# Patient Record
Sex: MALE | Race: WHITE | NOT HISPANIC OR LATINO | Employment: FULL TIME | URBAN - METROPOLITAN AREA
[De-identification: names, ages, dates, MRNs, and addresses within clinical notes are randomized per-mention and may not be internally consistent; named-entity substitution may affect disease eponyms.]

---
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
## 2018-01-12 ENCOUNTER — HOSPITAL ENCOUNTER (OUTPATIENT)
Dept: LAB | Facility: MEDICAL CENTER | Age: 18
End: 2018-01-12
Attending: NURSE PRACTITIONER
Payer: COMMERCIAL

## 2018-01-12 LAB
ALBUMIN SERPL BCP-MCNC: 5.1 G/DL (ref 3.2–4.9)
ALBUMIN/GLOB SERPL: 2 G/DL
ALP SERPL-CCNC: 75 U/L (ref 30–99)
ALT SERPL-CCNC: 77 U/L (ref 2–50)
ANION GAP SERPL CALC-SCNC: 7 MMOL/L (ref 0–11.9)
AST SERPL-CCNC: 35 U/L (ref 12–45)
BILIRUB SERPL-MCNC: 0.6 MG/DL (ref 0.1–1.5)
BUN SERPL-MCNC: 20 MG/DL (ref 8–22)
CALCIUM SERPL-MCNC: 9.6 MG/DL (ref 8.5–10.5)
CHLORIDE SERPL-SCNC: 100 MMOL/L (ref 96–112)
CHOLEST SERPL-MCNC: 230 MG/DL (ref 100–199)
CO2 SERPL-SCNC: 30 MMOL/L (ref 20–33)
CREAT SERPL-MCNC: 0.79 MG/DL (ref 0.5–1.4)
CREAT UR-MCNC: 404.8 MG/DL
GLOBULIN SER CALC-MCNC: 2.6 G/DL (ref 1.9–3.5)
GLUCOSE SERPL-MCNC: 216 MG/DL (ref 65–99)
HDLC SERPL-MCNC: 40 MG/DL
LDLC SERPL CALC-MCNC: 172 MG/DL
MICROALBUMIN UR-MCNC: 1 MG/DL
MICROALBUMIN/CREAT UR: 2 MG/G (ref 0–30)
POTASSIUM SERPL-SCNC: 4 MMOL/L (ref 3.6–5.5)
PROT SERPL-MCNC: 7.7 G/DL (ref 6–8.2)
SODIUM SERPL-SCNC: 137 MMOL/L (ref 135–145)
T4 FREE SERPL-MCNC: 1.11 NG/DL (ref 0.53–1.43)
TRIGL SERPL-MCNC: 90 MG/DL (ref 0–149)
TSH SERPL DL<=0.005 MIU/L-ACNC: 1.71 UIU/ML (ref 0.38–5.33)

## 2018-01-12 PROCEDURE — 84439 ASSAY OF FREE THYROXINE: CPT

## 2018-01-12 PROCEDURE — 80061 LIPID PANEL: CPT

## 2018-01-12 PROCEDURE — 80053 COMPREHEN METABOLIC PANEL: CPT

## 2018-01-12 PROCEDURE — 82043 UR ALBUMIN QUANTITATIVE: CPT

## 2018-01-12 PROCEDURE — 84443 ASSAY THYROID STIM HORMONE: CPT

## 2018-01-12 PROCEDURE — 36415 COLL VENOUS BLD VENIPUNCTURE: CPT

## 2018-01-12 PROCEDURE — 82570 ASSAY OF URINE CREATININE: CPT

## 2018-05-14 ENCOUNTER — HOSPITAL ENCOUNTER (EMERGENCY)
Facility: MEDICAL CENTER | Age: 18
End: 2018-05-15
Attending: EMERGENCY MEDICINE
Payer: COMMERCIAL

## 2018-05-14 DIAGNOSIS — R11.2 NON-INTRACTABLE VOMITING WITH NAUSEA, UNSPECIFIED VOMITING TYPE: ICD-10-CM

## 2018-05-14 DIAGNOSIS — E86.0 DEHYDRATION: ICD-10-CM

## 2018-05-14 LAB
BASOPHILS # BLD AUTO: 0.6 % (ref 0–1.8)
BASOPHILS # BLD: 0.07 K/UL (ref 0–0.12)
EOSINOPHIL # BLD AUTO: 0.1 K/UL (ref 0–0.51)
EOSINOPHIL NFR BLD: 0.8 % (ref 0–6.9)
ERYTHROCYTE [DISTWIDTH] IN BLOOD BY AUTOMATED COUNT: 37.6 FL (ref 35.9–50)
HCT VFR BLD AUTO: 47.3 % (ref 42–52)
HGB BLD-MCNC: 15.9 G/DL (ref 14–18)
IMM GRANULOCYTES # BLD AUTO: 0.22 K/UL (ref 0–0.11)
IMM GRANULOCYTES NFR BLD AUTO: 1.8 % (ref 0–0.9)
LYMPHOCYTES # BLD AUTO: 0.51 K/UL (ref 1–4.8)
LYMPHOCYTES NFR BLD: 4.3 % (ref 22–41)
MCH RBC QN AUTO: 28.6 PG (ref 27–33)
MCHC RBC AUTO-ENTMCNC: 33.6 G/DL (ref 33.7–35.3)
MCV RBC AUTO: 85.2 FL (ref 81.4–97.8)
MONOCYTES # BLD AUTO: 0.52 K/UL (ref 0–0.85)
MONOCYTES NFR BLD AUTO: 4.4 % (ref 0–13.4)
NEUTROPHILS # BLD AUTO: 10.5 K/UL (ref 1.82–7.42)
NEUTROPHILS NFR BLD: 88.1 % (ref 44–72)
NRBC # BLD AUTO: 0 K/UL
NRBC BLD-RTO: 0 /100 WBC
PLATELET # BLD AUTO: 249 K/UL (ref 164–446)
PMV BLD AUTO: 10.9 FL (ref 9–12.9)
RBC # BLD AUTO: 5.55 M/UL (ref 4.7–6.1)
WBC # BLD AUTO: 11.9 K/UL (ref 4.8–10.8)

## 2018-05-14 PROCEDURE — 99284 EMERGENCY DEPT VISIT MOD MDM: CPT

## 2018-05-14 PROCEDURE — 36415 COLL VENOUS BLD VENIPUNCTURE: CPT

## 2018-05-14 PROCEDURE — 80053 COMPREHEN METABOLIC PANEL: CPT

## 2018-05-14 PROCEDURE — 83690 ASSAY OF LIPASE: CPT

## 2018-05-14 PROCEDURE — 700105 HCHG RX REV CODE 258: Performed by: EMERGENCY MEDICINE

## 2018-05-14 PROCEDURE — 85025 COMPLETE CBC W/AUTO DIFF WBC: CPT

## 2018-05-14 PROCEDURE — 96374 THER/PROPH/DIAG INJ IV PUSH: CPT

## 2018-05-14 PROCEDURE — 700111 HCHG RX REV CODE 636 W/ 250 OVERRIDE (IP): Performed by: EMERGENCY MEDICINE

## 2018-05-14 RX ORDER — SODIUM CHLORIDE 9 MG/ML
1000 INJECTION, SOLUTION INTRAVENOUS ONCE
Status: COMPLETED | OUTPATIENT
Start: 2018-05-14 | End: 2018-05-15

## 2018-05-14 RX ORDER — ONDANSETRON 2 MG/ML
4 INJECTION INTRAMUSCULAR; INTRAVENOUS ONCE
Status: COMPLETED | OUTPATIENT
Start: 2018-05-14 | End: 2018-05-14

## 2018-05-14 RX ADMIN — ONDANSETRON HYDROCHLORIDE 4 MG: 2 INJECTION, SOLUTION INTRAMUSCULAR; INTRAVENOUS at 23:33

## 2018-05-14 RX ADMIN — SODIUM CHLORIDE 1000 ML: 9 INJECTION, SOLUTION INTRAVENOUS at 23:33

## 2018-05-15 VITALS
HEART RATE: 89 BPM | OXYGEN SATURATION: 99 % | HEIGHT: 67 IN | BODY MASS INDEX: 24.36 KG/M2 | RESPIRATION RATE: 16 BRPM | WEIGHT: 155.2 LBS | DIASTOLIC BLOOD PRESSURE: 96 MMHG | SYSTOLIC BLOOD PRESSURE: 150 MMHG | TEMPERATURE: 100.2 F

## 2018-05-15 LAB
ALBUMIN SERPL BCP-MCNC: 4.6 G/DL (ref 3.2–4.9)
ALBUMIN/GLOB SERPL: 1.4 G/DL
ALP SERPL-CCNC: 75 U/L (ref 30–99)
ALT SERPL-CCNC: 33 U/L (ref 2–50)
ANION GAP SERPL CALC-SCNC: 11 MMOL/L (ref 0–11.9)
AST SERPL-CCNC: 23 U/L (ref 12–45)
BILIRUB SERPL-MCNC: 1 MG/DL (ref 0.1–1.5)
BUN SERPL-MCNC: 16 MG/DL (ref 8–22)
CALCIUM SERPL-MCNC: 9.3 MG/DL (ref 8.5–10.5)
CHLORIDE SERPL-SCNC: 98 MMOL/L (ref 96–112)
CO2 SERPL-SCNC: 25 MMOL/L (ref 20–33)
CREAT SERPL-MCNC: 0.8 MG/DL (ref 0.5–1.4)
GLOBULIN SER CALC-MCNC: 3.2 G/DL (ref 1.9–3.5)
GLUCOSE SERPL-MCNC: 271 MG/DL (ref 65–99)
LIPASE SERPL-CCNC: 11 U/L (ref 11–82)
POTASSIUM SERPL-SCNC: 4.2 MMOL/L (ref 3.6–5.5)
PROT SERPL-MCNC: 7.8 G/DL (ref 6–8.2)
SODIUM SERPL-SCNC: 134 MMOL/L (ref 135–145)

## 2018-05-15 PROCEDURE — 700105 HCHG RX REV CODE 258: Performed by: EMERGENCY MEDICINE

## 2018-05-15 RX ORDER — ONDANSETRON 8 MG/1
8 TABLET, ORALLY DISINTEGRATING ORAL EVERY 8 HOURS PRN
Qty: 15 TAB | Refills: 0 | Status: SHIPPED | OUTPATIENT
Start: 2018-05-15 | End: 2019-12-14

## 2018-05-15 RX ORDER — SODIUM CHLORIDE 9 MG/ML
1000 INJECTION, SOLUTION INTRAVENOUS ONCE
Status: COMPLETED | OUTPATIENT
Start: 2018-05-15 | End: 2018-05-15

## 2018-05-15 RX ADMIN — SODIUM CHLORIDE 1000 ML: 9 INJECTION, SOLUTION INTRAVENOUS at 01:15

## 2018-05-15 NOTE — ED NOTES
Pt ambulated back to G [facility] with steady gait with mom at side. Pt thinks last time he threw up was around 2100 tonight.

## 2018-05-15 NOTE — ED TRIAGE NOTES
"Martinez Smith  17 y.o. male  Chief Complaint   Patient presents with   • Vomiting     \"I started throwing up this morning, I was fine for a few hours, I ate something about 6pm and haven't stopped since.\" HX type 1DM, reports BS slowly rising (203 at this time was 180 before leaving home). Endocrinologist Dr. Annabella Smith.         Pt amb to triage with steady gait for above complaint. HX celiacs as well.     Pt is alert and oriented, speaking in full sentences, follows commands and responds appropriately to questions. Pt is pale at this time. Resp are even and unlabored.    Pt placed in lobby. Pt educated on triage process. Pt encouraged to alert staff for any changes.    "

## 2018-05-15 NOTE — ED PROVIDER NOTES
"ED Provider Note    Scribed for Britney Barragan M.D. by Wandy Bailey. 5/14/2018, 11:00 PM.    Primary care provider: Gregory Grande M.D.  Means of arrival: walk-in  History obtained from: patient  History limited by: none    CHIEF COMPLAINT  Chief Complaint   Patient presents with   • Vomiting     \"I started throwing up this morning, I was fine for a few hours, I ate something about 6pm and haven't stopped since.\" HX type 1DM, reports BS slowly rising (203 at this time was 180 before leaving home). Endocrinologist Dr. Annabella Smith.         HPI  Martinez Smith is a 17 y.o. male with a history of celiac disease and diabetes who presents to the Emergency Department for evaluation after several episodes of vomiting beginning around 11AM this morning, resolving, then returning again at 6PM. Patient woke up with significant nausea and has been unable to tolerate most fluids or solids throughout the day. He does have a continuous blood sugar monitor and Insulin dispenser to manage his diabetes, with his blood sugars dropping throughout today secondary to the decreased PO. Patient has experienced nausea and vomiting similar to this in the past with gastroparesis. He reports feeling hungry but generally un well at this time, believing that he would not tolerate PO if he were to try.  No complaints of abdominal pain.    REVIEW OF SYSTEMS  Pertinent positives include nausea, vomiting. Pertinent negatives include no abdominal pain.  All other systems reviewed and negative.    PAST MEDICAL HISTORY   has a past medical history of Celiac disease and Diabetes type I (HCC).    SURGICAL HISTORY   has a past surgical history that includes hypospadias repair.    SOCIAL HISTORY  Social History   Substance Use Topics   • Smoking status: Never Smoker   • Smokeless tobacco: Never Used   • Alcohol use No      History   Drug Use No       FAMILY HISTORY  Family History   Problem Relation Age of Onset   • Arthritis Maternal " "Grandmother        CURRENT MEDICATIONS  No current facility-administered medications on file prior to encounter.      Current Outpatient Prescriptions on File Prior to Encounter   Medication Sig Dispense Refill   • insulin glargine (LANTUS SOLOSTAR) 100 UNIT/ML Solution Pen-injector injection Inject  as instructed as needed (backup for pump).     • Insulin Lispro (HUMALOG) 100 UNIT/ML Solution Cartridge Inject  as instructed. Refer to pump settings in download.     • Glucagon, rDNA, (GLUCAGON EMERGENCY) 1 MG Kit by Injection route.     • glucose blood (AFSHAN CONTOUR NEXT TEST) strip 1 Each by Other route as needed.     • Insulin Pen Needle (BD PEN NEEDLE ANDREW U/F) 32G X 4 MM Misc by Does not apply route.     • acetone, urine, test (KETOSTIX) strip by Does not apply route.     • Ketone Blood Test (PRECISION XTRA) Strip by In Vitro route.         ALLERGIES  Allergies   Allergen Reactions   • Acetaminophen Unspecified     \"Makes my CGM for DM unusable.\"        PHYSICAL EXAM  VITAL SIGNS: /96   Pulse (!) 123   Temp 37.9 °C (100.2 °F)   Resp 14   Ht 1.702 m (5' 7\")   Wt 70.4 kg (155 lb 3.3 oz)   SpO2 97%   BMI 24.31 kg/m²     Constitutional: Alert in no apparent distress.  HENT: No signs of trauma, Bilateral external ears normal, Nose normal.   Eyes: Pupils are equal and reactive, Conjunctiva normal, Non-icteric.   Neck: Normal range of motion, No tenderness, Supple, No stridor.   Cardiovascular: tachycardic, Regular hythm, no murmurs.   Thorax & Lungs: Normal breath sounds, No respiratory distress, No wheezing, No chest tenderness.   Abdomen: Bowel sounds normal, Soft, mild epigastric tenderness, No masses, No peritoneal signs.  Skin: Warm, Dry, No erythema, No rash.   Musculoskeletal:  No major deformities noted.   Neurologic: Alert, moving all extremities without difficulty, no focal deficits.    LABS  Labs Reviewed   CBC WITH DIFFERENTIAL - Abnormal; Notable for the following:        Result Value    WBC " 11.9 (*)     MCHC 33.6 (*)     Neutrophils-Polys 88.10 (*)     Lymphocytes 4.30 (*)     Immature Granulocytes 1.80 (*)     Neutrophils (Absolute) 10.50 (*)     Lymphs (Absolute) 0.51 (*)     Immature Granulocytes (abs) 0.22 (*)     All other components within normal limits   COMP METABOLIC PANEL - Abnormal; Notable for the following:     Sodium 134 (*)     Glucose 271 (*)     All other components within normal limits   LIPASE   ESTIMATED GFR     All labs reviewed by me.    IV Placement Procedure Note: (with ultrasound guidance)   The patient was consented all risks benefits and alternatives were discussed.   LOCATION: right brachial  POSITION: supine   PROCEDURE NOTE: Indication, poor access.   Using ultrasound guidance an 20-gauge IV was placed and had good flash and was able to cannulate fully and normal saline flowed easily. There is no localized infiltration. The line was secured by nursing staff.    COURSE & MEDICAL DECISION MAKING  Pertinent Labs & Imaging studies reviewed. (See chart for details)    Differential diagnoses include but are not limited to: DKA, gastroparesis, dehydration    11:00 PM - Patient seen and examined at bedside. He presents tachycardic with otherwise normal vital signs complaining of severe nausea and vomiting that has been intermittently present throughout the day, ultimately resulting in decreased PO and dropping blood sugars. Patient will be treated with 4mg iV Zofran. Ordered CBC, CMP, lipase to evaluate his symptoms. The patient will be resuscitated with NS IV for tachycardia. I informed the patient that we would rehydrate him and evaluate for anything acute with labs.    12:42 AM I re-evaluated patient at bedside. He reports feeling improved after some IV fluids. I explained that his labs appear normal at this time, and explained that he would most likely be discharge following 1 more bag of fluids and a p.o. challenge. Patient understands and agrees.     Response to IV fluids:  improved heart rate.      FINAL IMPRESSION  1. Non-intractable vomiting with nausea, unspecified vomiting type    2. Dehydration         This dictation has been created using voice recognition software and/or scribes. The accuracy of the dictation is limited by the abilities of the software and the expertise of the scribes. I expect there may be some errors of grammar and possibly content. I made every attempt to manually correct the errors within my dictation. However, errors related to voice recognition software and/or scribes may still exist and should be interpreted within the appropriate context.     Wandy GUTIERREZ (Scribe), am scribing for, and in the presence of, Britney Barragan M.D..    Electronically signed by: Wandy Bailey (Scribe), 5/14/2018    Britney GUTIERREZ M.D. personally performed the services described in this documentation, as scribed by Wandy Bailey in my presence, and it is both accurate and complete.    The note accurately reflects work and decisions made by me.  Britney Barragan  5/15/2018  12:57 AM

## 2018-05-15 NOTE — ED NOTES
Discharge instructions given to pt. Prescriptions handed to pt at bedside. Pt educated, verbalizes understanding. All belongings accounted for. Pt ambulated out of ED with mom at side and steady gait.

## 2018-11-13 ENCOUNTER — OFFICE VISIT (OUTPATIENT)
Dept: NEUROLOGY | Facility: MEDICAL CENTER | Age: 18
End: 2018-11-13
Payer: COMMERCIAL

## 2018-11-13 VITALS
HEART RATE: 98 BPM | RESPIRATION RATE: 16 BRPM | OXYGEN SATURATION: 98 % | WEIGHT: 158 LBS | TEMPERATURE: 96.6 F | SYSTOLIC BLOOD PRESSURE: 120 MMHG | BODY MASS INDEX: 25.39 KG/M2 | HEIGHT: 66 IN | DIASTOLIC BLOOD PRESSURE: 72 MMHG

## 2018-11-13 DIAGNOSIS — E10.9 TYPE 1 DIABETES MELLITUS WITHOUT COMPLICATION (HCC): ICD-10-CM

## 2018-11-13 DIAGNOSIS — M47.14 THORACIC MYELOPATHY: ICD-10-CM

## 2018-11-13 DIAGNOSIS — M54.30 BACK PAIN WITH SCIATICA: ICD-10-CM

## 2018-11-13 DIAGNOSIS — R29.898 WEAKNESS OF RIGHT LEG: ICD-10-CM

## 2018-11-13 DIAGNOSIS — M54.9 BACK PAIN WITH SCIATICA: ICD-10-CM

## 2018-11-13 DIAGNOSIS — R20.2 PARESTHESIAS: ICD-10-CM

## 2018-11-13 PROCEDURE — 99215 OFFICE O/P EST HI 40 MIN: CPT | Performed by: PSYCHIATRY & NEUROLOGY

## 2018-11-13 ASSESSMENT — PATIENT HEALTH QUESTIONNAIRE - PHQ9: CLINICAL INTERPRETATION OF PHQ2 SCORE: 0

## 2018-11-13 NOTE — PROGRESS NOTES
Chief Complaint   Patient presents with   • Follow-Up     leg weakness       Problem List Items Addressed This Visit     Type 1 diabetes mellitus without complication (HCC)      Other Visit Diagnoses     Paresthesias        Thoracic myelopathy        Relevant Orders    REFERRAL TO NEUROSURGERY    MR-THORACIC SPINE-W/O    Back pain with sciatica        Weakness of right leg              Interim history:  Martinez mSith returns in follow up with his mother. He still c/o middle back pain/ spasm that radiates to his right thigh up to his knee, that started when the school semester started in August. He says that it's probably aggravated by him walking around the [facility]. He is taking ibuprofen 200mg OTC, 1-2 tabs as needed, and reports that that is helping with the pain. He does take it judiciously and does not overdose himself with ibuprofen. He does not want any more pain meds.     He continues to complain of paresthesias and weakness on his right leg with the back pain. He wants to do a repeat MRI of his back and will consult another neurosurgery for 2nd opinion.     He has no problems with bladder or bowel control.      He denies any significant other symptoms.     Past medical history:   Past Medical History:   Diagnosis Date   • Celiac disease    • Diabetes type I (HCC)     insulin pump       Past surgical history:   Past Surgical History:   Procedure Laterality Date   • HYPOSPADIAS REPAIR         Family history:   Family History   Problem Relation Age of Onset   • Arthritis Maternal Grandmother        Social history:   Social History     Social History   • Marital status: Single     Spouse name: N/A   • Number of children: N/A   • Years of education: N/A     Occupational History   • Not on file.     Social History Main Topics   • Smoking status: Never Smoker   • Smokeless tobacco: Never Used   • Alcohol use No   • Drug use: No   • Sexual activity: Not on file     Other Topics Concern   • Not on file  "    Social History Narrative   • No narrative on file       Current medications:   Current Outpatient Prescriptions   Medication   • ondansetron (ZOFRAN ODT) 8 MG TABLET DISPERSIBLE   • insulin glargine (LANTUS SOLOSTAR) 100 UNIT/ML Solution Pen-injector injection   • Insulin Lispro (HUMALOG) 100 UNIT/ML Solution Cartridge   • glucose blood (AFSHAN CONTOUR NEXT TEST) strip   • Ketone Blood Test (PRECISION XTRA) Strip   • Glucagon, rDNA, (GLUCAGON EMERGENCY) 1 MG Kit   • Insulin Pen Needle (BD PEN NEEDLE ANDREW U/F) 32G X 4 MM Misc   • acetone, urine, test (KETOSTIX) strip     No current facility-administered medications for this visit.        Medication Allergy:  Allergies   Allergen Reactions   • Acetaminophen Unspecified     \"Makes my CGM for DM unusable.\"        Review of systems:   Constitutional: denies fever, night sweats, weight loss.   Eyes: denies acute vision change, eye pain or secretion.   Ears, Nose, Mouth, Throat: denies nasal secretion, nasal bleeding, difficulty swallowing, hearing loss, tinnitus, vertigo, ear pain, acute dental problems, oral ulcers or lesions.   Endocrine: denies recent weight changes, heat or cold intolerance, polyuria, polydypsia, polyphagia,abnormal hair growth.  Cardiovascular: denies new onset of chest pain, palpitations, syncope, or dyspnea of exertion.  Pulmonary: denies shortness of breath, new onset of cough, hemoptysis, wheezing, chest pain or flu-like symptoms.   GI: denies nausea, vomiting, diarrhea, GI bleeding, change in appetite, abdominal pain, and change in bowel habits.  : denies dysuria, urinary incontinence, hematuria.  Heme/oncology: denies history of easy bruising or bleeding. No history of cancer, DVTor PE.  Allergy/immunology: denies hives/urticaria, or itching.   Dermatologic: denies new rash, or new skin lesions.  Musculoskeletal:denies joint swelling or pain, muscle pain. + back pain.  Neurologic: denies headaches, acute visual changes, facial droopiness,  " "anesthesia, ataxia, change in speech or language, memory loss, abnormal movements, seizures, loss of consciousness, or episodes of confusion. +paresthesias and weakness on right leg.   Psychiatric: denies symptoms of depression, anxiety, hallucinations, mood swings or changes, suicidal or homicidal thoughts.     Physical examination:   Vitals:    11/13/18 1026   BP: 120/72   BP Location: Left arm   Patient Position: Sitting   BP Cuff Size: Adult   Pulse: 98   Resp: 16   Temp: 35.9 °C (96.6 °F)   TempSrc: Temporal   SpO2: 98%   Weight: 71.7 kg (158 lb)   Height: 1.676 m (5' 6\")     General: Patient in no acute distress, pleasant and cooperative.  HEENT: Normocephalic, no signs of acute trauma.    Neck: supple, no meningeal signs or carotid bruits. There is normal range of motion. No tenderness on exam.    Chest: clear to auscultation. No cough.    CV: RRR, no murmurs.    Skin: no signs of acute rashes or trauma.    Musculoskeletal: joints exhibit full range of motion. + Pain with middle back palpation.   Psychiatric: No hallucinatory behavior. Denies symptoms of depression or suicidal ideation. Mood and affect appear normal on exam.      NEUROLOGICAL EXAM:    Mental status, orientation: Awake, alert and fully oriented.    Speech and language: speech is clear and fluent. The patient is able to name, repeat and comprehend.    Memory: There is intact recollection of recent and remote events.    Cranial nerve exam: Pupils are 3-4 mm bilaterally and equally reactive to light and accommodation. Visual fields are intact by confrontation. There is no nystagmus on primary or secondary gaze. Intact full EOM in all directions of gaze. Face appears symmetric. Sensation in the face is intact to light touch. Uvula is midline. Palate elevates symmetrically. Tongue is midline and without any signs of tongue biting or fasciculations.Sternocleidomastoid muscles exhibit is normal strength bilaterally. Shoulder shrug is intact " bilaterally.    Motor exam: Strength is 5/5 in all extremities. Tone is normal. No abnormal movements were seen on exam.    Sensory exam reveals normal sense of light touch in all extremities. There is decreased sense of vibration on his feet.    Deep tendon reflexes:  2+ in the UE's, mildly brisk on his knees and ankles jerks. Plantar responses are flexor. There is no clonus.    Coordination: shows a normal finger-nose-finger. Normal rapidly alternating movements.    Gait: The patient was able to get up from seated position on first attempt without requiring assistance. Found to be steady when walking. Movements were fluid with normal arm swing. The patient was able to turn without difficulties or tendency to fall. Romberg examination unremarkable.   ANCILLARY DATA REVIEWED:     Lab Data Review:  Chart reviewed. CBC and CMP    Records reviewed:       Imaging:     MRI LS spine w/wo 10/3/16:  Sacralization of the L5 vertebral body, otherwise unremarkable MRI of the lumbar spine without and with contrast. This can be a pain generator.  (I personally reviewed images, no abnormalities other than described, no herniated discs).      MRI pelvis w/wo, right LS plexus protocol: 10/3/16  Transitional vertebral body at the lumbosacral junction.     MRI brain w/wo, 11/15/16  MRI OF THE BRAIN WITHOUT AND WITH CONTRAST WITHIN NORMAL LIMITS.     MRI c spine w/wo, 11/15/16  1. MRI OF THE CERVICAL SPINE WITHOUT AND WITH CONTRAST WITHIN NORMAL LIMITS. NO EVIDENCE OF DEMYELINATING DISEASE IN THE CERVICAL SPINAL CORD.     MRI t spine w/wo, 11/15/16  1.  T7-T8 small central disc protrusion with perhaps slight flattening of the ventral cord. Dorsal subarachnoid space remains intact without bernadette central stenosis.  2.  No evidence of myelopathic cord signal or abnormal enhancement in the thoracic spinal cord. No evidence of demyelinating disease.  (I personally reviewed images, either disc protrusion or extrusion at T7-8 with indentation  enough to deform spinal cord at that level although no clear cord changes abnormalities. I showed images to pt/mother)        NCS/EMG 8/31/16:  There is no electrodiagnostic suggestion of a widespread peripheral neuropathy (this test does not assess for small fiber neuropathy).    There is no electrodiagnostic suggestion of a right lumbosacral radiculopathy (L2-S2), however it cannot be fully ruled out on the basis of this test.    There is no electrodiagnostic suggestion of a lumbosacral plexopathy.    (Resuts d/w pt and mother).       ASSESSMENT AND PLAN:    1. Paresthesias    2. Thoracic myelopathy  - REFERRAL TO NEUROSURGERY  - MR-THORACIC SPINE-W/O; Future    3. Type 1 diabetes mellitus without complication (HCC)    4. Back pain with sciatica    5. Weakness of right leg        CLINICAL DISCUSSION:  There is no evidence for large or medium fiber peripheral polyneuropathy, mononeuropathy, plexopathy, or lumbar radiculopathy based on his last EMG result.   There is a family h/o MS but no evidence of MS on this patient.    Thoracic back pain and right leg paresthesia and weakness are thought due to T7-T8 thoracic myelopathy as seen on his previous MRI. Probably aggravated more with physical activities in school. He was previously referred to a neurosurgeon but was deemed insignificant by them and nothing was done. His sx improved until now. WIll repeat MRI t spine and pt was given referral to neurosurgery with Dr. Pete for 2nd opinion. He is okay with taking ibuprofen OTC. Does not want other pain meds. Discussed alternative options such as rest, cold/heat pack as needed. His neurological exam is intact.       FOLLOW-UP:   Return in about 3 months (around 2/13/2019).        EDUCATION AND COUNSELING:  -Discussed regular exercise program and prevention of cardiovascular disease, including stroke.   -Discussed healthy lifestyle, including: healthy diet (rich in fruits, vegetables, nuts and healthy oils); proper  hydration, and adequate sleep hygiene (allowing 7-8 hrs of overnight sleep).    The patient understands and agrees that due to the complexity of his/her diagnosis, results of any testing and further recommendations will typically be discussed/made during a face to face encounter in my office. The patient and/or family further understands it is their responsibility to keep proper follow up.       Severo Espinoza MD   Epilepsy and Neurodiagnostics.   Clinical  of Neurology [facility].   Diplomate in Neurology, Epilepsy, and Electrodiagnostic Medicine.   Office: [phone]  Fax: [phone]      o BILLING DOCUMENTATION:   Counseling:  I spent greater than 50% time face-to-face time of a total of 50 mins visit. Over 50% of the time of the visit today was spent on counseling and or coordination of care wtih the patient/family, with greater than 50% of the total time discussing:   o Diagnostic results, impressions, and/or recommended diagnostic studies, and coordination of care.   o Prognosis.  o Treatment recommendations, including risks, benefits, & alternatives.  o Instructions for treatment/management and/or follow-up.  o Importance of compliance with chosen treatment/management options.  o Risk factor modification.   o Patient & family education.  o Provided business card and/or instructions for follow-up & emergencies.

## 2018-11-28 ENCOUNTER — HOSPITAL ENCOUNTER (OUTPATIENT)
Dept: RADIOLOGY | Facility: MEDICAL CENTER | Age: 18
End: 2018-11-28
Attending: NURSE PRACTITIONER
Payer: COMMERCIAL

## 2018-11-28 DIAGNOSIS — M47.14 THORACIC MYELOPATHY: ICD-10-CM

## 2018-11-28 PROCEDURE — 72146 MRI CHEST SPINE W/O DYE: CPT

## 2019-02-02 ENCOUNTER — HOSPITAL ENCOUNTER (OUTPATIENT)
Dept: LAB | Facility: MEDICAL CENTER | Age: 19
End: 2019-02-02
Attending: NURSE PRACTITIONER
Payer: COMMERCIAL

## 2019-02-02 LAB
ALBUMIN SERPL BCP-MCNC: 4.8 G/DL (ref 3.2–4.9)
ALBUMIN/GLOB SERPL: 1.4 G/DL
ALP SERPL-CCNC: 79 U/L (ref 30–99)
ALT SERPL-CCNC: 19 U/L (ref 2–50)
ANION GAP SERPL CALC-SCNC: 9 MMOL/L (ref 0–11.9)
AST SERPL-CCNC: 19 U/L (ref 12–45)
BILIRUB SERPL-MCNC: 0.4 MG/DL (ref 0.1–1.5)
BUN SERPL-MCNC: 18 MG/DL (ref 8–22)
CALCIUM SERPL-MCNC: 9.5 MG/DL (ref 8.5–10.5)
CHLORIDE SERPL-SCNC: 101 MMOL/L (ref 96–112)
CHOLEST SERPL-MCNC: 223 MG/DL (ref 100–199)
CO2 SERPL-SCNC: 26 MMOL/L (ref 20–33)
CREAT SERPL-MCNC: 0.88 MG/DL (ref 0.5–1.4)
CREAT UR-MCNC: 361.3 MG/DL
GLOBULIN SER CALC-MCNC: 3.4 G/DL (ref 1.9–3.5)
GLUCOSE SERPL-MCNC: 255 MG/DL (ref 65–99)
HDLC SERPL-MCNC: 32 MG/DL
LDLC SERPL CALC-MCNC: 155 MG/DL
MICROALBUMIN UR-MCNC: 1.5 MG/DL
MICROALBUMIN/CREAT UR: 4 MG/G (ref 0–30)
POTASSIUM SERPL-SCNC: 4.5 MMOL/L (ref 3.6–5.5)
PROT SERPL-MCNC: 8.2 G/DL (ref 6–8.2)
SODIUM SERPL-SCNC: 136 MMOL/L (ref 135–145)
TRIGL SERPL-MCNC: 179 MG/DL (ref 0–149)
TSH SERPL DL<=0.005 MIU/L-ACNC: 1.57 UIU/ML (ref 0.38–5.33)

## 2019-02-02 PROCEDURE — 80061 LIPID PANEL: CPT

## 2019-02-02 PROCEDURE — 82570 ASSAY OF URINE CREATININE: CPT

## 2019-02-02 PROCEDURE — 36415 COLL VENOUS BLD VENIPUNCTURE: CPT

## 2019-02-02 PROCEDURE — 84443 ASSAY THYROID STIM HORMONE: CPT

## 2019-02-02 PROCEDURE — 82043 UR ALBUMIN QUANTITATIVE: CPT

## 2019-02-02 PROCEDURE — 80053 COMPREHEN METABOLIC PANEL: CPT

## 2019-02-08 ENCOUNTER — APPOINTMENT (OUTPATIENT)
Dept: RADIOLOGY | Facility: MEDICAL CENTER | Age: 19
End: 2019-02-08
Attending: NEUROLOGICAL SURGERY
Payer: COMMERCIAL

## 2019-02-19 ENCOUNTER — OFFICE VISIT (OUTPATIENT)
Dept: URGENT CARE | Facility: CLINIC | Age: 19
End: 2019-02-19
Payer: COMMERCIAL

## 2019-02-19 VITALS
TEMPERATURE: 98.1 F | BODY MASS INDEX: 25.49 KG/M2 | DIASTOLIC BLOOD PRESSURE: 80 MMHG | HEIGHT: 67 IN | OXYGEN SATURATION: 96 % | HEART RATE: 113 BPM | WEIGHT: 162.4 LBS | RESPIRATION RATE: 14 BRPM | SYSTOLIC BLOOD PRESSURE: 100 MMHG

## 2019-02-19 DIAGNOSIS — H66.002 ACUTE SUPPURATIVE OTITIS MEDIA OF LEFT EAR WITHOUT SPONTANEOUS RUPTURE OF TYMPANIC MEMBRANE, RECURRENCE NOT SPECIFIED: ICD-10-CM

## 2019-02-19 PROCEDURE — 99214 OFFICE O/P EST MOD 30 MIN: CPT | Performed by: FAMILY MEDICINE

## 2019-02-19 RX ORDER — AMOXICILLIN 500 MG/1
500 CAPSULE ORAL 3 TIMES DAILY
Qty: 30 CAP | Refills: 0 | Status: SHIPPED | OUTPATIENT
Start: 2019-02-19 | End: 2019-12-17 | Stop reason: CLARIF

## 2019-02-20 NOTE — PROGRESS NOTES
Subjective:     Martinez Smith is a 18 y.o. male who presents for Otalgia (started today); Pharyngitis (couple of days); and Cough (couple of days)    HPI  Pt presents for evaluation of a new problem   Pt with a cough for the past 3 days   Woke up this morning and lost voice   Both ears painful   Pain is constant and worse in left   Pain stays localized and does not radiate  +Nasal congestion   Cough is occasionally productive   Cough is constant and not worse at any time of day   Took ibuprofen, Zyrtec, and Mucinex     Review of Systems   Constitutional: Negative for fever.   HENT: Positive for congestion and ear pain. Negative for sore throat.    Respiratory: Positive for cough. Negative for shortness of breath.    Cardiovascular: Negative for chest pain.   Gastrointestinal: Negative for abdominal pain, diarrhea, nausea and vomiting.   Skin: Negative for rash.   Neurological: Negative for headaches.       PMH:  has a past medical history of Celiac disease and Diabetes type I (HCC).  MEDS:   Current Outpatient Prescriptions:   •  Insulin Lispro (HUMALOG) 100 UNIT/ML Solution Cartridge, Inject  as instructed. Refer to pump settings in download., Disp: , Rfl:   •  ondansetron (ZOFRAN ODT) 8 MG TABLET DISPERSIBLE, Take 1 Tab by mouth every 8 hours as needed., Disp: 15 Tab, Rfl: 0  •  insulin glargine (LANTUS SOLOSTAR) 100 UNIT/ML Solution Pen-injector injection, Inject  as instructed as needed (backup for pump)., Disp: , Rfl:   •  Glucagon, rDNA, (GLUCAGON EMERGENCY) 1 MG Kit, by Injection route., Disp: , Rfl:   •  glucose blood (FASHAN CONTOUR NEXT TEST) strip, 1 Each by Other route as needed., Disp: , Rfl:   •  Insulin Pen Needle (BD PEN NEEDLE ANDREW U/F) 32G X 4 MM Misc, by Does not apply route., Disp: , Rfl:   •  acetone, urine, test (KETOSTIX) strip, by Does not apply route., Disp: , Rfl:   •  Ketone Blood Test (PRECISION XTRA) Strip, by In Vitro route., Disp: , Rfl:   ALLERGIES:   Allergies   Allergen  "Reactions   • Acetaminophen Unspecified     \"Makes my CGM for DM unusable.\"      SURGHX:   Past Surgical History:   Procedure Laterality Date   • HYPOSPADIAS REPAIR       SOCHX:  reports that he has never smoked. He has never used smokeless tobacco. He reports that he does not drink alcohol or use drugs.  FH: Family history was reviewed, not contributing to acute illness     Objective:   /80 (BP Location: Left arm, Patient Position: Sitting, BP Cuff Size: Adult)   Pulse (!) 113   Temp 36.7 °C (98.1 °F)   Resp 14   Ht 1.702 m (5' 7\")   Wt 73.7 kg (162 lb 6.4 oz)   SpO2 96%   BMI 25.44 kg/m²     Physical Exam   Constitutional: He appears well-developed and well-nourished. No distress.   HENT:   Head: Normocephalic and atraumatic.   Right Ear: External ear normal.   Left Ear: External ear normal.   Nose: Nose normal.   Mouth/Throat: Oropharynx is clear and moist. No oropharyngeal exudate.   Left tympanic membrane erythematous with small purulent effusion   Eyes: Pupils are equal, round, and reactive to light. Conjunctivae and EOM are normal. Right eye exhibits no discharge. Left eye exhibits no discharge. No scleral icterus.   Neck: Normal range of motion. No tracheal deviation present.   Cardiovascular: Normal rate, regular rhythm and normal heart sounds.    Pulmonary/Chest: Effort normal and breath sounds normal. No respiratory distress. He has no wheezes. He has no rales.   Musculoskeletal: Normal range of motion.   Neurological: He is alert.   Skin: Skin is warm and dry. No rash noted. He is not diaphoretic.   Psychiatric: He has a normal mood and affect. His behavior is normal. Judgment and thought content normal.       Assessment/Plan:   Assessment    1. Acute suppurative otitis media of left ear without spontaneous rupture of tympanic membrane, recurrence not specified  Patient is a 18-year-old male with left-sided ear infection.  Will treat with amoxicillin.  Follow-up as needed.  - amoxicillin " (AMOXIL) 500 MG Cap; Take 1 Cap by mouth 3 times a day.  Dispense: 30 Cap; Refill: 0

## 2019-02-25 ASSESSMENT — ENCOUNTER SYMPTOMS
SHORTNESS OF BREATH: 0
HEADACHES: 0
COUGH: 1
FEVER: 0
DIARRHEA: 0
SORE THROAT: 0
NAUSEA: 0
VOMITING: 0
ABDOMINAL PAIN: 0

## 2019-05-23 ENCOUNTER — OFFICE VISIT (OUTPATIENT)
Dept: NEUROLOGY | Facility: MEDICAL CENTER | Age: 19
End: 2019-05-23
Payer: COMMERCIAL

## 2019-05-23 VITALS
WEIGHT: 163 LBS | TEMPERATURE: 96.7 F | SYSTOLIC BLOOD PRESSURE: 132 MMHG | HEIGHT: 66 IN | RESPIRATION RATE: 16 BRPM | DIASTOLIC BLOOD PRESSURE: 88 MMHG | OXYGEN SATURATION: 98 % | BODY MASS INDEX: 26.2 KG/M2 | HEART RATE: 100 BPM

## 2019-05-23 DIAGNOSIS — M47.14 THORACIC MYELOPATHY: ICD-10-CM

## 2019-05-23 DIAGNOSIS — R20.2 PARESTHESIAS: ICD-10-CM

## 2019-05-23 PROCEDURE — 99214 OFFICE O/P EST MOD 30 MIN: CPT | Performed by: PSYCHIATRY & NEUROLOGY

## 2019-05-23 ASSESSMENT — PATIENT HEALTH QUESTIONNAIRE - PHQ9: CLINICAL INTERPRETATION OF PHQ2 SCORE: 0

## 2019-05-24 NOTE — PROGRESS NOTES
Chief Complaint   Patient presents with   • Follow-Up     Thoracic myelopathy       Problem List Items Addressed This Visit     None      Visit Diagnoses     Thoracic myelopathy        Paresthesias              Interim history:  Martinez Smith returns in fu with his mother. He saw [facility] and was supposed to have Xray and MRI L spine but has not have done yet due to financial hardship. He is interested in scheduling it now.     His symptoms to the right lower extremity are actually better over the last few months. He states he has been walking more to school and feels much improve, only mild discomfort at times, no weakness.     Mood is great.     He is attending college.     Past medical history:   Past Medical History:   Diagnosis Date   • Celiac disease    • Diabetes type I (HCC)     insulin pump       Past surgical history:   Past Surgical History:   Procedure Laterality Date   • HYPOSPADIAS REPAIR         Family history:   Family History   Problem Relation Age of Onset   • Arthritis Maternal Grandmother        Social history:   Social History     Social History   • Marital status: Single     Spouse name: N/A   • Number of children: N/A   • Years of education: N/A     Occupational History   • Not on file.     Social History Main Topics   • Smoking status: Never Smoker   • Smokeless tobacco: Never Used   • Alcohol use No   • Drug use: No   • Sexual activity: Not on file     Other Topics Concern   • Not on file     Social History Narrative   • No narrative on file       Current medications:   Current Outpatient Prescriptions   Medication   • ondansetron (ZOFRAN ODT) 8 MG TABLET DISPERSIBLE   • insulin glargine (LANTUS SOLOSTAR) 100 UNIT/ML Solution Pen-injector injection   • Insulin Lispro (HUMALOG) 100 UNIT/ML Solution Cartridge   • Glucagon, rDNA, (GLUCAGON EMERGENCY) 1 MG Kit   • glucose blood (AFSHAN CONTOUR NEXT TEST) strip   • Insulin Pen Needle (BD PEN NEEDLE ANDREW U/F) 32G X 4 MM Misc   • acetone,  "urine, test (KETOSTIX) strip   • Ketone Blood Test (PRECISION XTRA) Strip   • amoxicillin (AMOXIL) 500 MG Cap     No current facility-administered medications for this visit.        Medication Allergy:  Allergies   Allergen Reactions   • Acetaminophen Unspecified     \"Makes my CGM for DM unusable.\"        Review of systems:   Constitutional: denies fever, night sweats, weight loss.   Eyes: denies acute vision change, eye pain or secretion.   Ears, Nose, Mouth, Throat: denies nasal secretion, nasal bleeding, difficulty swallowing, hearing loss, tinnitus, vertigo, ear pain, acute dental problems, oral ulcers or lesions.   Endocrine: denies recent weight changes, heat or cold intolerance, polyuria, polydypsia, polyphagia,abnormal hair growth.  Cardiovascular: denies new onset of chest pain, palpitations, syncope, or dyspnea of exertion.  Pulmonary: denies shortness of breath, new onset of cough, hemoptysis, wheezing, chest pain or flu-like symptoms.   GI: denies nausea, vomiting, diarrhea, GI bleeding, change in appetite, abdominal pain, and change in bowel habits.  : denies dysuria, urinary incontinence, hematuria.  Heme/oncology: denies history of easy bruising or bleeding. No history of cancer, DVTor PE.  Allergy/immunology: denies hives/urticaria, or itching.   Dermatologic: denies new rash, or new skin lesions.  Musculoskeletal:denies joint swelling or pain, muscle pain, neck and back pain. Neurologic: denies headaches, acute visual changes, facial droopiness, muscle weakness (focal or generalized), anesthesia, ataxia, change in speech or language, memory loss, abnormal movements, seizures, loss of consciousness, or episodes of confusion.   Psychiatric: denies symptoms of depression, anxiety, hallucinations, mood swings or changes, suicidal or homicidal thoughts.     Physical examination:   Vitals:    05/23/19 1027   BP: 132/88   BP Location: Left arm   Patient Position: Sitting   BP Cuff Size: Adult   Pulse: " "100   Resp: 16   Temp: 35.9 °C (96.7 °F)   TempSrc: Temporal   SpO2: 98%   Weight: 73.9 kg (163 lb)   Height: 1.676 m (5' 6\")     General: Patient in no acute distress, pleasant and cooperative.  HEENT: Normocephalic, no signs of acute trauma.   Neck: supple, no meningeal signs or carotid bruits. There is normal range of motion. No tenderness on exam.   Chest: clear to auscultation. No cough.   CV: RRR, no murmurs.   Skin: no signs of acute rashes or trauma.   Musculoskeletal: joints exhibit full range of motion, without any pain to palpation. There are no signs of joint or muscle swelling. There is no tenderness to deep palpation of muscles.   Psychiatric: No hallucinatory behavior. Denies symptoms of depression or suicidal ideation. Mood and affect appear normal on exam.     NEUROLOGICAL EXAM:   Mental status, orientation: Awake, alert and fully oriented.   Speech and language: speech is clear and fluent. The patient is able to name, repeat and comprehend.   Memory: There is intact recollection of recent and remote events.   Cranial nerve exam: Pupils are 3-4 mm bilaterally and equally reactive to light and accommodation. Visual fields are intact by confrontation. here is no nystagmus on primary or secondary gaze. Intact full EOM in all directions of gaze. Face appears symmetric. Sensation in the face is intact to light touch. Uvula is midline. Palate elevates symmetrically. Tongue is midline and without any signs of tongue biting or fasciculations. Sternocleidomastoid muscles exhibit is normal strength bilaterally. Shoulder shrug is intact bilaterally.   Motor exam: Strength is 5/5 in all extremities. Tone is normal. No abnormal movements were seen on exam.   Sensory exam reveals normal sense of light touch in all extremities.   Deep tendon reflexes:  2+ throughout. Plantar responses are flexor. There is no clonus.   Coordination: shows a normal finger-nose-finger. Normal rapidly alternating movements.   Gait: " The patient was able to get up from seated position on first attempt without requiring assistance. Found to be steady when walking. Movements were fluid with normal arm swing. The patient was able to turn without difficulties or tendency to fall. Romberg examination unremarkable.       ANCILLARY DATA REVIEWED:     Lab Data Review:  Reviewed in chart.     Records reviewed:   Chart reviewed.       Imaging:   MRI LS spine w/wo 10/3/16:  Sacralization of the L5 vertebral body, otherwise unremarkable MRI of the lumbar spine without and with contrast. This can be a pain generator.  (I personally reviewed images, no abnormalities other than described, no herniated discs).      MRI pelvis w/wo, right LS plexus protocol: 10/3/16  Transitional vertebral body at the lumbosacral junction.     MRI brain w/wo, 11/15/16  MRI OF THE BRAIN WITHOUT AND WITH CONTRAST WITHIN NORMAL LIMITS.     MRI c spine w/wo, 11/15/16  1. MRI OF THE CERVICAL SPINE WITHOUT AND WITH CONTRAST WITHIN NORMAL LIMITS. NO EVIDENCE OF DEMYELINATING DISEASE IN THE CERVICAL SPINAL CORD.     MRI t spine w/wo, 11/15/16  1.  T7-T8 small central disc protrusion with perhaps slight flattening of the ventral cord. Dorsal subarachnoid space remains intact without bernadette central stenosis.  2.  No evidence of myelopathic cord signal or abnormal enhancement in the thoracic spinal cord. No evidence of demyelinating disease.    MRI t spine w/wo, 11/29/18:  1. Dorsal epidural lipomatosis which does not cause significant compression of the thecal sac or cord.  2. Minimal central disc protrusion at the T7-8 level which has decreased in size since previous exam and does not cause significant cord compression.  3. Minimal right paracentral thoracic spondylosis at the T2-3 level.          NCS/EMG 8/31/16:  There is no electrodiagnostic suggestion of a widespread peripheral neuropathy (this test does not assess for small fiber neuropathy).    There is no electrodiagnostic  suggestion of a right lumbosacral radiculopathy (L2-S2), however it cannot be fully ruled out on the basis of this test.    There is no electrodiagnostic suggestion of a lumbosacral plexopathy.    (Resuts d/w pt and mother).         ASSESSMENT AND PLAN:    1. Thoracic myelopathy      2. Paresthesias        CLINICAL DISCUSSION:  There was no evidence for large or medium fiber peripheral polyneuropathy, mononeuropathy, plexopathy, or lumbar radiculopathy based on his last EMG result.   There is a family h/o MS but there was no evidence of MS on this patient.  Had thoracic back pain and right leg paresthesia and weakness. One possibility was due to finding of T7-T8 thoracic cord compression. Most recent MRI is improved. Pt's symptoms are much improved. Following with [facility], will proceed with imaging ordered by [facility].         FOLLOW-UP:   Return if symptoms worsen or fail to improve.          EDUCATION AND COUNSELING:  -Discussed regular exercise program and prevention of cardiovascular disease, including stroke.   -Discussed healthy lifestyle, including: healthy diet (rich in fruits, vegetables, nuts and healthy oils); proper hydration, and adequate sleep hygiene (allowing 7-8 hrs of overnight sleep).    The patient understands and agrees that due to the complexity of his/her diagnosis, results of any testing and further recommendations will typically be discussed/made during a face to face encounter in my office. The patient and/or family further understands it is their responsibility to keep proper follow up.     Pt and mother agree with plan.       Severo Espinoza MD   Epilepsy and Neurodiagnostics.   Clinical  of Neurology [facility].   Diplomate in Neurology, Epilepsy, and Electrodiagnostic Medicine.   Office: [phone]  Fax: [phone]      BILLING DOCUMENTATION:     I have performed physical exam and reviewed and updated ROS and plan today  5/23/2019. In review of that note, there are no new changes except as documented above.     Counseling:  I spent greater than 50% time face-to-face time of a total of 40 minutes visit. Over 50% of the time of the visit today was spent on counseling and or coordination of care wtih the patient and/or family, with greater than 50% of the total discussing my assessment and plan as stated above.

## 2019-07-11 ENCOUNTER — APPOINTMENT (OUTPATIENT)
Dept: RADIOLOGY | Facility: MEDICAL CENTER | Age: 19
End: 2019-07-11
Attending: NEUROLOGICAL SURGERY
Payer: COMMERCIAL

## 2019-07-13 ENCOUNTER — HOSPITAL ENCOUNTER (OUTPATIENT)
Dept: LAB | Facility: MEDICAL CENTER | Age: 19
End: 2019-07-13
Attending: FAMILY MEDICINE
Payer: COMMERCIAL

## 2019-07-13 LAB
25(OH)D3 SERPL-MCNC: 17 NG/ML (ref 30–100)
ALBUMIN SERPL BCP-MCNC: 4.9 G/DL (ref 3.2–4.9)
ALBUMIN/GLOB SERPL: 1.3 G/DL
ALP SERPL-CCNC: 92 U/L (ref 30–99)
ALT SERPL-CCNC: 27 U/L (ref 2–50)
ANION GAP SERPL CALC-SCNC: 5 MMOL/L (ref 0–11.9)
AST SERPL-CCNC: 20 U/L (ref 12–45)
BASOPHILS # BLD AUTO: 1.3 % (ref 0–1.8)
BASOPHILS # BLD: 0.1 K/UL (ref 0–0.12)
BILIRUB SERPL-MCNC: 0.5 MG/DL (ref 0.1–1.5)
BUN SERPL-MCNC: 13 MG/DL (ref 8–22)
CALCIUM SERPL-MCNC: 10 MG/DL (ref 8.5–10.5)
CHLORIDE SERPL-SCNC: 100 MMOL/L (ref 96–112)
CHOLEST SERPL-MCNC: 235 MG/DL (ref 100–199)
CO2 SERPL-SCNC: 28 MMOL/L (ref 20–33)
CREAT SERPL-MCNC: 0.83 MG/DL (ref 0.5–1.4)
CREAT UR-MCNC: 131.4 MG/DL
EOSINOPHIL # BLD AUTO: 0.69 K/UL (ref 0–0.51)
EOSINOPHIL NFR BLD: 8.8 % (ref 0–6.9)
ERYTHROCYTE [DISTWIDTH] IN BLOOD BY AUTOMATED COUNT: 40.5 FL (ref 35.9–50)
FASTING STATUS PATIENT QL REPORTED: NORMAL
FOLATE SERPL-MCNC: 5.9 NG/ML
GLOBULIN SER CALC-MCNC: 3.9 G/DL (ref 1.9–3.5)
GLUCOSE SERPL-MCNC: 331 MG/DL (ref 65–99)
HCT VFR BLD AUTO: 50.4 % (ref 42–52)
HDLC SERPL-MCNC: 38 MG/DL
HGB BLD-MCNC: 16.1 G/DL (ref 14–18)
IMM GRANULOCYTES # BLD AUTO: 0.06 K/UL (ref 0–0.11)
IMM GRANULOCYTES NFR BLD AUTO: 0.8 % (ref 0–0.9)
LDLC SERPL CALC-MCNC: 166 MG/DL
LYMPHOCYTES # BLD AUTO: 1.68 K/UL (ref 1–4.8)
LYMPHOCYTES NFR BLD: 21.5 % (ref 22–41)
MCH RBC QN AUTO: 28.3 PG (ref 27–33)
MCHC RBC AUTO-ENTMCNC: 31.9 G/DL (ref 33.7–35.3)
MCV RBC AUTO: 88.6 FL (ref 81.4–97.8)
MICROALBUMIN UR-MCNC: <0.7 MG/DL
MICROALBUMIN/CREAT UR: NORMAL MG/G (ref 0–30)
MONOCYTES # BLD AUTO: 0.57 K/UL (ref 0–0.85)
MONOCYTES NFR BLD AUTO: 7.3 % (ref 0–13.4)
NEUTROPHILS # BLD AUTO: 4.71 K/UL (ref 1.82–7.42)
NEUTROPHILS NFR BLD: 60.3 % (ref 44–72)
NRBC # BLD AUTO: 0 K/UL
NRBC BLD-RTO: 0 /100 WBC
PLATELET # BLD AUTO: 294 K/UL (ref 164–446)
PMV BLD AUTO: 11.7 FL (ref 9–12.9)
POTASSIUM SERPL-SCNC: 4.8 MMOL/L (ref 3.6–5.5)
PROT SERPL-MCNC: 8.8 G/DL (ref 6–8.2)
RBC # BLD AUTO: 5.69 M/UL (ref 4.7–6.1)
SODIUM SERPL-SCNC: 133 MMOL/L (ref 135–145)
T3FREE SERPL-MCNC: 4.61 PG/ML (ref 2.4–4.2)
T4 FREE SERPL-MCNC: 0.97 NG/DL (ref 0.53–1.43)
TRIGL SERPL-MCNC: 156 MG/DL (ref 0–149)
TSH SERPL DL<=0.005 MIU/L-ACNC: 1.61 UIU/ML (ref 0.38–5.33)
VIT B12 SERPL-MCNC: 878 PG/ML (ref 211–911)
WBC # BLD AUTO: 7.8 K/UL (ref 4.8–10.8)

## 2019-07-13 PROCEDURE — 80053 COMPREHEN METABOLIC PANEL: CPT

## 2019-07-13 PROCEDURE — 84481 FREE ASSAY (FT-3): CPT

## 2019-07-13 PROCEDURE — 82746 ASSAY OF FOLIC ACID SERUM: CPT

## 2019-07-13 PROCEDURE — 36415 COLL VENOUS BLD VENIPUNCTURE: CPT

## 2019-07-13 PROCEDURE — 84443 ASSAY THYROID STIM HORMONE: CPT

## 2019-07-13 PROCEDURE — 84439 ASSAY OF FREE THYROXINE: CPT

## 2019-07-13 PROCEDURE — 85025 COMPLETE CBC W/AUTO DIFF WBC: CPT

## 2019-07-13 PROCEDURE — 86800 THYROGLOBULIN ANTIBODY: CPT

## 2019-07-13 PROCEDURE — 82043 UR ALBUMIN QUANTITATIVE: CPT

## 2019-07-13 PROCEDURE — 80061 LIPID PANEL: CPT

## 2019-07-13 PROCEDURE — 82306 VITAMIN D 25 HYDROXY: CPT

## 2019-07-13 PROCEDURE — 82570 ASSAY OF URINE CREATININE: CPT

## 2019-07-13 PROCEDURE — 82607 VITAMIN B-12: CPT

## 2019-07-15 LAB — THYROGLOB AB SERPL-ACNC: <0.9 IU/ML (ref 0–4)

## 2019-07-18 ENCOUNTER — HOSPITAL ENCOUNTER (OUTPATIENT)
Dept: RADIOLOGY | Facility: MEDICAL CENTER | Age: 19
End: 2019-07-18
Attending: NEUROLOGICAL SURGERY
Payer: COMMERCIAL

## 2019-07-18 DIAGNOSIS — M54.5 LOW BACK PAIN, UNSPECIFIED BACK PAIN LATERALITY, UNSPECIFIED CHRONICITY, WITH SCIATICA PRESENCE UNSPECIFIED: ICD-10-CM

## 2019-07-18 PROCEDURE — 72072 X-RAY EXAM THORAC SPINE 3VWS: CPT

## 2019-07-18 PROCEDURE — 72148 MRI LUMBAR SPINE W/O DYE: CPT

## 2019-09-14 ENCOUNTER — HOSPITAL ENCOUNTER (OUTPATIENT)
Dept: LAB | Facility: MEDICAL CENTER | Age: 19
End: 2019-09-14
Attending: FAMILY MEDICINE
Payer: COMMERCIAL

## 2019-09-14 LAB
25(OH)D3 SERPL-MCNC: 46 NG/ML (ref 30–100)
ALBUMIN SERPL BCP-MCNC: 4.8 G/DL (ref 3.2–4.9)
ALBUMIN/GLOB SERPL: 1.5 G/DL
ALP SERPL-CCNC: 87 U/L (ref 30–99)
ALT SERPL-CCNC: 16 U/L (ref 2–50)
ANION GAP SERPL CALC-SCNC: 7 MMOL/L (ref 0–11.9)
AST SERPL-CCNC: 15 U/L (ref 12–45)
BASOPHILS # BLD AUTO: 1.3 % (ref 0–1.8)
BASOPHILS # BLD: 0.1 K/UL (ref 0–0.12)
BILIRUB SERPL-MCNC: 0.4 MG/DL (ref 0.1–1.5)
BUN SERPL-MCNC: 19 MG/DL (ref 8–22)
CALCIUM SERPL-MCNC: 9.5 MG/DL (ref 8.5–10.5)
CHLORIDE SERPL-SCNC: 103 MMOL/L (ref 96–112)
CHOLEST SERPL-MCNC: 253 MG/DL (ref 100–199)
CO2 SERPL-SCNC: 27 MMOL/L (ref 20–33)
CORTIS SERPL-MCNC: 13.6 UG/DL (ref 0–23)
CREAT SERPL-MCNC: 0.91 MG/DL (ref 0.5–1.4)
CRP SERPL HS-MCNC: 0.8 MG/L (ref 0–7.5)
EOSINOPHIL # BLD AUTO: 0.56 K/UL (ref 0–0.51)
EOSINOPHIL NFR BLD: 7.4 % (ref 0–6.9)
ERYTHROCYTE [DISTWIDTH] IN BLOOD BY AUTOMATED COUNT: 40.5 FL (ref 35.9–50)
GLOBULIN SER CALC-MCNC: 3.1 G/DL (ref 1.9–3.5)
GLUCOSE SERPL-MCNC: 283 MG/DL (ref 65–99)
HCT VFR BLD AUTO: 47.5 % (ref 42–52)
HDLC SERPL-MCNC: 37 MG/DL
HGB BLD-MCNC: 15.2 G/DL (ref 14–18)
IMM GRANULOCYTES # BLD AUTO: 0.03 K/UL (ref 0–0.11)
IMM GRANULOCYTES NFR BLD AUTO: 0.4 % (ref 0–0.9)
LDLC SERPL CALC-MCNC: 184 MG/DL
LYMPHOCYTES # BLD AUTO: 2.21 K/UL (ref 1–4.8)
LYMPHOCYTES NFR BLD: 29.3 % (ref 22–41)
MCH RBC QN AUTO: 28.8 PG (ref 27–33)
MCHC RBC AUTO-ENTMCNC: 32 G/DL (ref 33.7–35.3)
MCV RBC AUTO: 90.1 FL (ref 81.4–97.8)
MONOCYTES # BLD AUTO: 0.56 K/UL (ref 0–0.85)
MONOCYTES NFR BLD AUTO: 7.4 % (ref 0–13.4)
NEUTROPHILS # BLD AUTO: 4.09 K/UL (ref 1.82–7.42)
NEUTROPHILS NFR BLD: 54.2 % (ref 44–72)
NRBC # BLD AUTO: 0 K/UL
NRBC BLD-RTO: 0 /100 WBC
PLATELET # BLD AUTO: 311 K/UL (ref 164–446)
PMV BLD AUTO: 11.5 FL (ref 9–12.9)
POTASSIUM SERPL-SCNC: 4.6 MMOL/L (ref 3.6–5.5)
PROT SERPL-MCNC: 7.9 G/DL (ref 6–8.2)
RBC # BLD AUTO: 5.27 M/UL (ref 4.7–6.1)
SODIUM SERPL-SCNC: 137 MMOL/L (ref 135–145)
T3FREE SERPL-MCNC: 3.53 PG/ML (ref 2.4–4.2)
T4 FREE SERPL-MCNC: 0.84 NG/DL (ref 0.53–1.43)
TRIGL SERPL-MCNC: 160 MG/DL (ref 0–149)
TSH SERPL DL<=0.005 MIU/L-ACNC: 2.14 UIU/ML (ref 0.38–5.33)
WBC # BLD AUTO: 7.6 K/UL (ref 4.8–10.8)

## 2019-09-14 PROCEDURE — 86376 MICROSOMAL ANTIBODY EACH: CPT

## 2019-09-14 PROCEDURE — 85025 COMPLETE CBC W/AUTO DIFF WBC: CPT

## 2019-09-14 PROCEDURE — 82306 VITAMIN D 25 HYDROXY: CPT

## 2019-09-14 PROCEDURE — 82533 TOTAL CORTISOL: CPT

## 2019-09-14 PROCEDURE — 36415 COLL VENOUS BLD VENIPUNCTURE: CPT

## 2019-09-14 PROCEDURE — 84439 ASSAY OF FREE THYROXINE: CPT

## 2019-09-14 PROCEDURE — 80053 COMPREHEN METABOLIC PANEL: CPT

## 2019-09-14 PROCEDURE — 84481 FREE ASSAY (FT-3): CPT

## 2019-09-14 PROCEDURE — 80061 LIPID PANEL: CPT

## 2019-09-14 PROCEDURE — 86141 C-REACTIVE PROTEIN HS: CPT

## 2019-09-14 PROCEDURE — 86800 THYROGLOBULIN ANTIBODY: CPT

## 2019-09-14 PROCEDURE — 84443 ASSAY THYROID STIM HORMONE: CPT

## 2019-09-17 LAB
THYROGLOB AB SERPL-ACNC: <0.9 IU/ML (ref 0–4)
THYROPEROXIDASE AB SERPL-ACNC: 2.7 IU/ML (ref 0–9)

## 2019-11-26 ENCOUNTER — OFFICE VISIT (OUTPATIENT)
Dept: URGENT CARE | Facility: CLINIC | Age: 19
End: 2019-11-26
Payer: COMMERCIAL

## 2019-11-26 VITALS
BODY MASS INDEX: 25.88 KG/M2 | TEMPERATURE: 98.7 F | DIASTOLIC BLOOD PRESSURE: 80 MMHG | WEIGHT: 161 LBS | SYSTOLIC BLOOD PRESSURE: 106 MMHG | OXYGEN SATURATION: 95 % | HEIGHT: 66 IN | RESPIRATION RATE: 16 BRPM | HEART RATE: 120 BPM

## 2019-11-26 DIAGNOSIS — J01.90 ACUTE BACTERIAL SINUSITIS: ICD-10-CM

## 2019-11-26 DIAGNOSIS — B96.89 ACUTE BACTERIAL SINUSITIS: ICD-10-CM

## 2019-11-26 PROCEDURE — 99214 OFFICE O/P EST MOD 30 MIN: CPT | Performed by: NURSE PRACTITIONER

## 2019-11-26 RX ORDER — DOXYCYCLINE HYCLATE 100 MG
100 TABLET ORAL 2 TIMES DAILY
Qty: 20 TAB | Refills: 0 | Status: SHIPPED | OUTPATIENT
Start: 2019-11-26 | End: 2019-12-06

## 2019-11-26 ASSESSMENT — ENCOUNTER SYMPTOMS
HEADACHES: 0
FEVER: 0
DIZZINESS: 0
CHILLS: 0
SINUS PAIN: 1

## 2019-11-27 NOTE — PROGRESS NOTES
Subjective:      Martinez Smith is a 18 y.o. male who presents with Sinus Pain (x4 days) and Nasal Congestion (x4 days)            Sinus Pain   This is a new problem. Episode onset: 4 days. The problem occurs constantly. The problem has been gradually worsening. Associated symptoms include congestion. Pertinent negatives include no chills, fever or headaches. Associated symptoms comments: Patient reports worsening symptoms of sinus pain and congestion as well as bilateral ear pain and pressure.  Was treated 6 weeks ago for sinus infection with antibiotics feels as though he got better but now is getting worse again.. Exacerbated by: Position changes. Treatments tried: Mucinex. The treatment provided mild relief.       Review of Systems   Constitutional: Negative for chills and fever.   HENT: Positive for congestion, ear pain and sinus pain.    Neurological: Negative for dizziness and headaches.     Past Medical History:   Diagnosis Date   • Celiac disease    • Diabetes type I (HCC)     insulin pump      Past Surgical History:   Procedure Laterality Date   • HYPOSPADIAS REPAIR        Social History     Socioeconomic History   • Marital status: Single     Spouse name: Not on file   • Number of children: Not on file   • Years of education: Not on file   • Highest education level: Not on file   Occupational History   • Not on file   Social Needs   • Financial resource strain: Not on file   • Food insecurity:     Worry: Not on file     Inability: Not on file   • Transportation needs:     Medical: Not on file     Non-medical: Not on file   Tobacco Use   • Smoking status: Never Smoker   • Smokeless tobacco: Never Used   Substance and Sexual Activity   • Alcohol use: No   • Drug use: No   • Sexual activity: Not on file   Lifestyle   • Physical activity:     Days per week: Not on file     Minutes per session: Not on file   • Stress: Not on file   Relationships   • Social connections:     Talks on phone:  "Not on file     Gets together: Not on file     Attends Spiritism service: Not on file     Active member of club or organization: Not on file     Attends meetings of clubs or organizations: Not on file     Relationship status: Not on file   • Intimate partner violence:     Fear of current or ex partner: Not on file     Emotionally abused: Not on file     Physically abused: Not on file     Forced sexual activity: Not on file   Other Topics Concern   • Behavioral problems Not Asked   • Interpersonal relationships Not Asked   • Sad or not enjoying activities Not Asked   • Suicidal thoughts Not Asked   • Poor school performance Not Asked   • Reading difficulties Not Asked   • Speech difficulties Not Asked   • Writing difficulties Not Asked   • Inadequate sleep Not Asked   • Excessive TV viewing Not Asked   • Excessive video game use Not Asked   • Inadequate exercise Not Asked   • Sports related Not Asked   • Poor diet Not Asked   • Family concerns for drug/alcohol abuse Not Asked   • Poor oral hygiene Not Asked   • Bike safety Not Asked   • Family concerns vehicle safety Not Asked   Social History Narrative   • Not on file    Acetaminophen         Objective:     /80 (BP Location: Left arm, Patient Position: Sitting, BP Cuff Size: Adult)   Pulse (!) 120   Temp 37.1 °C (98.7 °F)   Resp 16   Ht 1.676 m (5' 6\")   Wt 73 kg (161 lb)   SpO2 95%   BMI 25.99 kg/m²      Physical Exam  Vitals signs reviewed.   Constitutional:       Appearance: Normal appearance.   HENT:      Head: Normocephalic and atraumatic.      Right Ear: Tympanic membrane, ear canal and external ear normal.      Left Ear: Tympanic membrane, ear canal and external ear normal.      Nose: Nose normal.      Right Turbinates: Enlarged.      Left Turbinates: Enlarged.      Mouth/Throat:      Lips: Pink.      Mouth: Mucous membranes are moist.      Pharynx: Uvula midline. Posterior oropharyngeal erythema present.      Comments: Postnasal drip " noted  Cardiovascular:      Rate and Rhythm: Normal rate and regular rhythm.      Heart sounds: Normal heart sounds.   Pulmonary:      Effort: Pulmonary effort is normal.      Breath sounds: Normal breath sounds.   Neurological:      Mental Status: He is alert and oriented to person, place, and time.   Psychiatric:         Mood and Affect: Mood normal.         Behavior: Behavior normal.         Thought Content: Thought content normal.         Judgment: Judgment normal.                 Assessment/Plan:       1. Acute bacterial sinusitis  - doxycycline (VIBRAMYCIN) 100 MG Tab; Take 1 Tab by mouth 2 times a day for 10 days.  Dispense: 20 Tab; Refill: 0    Instructed patient to increase fluids, may take over-the-counter NSAIDs and Tylenol per 's instructions.  May continue to take Mucinex also suggested he take Sudafed    Discussed antibiotic choice with patient due to recent antibiotic use.  Patient was on the sure he was placed on amoxicillin, however is not completely confident that it was not Augmentin.    Instructed patient to return to clinic for worsening symptoms or symptoms that persist for 7 to 10 days  Supportive care, differential diagnoses, and indications for immediate follow-up discussed with patient.    Pathogenesis of diagnosis discussed including typical length and natural progression. Patient expresses understanding and agrees to plan.       Please note that this dictation was created using voice recognition software. I have made every reasonable attempt to correct obvious errors, but I expect that there are errors of grammar and possibly content that I did not discover before finalizing the note.

## 2019-12-13 ENCOUNTER — OFFICE VISIT (OUTPATIENT)
Dept: ENDOCRINOLOGY | Facility: MEDICAL CENTER | Age: 19
End: 2019-12-13
Payer: COMMERCIAL

## 2019-12-13 DIAGNOSIS — Z79.4 ENCOUNTER FOR LONG-TERM (CURRENT) USE OF INSULIN (HCC): ICD-10-CM

## 2019-12-13 DIAGNOSIS — E10.9 TYPE 1 DIABETES MELLITUS WITHOUT COMPLICATION (HCC): ICD-10-CM

## 2019-12-13 DIAGNOSIS — Z46.81 FITTING AND ADJUSTMENT OF INSULIN PUMP: ICD-10-CM

## 2019-12-13 LAB
HBA1C MFR BLD: 8.8 % (ref 0–5.6)
INT CON NEG: NEGATIVE
INT CON POS: POSITIVE

## 2019-12-13 PROCEDURE — 83036 HEMOGLOBIN GLYCOSYLATED A1C: CPT | Performed by: PHYSICIAN ASSISTANT

## 2019-12-13 PROCEDURE — 99204 OFFICE O/P NEW MOD 45 MIN: CPT | Performed by: PHYSICIAN ASSISTANT

## 2019-12-14 ENCOUNTER — APPOINTMENT (OUTPATIENT)
Dept: RADIOLOGY | Facility: MEDICAL CENTER | Age: 19
End: 2019-12-14
Attending: EMERGENCY MEDICINE
Payer: COMMERCIAL

## 2019-12-14 ENCOUNTER — HOSPITAL ENCOUNTER (EMERGENCY)
Facility: MEDICAL CENTER | Age: 19
End: 2019-12-14
Attending: EMERGENCY MEDICINE
Payer: COMMERCIAL

## 2019-12-14 VITALS
TEMPERATURE: 96.5 F | WEIGHT: 159.39 LBS | HEART RATE: 115 BPM | RESPIRATION RATE: 18 BRPM | BODY MASS INDEX: 25.73 KG/M2 | DIASTOLIC BLOOD PRESSURE: 61 MMHG | SYSTOLIC BLOOD PRESSURE: 111 MMHG | OXYGEN SATURATION: 96 %

## 2019-12-14 DIAGNOSIS — R11.2 NON-INTRACTABLE VOMITING WITH NAUSEA, UNSPECIFIED VOMITING TYPE: ICD-10-CM

## 2019-12-14 LAB
ALBUMIN SERPL BCP-MCNC: 4.6 G/DL (ref 3.2–4.9)
ALBUMIN/GLOB SERPL: 1.4 G/DL
ALP SERPL-CCNC: 79 U/L (ref 30–99)
ALT SERPL-CCNC: 15 U/L (ref 2–50)
ANION GAP SERPL CALC-SCNC: 7 MMOL/L (ref 0–11.9)
AST SERPL-CCNC: 16 U/L (ref 12–45)
BASE EXCESS BLDV CALC-SCNC: 2 MMOL/L
BASOPHILS # BLD AUTO: 0.1 % (ref 0–1.8)
BASOPHILS # BLD: 0.02 K/UL (ref 0–0.12)
BILIRUB SERPL-MCNC: 0.5 MG/DL (ref 0.1–1.5)
BODY TEMPERATURE: NORMAL CENTIGRADE
BUN SERPL-MCNC: 15 MG/DL (ref 8–22)
CALCIUM SERPL-MCNC: 9.6 MG/DL (ref 8.5–10.5)
CHLORIDE SERPL-SCNC: 103 MMOL/L (ref 96–112)
CO2 SERPL-SCNC: 29 MMOL/L (ref 20–33)
CREAT SERPL-MCNC: 0.8 MG/DL (ref 0.5–1.4)
EKG IMPRESSION: NORMAL
EOSINOPHIL # BLD AUTO: 0.02 K/UL (ref 0–0.51)
EOSINOPHIL NFR BLD: 0.1 % (ref 0–6.9)
ERYTHROCYTE [DISTWIDTH] IN BLOOD BY AUTOMATED COUNT: 37.9 FL (ref 35.9–50)
FLUAV RNA SPEC QL NAA+PROBE: NEGATIVE
FLUBV RNA SPEC QL NAA+PROBE: NEGATIVE
GLOBULIN SER CALC-MCNC: 3.4 G/DL (ref 1.9–3.5)
GLUCOSE BLD-MCNC: 66 MG/DL (ref 65–99)
GLUCOSE BLD-MCNC: 83 MG/DL (ref 65–99)
GLUCOSE SERPL-MCNC: 86 MG/DL (ref 65–99)
HCO3 BLDV-SCNC: 27 MMOL/L (ref 24–28)
HCT VFR BLD AUTO: 43.8 % (ref 42–52)
HGB BLD-MCNC: 14.8 G/DL (ref 14–18)
IMM GRANULOCYTES # BLD AUTO: 0.08 K/UL (ref 0–0.11)
IMM GRANULOCYTES NFR BLD AUTO: 0.6 % (ref 0–0.9)
LIPASE SERPL-CCNC: 29 U/L (ref 11–82)
LYMPHOCYTES # BLD AUTO: 1.32 K/UL (ref 1–4.8)
LYMPHOCYTES NFR BLD: 9.8 % (ref 22–41)
MCH RBC QN AUTO: 29.2 PG (ref 27–33)
MCHC RBC AUTO-ENTMCNC: 33.8 G/DL (ref 33.7–35.3)
MCV RBC AUTO: 86.4 FL (ref 81.4–97.8)
MONOCYTES # BLD AUTO: 0.7 K/UL (ref 0–0.85)
MONOCYTES NFR BLD AUTO: 5.2 % (ref 0–13.4)
NEUTROPHILS # BLD AUTO: 11.31 K/UL (ref 1.82–7.42)
NEUTROPHILS NFR BLD: 84.2 % (ref 44–72)
NRBC # BLD AUTO: 0 K/UL
NRBC BLD-RTO: 0 /100 WBC
PCO2 BLDV: 46.4 MMHG (ref 41–51)
PH BLDV: 7.39 [PH] (ref 7.31–7.45)
PLATELET # BLD AUTO: 320 K/UL (ref 164–446)
PMV BLD AUTO: 10.6 FL (ref 9–12.9)
PO2 BLDV: 25.8 MMHG (ref 25–40)
POTASSIUM SERPL-SCNC: 3.6 MMOL/L (ref 3.6–5.5)
PROT SERPL-MCNC: 8 G/DL (ref 6–8.2)
RBC # BLD AUTO: 5.07 M/UL (ref 4.7–6.1)
SAO2 % BLDV: 44.9 %
SODIUM SERPL-SCNC: 139 MMOL/L (ref 135–145)
TROPONIN T SERPL-MCNC: 10 NG/L (ref 6–19)
WBC # BLD AUTO: 13.5 K/UL (ref 4.8–10.8)

## 2019-12-14 PROCEDURE — 71045 X-RAY EXAM CHEST 1 VIEW: CPT

## 2019-12-14 PROCEDURE — 700105 HCHG RX REV CODE 258: Performed by: EMERGENCY MEDICINE

## 2019-12-14 PROCEDURE — 96375 TX/PRO/DX INJ NEW DRUG ADDON: CPT

## 2019-12-14 PROCEDURE — 82803 BLOOD GASES ANY COMBINATION: CPT

## 2019-12-14 PROCEDURE — 93005 ELECTROCARDIOGRAM TRACING: CPT | Performed by: EMERGENCY MEDICINE

## 2019-12-14 PROCEDURE — 82962 GLUCOSE BLOOD TEST: CPT | Mod: 91

## 2019-12-14 PROCEDURE — 85025 COMPLETE CBC W/AUTO DIFF WBC: CPT

## 2019-12-14 PROCEDURE — 83690 ASSAY OF LIPASE: CPT

## 2019-12-14 PROCEDURE — 80053 COMPREHEN METABOLIC PANEL: CPT

## 2019-12-14 PROCEDURE — 87502 INFLUENZA DNA AMP PROBE: CPT

## 2019-12-14 PROCEDURE — 84484 ASSAY OF TROPONIN QUANT: CPT

## 2019-12-14 PROCEDURE — 99285 EMERGENCY DEPT VISIT HI MDM: CPT

## 2019-12-14 PROCEDURE — 700111 HCHG RX REV CODE 636 W/ 250 OVERRIDE (IP): Performed by: EMERGENCY MEDICINE

## 2019-12-14 PROCEDURE — 96374 THER/PROPH/DIAG INJ IV PUSH: CPT

## 2019-12-14 RX ORDER — SODIUM CHLORIDE 9 MG/ML
1000 INJECTION, SOLUTION INTRAVENOUS ONCE
Status: COMPLETED | OUTPATIENT
Start: 2019-12-14 | End: 2019-12-14

## 2019-12-14 RX ORDER — DIPHENHYDRAMINE HYDROCHLORIDE 50 MG/ML
25 INJECTION INTRAMUSCULAR; INTRAVENOUS ONCE
Status: COMPLETED | OUTPATIENT
Start: 2019-12-14 | End: 2019-12-14

## 2019-12-14 RX ORDER — METOCLOPRAMIDE HYDROCHLORIDE 5 MG/ML
10 INJECTION INTRAMUSCULAR; INTRAVENOUS ONCE
Status: COMPLETED | OUTPATIENT
Start: 2019-12-14 | End: 2019-12-14

## 2019-12-14 RX ORDER — ONDANSETRON 4 MG/1
4 TABLET, ORALLY DISINTEGRATING ORAL EVERY 8 HOURS PRN
Qty: 8 TAB | Refills: 0 | Status: SHIPPED | OUTPATIENT
Start: 2019-12-14 | End: 2019-12-17

## 2019-12-14 RX ADMIN — DIPHENHYDRAMINE HYDROCHLORIDE 25 MG: 50 INJECTION INTRAMUSCULAR; INTRAVENOUS at 16:07

## 2019-12-14 RX ADMIN — SODIUM CHLORIDE 1000 ML: 9 INJECTION, SOLUTION INTRAVENOUS at 16:07

## 2019-12-14 RX ADMIN — METOCLOPRAMIDE 10 MG: 5 INJECTION, SOLUTION INTRAMUSCULAR; INTRAVENOUS at 16:07

## 2019-12-14 ASSESSMENT — LIFESTYLE VARIABLES: DO YOU DRINK ALCOHOL: NO

## 2019-12-14 NOTE — ED PROVIDER NOTES
ED Provider Note    Scribed for Abbe Shipley M.D. by Michael Jett. 12/14/2019  3:34 PM    Primary care provider: Gregory Grande M.D.  Means of arrival: Walk-in  History obtained from: Patient  History limited by: None    CHIEF COMPLAINT  Chief Complaint   Patient presents with   • Abdominal Pain   • Vomiting       HPI  Martinez Smith is a 18 y.o. male who presents to the Emergency Department for evaluation of abdominal pain and vomiting onset today. Additionally, he reports of numbness of bilateral arms, tactile fever, and cough, but denies any dysuria or hematuria. Patient states that he developed a headache after his episode of emesis. As well, he reports of chest pain that comes and goes for last 3 days. He has no past abdominal surgeries. Patient reports having similar symptoms 6 years ago and was diagnosed with the flu. He denies any alcohol or drug use. Patient takes allegra and vitamin 3 every day. Patient has a past medical history of diabetes type 1 and takes insulin every time he eats, but does not take any oral insulin as he is on the drip. Patient's blood glucose levels were 140 prior to arrival.     REVIEW OF SYSTEMS  Pertinent positives include: abdominal pain, vomiting, diarrhea, chest pain, headache numbness of bilateral arms, tactile fever, and cough. Pertinent negatives include: dysuria or hematuria.. See history of present illness. All other systems are negative.     PAST MEDICAL HISTORY   has a past medical history of Celiac disease and Diabetes type I (HCC).    SURGICAL HISTORY   has a past surgical history that includes hypospadias repair.    SOCIAL HISTORY  Social History     Tobacco Use   • Smoking status: Never Smoker   • Smokeless tobacco: Never Used   Substance Use Topics   • Alcohol use: No   • Drug use: No      Social History     Substance and Sexual Activity   Drug Use No       FAMILY HISTORY  Family History   Problem Relation Age of Onset   • Arthritis Maternal  Grandmother        CURRENT MEDICATIONS  Current Outpatient Medications:   •  Fexofenadine HCl (ALLEGRA ALLERGY PO), Take  by mouth., Disp: , Rfl:   •  Cholecalciferol (VITAMIN D-3) 5000 units Tab, Take  by mouth., Disp: , Rfl:   •  amoxicillin (AMOXIL) 500 MG Cap, Take 1 Cap by mouth 3 times a day. (Patient not taking: Reported on 5/23/2019), Disp: 30 Cap, Rfl: 0  •  ondansetron (ZOFRAN ODT) 8 MG TABLET DISPERSIBLE, Take 1 Tab by mouth every 8 hours as needed., Disp: 15 Tab, Rfl: 0  •  insulin glargine (LANTUS SOLOSTAR) 100 UNIT/ML Solution Pen-injector injection, Inject  as instructed as needed (backup for pump)., Disp: , Rfl:   •  Insulin Lispro (HUMALOG) 100 UNIT/ML Solution Cartridge, Inject  as instructed. Refer to pump settings in download., Disp: , Rfl:   •  Glucagon, rDNA, (GLUCAGON EMERGENCY) 1 MG Kit, by Injection route., Disp: , Rfl:   •  glucose blood (AFSHAN CONTOUR NEXT TEST) strip, 1 Each by Other route as needed., Disp: , Rfl:   •  Insulin Pen Needle (BD PEN NEEDLE ANDREW U/F) 32G X 4 MM Misc, by Does not apply route., Disp: , Rfl:   •  acetone, urine, test (KETOSTIX) strip, by Does not apply route., Disp: , Rfl:   •  Ketone Blood Test (PRECISION XTRA) Strip, by In Vitro route., Disp: , Rfl:     ALLERGIES  No Known Allergies    PHYSICAL EXAM  VITAL SIGNS: /84   Pulse (!) 111   Temp 35.8 °C (96.5 °F) (Temporal)   Resp 16   Wt 72.3 kg (159 lb 6.3 oz)   SpO2 98%   BMI 25.73 kg/m²     Constitutional: Alert in no apparent distress.  HENT: No signs of trauma, Bilateral external ears normal, Nose normal. Uvula midline.   Eyes: Pupils are equal and reactive, Conjunctiva normal, Non-icteric.   Neck: Normal range of motion, No tenderness, Supple, No stridor.   Lymphatic: No lymphadenopathy noted.   Cardiovascular: Tachycardic. Regular rhythm, no murmurs.   Thorax & Lungs: Normal breath sounds, No respiratory distress, No wheezing, No chest tenderness.   Abdomen: Mild diffuse abdominal pain more  significant at epigastric region. Soft,  No peritoneal signs, No masses, No pulsatile masses.   Skin: Warm, Dry, No erythema, No rash.   Back: No bony tenderness, No CVA tenderness.   Extremities: Intact distal pulses, No edema, No tenderness, No cyanosis.  Musculoskeletal: Good range of motion in all major joints. No tenderness to palpation or major deformities noted.   Neurologic: Alert, Cranial nerves II through XII intact.  5 out of 5 strength x4.  Sensation intact light touch.  Normal finger-nose-finger.  Normal reflexes bilaterally.  No clonus. EOMI. PERRL.   Psychiatric: Affect normal, Judgment normal, Mood normal.     DIAGNOSTIC STUDIES / PROCEDURES    LABS  Labs Reviewed   CBC WITH DIFFERENTIAL - Abnormal; Notable for the following components:       Result Value    WBC 13.5 (*)     Neutrophils-Polys 84.20 (*)     Lymphocytes 9.80 (*)     Neutrophils (Absolute) 11.31 (*)     All other components within normal limits   COMP METABOLIC PANEL   VENOUS BLOOD GAS   INFLUENZA A/B BY PCR   TROPONIN   LIPASE   ESTIMATED GFR   ACCU-CHEK GLUCOSE   ACCU-CHEK GLUCOSE      All labs reviewed by me.    EKG  12 Lead EKG interpreted by me to show:  Indication: Arrhythmia   Tachycardic sinus rhythm  Rate 107  Axis: Normal  Intervals: Normal  Normal T waves  Normal ST segments  My impression of this EKG: No STEMI.     RADIOLOGY  DX-CHEST-PORTABLE (1 VIEW)   Final Result         No acute cardiac or pulmonary abnormality is identified.        The radiologist's interpretation of all radiological studies have been reviewed by me.    COURSE & MEDICAL DECISION MAKING  Nursing notes, VS, PMSFHx reviewed in chart.    18 y.o. male p/w chief complaint of abdominal pain and vomiting onset today.    3:34 PM Patient seen and examined at bedside. Patient will be treated with NS infusion 1000 ml, benadryl 25 mg, and reglan 10 mg. Ordered DX-chest, CMP, venous blood, CBC with diff, lipase, troponin, influenza A/B, and EKG.    5:04 PM - Patient  was reevaluated at bedside. Discussed lab and radiology results with the patient and informed them that the results came back normal. The patient will return for new or worsening symptoms and is stable at the time of discharge.    The differential diagnoses include but are not limited to: No: muffled voice, drooling, stridor, tripoding, trismus, crepitus or trauma.   #abdominal pain  No RLQ pain, TTP or fever to suggest appendicitis  No LLQ pain or TTP or fever to suggest diverticulitis  No e/o rash or zoster  No hx to suggest UTI  No elevation in lipase to suggest pancreatitis  Mild elevation in white blood cell count however patient agrees to return within 24 hours for repeat abdominal exam if symptoms persist or return sooner if symptoms worsen.      HYDRATION: Based on the patient's presentation of Acute Vomiting the patient was given IV fluids. IV Hydration was used because oral hydration was not adequate alone. Upon recheck following hydration, the patient was improved.    I discussed alternative treatments at this time of giving patient an additional liter of normal saline to help with his tachycardia/dehydration  He does not wish to stay for an additional liter of normal saline to ensure vital signs normalized.  Upon review of last 2 office visits patient with tachycardia and heart rate greater than 100 on both prior visits.      I discussed the importance of reducing his basal insulin rate given his hypoglycemic episode (glucose in 60s) in emergency department    The patient is referred to a primary physician for blood pressure management, diabetic screening, and for all other preventative health concerns.    DISPOSITION:  Patient will be discharged home in stable condition.    FOLLOW UP:  Gregory Grande M.D.  [address] [id number] [phone]    In 3 days  If symptoms worsen    [facility], Emergency Dept  [address] [facility] [id number] [phone]    If  symptoms worsen    FINAL IMPRESSION  1. Non-intractable vomiting with nausea, unspecified vomiting type        I, Michael Jett (Scribe), am scribing for, and in the presence of, Abbe Shipley M.D..    Electronically signed by: Michael Jett (Scribe), 12/14/2019    IAbbe M.D. personally performed the services described in this documentation, as scribed by Michael Jett in my presence, and it is both accurate and complete.C.    The note accurately reflects work and decisions made by me.  Abbe Shipley  12/14/2019  8:31 PM

## 2019-12-15 NOTE — ED NOTES
Glucose 66, pt has an insulin pump, suspended pump - MD aware. Apple juice given. Pt told to leave insulin pump suspended - pt verbalized understanding. Mom at bedside.

## 2019-12-15 NOTE — ED NOTES
Discharge instructions given and explained to pt and mom including f/u care and Rx, reasons to rtn to ER. All questions answered and pt verbalized understanding. Left ER ambulatory with mom.

## 2019-12-16 ENCOUNTER — APPOINTMENT (OUTPATIENT)
Dept: RADIOLOGY | Facility: MEDICAL CENTER | Age: 19
End: 2019-12-16
Attending: EMERGENCY MEDICINE
Payer: COMMERCIAL

## 2019-12-16 ENCOUNTER — HOSPITAL ENCOUNTER (EMERGENCY)
Facility: MEDICAL CENTER | Age: 19
End: 2019-12-16
Attending: EMERGENCY MEDICINE
Payer: COMMERCIAL

## 2019-12-16 VITALS
HEART RATE: 70 BPM | HEIGHT: 66 IN | TEMPERATURE: 97.8 F | RESPIRATION RATE: 17 BRPM | OXYGEN SATURATION: 95 % | WEIGHT: 158.07 LBS | BODY MASS INDEX: 25.4 KG/M2 | DIASTOLIC BLOOD PRESSURE: 72 MMHG | SYSTOLIC BLOOD PRESSURE: 109 MMHG

## 2019-12-16 DIAGNOSIS — R11.2 NON-INTRACTABLE VOMITING WITH NAUSEA, UNSPECIFIED VOMITING TYPE: ICD-10-CM

## 2019-12-16 DIAGNOSIS — I88.0 MESENTERIC ADENITIS: ICD-10-CM

## 2019-12-16 DIAGNOSIS — R10.31 RIGHT LOWER QUADRANT ABDOMINAL PAIN: ICD-10-CM

## 2019-12-16 LAB
ALBUMIN SERPL BCP-MCNC: 4.9 G/DL (ref 3.2–4.9)
ALBUMIN/GLOB SERPL: 1.8 G/DL
ALP SERPL-CCNC: 77 U/L (ref 30–99)
ALT SERPL-CCNC: 13 U/L (ref 2–50)
ANION GAP SERPL CALC-SCNC: 10 MMOL/L (ref 0–11.9)
APPEARANCE UR: CLEAR
AST SERPL-CCNC: 14 U/L (ref 12–45)
BASOPHILS # BLD AUTO: 0.2 % (ref 0–1.8)
BASOPHILS # BLD: 0.02 K/UL (ref 0–0.12)
BILIRUB SERPL-MCNC: 0.4 MG/DL (ref 0.1–1.5)
BILIRUB UR QL STRIP.AUTO: NEGATIVE
BUN SERPL-MCNC: 9 MG/DL (ref 8–22)
CALCIUM SERPL-MCNC: 9.7 MG/DL (ref 8.5–10.5)
CHLORIDE SERPL-SCNC: 105 MMOL/L (ref 96–112)
CO2 SERPL-SCNC: 24 MMOL/L (ref 20–33)
COLOR UR: YELLOW
CREAT SERPL-MCNC: 0.78 MG/DL (ref 0.5–1.4)
EOSINOPHIL # BLD AUTO: 0.4 K/UL (ref 0–0.51)
EOSINOPHIL NFR BLD: 4.3 % (ref 0–6.9)
ERYTHROCYTE [DISTWIDTH] IN BLOOD BY AUTOMATED COUNT: 38.6 FL (ref 35.9–50)
GLOBULIN SER CALC-MCNC: 2.7 G/DL (ref 1.9–3.5)
GLUCOSE BLD-MCNC: 150 MG/DL (ref 65–99)
GLUCOSE SERPL-MCNC: 168 MG/DL (ref 65–99)
GLUCOSE UR STRIP.AUTO-MCNC: NEGATIVE MG/DL
HCT VFR BLD AUTO: 41.5 % (ref 42–52)
HGB BLD-MCNC: 14.2 G/DL (ref 14–18)
IMM GRANULOCYTES # BLD AUTO: 0.04 K/UL (ref 0–0.11)
IMM GRANULOCYTES NFR BLD AUTO: 0.4 % (ref 0–0.9)
KETONES UR STRIP.AUTO-MCNC: 15 MG/DL
LACTATE BLD-SCNC: 1 MMOL/L (ref 0.5–2)
LEUKOCYTE ESTERASE UR QL STRIP.AUTO: NEGATIVE
LIPASE SERPL-CCNC: 7 U/L (ref 11–82)
LYMPHOCYTES # BLD AUTO: 1.71 K/UL (ref 1–4.8)
LYMPHOCYTES NFR BLD: 18.3 % (ref 22–41)
MCH RBC QN AUTO: 29.6 PG (ref 27–33)
MCHC RBC AUTO-ENTMCNC: 34.2 G/DL (ref 33.7–35.3)
MCV RBC AUTO: 86.6 FL (ref 81.4–97.8)
MICRO URNS: ABNORMAL
MONOCYTES # BLD AUTO: 0.54 K/UL (ref 0–0.85)
MONOCYTES NFR BLD AUTO: 5.8 % (ref 0–13.4)
NEUTROPHILS # BLD AUTO: 6.64 K/UL (ref 1.82–7.42)
NEUTROPHILS NFR BLD: 71 % (ref 44–72)
NITRITE UR QL STRIP.AUTO: NEGATIVE
NRBC # BLD AUTO: 0 K/UL
NRBC BLD-RTO: 0 /100 WBC
PH UR STRIP.AUTO: 5 [PH] (ref 5–8)
PLATELET # BLD AUTO: 304 K/UL (ref 164–446)
PMV BLD AUTO: 10.8 FL (ref 9–12.9)
POTASSIUM SERPL-SCNC: 3.6 MMOL/L (ref 3.6–5.5)
PROT SERPL-MCNC: 7.6 G/DL (ref 6–8.2)
PROT UR QL STRIP: NEGATIVE MG/DL
RBC # BLD AUTO: 4.79 M/UL (ref 4.7–6.1)
RBC UR QL AUTO: NEGATIVE
SODIUM SERPL-SCNC: 139 MMOL/L (ref 135–145)
SP GR UR STRIP.AUTO: >=1.045
UROBILINOGEN UR STRIP.AUTO-MCNC: 0.2 MG/DL
WBC # BLD AUTO: 9.4 K/UL (ref 4.8–10.8)

## 2019-12-16 PROCEDURE — 96374 THER/PROPH/DIAG INJ IV PUSH: CPT

## 2019-12-16 PROCEDURE — 74177 CT ABD & PELVIS W/CONTRAST: CPT

## 2019-12-16 PROCEDURE — 83605 ASSAY OF LACTIC ACID: CPT

## 2019-12-16 PROCEDURE — 80053 COMPREHEN METABOLIC PANEL: CPT

## 2019-12-16 PROCEDURE — 83690 ASSAY OF LIPASE: CPT

## 2019-12-16 PROCEDURE — 700105 HCHG RX REV CODE 258: Performed by: EMERGENCY MEDICINE

## 2019-12-16 PROCEDURE — 96375 TX/PRO/DX INJ NEW DRUG ADDON: CPT

## 2019-12-16 PROCEDURE — 81003 URINALYSIS AUTO W/O SCOPE: CPT

## 2019-12-16 PROCEDURE — 700111 HCHG RX REV CODE 636 W/ 250 OVERRIDE (IP): Performed by: EMERGENCY MEDICINE

## 2019-12-16 PROCEDURE — 85025 COMPLETE CBC W/AUTO DIFF WBC: CPT

## 2019-12-16 PROCEDURE — 82962 GLUCOSE BLOOD TEST: CPT

## 2019-12-16 PROCEDURE — 99285 EMERGENCY DEPT VISIT HI MDM: CPT

## 2019-12-16 PROCEDURE — 700117 HCHG RX CONTRAST REV CODE 255: Performed by: EMERGENCY MEDICINE

## 2019-12-16 RX ORDER — ONDANSETRON 2 MG/ML
4 INJECTION INTRAMUSCULAR; INTRAVENOUS ONCE
Status: COMPLETED | OUTPATIENT
Start: 2019-12-16 | End: 2019-12-16

## 2019-12-16 RX ORDER — SODIUM CHLORIDE, SODIUM LACTATE, POTASSIUM CHLORIDE, CALCIUM CHLORIDE 600; 310; 30; 20 MG/100ML; MG/100ML; MG/100ML; MG/100ML
1000 INJECTION, SOLUTION INTRAVENOUS ONCE
Status: COMPLETED | OUTPATIENT
Start: 2019-12-16 | End: 2019-12-16

## 2019-12-16 RX ORDER — MORPHINE SULFATE 4 MG/ML
4 INJECTION, SOLUTION INTRAMUSCULAR; INTRAVENOUS
Status: DISPENSED | OUTPATIENT
Start: 2019-12-16 | End: 2019-12-16

## 2019-12-16 RX ADMIN — MORPHINE SULFATE 4 MG: 4 INJECTION INTRAVENOUS at 01:53

## 2019-12-16 RX ADMIN — SODIUM CHLORIDE, POTASSIUM CHLORIDE, SODIUM LACTATE AND CALCIUM CHLORIDE 1000 ML: 600; 310; 30; 20 INJECTION, SOLUTION INTRAVENOUS at 03:33

## 2019-12-16 RX ADMIN — IOHEXOL 80 ML: 350 INJECTION, SOLUTION INTRAVENOUS at 02:48

## 2019-12-16 RX ADMIN — SODIUM CHLORIDE, POTASSIUM CHLORIDE, SODIUM LACTATE AND CALCIUM CHLORIDE 1000 ML: 600; 310; 30; 20 INJECTION, SOLUTION INTRAVENOUS at 01:53

## 2019-12-16 RX ADMIN — ONDANSETRON 4 MG: 2 INJECTION INTRAMUSCULAR; INTRAVENOUS at 01:53

## 2019-12-16 ASSESSMENT — PAIN SCALES - WONG BAKER: WONGBAKER_NUMERICALRESPONSE: DOESN'T HURT AT ALL

## 2019-12-16 ASSESSMENT — PAIN DESCRIPTION - DESCRIPTORS: DESCRIPTORS: CRAMPING;SHARP

## 2019-12-16 NOTE — ED PROVIDER NOTES
ED Provider Note    CHIEF COMPLAINT  Chief Complaint   Patient presents with   • Abdominal Pain     c/o lower abdominal pain, described as sharp / cramping and constant. patient as seen here last saturday c/o epigastric pain, now pain went down to more on the lower part of the abdomen. + N/V. states that he is Diabetic  mg/ dl.        HPI  Martinez Smith is a 18 y.o. male who presents for evaluation of continued right-sided abdominal pain which is been present for several days and now is migrated to the right lower quadrant.  Patient was seen yesterday complaining of pain in the right upper quadrant and epigastrium however he states that the pain is not improving and he had multiple episodes of diarrhea additionally.  He notes he did not have nausea today until just prior to arrival but has had no significant output.  He notes that his blood sugar has generally been very well controlled despite all of this    REVIEW OF SYSTEMS  Constitutional: No fevers or chills.  Generalized fatigue and malaise noted  Skin: No rashes  HEENT: No sore throat, runny nose, sores, trouble swallowing, trouble speaking.  Neck: No neck pain, stiffness, or masses.  Chest: No pain or rashes  Pulm: No shortness of breath, cough, wheezing, stridor, or pain with inspiration/expiration  Gastrointestinal: Nausea, no significant vomiting.  Multiple episodes of diarrhea.  No blood in the stool or melena.  Genitourinary: No dysuria or hematuria  Musculoskeletal: No recent trauma, pain, swelling, weakness  Neurologic: No sensory or motor changes. No confusion or disorientation.  Heme: No bleeding or bruising problems.   Immuno: No hx of recurrent infections      PAST MEDICAL HISTORY   has a past medical history of Celiac disease and Diabetes type I (HCC).    SOCIAL HISTORY  Social History     Tobacco Use   • Smoking status: Never Smoker   • Smokeless tobacco: Never Used   Substance and Sexual Activity   • Alcohol use: No   • Drug  "use: No   • Sexual activity: Not on file       SURGICAL HISTORY   has a past surgical history that includes hypospadias repair.    CURRENT MEDICATIONS  Home Medications    **Home medications have not yet been reviewed for this encounter**         ALLERGIES  No Known Allergies    PHYSICAL EXAM  VITAL SIGNS: /72   Pulse 70   Temp 36.6 °C (97.8 °F)   Resp 17   Ht 1.676 m (5' 6\")   Wt 71.7 kg (158 lb 1.1 oz)   SpO2 95%   BMI 25.51 kg/m²    Gen: Appears tired, slightly pale, otherwise nondistressed and appropriate  HEENT: No signs of trauma, Bilateral external ears normal, Nose normal. Conjunctiva normal, Non-icteric.  Tacky mucous membrane  Neck:  No tenderness, Supple, No masses  Lymphatic: No cervical lymphadenopathy noted.   Cardiovascular: Tachycardia, regular rhythm, no murmurs.  Capillary refill around 4 seconds to all extremities, 2+ distal pulses.  Thorax & Lungs: Normal breath sounds, No respiratory distress, No wheezing bilateral chest rise  Abdomen: Bowel sounds normal, Soft, moderate diffuse tenderness, worse in the right lower quadrant. No masses, No pulsatile masses. No Guarding or rebound  Skin: Warm, Dry, No erythema, No rash.    Extremities: Intact distal pulses, No edema  Neurologic: Alert , no facial droop, grossly normal coordination    LABS  Results for orders placed or performed during the hospital encounter of 12/16/19   CBC WITH DIFFERENTIAL   Result Value Ref Range    WBC 9.4 4.8 - 10.8 K/uL    RBC 4.79 4.70 - 6.10 M/uL    Hemoglobin 14.2 14.0 - 18.0 g/dL    Hematocrit 41.5 (L) 42.0 - 52.0 %    MCV 86.6 81.4 - 97.8 fL    MCH 29.6 27.0 - 33.0 pg    MCHC 34.2 33.7 - 35.3 g/dL    RDW 38.6 35.9 - 50.0 fL    Platelet Count 304 164 - 446 K/uL    MPV 10.8 9.0 - 12.9 fL    Neutrophils-Polys 71.00 44.00 - 72.00 %    Lymphocytes 18.30 (L) 22.00 - 41.00 %    Monocytes 5.80 0.00 - 13.40 %    Eosinophils 4.30 0.00 - 6.90 %    Basophils 0.20 0.00 - 1.80 %    Immature Granulocytes 0.40 0.00 - 0.90 " %    Nucleated RBC 0.00 /100 WBC    Neutrophils (Absolute) 6.64 1.82 - 7.42 K/uL    Lymphs (Absolute) 1.71 1.00 - 4.80 K/uL    Monos (Absolute) 0.54 0.00 - 0.85 K/uL    Eos (Absolute) 0.40 0.00 - 0.51 K/uL    Baso (Absolute) 0.02 0.00 - 0.12 K/uL    Immature Granulocytes (abs) 0.04 0.00 - 0.11 K/uL    NRBC (Absolute) 0.00 K/uL   COMP METABOLIC PANEL   Result Value Ref Range    Sodium 139 135 - 145 mmol/L    Potassium 3.6 3.6 - 5.5 mmol/L    Chloride 105 96 - 112 mmol/L    Co2 24 20 - 33 mmol/L    Anion Gap 10.0 0.0 - 11.9    Glucose 168 (H) 65 - 99 mg/dL    Bun 9 8 - 22 mg/dL    Creatinine 0.78 0.50 - 1.40 mg/dL    Calcium 9.7 8.5 - 10.5 mg/dL    AST(SGOT) 14 12 - 45 U/L    ALT(SGPT) 13 2 - 50 U/L    Alkaline Phosphatase 77 30 - 99 U/L    Total Bilirubin 0.4 0.1 - 1.5 mg/dL    Albumin 4.9 3.2 - 4.9 g/dL    Total Protein 7.6 6.0 - 8.2 g/dL    Globulin 2.7 1.9 - 3.5 g/dL    A-G Ratio 1.8 g/dL   LIPASE   Result Value Ref Range    Lipase 7 (L) 11 - 82 U/L   LACTIC ACID   Result Value Ref Range    Lactic Acid 1.0 0.5 - 2.0 mmol/L   URINALYSIS CULTURE, IF INDICATED   Result Value Ref Range    Color Yellow     Character Clear     Specific Gravity >=1.045 (A) <1.035    Ph 5.0 5.0 - 8.0    Glucose Negative Negative mg/dL    Ketones 15 (A) Negative mg/dL    Protein Negative Negative mg/dL    Bilirubin Negative Negative    Urobilinogen, Urine 0.2 Negative    Nitrite Negative Negative    Leukocyte Esterase Negative Negative    Occult Blood Negative Negative    Micro Urine Req see below    ESTIMATED GFR   Result Value Ref Range    GFR If African American >60 >60 mL/min/1.73 m 2    GFR If Non African American >60 >60 mL/min/1.73 m 2   ACCU-CHEK GLUCOSE   Result Value Ref Range    Glucose - Accu-Ck 150 (H) 65 - 99 mg/dL       RADIOLOGY  CT-ABDOMEN-PELVIS WITH   Final Result            1.  Mildly prominent mesenteric lymph nodes, consider mesenteric adenitis, otherwise no acute abdominal abnormality.          HYDRATION: Based on  the patient's presentation of Tachycardia the patient was given IV fluids. IV Hydration was used because oral hydration was not adequate alone. Upon recheck following hydration, the patient was stable.    COURSE & MEDICAL DECISION MAKING  Pertinent Labs & Imaging studies reviewed. (See chart for details)  Patient's initial evaluation is concerning for possible appendicitis in the setting of what appears to be dehydration and possibly early sepsis.  Of particular concern is the fact patient is diabetic and has been vomiting and having diarrhea.  He does note that he has generally been able to take fluids today and has been drinking Gatorade as well as electrolyte water.  He will be given IV fluids as he is nauseous currently and will be made n.p.o due to the possibility of appendicitis.  We will plan on CT in his abdomen and pelvis and performing screening labs to compare to yesterday.    2 AM  Still awaiting urinalysis.  I evaluated patient at bedside, he is calm, conversant, no longer tachycardic.  He states he is feeling better but does not feel like he can urinate yet.  Will give him another liter of lactated Ringer's.    4 AM  Patient able to tolerate p.o. fluids and has a normal urinalysis.  Given his work-up today, I feel he is safe for discharge with supportive treatment for what is likely a viral gastroenteritis.  He is noted to have mesenteric adenitis and also stated clear understanding that appendicitis was not completely ruled out.  I feel it is very unlikely, however, as it is biliary colic.  I feel he is safe for discharge with continued symptomatic treatment provided symptoms do not worsen or change in any way in which case he should return for immediate reevaluation..    FINAL IMPRESSION  1. Right lower quadrant abdominal pain    2. Mesenteric adenitis    3. Non-intractable vomiting with nausea, unspecified vomiting type        Electronically signed by: Ryan Lemus, 12/16/2019 1:31 AM

## 2019-12-16 NOTE — ED TRIAGE NOTES
Martinez Smith  18 y.o.  male  Chief Complaint   Patient presents with   • Abdominal Pain     c/o lower abdominal pain, described as sharp / cramping and constant. patient as seen here last saturday c/o epigastric pain, now pain went down to more on the lower part of the abdomen. + N/V. states that he is Diabetic  mg/ dl.

## 2019-12-17 ENCOUNTER — HOSPITAL ENCOUNTER (OUTPATIENT)
Facility: MEDICAL CENTER | Age: 19
End: 2019-12-21
Attending: EMERGENCY MEDICINE | Admitting: INTERNAL MEDICINE
Payer: COMMERCIAL

## 2019-12-17 DIAGNOSIS — R11.2 INTRACTABLE VOMITING WITH NAUSEA, UNSPECIFIED VOMITING TYPE: ICD-10-CM

## 2019-12-17 DIAGNOSIS — R73.9 HYPERGLYCEMIA: ICD-10-CM

## 2019-12-17 DIAGNOSIS — R10.33 PERIUMBILICAL ABDOMINAL PAIN: ICD-10-CM

## 2019-12-17 PROBLEM — Z46.81 FITTING AND ADJUSTMENT OF INSULIN PUMP: Status: ACTIVE | Noted: 2019-12-17

## 2019-12-17 PROBLEM — Z79.4 ENCOUNTER FOR LONG-TERM (CURRENT) USE OF INSULIN (HCC): Status: ACTIVE | Noted: 2019-12-17

## 2019-12-17 LAB
ALBUMIN SERPL BCP-MCNC: 5.2 G/DL (ref 3.2–4.9)
ALBUMIN/GLOB SERPL: 1.5 G/DL
ALP SERPL-CCNC: 81 U/L (ref 30–99)
ALT SERPL-CCNC: 13 U/L (ref 2–50)
ANION GAP SERPL CALC-SCNC: 11 MMOL/L (ref 0–11.9)
AST SERPL-CCNC: 14 U/L (ref 12–45)
BASOPHILS # BLD AUTO: 0.5 % (ref 0–1.8)
BASOPHILS # BLD: 0.05 K/UL (ref 0–0.12)
BILIRUB SERPL-MCNC: 0.9 MG/DL (ref 0.1–1.5)
BUN SERPL-MCNC: 7 MG/DL (ref 8–22)
CALCIUM SERPL-MCNC: 9.8 MG/DL (ref 8.5–10.5)
CHLORIDE SERPL-SCNC: 102 MMOL/L (ref 96–112)
CO2 SERPL-SCNC: 26 MMOL/L (ref 20–33)
CREAT SERPL-MCNC: 0.75 MG/DL (ref 0.5–1.4)
EOSINOPHIL # BLD AUTO: 0.13 K/UL (ref 0–0.51)
EOSINOPHIL NFR BLD: 1.3 % (ref 0–6.9)
ERYTHROCYTE [DISTWIDTH] IN BLOOD BY AUTOMATED COUNT: 38.9 FL (ref 35.9–50)
GLOBULIN SER CALC-MCNC: 3.4 G/DL (ref 1.9–3.5)
GLUCOSE BLD-MCNC: 137 MG/DL (ref 65–99)
GLUCOSE BLD-MCNC: 142 MG/DL (ref 65–99)
GLUCOSE BLD-MCNC: 90 MG/DL (ref 65–99)
GLUCOSE BLD-MCNC: 92 MG/DL (ref 65–99)
GLUCOSE BLD-MCNC: 95 MG/DL (ref 65–99)
GLUCOSE SERPL-MCNC: 147 MG/DL (ref 65–99)
HCT VFR BLD AUTO: 48.1 % (ref 42–52)
HGB BLD-MCNC: 15.9 G/DL (ref 14–18)
IMM GRANULOCYTES # BLD AUTO: 0.03 K/UL (ref 0–0.11)
IMM GRANULOCYTES NFR BLD AUTO: 0.3 % (ref 0–0.9)
LIPASE SERPL-CCNC: 4 U/L (ref 11–82)
LYMPHOCYTES # BLD AUTO: 1.24 K/UL (ref 1–4.8)
LYMPHOCYTES NFR BLD: 12.5 % (ref 22–41)
MCH RBC QN AUTO: 29.1 PG (ref 27–33)
MCHC RBC AUTO-ENTMCNC: 33.1 G/DL (ref 33.7–35.3)
MCV RBC AUTO: 88.1 FL (ref 81.4–97.8)
MONOCYTES # BLD AUTO: 0.38 K/UL (ref 0–0.85)
MONOCYTES NFR BLD AUTO: 3.8 % (ref 0–13.4)
NEUTROPHILS # BLD AUTO: 8.06 K/UL (ref 1.82–7.42)
NEUTROPHILS NFR BLD: 81.6 % (ref 44–72)
NRBC # BLD AUTO: 0 K/UL
NRBC BLD-RTO: 0 /100 WBC
PLATELET # BLD AUTO: 356 K/UL (ref 164–446)
PMV BLD AUTO: 10.5 FL (ref 9–12.9)
POTASSIUM SERPL-SCNC: 4 MMOL/L (ref 3.6–5.5)
PROT SERPL-MCNC: 8.6 G/DL (ref 6–8.2)
RBC # BLD AUTO: 5.46 M/UL (ref 4.7–6.1)
SODIUM SERPL-SCNC: 139 MMOL/L (ref 135–145)
WBC # BLD AUTO: 9.9 K/UL (ref 4.8–10.8)

## 2019-12-17 PROCEDURE — G0378 HOSPITAL OBSERVATION PER HR: HCPCS

## 2019-12-17 PROCEDURE — 96375 TX/PRO/DX INJ NEW DRUG ADDON: CPT

## 2019-12-17 PROCEDURE — 700111 HCHG RX REV CODE 636 W/ 250 OVERRIDE (IP): Performed by: INTERNAL MEDICINE

## 2019-12-17 PROCEDURE — 99220 PR INITIAL OBSERVATION CARE,LEVL III: CPT | Performed by: INTERNAL MEDICINE

## 2019-12-17 PROCEDURE — 700111 HCHG RX REV CODE 636 W/ 250 OVERRIDE (IP): Performed by: EMERGENCY MEDICINE

## 2019-12-17 PROCEDURE — A9270 NON-COVERED ITEM OR SERVICE: HCPCS | Performed by: INTERNAL MEDICINE

## 2019-12-17 PROCEDURE — 700102 HCHG RX REV CODE 250 W/ 637 OVERRIDE(OP): Performed by: INTERNAL MEDICINE

## 2019-12-17 PROCEDURE — 85025 COMPLETE CBC W/AUTO DIFF WBC: CPT

## 2019-12-17 PROCEDURE — 700105 HCHG RX REV CODE 258: Performed by: EMERGENCY MEDICINE

## 2019-12-17 PROCEDURE — 36415 COLL VENOUS BLD VENIPUNCTURE: CPT

## 2019-12-17 PROCEDURE — 94760 N-INVAS EAR/PLS OXIMETRY 1: CPT

## 2019-12-17 PROCEDURE — 99285 EMERGENCY DEPT VISIT HI MDM: CPT

## 2019-12-17 PROCEDURE — 83690 ASSAY OF LIPASE: CPT

## 2019-12-17 PROCEDURE — 82962 GLUCOSE BLOOD TEST: CPT

## 2019-12-17 PROCEDURE — 96374 THER/PROPH/DIAG INJ IV PUSH: CPT

## 2019-12-17 PROCEDURE — 80053 COMPREHEN METABOLIC PANEL: CPT

## 2019-12-17 PROCEDURE — 700105 HCHG RX REV CODE 258: Performed by: INTERNAL MEDICINE

## 2019-12-17 RX ORDER — ONDANSETRON 4 MG/1
4 TABLET, ORALLY DISINTEGRATING ORAL EVERY 8 HOURS PRN
Status: ON HOLD | COMMUNITY
End: 2019-12-21 | Stop reason: SDUPTHER

## 2019-12-17 RX ORDER — SODIUM CHLORIDE 9 MG/ML
INJECTION, SOLUTION INTRAVENOUS CONTINUOUS
Status: DISCONTINUED | OUTPATIENT
Start: 2019-12-17 | End: 2019-12-21 | Stop reason: HOSPADM

## 2019-12-17 RX ORDER — FEXOFENADINE HCL 60 MG/1
60 TABLET, FILM COATED ORAL EVERY 12 HOURS
Status: DISCONTINUED | OUTPATIENT
Start: 2019-12-18 | End: 2019-12-21 | Stop reason: HOSPADM

## 2019-12-17 RX ORDER — PROCHLORPERAZINE EDISYLATE 5 MG/ML
5-10 INJECTION INTRAMUSCULAR; INTRAVENOUS EVERY 4 HOURS PRN
Status: DISCONTINUED | OUTPATIENT
Start: 2019-12-17 | End: 2019-12-21 | Stop reason: HOSPADM

## 2019-12-17 RX ORDER — BISACODYL 10 MG
10 SUPPOSITORY, RECTAL RECTAL
Status: DISCONTINUED | OUTPATIENT
Start: 2019-12-17 | End: 2019-12-21 | Stop reason: HOSPADM

## 2019-12-17 RX ORDER — AMOXICILLIN 250 MG
2 CAPSULE ORAL 2 TIMES DAILY
Status: DISCONTINUED | OUTPATIENT
Start: 2019-12-17 | End: 2019-12-21 | Stop reason: HOSPADM

## 2019-12-17 RX ORDER — SODIUM CHLORIDE 9 MG/ML
1000 INJECTION, SOLUTION INTRAVENOUS ONCE
Status: COMPLETED | OUTPATIENT
Start: 2019-12-17 | End: 2019-12-17

## 2019-12-17 RX ORDER — POLYETHYLENE GLYCOL 3350 17 G/17G
1 POWDER, FOR SOLUTION ORAL
Status: DISCONTINUED | OUTPATIENT
Start: 2019-12-17 | End: 2019-12-21 | Stop reason: HOSPADM

## 2019-12-17 RX ORDER — ACETAMINOPHEN 325 MG/1
650 TABLET ORAL EVERY 6 HOURS PRN
Status: DISCONTINUED | OUTPATIENT
Start: 2019-12-17 | End: 2019-12-21 | Stop reason: HOSPADM

## 2019-12-17 RX ORDER — DIPHENHYDRAMINE HYDROCHLORIDE 50 MG/ML
25 INJECTION INTRAMUSCULAR; INTRAVENOUS ONCE
Status: COMPLETED | OUTPATIENT
Start: 2019-12-17 | End: 2019-12-17

## 2019-12-17 RX ORDER — PROMETHAZINE HYDROCHLORIDE 25 MG/1
12.5-25 TABLET ORAL EVERY 4 HOURS PRN
Status: DISCONTINUED | OUTPATIENT
Start: 2019-12-17 | End: 2019-12-21 | Stop reason: HOSPADM

## 2019-12-17 RX ORDER — ONDANSETRON 2 MG/ML
4 INJECTION INTRAMUSCULAR; INTRAVENOUS EVERY 4 HOURS PRN
Status: DISCONTINUED | OUTPATIENT
Start: 2019-12-17 | End: 2019-12-21 | Stop reason: HOSPADM

## 2019-12-17 RX ORDER — METOCLOPRAMIDE HYDROCHLORIDE 5 MG/ML
10 INJECTION INTRAMUSCULAR; INTRAVENOUS ONCE
Status: COMPLETED | OUTPATIENT
Start: 2019-12-17 | End: 2019-12-17

## 2019-12-17 RX ORDER — FEXOFENADINE HCL 60 MG/1
60 TABLET, FILM COATED ORAL DAILY
COMMUNITY
End: 2021-04-05

## 2019-12-17 RX ORDER — TRAMADOL HYDROCHLORIDE 50 MG/1
50 TABLET ORAL EVERY 6 HOURS PRN
Status: DISCONTINUED | OUTPATIENT
Start: 2019-12-17 | End: 2019-12-21 | Stop reason: HOSPADM

## 2019-12-17 RX ORDER — ONDANSETRON 4 MG/1
4 TABLET, ORALLY DISINTEGRATING ORAL EVERY 4 HOURS PRN
Status: DISCONTINUED | OUTPATIENT
Start: 2019-12-17 | End: 2019-12-21 | Stop reason: HOSPADM

## 2019-12-17 RX ORDER — PROMETHAZINE HYDROCHLORIDE 12.5 MG/1
12.5-25 SUPPOSITORY RECTAL EVERY 4 HOURS PRN
Status: DISCONTINUED | OUTPATIENT
Start: 2019-12-17 | End: 2019-12-21 | Stop reason: HOSPADM

## 2019-12-17 RX ADMIN — TRAMADOL HYDROCHLORIDE 50 MG: 50 TABLET, FILM COATED ORAL at 20:17

## 2019-12-17 RX ADMIN — ONDANSETRON 4 MG: 2 INJECTION INTRAMUSCULAR; INTRAVENOUS at 20:18

## 2019-12-17 RX ADMIN — METOCLOPRAMIDE 10 MG: 5 INJECTION, SOLUTION INTRAMUSCULAR; INTRAVENOUS at 17:59

## 2019-12-17 RX ADMIN — DIPHENHYDRAMINE HYDROCHLORIDE 25 MG: 50 INJECTION INTRAMUSCULAR; INTRAVENOUS at 17:59

## 2019-12-17 RX ADMIN — SODIUM CHLORIDE: 9 INJECTION, SOLUTION INTRAVENOUS at 20:24

## 2019-12-17 RX ADMIN — SODIUM CHLORIDE 1000 ML: 9 INJECTION, SOLUTION INTRAVENOUS at 17:37

## 2019-12-17 ASSESSMENT — ENCOUNTER SYMPTOMS
FOCAL WEAKNESS: 0
DEPRESSION: 0
EYE PAIN: 0
HEARTBURN: 0
NAUSEA: 1
BLURRED VISION: 0
STRIDOR: 0
VOMITING: 1
WEIGHT LOSS: 0
NECK PAIN: 0
SPUTUM PRODUCTION: 0
ORTHOPNEA: 0
COUGH: 0
NERVOUS/ANXIOUS: 0
FEVER: 0
ABDOMINAL PAIN: 1
HEADACHES: 0
DIZZINESS: 0
MYALGIAS: 0
SHORTNESS OF BREATH: 0
CHILLS: 0
DIARRHEA: 1
PALPITATIONS: 0
BACK PAIN: 0
EYE REDNESS: 0
INSOMNIA: 0
EYE DISCHARGE: 0
SEIZURES: 0

## 2019-12-17 ASSESSMENT — LIFESTYLE VARIABLES
EVER HAD A DRINK FIRST THING IN THE MORNING TO STEADY YOUR NERVES TO GET RID OF A HANGOVER: NO
HAVE PEOPLE ANNOYED YOU BY CRITICIZING YOUR DRINKING: NO
HOW MANY TIMES IN THE PAST YEAR HAVE YOU HAD 5 OR MORE DRINKS IN A DAY: 0
TOTAL SCORE: 0
EVER_SMOKED: NEVER
HAVE YOU EVER FELT YOU SHOULD CUT DOWN ON YOUR DRINKING: NO
ON A TYPICAL DAY WHEN YOU DRINK ALCOHOL HOW MANY DRINKS DO YOU HAVE: 0
TOTAL SCORE: 0
DOES PATIENT WANT TO STOP DRINKING: NO
ALCOHOL_USE: NO
CONSUMPTION TOTAL: NEGATIVE
DO YOU DRINK ALCOHOL: NO
TOTAL SCORE: 0
AVERAGE NUMBER OF DAYS PER WEEK YOU HAVE A DRINK CONTAINING ALCOHOL: 0
EVER FELT BAD OR GUILTY ABOUT YOUR DRINKING: NO

## 2019-12-17 ASSESSMENT — PATIENT HEALTH QUESTIONNAIRE - PHQ9
6. FEELING BAD ABOUT YOURSELF - OR THAT YOU ARE A FAILURE OR HAVE LET YOURSELF OR YOUR FAMILY DOWN: NOT AL ALL
5. POOR APPETITE OR OVEREATING: NOT AT ALL
8. MOVING OR SPEAKING SO SLOWLY THAT OTHER PEOPLE COULD HAVE NOTICED. OR THE OPPOSITE, BEING SO FIGETY OR RESTLESS THAT YOU HAVE BEEN MOVING AROUND A LOT MORE THAN USUAL: NOT AT ALL
3. TROUBLE FALLING OR STAYING ASLEEP OR SLEEPING TOO MUCH: SEVERAL DAYS
SUM OF ALL RESPONSES TO PHQ9 QUESTIONS 1 AND 2: 2
2. FEELING DOWN, DEPRESSED, IRRITABLE, OR HOPELESS: SEVERAL DAYS
7. TROUBLE CONCENTRATING ON THINGS, SUCH AS READING THE NEWSPAPER OR WATCHING TELEVISION: SEVERAL DAYS
SUM OF ALL RESPONSES TO PHQ QUESTIONS 1-9: 5
9. THOUGHTS THAT YOU WOULD BE BETTER OFF DEAD, OR OF HURTING YOURSELF: NOT AT ALL
1. LITTLE INTEREST OR PLEASURE IN DOING THINGS: SEVERAL DAYS
4. FEELING TIRED OR HAVING LITTLE ENERGY: SEVERAL DAYS

## 2019-12-17 NOTE — PROGRESS NOTES
New Patient Consult Note  Referred by: Gregory Grande M.D.    Reason for consult: Diabetes Management Type 1    HPI:  Martinez Smith is a 19 y.o. old patient who is seeing us today for diabetes care.  This is a pleasant patient with diabetes and I appreciate the opportunity to participate in the care of this patient.  This is a new patient with me today.    Medtronic Insulin 630G insulin pump    Labs of 12/17/2019 HbA1c is 8.8      BG Diary:12/17/2019  In the AM no log    Has been Diabetic since T1 age 6  Has a Glucagon pen at home: no        1. Type 1 diabetes mellitus without complication (HCC)  This is a new patient with me on 12/17/2019  They are on:  1.  Humalog    Basal rate  00:00  1.25  03:00  1.35  07:00  1.40  09:00  1.30  11:00  1.30  17:00  1.45    Carb ratio  00:00  8  11:00  7.5  15:30  7.0  17:00  6.0  20:00  8.0    ICF  1:35 above 130    Active insulin time      2. Encounter for long-term (current) use of insulin (HCC)  Is on a high risk medication Insulin and we will continue to follow       ROS:   Constitutional: No change in weight , No fatigue, No night sweats.  HEENT: No Headache.  Eyes:  No blurred vision, No visual changes.  Cardiac: No chest pain, No palpitations.  Resp: No shortness of breath, No cough,   Gastro: No nausea or vomiting, No diarrhea.  Neuro: Denies numbness or tinging in bilateral feet or hands, and no loss of sensation.  Endo: No heat or cold intolerance.  : No polyuria, No polydipsia, No chronic UTI's.  Lower extremities: No lower leg edema bilateral.  All other systems were reviewed and were negative.    Past Medical History:  Patient Active Problem List    Diagnosis Date Noted   • Encounter for long-term (current) use of insulin (HCC) 12/17/2019   • Fitting and adjustment of insulin pump 12/17/2019   • Type 1 diabetes mellitus without complication (HCC) 06/15/2017   • Celiac disease 06/15/2017   • Weakness of right lower extremity 06/15/2017   •  Lipohyperplasia 06/15/2017   • Dyslipidemia 06/15/2017       Past Surgical History:  Past Surgical History:   Procedure Laterality Date   • HYPOSPADIAS REPAIR         Allergies:  Patient has no known allergies.    Social History:  Social History     Socioeconomic History   • Marital status: Single     Spouse name: Not on file   • Number of children: Not on file   • Years of education: Not on file   • Highest education level: Not on file   Occupational History   • Not on file   Social Needs   • Financial resource strain: Not on file   • Food insecurity:     Worry: Not on file     Inability: Not on file   • Transportation needs:     Medical: Not on file     Non-medical: Not on file   Tobacco Use   • Smoking status: Never Smoker   • Smokeless tobacco: Never Used   Substance and Sexual Activity   • Alcohol use: No   • Drug use: No   • Sexual activity: Not on file   Lifestyle   • Physical activity:     Days per week: Not on file     Minutes per session: Not on file   • Stress: Not on file   Relationships   • Social connections:     Talks on phone: Not on file     Gets together: Not on file     Attends Yarsanism service: Not on file     Active member of club or organization: Not on file     Attends meetings of clubs or organizations: Not on file     Relationship status: Not on file   • Intimate partner violence:     Fear of current or ex partner: Not on file     Emotionally abused: Not on file     Physically abused: Not on file     Forced sexual activity: Not on file   Other Topics Concern   • Behavioral problems Not Asked   • Interpersonal relationships Not Asked   • Sad or not enjoying activities Not Asked   • Suicidal thoughts Not Asked   • Poor school performance Not Asked   • Reading difficulties Not Asked   • Speech difficulties Not Asked   • Writing difficulties Not Asked   • Inadequate sleep Not Asked   • Excessive TV viewing Not Asked   • Excessive video game use Not Asked   • Inadequate exercise Not Asked   •  Sports related Not Asked   • Poor diet Not Asked   • Family concerns for drug/alcohol abuse Not Asked   • Poor oral hygiene Not Asked   • Bike safety Not Asked   • Family concerns vehicle safety Not Asked   Social History Narrative   • Not on file       Family History:  Family History   Problem Relation Age of Onset   • Arthritis Maternal Grandmother        Medications:    Current Outpatient Medications:   •  glucose blood (AFSHAN CONTOUR NEXT TEST) strip, 1 Strip by Other route 6 Times a Day., Disp: 150 Strip, Rfl: 11  •  HUMALOG 100 UNIT/ML, Inject 100 Units as instructed Continuous., Disp: 3 Vial, Rfl: 11  •  Glucagon (BAQSIMI TWO PACK) 3 MG/DOSE Powder, Spray 1 Applicator in nose as needed. For severe low blood glucose, Disp: 1 Each, Rfl: 6  •  Urine Glucose-Ketones Test Strip, 1 Strip by In Vitro route every day., Disp: 100 Strip, Rfl: 2  •  ondansetron (ZOFRAN ODT) 4 MG TABLET DISPERSIBLE, Take 1 Tab by mouth every 8 hours as needed for Nausea for up to 7 days., Disp: 8 Tab, Rfl: 0  •  Fexofenadine HCl (ALLEGRA ALLERGY PO), Take  by mouth., Disp: , Rfl:   •  Cholecalciferol (VITAMIN D-3) 5000 units Tab, Take  by mouth., Disp: , Rfl:   •  amoxicillin (AMOXIL) 500 MG Cap, Take 1 Cap by mouth 3 times a day. (Patient not taking: Reported on 5/23/2019), Disp: 30 Cap, Rfl: 0  •  insulin glargine (LANTUS SOLOSTAR) 100 UNIT/ML Solution Pen-injector injection, Inject  as instructed as needed (backup for pump)., Disp: , Rfl:   •  Glucagon, rDNA, (GLUCAGON EMERGENCY) 1 MG Kit, by Injection route., Disp: , Rfl:   •  Insulin Pen Needle (BD PEN NEEDLE ANDREW U/F) 32G X 4 MM Misc, by Does not apply route., Disp: , Rfl:   •  acetone, urine, test (KETOSTIX) strip, by Does not apply route., Disp: , Rfl:   •  Ketone Blood Test (PRECISION XTRA) Strip, by In Vitro route., Disp: , Rfl:       Physical Examination:   Vital signs: There were no vitals taken for this visit.  General: No distress, cooperative, well dressed and well  nourished.   Eyes: No scleral icterus or discharge, No hyposphagma  ENMT: Normal on external inspection of nose, lips, No nasal drainage   Neck: No abnormal masses on inspection  Resp: Normal effort, Bilateral clear to auscultation, No wheezing, No rales  CVS: Regular rate and rhythm, S1 S2 normal, No murmur. No gallop  Extremities: No edema bilateral extremities  Neuro: Alert and oriented  Skin: No rash, No Ulcers  Psych: Normal mood and affect  Foot exam: normal sensation to monofilament testing, normal pulses, no ulcers.  Normal Vibration quantitative sensation test.    Assessment and Plan:    1. Type 1 diabetes mellitus without complication (HCC)  Basal rate  00:00  1.25 (change to 1.15)  03:00  1.35 (change to 1.25)  07:00  1.40  09:00  1.30  11:00  1.30  17:00  1.45    Carb ratio  00:00  8  11:00  7.5  15:30  7.0  17:00  6.0  20:00  8.0    ICF  1:35 above 130    Active insulin time 3 hours    2. Encounter for long-term (current) use of insulin (HCC)  Is on a high risk medication Insulin and we will continue to follow     During today's visit we went over the patients insulin pump Medtronic and settings and the cloud based pump reports.  See report that has been scanned into the chart. This should be considered part of the chart record for today, hand written notes were placed on this record. Recommendations were made and pump settings may have been changed.  See the written notes on the scanned in report.   If this patient has a CGM then a blood glucose was taken in the office today.    We had a long discussion of better control    Return in about 3 weeks (around 1/3/2020).       This patient during there office visit was started on new medication.  Side effects of new medications were discussed with the patient today in the office. The patient was supplied paperwork on this new medication.    Thank you kindly for allowing me to participate in the diabetes care plan for this patient.    Kingsley Ríos PA-C,  [facility]  Board Certified - Advanced Diabetes Management  12/17/19    CC:   Gregory Grande M.D.

## 2019-12-17 NOTE — ED TRIAGE NOTES
Martinez Smith  Chief Complaint   Patient presents with   • Nausea/Vomiting/Diarrhea     started Saturday    • Abdominal Pain     Pt ambulatory to triage with above complaint.  No acute distress.  Pt seen here Saturday and last night for same,  States today unable to keep any fluids down, no relief with zofron prescription..  Type I DM,  FSBS 137 in triage.   Pt returned to [facility], educated on triage process, and to inform staff of any changes or concerns.

## 2019-12-17 NOTE — ED NOTES
Pt feeling shaky,  FSBS rechecked, noted at 142.  Ice chips given for comfort.  Apologized for wait.  No other assessment changes noted.  Refusing blood draw at this time.

## 2019-12-18 LAB
ALBUMIN SERPL BCP-MCNC: 3.8 G/DL (ref 3.2–4.9)
ALBUMIN/GLOB SERPL: 1.9 G/DL
ALP SERPL-CCNC: 64 U/L (ref 30–99)
ALT SERPL-CCNC: 8 U/L (ref 2–50)
ANION GAP SERPL CALC-SCNC: 6 MMOL/L (ref 0–11.9)
AST SERPL-CCNC: 11 U/L (ref 12–45)
BILIRUB SERPL-MCNC: 1.1 MG/DL (ref 0.1–1.5)
BUN SERPL-MCNC: 8 MG/DL (ref 8–22)
CALCIUM SERPL-MCNC: 8.5 MG/DL (ref 8.5–10.5)
CHLORIDE SERPL-SCNC: 109 MMOL/L (ref 96–112)
CO2 SERPL-SCNC: 27 MMOL/L (ref 20–33)
CREAT SERPL-MCNC: 0.72 MG/DL (ref 0.5–1.4)
ERYTHROCYTE [DISTWIDTH] IN BLOOD BY AUTOMATED COUNT: 38.8 FL (ref 35.9–50)
GLOBULIN SER CALC-MCNC: 2 G/DL (ref 1.9–3.5)
GLUCOSE BLD-MCNC: 108 MG/DL (ref 65–99)
GLUCOSE BLD-MCNC: 168 MG/DL (ref 65–99)
GLUCOSE BLD-MCNC: 180 MG/DL (ref 65–99)
GLUCOSE BLD-MCNC: 225 MG/DL (ref 65–99)
GLUCOSE BLD-MCNC: 53 MG/DL (ref 65–99)
GLUCOSE BLD-MCNC: 63 MG/DL (ref 65–99)
GLUCOSE BLD-MCNC: 72 MG/DL (ref 65–99)
GLUCOSE BLD-MCNC: 72 MG/DL (ref 65–99)
GLUCOSE BLD-MCNC: 78 MG/DL (ref 65–99)
GLUCOSE SERPL-MCNC: 195 MG/DL (ref 65–99)
HCT VFR BLD AUTO: 38.3 % (ref 42–52)
HGB BLD-MCNC: 12.7 G/DL (ref 14–18)
MCH RBC QN AUTO: 29.1 PG (ref 27–33)
MCHC RBC AUTO-ENTMCNC: 33.2 G/DL (ref 33.7–35.3)
MCV RBC AUTO: 87.8 FL (ref 81.4–97.8)
PLATELET # BLD AUTO: 294 K/UL (ref 164–446)
PMV BLD AUTO: 10.7 FL (ref 9–12.9)
POTASSIUM SERPL-SCNC: 4 MMOL/L (ref 3.6–5.5)
PROT SERPL-MCNC: 5.8 G/DL (ref 6–8.2)
RBC # BLD AUTO: 4.36 M/UL (ref 4.7–6.1)
SODIUM SERPL-SCNC: 142 MMOL/L (ref 135–145)
WBC # BLD AUTO: 10.1 K/UL (ref 4.8–10.8)

## 2019-12-18 PROCEDURE — 700102 HCHG RX REV CODE 250 W/ 637 OVERRIDE(OP): Performed by: INTERNAL MEDICINE

## 2019-12-18 PROCEDURE — 700105 HCHG RX REV CODE 258: Performed by: INTERNAL MEDICINE

## 2019-12-18 PROCEDURE — A9270 NON-COVERED ITEM OR SERVICE: HCPCS | Performed by: INTERNAL MEDICINE

## 2019-12-18 PROCEDURE — G0378 HOSPITAL OBSERVATION PER HR: HCPCS

## 2019-12-18 PROCEDURE — 36415 COLL VENOUS BLD VENIPUNCTURE: CPT

## 2019-12-18 PROCEDURE — 82962 GLUCOSE BLOOD TEST: CPT | Mod: 91

## 2019-12-18 PROCEDURE — 80053 COMPREHEN METABOLIC PANEL: CPT

## 2019-12-18 PROCEDURE — 99226 PR SUBSEQUENT OBSERVATION CARE,LEVEL III: CPT | Performed by: HOSPITALIST

## 2019-12-18 PROCEDURE — 85027 COMPLETE CBC AUTOMATED: CPT

## 2019-12-18 RX ADMIN — Medication 16 G: at 00:06

## 2019-12-18 RX ADMIN — FEXOFENADINE HCL 60 MG: 60 TABLET, FILM COATED ORAL at 17:56

## 2019-12-18 RX ADMIN — SODIUM CHLORIDE: 9 INJECTION, SOLUTION INTRAVENOUS at 06:00

## 2019-12-18 RX ADMIN — FEXOFENADINE HCL 60 MG: 60 TABLET, FILM COATED ORAL at 06:00

## 2019-12-18 RX ADMIN — SODIUM CHLORIDE: 9 INJECTION, SOLUTION INTRAVENOUS at 17:09

## 2019-12-18 ASSESSMENT — ENCOUNTER SYMPTOMS
ABDOMINAL PAIN: 1
FALLS: 0
CHILLS: 0
MYALGIAS: 0
EYE PAIN: 0
SORE THROAT: 0
VOMITING: 1
SHORTNESS OF BREATH: 0
FEVER: 0
DEPRESSION: 0
DIZZINESS: 0
EYE DISCHARGE: 0
MEMORY LOSS: 0
COUGH: 0
NAUSEA: 1
HEADACHES: 0
CONSTIPATION: 0

## 2019-12-18 NOTE — ED NOTES
Med rec updated and complete. Allergies reviewed.   No antibiotic use in last 14 days  Home pharmacy [facility]

## 2019-12-18 NOTE — PROGRESS NOTES
Pt is Type 1 diabetic. Per pt request, this Rn has checked his blood glucose nearly every 1 hour due to pt having lower than average readings for him. Pt had sustained in the 90's for most of the night, however he did drop down to 72 x2. Pt was given a total of 4 orange juices and 4 glucose tablets. This resulted in a rise of his blood glucose from 72 to 108. Will recheck around 0200.

## 2019-12-18 NOTE — ED PROVIDER NOTES
ED Provider Note    Scribed for Lin Bhat M.D. by Soco Sepulveda. 12/17/2019, 5:15 PM.    Primary care provider: Gregory Grande M.D.  Means of arrival: Walk in  History obtained from: patient   History limited by: note    CHIEF COMPLAINT  Chief Complaint   Patient presents with   • Nausea/Vomiting/Diarrhea     started Saturday    • Abdominal Pain       HPI  Martinez Smith is a 19 y.o. male who presents to the Emergency Department for vomiting onset last night. He was seen in the ED on Saturday afternoon and yesterday for abdominal pain where he has a CT done which showed mesenteric adenitis.  he presents to the ED again today because he has been repeatedly vomiting even after treating with Zofran. His blood sugars have been normal at home with his most recent reading being 142 in lobby. He has associated diarrhea starting yesterday, and continues to have worsening abdominal pain. Denies back pain, cough, or dysuria. Denies ever having smoked marijuana. Additionally he has never been diagnosed with gastroparesis.     REVIEW OF SYSTEMS  Pertinent positives include emesis, abdominal pain, and diarrhea. Pertinent negatives include no back pain, cough, or dysuria, blood in his vomit, black stools, bleeding problems, fevers, difficulty breathing, chest pain. All other systems reviewed and negative.     PAST MEDICAL HISTORY   has a past medical history of Celiac disease and Diabetes type I (HCC).    SURGICAL HISTORY   has a past surgical history that includes hypospadias repair.    SOCIAL HISTORY  Social History     Tobacco Use   • Smoking status: Never Smoker   • Smokeless tobacco: Never Used   Substance Use Topics   • Alcohol use: No   • Drug use: No      Social History     Substance and Sexual Activity   Drug Use No       FAMILY HISTORY  Family History   Problem Relation Age of Onset   • Arthritis Maternal Grandmother        CURRENT MEDICATIONS  Home Medications     Reviewed by Mayra Cardona R.N.  "(Registered Nurse) on 12/17/19 at 1426  Med List Status: Partial   Medication Last Dose Status   acetone, urine, test (KETOSTIX) strip  Active   amoxicillin (AMOXIL) 500 MG Cap  Active   Cholecalciferol (VITAMIN D-3) 5000 units Tab  Active   Fexofenadine HCl (ALLEGRA ALLERGY PO)  Active   Glucagon (BAQSIMI TWO PACK) 3 MG/DOSE Powder  Active   Glucagon, rDNA, (GLUCAGON EMERGENCY) 1 MG Kit  Active   glucose blood (AFSHAN CONTOUR NEXT TEST) strip  Active   HUMALOG 100 UNIT/ML  Active   insulin glargine (LANTUS SOLOSTAR) 100 UNIT/ML Solution Pen-injector injection  Active   Insulin Pen Needle (BD PEN NEEDLE ANDREW U/F) 32G X 4 MM Misc  Active   Ketone Blood Test (PRECISION XTRA) Strip  Active   ondansetron (ZOFRAN ODT) 4 MG TABLET DISPERSIBLE  Active   Urine Glucose-Ketones Test Strip  Active                ALLERGIES  No Known Allergies    PHYSICAL EXAM  VITAL SIGNS: /94   Pulse 100   Temp 37.3 °C (99.1 °F) (Oral)   Resp 18   Ht 1.676 m (5' 6\")   Wt 70.9 kg (156 lb 4.9 oz)   SpO2 96%   BMI 25.23 kg/m²     Constitutional: Well developed, No acute distress, Non-toxic appearance.   HENT: Normocephalic, Atraumatic, Bilateral external ears normal, oral mucosa dry, No oral exudates, Nose normal.   Eyes: PERRL, EOMI, Conjunctiva normal  Neck: Normal range of motion, No tenderness, Supple  Cardiovascular: Tachycardic, Normal rhythm  Thorax & Lungs: Normal breath sounds, No respiratory distress,  No chest tenderness.   Abdomen: Periumbilical tenderness,no guarding no rebound,no distention  Skin: Warm, Dry, No erythema, No rash.   Back: No tenderness, No CVA tenderness.   Extremities: Intact distal pulses, No edema, No tenderness   Neurologic: Alert & oriented x 3, Normal motor function, Normal sensory function, No focal deficits noted.  Psychiatric: Appropriate                                                 DIAGNOSTIC STUDIES / PROCEDURES\    LABS  Results for orders placed or performed during the hospital encounter " of 12/17/19   CBC WITH DIFFERENTIAL   Result Value Ref Range    WBC 9.9 4.8 - 10.8 K/uL    RBC 5.46 4.70 - 6.10 M/uL    Hemoglobin 15.9 14.0 - 18.0 g/dL    Hematocrit 48.1 42.0 - 52.0 %    MCV 88.1 81.4 - 97.8 fL    MCH 29.1 27.0 - 33.0 pg    MCHC 33.1 (L) 33.7 - 35.3 g/dL    RDW 38.9 35.9 - 50.0 fL    Platelet Count 356 164 - 446 K/uL    MPV 10.5 9.0 - 12.9 fL    Neutrophils-Polys 81.60 (H) 44.00 - 72.00 %    Lymphocytes 12.50 (L) 22.00 - 41.00 %    Monocytes 3.80 0.00 - 13.40 %    Eosinophils 1.30 0.00 - 6.90 %    Basophils 0.50 0.00 - 1.80 %    Immature Granulocytes 0.30 0.00 - 0.90 %    Nucleated RBC 0.00 /100 WBC    Neutrophils (Absolute) 8.06 (H) 1.82 - 7.42 K/uL    Lymphs (Absolute) 1.24 1.00 - 4.80 K/uL    Monos (Absolute) 0.38 0.00 - 0.85 K/uL    Eos (Absolute) 0.13 0.00 - 0.51 K/uL    Baso (Absolute) 0.05 0.00 - 0.12 K/uL    Immature Granulocytes (abs) 0.03 0.00 - 0.11 K/uL    NRBC (Absolute) 0.00 K/uL   COMP METABOLIC PANEL   Result Value Ref Range    Sodium 139 135 - 145 mmol/L    Potassium 4.0 3.6 - 5.5 mmol/L    Chloride 102 96 - 112 mmol/L    Co2 26 20 - 33 mmol/L    Anion Gap 11.0 0.0 - 11.9    Glucose 147 (H) 65 - 99 mg/dL    Bun 7 (L) 8 - 22 mg/dL    Creatinine 0.75 0.50 - 1.40 mg/dL    Calcium 9.8 8.5 - 10.5 mg/dL    AST(SGOT) 14 12 - 45 U/L    ALT(SGPT) 13 2 - 50 U/L    Alkaline Phosphatase 81 30 - 99 U/L    Total Bilirubin 0.9 0.1 - 1.5 mg/dL    Albumin 5.2 (H) 3.2 - 4.9 g/dL    Total Protein 8.6 (H) 6.0 - 8.2 g/dL    Globulin 3.4 1.9 - 3.5 g/dL    A-G Ratio 1.5 g/dL   LIPASE   Result Value Ref Range    Lipase 4 (L) 11 - 82 U/L   ESTIMATED GFR   Result Value Ref Range    GFR If African American >60 >60 mL/min/1.73 m 2    GFR If Non African American >60 >60 mL/min/1.73 m 2   ACCU-CHEK GLUCOSE   Result Value Ref Range    Glucose - Accu-Ck 137 (H) 65 - 99 mg/dL   ACCU-CHEK GLUCOSE   Result Value Ref Range    Glucose - Accu-Ck 142 (H) 65 - 99 mg/dL     All labs reviewed by me.    COURSE & MEDICAL  DECISION MAKING  Nursing notes, VS, PMSFHx reviewed in chart.     Patient presents to the emergency department complaining of persistent abdominal pain as well as vomiting.  Patient is unable to tolerate p.o. despite Zofran.  Patient has diffuse abdominal pain on exam.  There is no focal right lower quadrant tenderness to suggest appendicitis.  I have reviewed the old records from his previous visits.  CT yesterday showed evidence of mesenteric adenitis.  He does not have any peritoneal signs on exam.  Therefore at this point will hold re-CT and.  Plan is to give Reglan and Benadryl for his vomiting for possible gastroparesis and check laboratory studies    5:15 PM Patient seen and examined at bedside. I discussed that we will hydrate the patient and give him nausea medication before reevaluating. I am hesitant to rescan the patients abdomen due to radiation exposure so we will start with the treatment stated previously and go from there. Because of the patients persistent symptoms and medical history of Type 1 Diabetes we will likely need to admit him for further observation and treatment. The patient presents with abdominal pain and vomiting. Ordered for accu-chek glucose, CBC w/ diff, CMP, lipase, and UA to evaluate. Patient was treated with Reglan 10 mg, Benadryl 25 mg, and IV hydration for dehydration for his symptoms.    Patient's laboratory studies have returned and show no evidence of a gap acidosis or significant dehydration.  Is a normal white count without evidence of bandemia.  Overall his labs look quite good.  However patient continues to have vomiting and therefore I do not feel it is stable for outpatient management.  This is his third visit in 4 days for continued vomiting.  Patient does not have a history of marijuana use and I doubt this is cyclic vomiting.    6:05 PM I discussed the patient's case and the above findings with Dr. Jorge (Hospitalist) who agreed to evaluate patient for  hospitalization.    6:14 PM Patient was reevaluated at bedside. Further discussed admission with the patient who is agreeable.    HYDRATION: Based on the patient's presentation of Acute Vomiting and Dehydration the patient was given IV fluids. IV Hydration was used because oral hydration was not adequate alone. Upon recheck following hydration, the patient was well improved.    DISPOSITION:  Patient will be hospitalized by Dr. Jorge in guarded condition.     FINAL IMPRESSION  1. Intractable vomiting with nausea, unspecified vomiting type    2. Hyperglycemia    3. Periumbilical abdominal pain          Soco GUTIERREZ ([employer]), am scribing for, and in the presence of, Lin Bhat M.D..    Electronically signed by: Soco Sepulveda ([employer]), 12/17/2019    ILin M.D. personally performed the services described in this documentation, as scribed by Soco Sepulveda in my presence, and it is both accurate and complete. C    The note accurately reflects work and decisions made by me.  Lin Bhat  12/17/2019  9:13 PM

## 2019-12-18 NOTE — CARE PLAN
Pt instructed on use of call light and pt using call light appropriately. Call light and personal belongings within reach of pt. Pt denies any needs at this time. Will continue to monitor.

## 2019-12-18 NOTE — ASSESSMENT & PLAN NOTE
Viral gastroenteritis vs. Gastroparesis   Supportive care with IV fluid and antiemetic medications  Gastric emptying study pending

## 2019-12-18 NOTE — H&P
Hospital Medicine History & Physical Note    Date of Service  12/17/2019    Primary Care Physician  Gregory Grande M.D.    Consultants  none    Code Status  full    Chief Complaint  Intractable nausea and vomiting    History of Presenting Illness  19 y.o. male insulin-dependent diabetes mellitus who presented 12/17/2019 with intractable nausea and vomiting for the past 3 days.  The patient visited ER 2 times over the past few days.  But he continued to have intractable nausea and vomiting and because of that he has not been able to eat or drink much.  Yesterday he has multiple episodes of diarrhea but today it resolved.  The patient denied eating any unusual food.  In the ER he had a CT scan of the abdomen and showed no acute finding except mild mesenteric lymph enlargement.  He denies smoking cigarette, marijuana use or alcohol drink. he will be admitted to the hospital for observation.    Review of Systems  Review of Systems   Constitutional: Negative for chills, fever and weight loss.   HENT: Negative for congestion and nosebleeds.    Eyes: Negative for blurred vision, pain, discharge and redness.   Respiratory: Negative for cough, sputum production, shortness of breath and stridor.    Cardiovascular: Negative for chest pain, palpitations and orthopnea.   Gastrointestinal: Positive for abdominal pain, diarrhea, nausea and vomiting. Negative for heartburn.   Genitourinary: Negative for dysuria, frequency and urgency.   Musculoskeletal: Negative for back pain, myalgias and neck pain.   Skin: Negative for itching and rash.   Neurological: Negative for dizziness, focal weakness, seizures and headaches.   Psychiatric/Behavioral: Negative for depression. The patient is not nervous/anxious and does not have insomnia.        Past Medical History   has a past medical history of Celiac disease and Diabetes type I (HCC).    Surgical History   has a past surgical history that includes hypospadias repair.     Family  History  family history includes Arthritis in his maternal grandmother.     Social History   reports that he has never smoked. He has never used smokeless tobacco. He reports that he does not drink alcohol or use drugs.    Allergies  No Known Allergies    Medications  Prior to Admission Medications   Prescriptions Last Dose Informant Patient Reported? Taking?   Cholecalciferol (VITAMIN D-3) 5000 units Tab   Yes No   Sig: Take  by mouth.   Fexofenadine HCl (ALLEGRA ALLERGY PO)   Yes No   Sig: Take  by mouth.   Glucagon (BAQSIMI TWO PACK) 3 MG/DOSE Powder   No No   Sig: Spray 1 Applicator in nose as needed. For severe low blood glucose   Glucagon, rDNA, (GLUCAGON EMERGENCY) 1 MG Kit   Yes No   Sig: by Injection route.   HUMALOG 100 UNIT/ML   No No   Sig: Inject 100 Units as instructed Continuous.   Insulin Pen Needle (BD PEN NEEDLE ANDREW U/F) 32G X 4 MM Misc   Yes No   Sig: by Does not apply route.   Ketone Blood Test (PRECISION XTRA) Strip   Yes No   Sig: by In Vitro route.   Urine Glucose-Ketones Test Strip   No No   Si Strip by In Vitro route every day.   acetone, urine, test (KETOSTIX) strip   Yes No   Sig: by Does not apply route.   amoxicillin (AMOXIL) 500 MG Cap   No No   Sig: Take 1 Cap by mouth 3 times a day.   Patient not taking: Reported on 2019   glucose blood (AFSHAN CONTOUR NEXT TEST) strip   No No   Si Strip by Other route 6 Times a Day.   insulin glargine (LANTUS SOLOSTAR) 100 UNIT/ML Solution Pen-injector injection   Yes No   Sig: Inject  as instructed as needed (backup for pump).   ondansetron (ZOFRAN ODT) 4 MG TABLET DISPERSIBLE   No No   Sig: Take 1 Tab by mouth every 8 hours as needed for Nausea for up to 7 days.      Facility-Administered Medications: None       Physical Exam  Temp:  [37.3 °C (99.1 °F)] 37.3 °C (99.1 °F)  Pulse:  [100-103] 103  Resp:  [18] 18  BP: (122-158)/(80-94) 122/80  SpO2:  [96 %] 96 %    Physical Exam  Vitals signs reviewed.   Constitutional:       General: He  is not in acute distress.     Appearance: Normal appearance.   HENT:      Head: Normocephalic and atraumatic.      Nose: No congestion or rhinorrhea.   Eyes:      Extraocular Movements: Extraocular movements intact.      Pupils: Pupils are equal, round, and reactive to light.   Neck:      Musculoskeletal: Normal range of motion and neck supple.   Cardiovascular:      Rate and Rhythm: Normal rate and regular rhythm.      Pulses: Normal pulses.   Pulmonary:      Effort: Pulmonary effort is normal. No respiratory distress.      Breath sounds: Normal breath sounds.   Abdominal:      General: Bowel sounds are normal. There is no distension.      Palpations: Abdomen is soft.      Tenderness: There is no tenderness.   Musculoskeletal:         General: No swelling or tenderness.   Skin:     General: Skin is warm.      Findings: No erythema.   Neurological:      General: No focal deficit present.      Mental Status: He is alert and oriented to person, place, and time.         Laboratory:  Recent Labs     12/16/19  0200 12/17/19  1623   WBC 9.4 9.9   RBC 4.79 5.46   HEMOGLOBIN 14.2 15.9   HEMATOCRIT 41.5* 48.1   MCV 86.6 88.1   MCH 29.6 29.1   MCHC 34.2 33.1*   RDW 38.6 38.9   PLATELETCT 304 356   MPV 10.8 10.5     Recent Labs     12/16/19  0200 12/17/19  1623   SODIUM 139 139   POTASSIUM 3.6 4.0   CHLORIDE 105 102   CO2 24 26   GLUCOSE 168* 147*   BUN 9 7*   CREATININE 0.78 0.75   CALCIUM 9.7 9.8     Recent Labs     12/16/19  0200 12/17/19  1623   ALTSGPT 13 13   ASTSGOT 14 14   ALKPHOSPHAT 77 81   TBILIRUBIN 0.4 0.9   LIPASE 7* 4*   GLUCOSE 168* 147*         No results for input(s): NTPROBNP in the last 72 hours.      No results for input(s): TROPONINT in the last 72 hours.    Urinalysis:    Recent Labs     12/16/19  0440   SPECGRAVITY >=1.045*   GLUCOSEUR Negative   KETONES 15*   NITRITE Negative   LEUKESTERAS Negative        Imaging:  No orders to display         Assessment/Plan:  I anticipate this patient is appropriate  for observation status at this time.    Intractable nausea and vomiting- (present on admission)  Assessment & Plan  Could be related to viral gastroenteritis  Supportive care with IV fluid and antiemetic medications  Patient was instructed to get gastric emptying study done after discharge to rule out possible gastroparesis    Type 1 diabetes mellitus without complication (HCC)- (present on admission)  Assessment & Plan  On insulin management with insulin pump  Hypoglycemia protocol      VTE prophylaxis: Ambulatory

## 2019-12-19 ENCOUNTER — APPOINTMENT (OUTPATIENT)
Dept: RADIOLOGY | Facility: MEDICAL CENTER | Age: 19
End: 2019-12-19
Attending: NURSE PRACTITIONER
Payer: COMMERCIAL

## 2019-12-19 PROBLEM — E10.65 TYPE 1 DIABETES MELLITUS WITH HYPERGLYCEMIA (HCC): Status: ACTIVE | Noted: 2017-06-15

## 2019-12-19 PROBLEM — E11.43 DIABETIC GASTROPARESIS (HCC): Status: ACTIVE | Noted: 2019-12-19

## 2019-12-19 PROBLEM — K31.84 DIABETIC GASTROPARESIS (HCC): Status: ACTIVE | Noted: 2019-12-19

## 2019-12-19 LAB
ANION GAP SERPL CALC-SCNC: 8 MMOL/L (ref 0–11.9)
BUN SERPL-MCNC: 5 MG/DL (ref 8–22)
CALCIUM SERPL-MCNC: 8.7 MG/DL (ref 8.5–10.5)
CHLORIDE SERPL-SCNC: 108 MMOL/L (ref 96–112)
CO2 SERPL-SCNC: 26 MMOL/L (ref 20–33)
CREAT SERPL-MCNC: 0.7 MG/DL (ref 0.5–1.4)
ERYTHROCYTE [DISTWIDTH] IN BLOOD BY AUTOMATED COUNT: 38.9 FL (ref 35.9–50)
GLUCOSE BLD-MCNC: 109 MG/DL (ref 65–99)
GLUCOSE BLD-MCNC: 110 MG/DL (ref 65–99)
GLUCOSE BLD-MCNC: 119 MG/DL (ref 65–99)
GLUCOSE BLD-MCNC: 127 MG/DL (ref 65–99)
GLUCOSE BLD-MCNC: 182 MG/DL (ref 65–99)
GLUCOSE BLD-MCNC: 46 MG/DL (ref 65–99)
GLUCOSE BLD-MCNC: 65 MG/DL (ref 65–99)
GLUCOSE BLD-MCNC: 73 MG/DL (ref 65–99)
GLUCOSE BLD-MCNC: 80 MG/DL (ref 65–99)
GLUCOSE BLD-MCNC: 85 MG/DL (ref 65–99)
GLUCOSE BLD-MCNC: 87 MG/DL (ref 65–99)
GLUCOSE SERPL-MCNC: 138 MG/DL (ref 65–99)
HCT VFR BLD AUTO: 38.4 % (ref 42–52)
HGB BLD-MCNC: 12.9 G/DL (ref 14–18)
MAGNESIUM SERPL-MCNC: 2 MG/DL (ref 1.5–2.5)
MCH RBC QN AUTO: 29.5 PG (ref 27–33)
MCHC RBC AUTO-ENTMCNC: 33.6 G/DL (ref 33.7–35.3)
MCV RBC AUTO: 87.9 FL (ref 81.4–97.8)
PLATELET # BLD AUTO: 283 K/UL (ref 164–446)
PMV BLD AUTO: 10.7 FL (ref 9–12.9)
POTASSIUM SERPL-SCNC: 3.3 MMOL/L (ref 3.6–5.5)
RBC # BLD AUTO: 4.37 M/UL (ref 4.7–6.1)
SODIUM SERPL-SCNC: 142 MMOL/L (ref 135–145)
WBC # BLD AUTO: 7.6 K/UL (ref 4.8–10.8)

## 2019-12-19 PROCEDURE — 700111 HCHG RX REV CODE 636 W/ 250 OVERRIDE (IP): Performed by: INTERNAL MEDICINE

## 2019-12-19 PROCEDURE — 85027 COMPLETE CBC AUTOMATED: CPT

## 2019-12-19 PROCEDURE — 700102 HCHG RX REV CODE 250 W/ 637 OVERRIDE(OP): Performed by: INTERNAL MEDICINE

## 2019-12-19 PROCEDURE — 96376 TX/PRO/DX INJ SAME DRUG ADON: CPT

## 2019-12-19 PROCEDURE — 99225 PR SUBSEQUENT OBSERVATION CARE,LEVEL II: CPT | Performed by: HOSPITALIST

## 2019-12-19 PROCEDURE — 83735 ASSAY OF MAGNESIUM: CPT

## 2019-12-19 PROCEDURE — 82962 GLUCOSE BLOOD TEST: CPT | Mod: 91

## 2019-12-19 PROCEDURE — G0378 HOSPITAL OBSERVATION PER HR: HCPCS

## 2019-12-19 PROCEDURE — 80048 BASIC METABOLIC PNL TOTAL CA: CPT

## 2019-12-19 PROCEDURE — 36415 COLL VENOUS BLD VENIPUNCTURE: CPT

## 2019-12-19 PROCEDURE — 700105 HCHG RX REV CODE 258: Performed by: INTERNAL MEDICINE

## 2019-12-19 PROCEDURE — A9270 NON-COVERED ITEM OR SERVICE: HCPCS | Performed by: INTERNAL MEDICINE

## 2019-12-19 PROCEDURE — 700102 HCHG RX REV CODE 250 W/ 637 OVERRIDE(OP): Performed by: NURSE PRACTITIONER

## 2019-12-19 PROCEDURE — 96375 TX/PRO/DX INJ NEW DRUG ADDON: CPT

## 2019-12-19 PROCEDURE — A9270 NON-COVERED ITEM OR SERVICE: HCPCS | Performed by: NURSE PRACTITIONER

## 2019-12-19 RX ORDER — POTASSIUM CHLORIDE 20 MEQ/1
40 TABLET, EXTENDED RELEASE ORAL ONCE
Status: COMPLETED | OUTPATIENT
Start: 2019-12-19 | End: 2019-12-19

## 2019-12-19 RX ADMIN — PROCHLORPERAZINE EDISYLATE 10 MG: 5 INJECTION INTRAMUSCULAR; INTRAVENOUS at 09:26

## 2019-12-19 RX ADMIN — PROMETHAZINE HYDROCHLORIDE 25 MG: 25 TABLET ORAL at 10:13

## 2019-12-19 RX ADMIN — Medication 16 G: at 01:40

## 2019-12-19 RX ADMIN — Medication 16 G: at 20:44

## 2019-12-19 RX ADMIN — ONDANSETRON 4 MG: 2 INJECTION INTRAMUSCULAR; INTRAVENOUS at 02:29

## 2019-12-19 RX ADMIN — ONDANSETRON 4 MG: 2 INJECTION INTRAMUSCULAR; INTRAVENOUS at 18:38

## 2019-12-19 RX ADMIN — ONDANSETRON 4 MG: 2 INJECTION INTRAMUSCULAR; INTRAVENOUS at 08:33

## 2019-12-19 RX ADMIN — TRAMADOL HYDROCHLORIDE 50 MG: 50 TABLET, FILM COATED ORAL at 20:45

## 2019-12-19 RX ADMIN — POTASSIUM CHLORIDE 40 MEQ: 1500 TABLET, EXTENDED RELEASE ORAL at 17:15

## 2019-12-19 RX ADMIN — SODIUM CHLORIDE: 9 INJECTION, SOLUTION INTRAVENOUS at 02:29

## 2019-12-19 RX ADMIN — FEXOFENADINE HCL 60 MG: 60 TABLET, FILM COATED ORAL at 17:15

## 2019-12-19 RX ADMIN — SODIUM CHLORIDE: 9 INJECTION, SOLUTION INTRAVENOUS at 15:12

## 2019-12-19 RX ADMIN — PROCHLORPERAZINE EDISYLATE 5 MG: 5 INJECTION INTRAMUSCULAR; INTRAVENOUS at 21:55

## 2019-12-19 ASSESSMENT — ENCOUNTER SYMPTOMS
EYE DISCHARGE: 0
MYALGIAS: 0
DIARRHEA: 1
ABDOMINAL PAIN: 1
NERVOUS/ANXIOUS: 1
COUGH: 0
SORE THROAT: 0
SHORTNESS OF BREATH: 0
CONSTIPATION: 0
MEMORY LOSS: 0
DEPRESSION: 0
HEADACHES: 0
FEVER: 0
VOMITING: 1
NAUSEA: 1
FALLS: 0
DIZZINESS: 0
CHILLS: 0
EYE PAIN: 0

## 2019-12-19 NOTE — PROGRESS NOTES
Pt to gastric emptying study. Pt unable to tolerate eating eggs and patient sent back to floor. MD notified.

## 2019-12-19 NOTE — CARE PLAN
"Pt having 10/10 chest and abdominal pain from \"dry heaving\". Pt unable to have pain medication at this time due to scheduled gastric emptying study; Pt understanding. Will continue to monitor.   "

## 2019-12-19 NOTE — PROGRESS NOTES
Report received from YAYA Juarez.  Patient sitting up in bed watching TV with his mom at the bedside.  POC gone over with pt and all questions answered at this time.  Pt states his diarrhea has returned, but he does not have any nausea.  Pt also stated that he needs to change his insulin pump tomorrow, and he has all of the supplies to be able to do it himself.  Patient A & O  X 4.  No apparent signs of distress.  Safety precautions in place.  Patient educated to call for assistance.  Will continue to monitor.

## 2019-12-19 NOTE — PROGRESS NOTES
[facility] Medicine Daily Progress Note    Date of Service  12/18/2019    Chief Complaint  19 y.o. male admitted 12/17/2019 with intractable nausea and vomiting.    Hospital Course    Patient is a 19-year-old male with known history of insulin dependent diabetes type 1 who has had intractable nausea and vomiting for approximately 3 days.  Patient had previously visited the emergency room 2 times over the last week however continues to have intractable nausea vomiting and is unable to maintain p.o. intake.  Patient had CT scan of the abdomen in the emergency room which showed no acute findings except mild mesenteric lymph enlargement.  Patient was admitted to CDU for further work-up and monitoring.  Patient was given IV fluid hydration, and antiemetics analgesics as needed.      Interval Problem Update  12/18- Patient continues to have nausea and intermittent vomiting.  Questionable gastroenteritis versus possible gastroparesis.  Given frequency of visits to the emergency room and long standing history of diabetes will complete gastric emptying study. Continue with symptom management with IV fluid hydration, antiemetics, and analgesics as needed.     Consultants/Specialty  None     Code Status  Full     Disposition  TBD based on gastric emptying study results.     Review of Systems  Review of Systems   Constitutional: Positive for malaise/fatigue. Negative for chills and fever.   HENT: Negative for congestion and sore throat.    Eyes: Negative for pain and discharge.   Respiratory: Negative for cough and shortness of breath.    Cardiovascular: Negative for chest pain and leg swelling.   Gastrointestinal: Positive for abdominal pain, nausea and vomiting. Negative for constipation.   Genitourinary: Negative for dysuria, frequency and urgency.   Musculoskeletal: Negative for falls and myalgias.   Skin: Negative for rash.   Neurological: Negative for dizziness and headaches.   Psychiatric/Behavioral: Negative for  depression and memory loss.        Physical Exam  Temp:  [36.4 °C (97.6 °F)-36.8 °C (98.3 °F)] 36.8 °C (98.3 °F)  Pulse:  [] 78  Resp:  [16-18] 18  BP: ()/(52-80) 121/79  SpO2:  [96 %-99 %] 97 %    Physical Exam  Vitals signs and nursing note reviewed.   Constitutional:       General: He is awake.      Appearance: Normal appearance. He is not ill-appearing.   HENT:      Head: Normocephalic.      Nose: Nose normal.      Mouth/Throat:      Lips: Pink.      Mouth: Mucous membranes are moist.   Eyes:      General: Lids are normal.      Conjunctiva/sclera: Conjunctivae normal.   Cardiovascular:      Rate and Rhythm: Normal rate.      Heart sounds: Normal heart sounds. No friction rub.   Abdominal:      General: Bowel sounds are normal.      Palpations: Abdomen is soft.      Tenderness: There is no tenderness.   Musculoskeletal:      Comments: WEEKS    Skin:     General: Skin is warm and dry.   Neurological:      Mental Status: He is alert and oriented to person, place, and time.      Motor: Motor function is intact.      Coordination: Coordination is intact.   Psychiatric:         Attention and Perception: Attention normal.         Mood and Affect: Mood normal.         Speech: Speech normal.         Behavior: Behavior is cooperative.         Cognition and Memory: Cognition normal.         Fluids    Intake/Output Summary (Last 24 hours) at 12/18/2019 1604  Last data filed at 12/18/2019 0200  Gross per 24 hour   Intake 1472 ml   Output --   Net 1472 ml       Laboratory  Recent Labs     12/16/19  0200 12/17/19  1623 12/18/19  0422   WBC 9.4 9.9 10.1   RBC 4.79 5.46 4.36*   HEMOGLOBIN 14.2 15.9 12.7*   HEMATOCRIT 41.5* 48.1 38.3*   MCV 86.6 88.1 87.8   MCH 29.6 29.1 29.1   MCHC 34.2 33.1* 33.2*   RDW 38.6 38.9 38.8   PLATELETCT 304 356 294   MPV 10.8 10.5 10.7     Recent Labs     12/16/19  0200 12/17/19  1623 12/18/19  0422   SODIUM 139 139 142   POTASSIUM 3.6 4.0 4.0   CHLORIDE 105 102 109   CO2 24 26 27   GLUCOSE  168* 147* 195*   BUN 9 7* 8   CREATININE 0.78 0.75 0.72   CALCIUM 9.7 9.8 8.5                   Imaging  NM-GASTRIC EMPTYING    (Results Pending)        Assessment/Plan  Intractable nausea and vomiting- (present on admission)  Assessment & Plan  Viral gastroenteritis vs. Gastroparesis   Supportive care with IV fluid and antiemetic medications  Gastric emptying study pending     Type 1 diabetes mellitus without complication (HCC)- (present on admission)  Assessment & Plan  On insulin management with insulin pump  Hypoglycemia protocol  Frequent blood sugar checks        VTE prophylaxis: Ambulatory, SCD, Ambulatory

## 2019-12-19 NOTE — DISCHARGE PLANNING
Care Transition Team Assessment    Spoke with patient and mother at bedside with verbal consent. [place] parents in [place] on ground [place]. Uses [facility] on [address]. Parents will be ride @ D/C. Anticipate no needs @ present time.     Information Source  Orientation : Oriented x 4  Information Given By: Parent  Informant's Name: Leisa Smith    Readmission Evaluation  Is this a readmission?: No    Interdisciplinary Discharge Planning  Primary Care Physician: Bogdan Grande  Lives with - Patient's Self Care Capacity: Parents  Patient or legal guardian wants to designate a caregiver (see row info): No  Support Systems: Parent  Housing / Facility: 1 Story Apartment / Condo  Do You Take your Prescribed Medications Regularly: Yes  Able to Return to Previous ADL's: Yes  Mobility Issues: No  Prior Services: Home-Independent  Patient Expects to be Discharged to:: Home  Assistance Needed: No  Durable Medical Equipment: Not Applicable    Discharge Preparedness  What are your discharge supports?: Parent  Prior Functional Level: Ambulatory    Functional Assesment  Prior Functional Level: Ambulatory    Finances  Prescription Coverage: Yes    Anticipated Discharge Information  Anticipated discharge disposition: Home  Discharge Address: [address] [place] [address]  Discharge Contact Phone Number: [phone]

## 2019-12-19 NOTE — PROGRESS NOTES
[facility] Medicine Daily Progress Note    Date of Service  12/19/2019    Chief Complaint  19 y.o. male admitted 12/17/2019 with intractable nausea and vomiting.    Hospital Course    Patient is a 19-year-old male with known history of insulin dependent diabetes type 1 who has had intractable nausea and vomiting for approximately 3 days.  Patient had previously visited the emergency room 2 times over the last week however continues to have intractable nausea vomiting and is unable to maintain p.o. intake.  Patient had CT scan of the abdomen in the emergency room which showed no acute findings except mild mesenteric lymph enlargement.  Patient was admitted to CDU for further work-up and monitoring.  Patient was given IV fluid hydration, and antiemetics analgesics as needed.      Interval Problem Update  12/18- Patient continues to have nausea and intermittent vomiting.  Questionable gastroenteritis versus possible gastroparesis.  Given frequency of visits to the emergency room and long standing history of diabetes will complete gastric emptying study. Continue with symptom management with IV fluid hydration, antiemetics, and analgesics as needed.   12/19- Patient continues to have active nausea and vomiting. Appears to be vomiting fluids he consumed yesterday. Attempted gastric emptying study this am and was unable to complete due to active vomiting. Will attempt later this afternoon, if unable to be obtained will initiate Reglan and have patient follow up. Will transfer to floor as patient has been admitted 2 days without resolution of symptoms.     Consultants/Specialty  None     Code Status  Full     Disposition  TBD based on gastric emptying study results or control of N/V.     Review of Systems  Review of Systems   Constitutional: Positive for malaise/fatigue. Negative for chills and fever.   HENT: Negative for congestion and sore throat.    Eyes: Negative for pain and discharge.   Respiratory: Negative for cough  and shortness of breath.    Cardiovascular: Negative for chest pain and leg swelling.   Gastrointestinal: Positive for abdominal pain, diarrhea, nausea and vomiting. Negative for constipation.   Genitourinary: Negative for dysuria, frequency and urgency.   Musculoskeletal: Negative for falls and myalgias.   Skin: Negative for rash.   Neurological: Negative for dizziness and headaches.   Psychiatric/Behavioral: Negative for depression and memory loss. The patient is nervous/anxious.         Physical Exam  Temp:  [36.4 °C (97.5 °F)-37.2 °C (98.9 °F)] 37.2 °C (98.9 °F)  Pulse:  [63-83] 63  Resp:  [16-18] 16  BP: ()/(56-80) 127/80  SpO2:  [94 %-98 %] 94 %    Physical Exam  Vitals signs and nursing note reviewed.   Constitutional:       General: He is awake.      Appearance: Normal appearance. He is ill-appearing.   HENT:      Head: Normocephalic.      Nose: Nose normal.      Mouth/Throat:      Lips: Pink.      Mouth: Mucous membranes are moist.   Eyes:      General: Lids are normal.      Conjunctiva/sclera: Conjunctivae normal.   Cardiovascular:      Rate and Rhythm: Normal rate.      Heart sounds: Normal heart sounds. No friction rub.   Abdominal:      General: Bowel sounds are normal.      Palpations: Abdomen is soft.      Tenderness: There is no tenderness.   Musculoskeletal:      Comments: WEEKS    Skin:     General: Skin is warm and dry.   Neurological:      Mental Status: He is alert and oriented to person, place, and time.      Motor: Motor function is intact.      Coordination: Coordination is intact.   Psychiatric:         Attention and Perception: Attention normal.         Mood and Affect: Mood normal.         Speech: Speech normal.         Behavior: Behavior is cooperative.         Cognition and Memory: Cognition normal.         Fluids    Intake/Output Summary (Last 24 hours) at 12/19/2019 1101  Last data filed at 12/18/2019 1910  Gross per 24 hour   Intake 480 ml   Output --   Net 480 ml        Laboratory  Recent Labs     12/17/19  1623 12/18/19  0422 12/19/19  0233   WBC 9.9 10.1 7.6   RBC 5.46 4.36* 4.37*   HEMOGLOBIN 15.9 12.7* 12.9*   HEMATOCRIT 48.1 38.3* 38.4*   MCV 88.1 87.8 87.9   MCH 29.1 29.1 29.5   MCHC 33.1* 33.2* 33.6*   RDW 38.9 38.8 38.9   PLATELETCT 356 294 283   MPV 10.5 10.7 10.7     Recent Labs     12/17/19  1623 12/18/19  0422 12/19/19  0233   SODIUM 139 142 142   POTASSIUM 4.0 4.0 3.3*   CHLORIDE 102 109 108   CO2 26 27 26   GLUCOSE 147* 195* 138*   BUN 7* 8 5*   CREATININE 0.75 0.72 0.70   CALCIUM 9.8 8.5 8.7                   Imaging  No orders to display        Assessment/Plan  Diabetic gastroparesis (HCC)- (present on admission)  Assessment & Plan  This is suspected  Gastric emptying study ordered- attempted, will attempt again this afternoon   Consider iv reglan post study or if study unable to be completed     Intractable nausea and vomiting- (present on admission)  Assessment & Plan  Viral gastroenteritis vs. Gastroparesis   Supportive care with IV fluid and antiemetic medications  Gastric emptying study pending     Type 1 diabetes mellitus with hyperglycemia (HCC)- (present on admission)  Assessment & Plan  On insulin management with insulin pump  Hypoglycemia protocol  Frequent blood sugar checks        VTE prophylaxis: Ambulatory, SCD, Ambulatory

## 2019-12-19 NOTE — CARE PLAN
Problem: Infection  Goal: Will remain free from infection  Outcome: PROGRESSING AS EXPECTED  Note:   Implement standard precautions and perform handwashing before and after patient contact. RN will follow protocols and necessary steps to minimize the spread of infection.  RN educated pt and any visitors on proper hand hygiene.      Problem: Bowel/Gastric:  Goal: Normal bowel function is maintained or improved  Outcome: PROGRESSING AS EXPECTED

## 2019-12-20 ENCOUNTER — APPOINTMENT (OUTPATIENT)
Dept: RADIOLOGY | Facility: MEDICAL CENTER | Age: 19
End: 2019-12-20
Attending: NURSE PRACTITIONER
Payer: COMMERCIAL

## 2019-12-20 LAB
ANION GAP SERPL CALC-SCNC: 10 MMOL/L (ref 0–11.9)
BUN SERPL-MCNC: 6 MG/DL (ref 8–22)
CALCIUM SERPL-MCNC: 8.8 MG/DL (ref 8.5–10.5)
CHLORIDE SERPL-SCNC: 111 MMOL/L (ref 96–112)
CO2 SERPL-SCNC: 23 MMOL/L (ref 20–33)
CREAT SERPL-MCNC: 0.67 MG/DL (ref 0.5–1.4)
ERYTHROCYTE [DISTWIDTH] IN BLOOD BY AUTOMATED COUNT: 38.6 FL (ref 35.9–50)
GLUCOSE BLD-MCNC: 146 MG/DL (ref 65–99)
GLUCOSE BLD-MCNC: 147 MG/DL (ref 65–99)
GLUCOSE BLD-MCNC: 179 MG/DL (ref 65–99)
GLUCOSE BLD-MCNC: 206 MG/DL (ref 65–99)
GLUCOSE BLD-MCNC: 216 MG/DL (ref 65–99)
GLUCOSE BLD-MCNC: 221 MG/DL (ref 65–99)
GLUCOSE BLD-MCNC: 222 MG/DL (ref 65–99)
GLUCOSE BLD-MCNC: 268 MG/DL (ref 65–99)
GLUCOSE BLD-MCNC: 283 MG/DL (ref 65–99)
GLUCOSE BLD-MCNC: 36 MG/DL (ref 65–99)
GLUCOSE SERPL-MCNC: 50 MG/DL (ref 65–99)
HCT VFR BLD AUTO: 39.6 % (ref 42–52)
HGB BLD-MCNC: 13.4 G/DL (ref 14–18)
MCH RBC QN AUTO: 29.7 PG (ref 27–33)
MCHC RBC AUTO-ENTMCNC: 33.8 G/DL (ref 33.7–35.3)
MCV RBC AUTO: 87.8 FL (ref 81.4–97.8)
PLATELET # BLD AUTO: 298 K/UL (ref 164–446)
PMV BLD AUTO: 10.7 FL (ref 9–12.9)
POTASSIUM SERPL-SCNC: 3.5 MMOL/L (ref 3.6–5.5)
RBC # BLD AUTO: 4.51 M/UL (ref 4.7–6.1)
SODIUM SERPL-SCNC: 144 MMOL/L (ref 135–145)
WBC # BLD AUTO: 11.3 K/UL (ref 4.8–10.8)

## 2019-12-20 PROCEDURE — 96375 TX/PRO/DX INJ NEW DRUG ADDON: CPT

## 2019-12-20 PROCEDURE — 82962 GLUCOSE BLOOD TEST: CPT | Mod: 91

## 2019-12-20 PROCEDURE — A9541 TC99M SULFUR COLLOID: HCPCS

## 2019-12-20 PROCEDURE — A9270 NON-COVERED ITEM OR SERVICE: HCPCS | Performed by: INTERNAL MEDICINE

## 2019-12-20 PROCEDURE — 700111 HCHG RX REV CODE 636 W/ 250 OVERRIDE (IP): Performed by: NURSE PRACTITIONER

## 2019-12-20 PROCEDURE — 85027 COMPLETE CBC AUTOMATED: CPT

## 2019-12-20 PROCEDURE — 99225 PR SUBSEQUENT OBSERVATION CARE,LEVEL II: CPT | Performed by: HOSPITALIST

## 2019-12-20 PROCEDURE — 700102 HCHG RX REV CODE 250 W/ 637 OVERRIDE(OP): Performed by: INTERNAL MEDICINE

## 2019-12-20 PROCEDURE — 80048 BASIC METABOLIC PNL TOTAL CA: CPT

## 2019-12-20 PROCEDURE — 700105 HCHG RX REV CODE 258: Performed by: INTERNAL MEDICINE

## 2019-12-20 PROCEDURE — 36415 COLL VENOUS BLD VENIPUNCTURE: CPT

## 2019-12-20 PROCEDURE — G0378 HOSPITAL OBSERVATION PER HR: HCPCS

## 2019-12-20 RX ORDER — METOCLOPRAMIDE HYDROCHLORIDE 5 MG/ML
10 INJECTION INTRAMUSCULAR; INTRAVENOUS EVERY 6 HOURS
Status: DISCONTINUED | OUTPATIENT
Start: 2019-12-20 | End: 2019-12-21 | Stop reason: HOSPADM

## 2019-12-20 RX ORDER — FAMOTIDINE 20 MG/1
20 TABLET, FILM COATED ORAL 2 TIMES DAILY
Status: DISCONTINUED | OUTPATIENT
Start: 2019-12-20 | End: 2019-12-21 | Stop reason: HOSPADM

## 2019-12-20 RX ADMIN — DEXTROSE 250 ML: 10 SOLUTION INTRAVENOUS at 04:39

## 2019-12-20 RX ADMIN — METOCLOPRAMIDE 10 MG: 5 INJECTION, SOLUTION INTRAMUSCULAR; INTRAVENOUS at 17:23

## 2019-12-20 RX ADMIN — FEXOFENADINE HCL 60 MG: 60 TABLET, FILM COATED ORAL at 17:23

## 2019-12-20 RX ADMIN — SODIUM CHLORIDE: 9 INJECTION, SOLUTION INTRAVENOUS at 00:48

## 2019-12-20 RX ADMIN — SODIUM CHLORIDE: 9 INJECTION, SOLUTION INTRAVENOUS at 17:23

## 2019-12-20 ASSESSMENT — ENCOUNTER SYMPTOMS
COUGH: 0
MEMORY LOSS: 0
VOMITING: 1
HEADACHES: 0
ABDOMINAL PAIN: 1
DIARRHEA: 0
NAUSEA: 1
FALLS: 0
NERVOUS/ANXIOUS: 1
FEVER: 0
CONSTIPATION: 0
MYALGIAS: 0
DIZZINESS: 0
SHORTNESS OF BREATH: 0
SORE THROAT: 0
EYE PAIN: 0
DEPRESSION: 0
CHILLS: 0
EYE DISCHARGE: 0

## 2019-12-20 ASSESSMENT — COGNITIVE AND FUNCTIONAL STATUS - GENERAL
MOBILITY SCORE: 24
SUGGESTED CMS G CODE MODIFIER DAILY ACTIVITY: CH
SUGGESTED CMS G CODE MODIFIER MOBILITY: CH
DAILY ACTIVITIY SCORE: 24

## 2019-12-20 ASSESSMENT — PATIENT HEALTH QUESTIONNAIRE - PHQ9
2. FEELING DOWN, DEPRESSED, IRRITABLE, OR HOPELESS: NOT AT ALL
1. LITTLE INTEREST OR PLEASURE IN DOING THINGS: NOT AT ALL
SUM OF ALL RESPONSES TO PHQ9 QUESTIONS 1 AND 2: 0

## 2019-12-20 NOTE — CARE PLAN
Problem: Safety  Goal: Will remain free from injury  Outcome: PROGRESSING AS EXPECTED     Problem: Pain Management  Goal: Pain level will decrease to patient's comfort goal  Outcome: PROGRESSING AS EXPECTED     Problem: Discharge Barriers/Planning  Goal: Patient's continuum of care needs will be met  Outcome: PROGRESSING AS EXPECTED     Problem: Communication  Goal: The ability to communicate needs accurately and effectively will improve  Outcome: PROGRESSING AS EXPECTED

## 2019-12-20 NOTE — PROGRESS NOTES
Assessment done, Pt educated on plan of care. Waiting on dr rounds  Patient resting in bed, no signs of distress,  no complaints of pain at this time. Call light within reach,  side rails up, will monitor. Condition stable suyapa at bedside waiting to get nuc test later today

## 2019-12-20 NOTE — PROGRESS NOTES
Assumed care pt. from previous shift. Patient alert oriented x4, self. Room air. Patient has peripheral IV on right arm clean, dry and infusing. Complaints of abdominal pain 9/10. Medicated per MAR. On clear liquid diet. Assessment complete. Answered all questions and concern regarding care. Safety precaution & hourly rounding in place.

## 2019-12-20 NOTE — CARE PLAN
Problem: Fluid Volume:  Goal: Will maintain balanced intake and output  Outcome: PROGRESSING AS EXPECTED  Note:   Ensure proper IV fluids.     Problem: Safety  Goal: Will remain free from injury  Outcome: PROGRESSING AS EXPECTED  Note:   Call light within reach.   Goal: Will remain free from falls  Outcome: PROGRESSING AS EXPECTED     Problem: Pain Management  Goal: Pain level will decrease to patient's comfort goal  Outcome: PROGRESSING AS EXPECTED  Note:   Medicated per MAR     Problem: Infection  Goal: Will remain free from infection  Outcome: PROGRESSING AS EXPECTED     Problem: Discharge Barriers/Planning  Goal: Patient's continuum of care needs will be met  Outcome: PROGRESSING AS EXPECTED     Problem: Bowel/Gastric:  Goal: Normal bowel function is maintained or improved  Outcome: PROGRESSING AS EXPECTED     Problem: Communication  Goal: The ability to communicate needs accurately and effectively will improve  Outcome: PROGRESSING AS EXPECTED

## 2019-12-20 NOTE — PROGRESS NOTES
Assist RN: patient to NM for testing.

## 2019-12-21 VITALS
WEIGHT: 157.19 LBS | HEIGHT: 66 IN | SYSTOLIC BLOOD PRESSURE: 129 MMHG | BODY MASS INDEX: 25.26 KG/M2 | OXYGEN SATURATION: 96 % | DIASTOLIC BLOOD PRESSURE: 72 MMHG | TEMPERATURE: 98.6 F | HEART RATE: 78 BPM | RESPIRATION RATE: 16 BRPM

## 2019-12-21 LAB
ANION GAP SERPL CALC-SCNC: 10 MMOL/L (ref 0–11.9)
BUN SERPL-MCNC: 8 MG/DL (ref 8–22)
CALCIUM SERPL-MCNC: 9.1 MG/DL (ref 8.5–10.5)
CHLORIDE SERPL-SCNC: 107 MMOL/L (ref 96–112)
CO2 SERPL-SCNC: 21 MMOL/L (ref 20–33)
CREAT SERPL-MCNC: 0.62 MG/DL (ref 0.5–1.4)
ERYTHROCYTE [DISTWIDTH] IN BLOOD BY AUTOMATED COUNT: 37.9 FL (ref 35.9–50)
GLUCOSE BLD-MCNC: 177 MG/DL (ref 65–99)
GLUCOSE BLD-MCNC: 180 MG/DL (ref 65–99)
GLUCOSE BLD-MCNC: 206 MG/DL (ref 65–99)
GLUCOSE BLD-MCNC: 67 MG/DL (ref 65–99)
GLUCOSE BLD-MCNC: 89 MG/DL (ref 65–99)
GLUCOSE BLD-MCNC: 99 MG/DL (ref 65–99)
GLUCOSE SERPL-MCNC: 81 MG/DL (ref 65–99)
HCT VFR BLD AUTO: 40.7 % (ref 42–52)
HGB BLD-MCNC: 13.6 G/DL (ref 14–18)
MCH RBC QN AUTO: 29.1 PG (ref 27–33)
MCHC RBC AUTO-ENTMCNC: 33.4 G/DL (ref 33.7–35.3)
MCV RBC AUTO: 87.2 FL (ref 81.4–97.8)
PLATELET # BLD AUTO: 316 K/UL (ref 164–446)
PMV BLD AUTO: 10.6 FL (ref 9–12.9)
POTASSIUM SERPL-SCNC: 4 MMOL/L (ref 3.6–5.5)
RBC # BLD AUTO: 4.67 M/UL (ref 4.7–6.1)
SODIUM SERPL-SCNC: 138 MMOL/L (ref 135–145)
WBC # BLD AUTO: 9.3 K/UL (ref 4.8–10.8)

## 2019-12-21 PROCEDURE — 700105 HCHG RX REV CODE 258: Performed by: INTERNAL MEDICINE

## 2019-12-21 PROCEDURE — 700102 HCHG RX REV CODE 250 W/ 637 OVERRIDE(OP): Performed by: INTERNAL MEDICINE

## 2019-12-21 PROCEDURE — 82962 GLUCOSE BLOOD TEST: CPT

## 2019-12-21 PROCEDURE — 700111 HCHG RX REV CODE 636 W/ 250 OVERRIDE (IP): Performed by: NURSE PRACTITIONER

## 2019-12-21 PROCEDURE — 36415 COLL VENOUS BLD VENIPUNCTURE: CPT

## 2019-12-21 PROCEDURE — A9270 NON-COVERED ITEM OR SERVICE: HCPCS | Performed by: INTERNAL MEDICINE

## 2019-12-21 PROCEDURE — 700102 HCHG RX REV CODE 250 W/ 637 OVERRIDE(OP): Performed by: HOSPITALIST

## 2019-12-21 PROCEDURE — 85027 COMPLETE CBC AUTOMATED: CPT

## 2019-12-21 PROCEDURE — 80048 BASIC METABOLIC PNL TOTAL CA: CPT

## 2019-12-21 PROCEDURE — 99217 PR OBSERVATION CARE DISCHARGE: CPT | Performed by: HOSPITALIST

## 2019-12-21 PROCEDURE — A9270 NON-COVERED ITEM OR SERVICE: HCPCS | Performed by: HOSPITALIST

## 2019-12-21 PROCEDURE — G0378 HOSPITAL OBSERVATION PER HR: HCPCS

## 2019-12-21 RX ORDER — ONDANSETRON 4 MG/1
4 TABLET, ORALLY DISINTEGRATING ORAL EVERY 8 HOURS PRN
Qty: 10 TAB | Refills: 1 | Status: SHIPPED | OUTPATIENT
Start: 2019-12-21 | End: 2020-07-31

## 2019-12-21 RX ADMIN — FAMOTIDINE 20 MG: 20 TABLET ORAL at 06:31

## 2019-12-21 RX ADMIN — SODIUM CHLORIDE: 9 INJECTION, SOLUTION INTRAVENOUS at 02:55

## 2019-12-21 RX ADMIN — MELATONIN 2000 UNITS: at 06:31

## 2019-12-21 RX ADMIN — FEXOFENADINE HCL 60 MG: 60 TABLET, FILM COATED ORAL at 06:31

## 2019-12-21 RX ADMIN — METOCLOPRAMIDE 10 MG: 5 INJECTION, SOLUTION INTRAMUSCULAR; INTRAVENOUS at 00:40

## 2019-12-21 RX ADMIN — METOCLOPRAMIDE 10 MG: 5 INJECTION, SOLUTION INTRAMUSCULAR; INTRAVENOUS at 06:31

## 2019-12-21 RX ADMIN — FAMOTIDINE 20 MG: 20 TABLET ORAL at 00:40

## 2019-12-21 NOTE — PROGRESS NOTES
2 RN SKIN CHECK:     All skin over bony prominences intact.   Skin over sacrum intact.   Patient has an insulin pump on the R hip.   Otherwise all other skin intact.     Patient repositions self. Ambulates frequently.

## 2019-12-21 NOTE — DISCHARGE SUMMARY
Hospital Medicine Discharge Note     Admit Date:  2019       Discharge Date:   2019    Attending Physician:  Rahul Kline M.D.      Diagnoses (includes active and resolved):     Active Problems:    Type 1 diabetes mellitus with hyperglycemia (HCC) POA: Yes    Intractable nausea and vomiting POA: Yes    Suspect diabetic gastroparesis (HCC) POA: Yes  Resolved Problems:  Intractable nausea/vomiting, probable viral enteritis  Dehydration    Hospital Summary (Brief Narrative):         19-year-old male history insulin-dependent diabetes on insulin pump admitted with intractable nausea and vomiting.  CT scan revealed no acute findings other than mild mesenteric lymph node enlargement.  Patient recent hemoglobin A1c 8.8.  He was treated conservatively with as needed antiemetics.  Suspected diabetic gastroparesis-patient was started on Reglan.  He had gastric emptying study that was negative.  Suspected viral enteritis.  CT scan did reveal mesenteric adenitis.  He had no fever, bloody stools.  Abdominal exam was without tenderness, benign.  Eventually his symptoms resolved.  He tolerated diet.  Patient advised to follow-up outpatient [facility].  On day of discharge I examined patient, discussed findings, plan of care and follow-up with the patient and mother at bedside.  He was discharged home in stable condition.     Consultants:        None    Imaging/ Testing:      NM-GASTRIC EMPTYING   Final Result      Normal gastric emptying scan.               Procedures:          none    Discharge Medications:             Medication List      CONTINUE taking these medications      Instructions   fexofenadine 60 MG Tabs  Commonly known as:  ALLEGRA   Take 60 mg by mouth every day. Indications: Hayfever  Dose:  60 mg     insulin infusion pump Terri   Inject 1.25-1.4 Units/hr as instructed Continuous. Patient's own SQ insulin pump    Type of Insulin: Humalog  Last change of tubin/15/19 - Change tubing and site  every 72 hours    Dosing:  Basal rate:  0000 - 0300 = 1.25 units  0300 - 0700 = 1.35 units  0700 - 0900 = 1.40 units  0900 - 1700  = 1.30 units  1700 - 0000 = 1.45 units  Bolus ratio:   1 unit : 8 g carbohydrate at 0000 - 1100   1 unit : 7.5 g carbohydrate at 1100 - 1530               1 unit : 7 g CHO 1530 - 1700              1 unit : 6 g CHO 6386-5760              1 unit : 8 g CHO 6083-2440    Sensitivity ratio:   1 units for every 35 over 130-150 mg/dL    Disconnect pump if patient becomes hypoglycemic and altered.  Dose:  1.25-1.4 Units/hr     ondansetron 4 MG Tbdp  Commonly known as:  ZOFRAN ODT   Take 1 Tab by mouth every 8 hours as needed for Nausea.  Dose:  4 mg     vitamin D 1000 UNIT Tabs  Commonly known as:  cholecalciferol   Take 5,000 Units by mouth every day.  Dose:  5,000 Units               Diet:       DIET ORDERS (From admission to next 24h)    None            Activity:   As tolerated.      Code status:   Full code    Primary Care Provider:    Gregory Grande M.D.    Follow up appointment details :      .  Gregory Grande M.D.  [address] [address]  [phone]    In 2 weeks      Follow up with [facility] as scheduled           Future Appointments   Date Time Provider Department Center   12/24/2019  7:45 AM LAB [facility] None   12/30/2019  1:30 PM [facility]           Time spent on discharge day patient visit: 40 minutes    #################################################

## 2019-12-21 NOTE — PROGRESS NOTES
CDU RN brought patient family member to floor while the patient was in a procedure and gave report to this RN.

## 2019-12-21 NOTE — PROGRESS NOTES
Patient Arrived to floor via transport from gastric emptying study.   Assessment complete.     Patient A&O x  4  .   Patient denies SOB or Chest pain.   Patient's respirations are even and unlabored .  Patient eating small amounts of clear liquid diet , Last BM 12/20, Bowel sounds hyperactive x 4 quadrants. Patient stated he has slight nausea but denies any intervention at this time.   Patient denies pain   Patient's mobility is up self and steady  .  Skin  Intact     Discussed POC with patient and answered any questions that the patient had.   Reinforced use of call light. Bed locked and in lowest position. Belongings and call light in reach. Hourly rounding in place to check on patient's well being.

## 2019-12-21 NOTE — DISCHARGE INSTRUCTIONS
Discharge Instructions    Discharged to home by car with relative. Discharged via walking, hospital escort: Refused.  Special equipment needed: Not Applicable    Be sure to schedule a follow-up appointment with your primary care doctor or any specialists as instructed.     Discharge Plan:   Influenza Vaccine Indication: Patient Refuses    I understand that a diet low in cholesterol, fat, and sodium is recommended for good health. Unless I have been given specific instructions below for another diet, I accept this instruction as my diet prescription.   Other diet: Liquid diet for x2 days then slowly transition to regular food    Special Instructions: None    · Is patient discharged on Warfarin / Coumadin?   No     Depression / Suicide Risk    As you are discharged from this [facility] facility, it is important to learn how to keep safe from harming yourself.    Recognize the warning signs:  · Abrupt changes in personality, positive or negative- including increase in energy   · Giving away possessions  · Change in eating patterns- significant weight changes-  positive or negative  · Change in sleeping patterns- unable to sleep or sleeping all the time   · Unwillingness or inability to communicate  · Depression  · Unusual sadness, discouragement and loneliness  · Talk of wanting to die  · Neglect of personal appearance   · Rebelliousness- reckless behavior  · Withdrawal from people/activities they love  · Confusion- inability to concentrate     If you or a loved one observes any of these behaviors or has concerns about self-harm, here's what you can do:  · Talk about it- your feelings and reasons for harming yourself  · Remove any means that you might use to hurt yourself (examples: pills, rope, extension cords, firearm)  · Get professional help from the community (Mental Health, Substance Abuse, psychological counseling)  · Do not be alone:Call your Safe Contact- someone whom you trust who will be there for  you.  · Call your local CRISIS HOTLINE 576-3591 or 773-451-7777  · Call your local Children's Mobile Crisis Response Team [place] (558) 190-4047 or www.[employer]ne  · Call the toll free National Suicide Prevention Hotlines   · National Suicide Prevention Lifeline 677-569-CYFT (1124)  · National Hope Line Network 800-SUICIDE (380-7976)    Continue with insulin pump as directed. Follow up with [facility] .

## 2019-12-21 NOTE — PROGRESS NOTES
[facility] Medicine Daily Progress Note    Date of Service  12/20/2019    Chief Complaint  19 y.o. male admitted 12/17/2019 with intractable nausea and vomiting.    Hospital Course    Patient is a 19-year-old male with known history of insulin dependent diabetes type 1 who has had intractable nausea and vomiting for approximately 3 days.  Patient had previously visited the emergency room 2 times over the last week however continues to have intractable nausea vomiting and is unable to maintain p.o. intake.  Patient had CT scan of the abdomen in the emergency room which showed no acute findings except mild mesenteric lymph enlargement.  Patient was admitted to CDU for further work-up and monitoring.  Patient was given IV fluid hydration, and antiemetics analgesics as needed.      Interval Problem Update  12/18- Patient continues to have nausea and intermittent vomiting.  Questionable gastroenteritis versus possible gastroparesis.  Given frequency of visits to the emergency room and long standing history of diabetes will complete gastric emptying study. Continue with symptom management with IV fluid hydration, antiemetics, and analgesics as needed.   12/19- Patient continues to have active nausea and vomiting. Appears to be vomiting fluids he consumed yesterday. Attempted gastric emptying study this am and was unable to complete due to active vomiting. Will attempt later this afternoon, if unable to be obtained will initiate Reglan and have patient follow up. Will transfer to floor as patient has been admitted 2 days without resolution of symptoms.   12/20- Patient states nausea has improved some but has been keeping himself from eating overnight as he was having episodes of emesis. Attempting again today to complete gastric emptying study. Patient noted to have episode of hypoglycemia overnight into the 30's. Insulin pump paused.  Patient's sugars started to climb into 200's and patient iniitated insulin pump again on  his own. Patient down for gastric emptying study, have initiated reglan to start on his return. Will await results and continue with antiemetics overnight with addition of reglan and hopefully home in am.     Consultants/Specialty  None     Code Status  Full     Disposition  TBD based on gastric emptying study results or control of N/V.     Review of Systems  Review of Systems   Constitutional: Positive for malaise/fatigue. Negative for chills and fever.   HENT: Negative for congestion and sore throat.    Eyes: Negative for pain and discharge.   Respiratory: Negative for cough and shortness of breath.    Cardiovascular: Negative for chest pain and leg swelling.   Gastrointestinal: Positive for abdominal pain, nausea (improving ) and vomiting (improving ). Negative for constipation and diarrhea.   Genitourinary: Negative for dysuria, frequency and urgency.   Musculoskeletal: Negative for falls and myalgias.   Skin: Negative for rash.   Neurological: Negative for dizziness and headaches.   Psychiatric/Behavioral: Negative for depression and memory loss. The patient is nervous/anxious.         Physical Exam  Temp:  [36.5 °C (97.7 °F)-37.3 °C (99.1 °F)] 37.1 °C (98.7 °F)  Pulse:  [] 111  Resp:  [16-18] 18  BP: (119-143)/(68-81) 129/81  SpO2:  [94 %-98 %] 95 %    Physical Exam  Vitals signs and nursing note reviewed.   Constitutional:       General: He is awake.      Appearance: Normal appearance. He is ill-appearing.   HENT:      Head: Normocephalic.      Nose: Nose normal.      Mouth/Throat:      Lips: Pink.      Mouth: Mucous membranes are moist.   Eyes:      General: Lids are normal.      Conjunctiva/sclera: Conjunctivae normal.   Cardiovascular:      Rate and Rhythm: Normal rate.      Heart sounds: Normal heart sounds. No friction rub. No gallop.    Pulmonary:      Effort: Pulmonary effort is normal.      Breath sounds: Normal breath sounds. No decreased breath sounds or wheezing.   Abdominal:      General:  Bowel sounds are normal.      Palpations: Abdomen is soft.      Tenderness: There is no tenderness.   Musculoskeletal:      Comments: DESI - ambulates independently    Skin:     General: Skin is warm and dry.   Neurological:      Mental Status: He is alert and oriented to person, place, and time.      Motor: Motor function is intact.      Coordination: Coordination is intact.   Psychiatric:         Attention and Perception: Attention normal.         Mood and Affect: Mood normal.         Speech: Speech normal.         Behavior: Behavior is cooperative.         Cognition and Memory: Cognition normal.         Fluids  No intake or output data in the 24 hours ending 12/20/19 1617    Laboratory  Recent Labs     12/18/19  0422 12/19/19  0233 12/20/19  0320   WBC 10.1 7.6 11.3*   RBC 4.36* 4.37* 4.51*   HEMOGLOBIN 12.7* 12.9* 13.4*   HEMATOCRIT 38.3* 38.4* 39.6*   MCV 87.8 87.9 87.8   MCH 29.1 29.5 29.7   MCHC 33.2* 33.6* 33.8   RDW 38.8 38.9 38.6   PLATELETCT 294 283 298   MPV 10.7 10.7 10.7     Recent Labs     12/18/19  0422 12/19/19  0233 12/20/19  0320   SODIUM 142 142 144   POTASSIUM 4.0 3.3* 3.5*   CHLORIDE 109 108 111   CO2 27 26 23   GLUCOSE 195* 138* 50*   BUN 8 5* 6*   CREATININE 0.72 0.70 0.67   CALCIUM 8.5 8.7 8.8                   Imaging  NM-GASTRIC EMPTYING    (Results Pending)        Assessment/Plan  Diabetic gastroparesis (HCC)- (present on admission)  Assessment & Plan  This is suspected  Gastric emptying study- pending results   IV reglan initiated     Intractable nausea and vomiting- (present on admission)  Assessment & Plan  Viral gastroenteritis vs. Gastroparesis   Supportive care with IV fluid and antiemetic medications  Gastric emptying study pending     Type 1 diabetes mellitus with hyperglycemia (HCC)- (present on admission)  Assessment & Plan  On insulin management with insulin pump  Hypoglycemia protocol  Frequent blood sugar checks        VTE prophylaxis: Ambulatory, SCD, Ambulatory

## 2019-12-21 NOTE — CARE PLAN
Problem: Fluid Volume:  Goal: Will maintain balanced intake and output  Outcome: PROGRESSING AS EXPECTED     Problem: Safety  Goal: Will remain free from injury  Outcome: PROGRESSING AS EXPECTED

## 2019-12-24 ENCOUNTER — HOSPITAL ENCOUNTER (OUTPATIENT)
Dept: LAB | Facility: MEDICAL CENTER | Age: 19
End: 2019-12-24
Attending: FAMILY MEDICINE
Payer: COMMERCIAL

## 2019-12-24 LAB
ALBUMIN SERPL BCP-MCNC: 4.9 G/DL (ref 3.2–4.9)
ALBUMIN/GLOB SERPL: 1.8 G/DL
ALP SERPL-CCNC: 81 U/L (ref 30–99)
ALT SERPL-CCNC: 11 U/L (ref 2–50)
ANION GAP SERPL CALC-SCNC: 8 MMOL/L (ref 0–11.9)
AST SERPL-CCNC: 14 U/L (ref 12–45)
BASOPHILS # BLD AUTO: 0.7 % (ref 0–1.8)
BASOPHILS # BLD: 0.05 K/UL (ref 0–0.12)
BILIRUB SERPL-MCNC: 0.7 MG/DL (ref 0.1–1.5)
BUN SERPL-MCNC: 3 MG/DL (ref 8–22)
CALCIUM SERPL-MCNC: 9.6 MG/DL (ref 8.5–10.5)
CHLORIDE SERPL-SCNC: 101 MMOL/L (ref 96–112)
CHOLEST SERPL-MCNC: 177 MG/DL (ref 100–199)
CO2 SERPL-SCNC: 30 MMOL/L (ref 20–33)
CREAT SERPL-MCNC: 0.73 MG/DL (ref 0.5–1.4)
EOSINOPHIL # BLD AUTO: 0.47 K/UL (ref 0–0.51)
EOSINOPHIL NFR BLD: 7 % (ref 0–6.9)
ERYTHROCYTE [DISTWIDTH] IN BLOOD BY AUTOMATED COUNT: 39.2 FL (ref 35.9–50)
FASTING STATUS PATIENT QL REPORTED: NORMAL
FERRITIN SERPL-MCNC: 210.5 NG/ML (ref 22–322)
GLOBULIN SER CALC-MCNC: 2.8 G/DL (ref 1.9–3.5)
GLUCOSE SERPL-MCNC: 116 MG/DL (ref 65–99)
HCT VFR BLD AUTO: 45.2 % (ref 42–52)
HDLC SERPL-MCNC: 28 MG/DL
HGB BLD-MCNC: 15.1 G/DL (ref 14–18)
IMM GRANULOCYTES # BLD AUTO: 0.05 K/UL (ref 0–0.11)
IMM GRANULOCYTES NFR BLD AUTO: 0.7 % (ref 0–0.9)
IRON SATN MFR SERPL: 34 % (ref 15–55)
IRON SERPL-MCNC: 93 UG/DL (ref 50–180)
LDLC SERPL CALC-MCNC: 128 MG/DL
LYMPHOCYTES # BLD AUTO: 1.83 K/UL (ref 1–4.8)
LYMPHOCYTES NFR BLD: 27.3 % (ref 22–41)
MCH RBC QN AUTO: 29.3 PG (ref 27–33)
MCHC RBC AUTO-ENTMCNC: 33.4 G/DL (ref 33.7–35.3)
MCV RBC AUTO: 87.6 FL (ref 81.4–97.8)
MONOCYTES # BLD AUTO: 0.59 K/UL (ref 0–0.85)
MONOCYTES NFR BLD AUTO: 8.8 % (ref 0–13.4)
NEUTROPHILS # BLD AUTO: 3.71 K/UL (ref 1.82–7.42)
NEUTROPHILS NFR BLD: 55.5 % (ref 44–72)
NRBC # BLD AUTO: 0 K/UL
NRBC BLD-RTO: 0 /100 WBC
PLATELET # BLD AUTO: 363 K/UL (ref 164–446)
PMV BLD AUTO: 11.4 FL (ref 9–12.9)
POTASSIUM SERPL-SCNC: 3.6 MMOL/L (ref 3.6–5.5)
PROT SERPL-MCNC: 7.7 G/DL (ref 6–8.2)
RBC # BLD AUTO: 5.16 M/UL (ref 4.7–6.1)
SODIUM SERPL-SCNC: 139 MMOL/L (ref 135–145)
TIBC SERPL-MCNC: 276 UG/DL (ref 250–450)
TRANSFERRIN SERPL-MCNC: 192 MG/DL (ref 200–370)
TRIGL SERPL-MCNC: 103 MG/DL (ref 0–149)
WBC # BLD AUTO: 6.7 K/UL (ref 4.8–10.8)

## 2019-12-24 PROCEDURE — 82728 ASSAY OF FERRITIN: CPT

## 2019-12-24 PROCEDURE — 80053 COMPREHEN METABOLIC PANEL: CPT

## 2019-12-24 PROCEDURE — 85025 COMPLETE CBC W/AUTO DIFF WBC: CPT

## 2019-12-24 PROCEDURE — 84466 ASSAY OF TRANSFERRIN: CPT

## 2019-12-24 PROCEDURE — 80061 LIPID PANEL: CPT

## 2019-12-24 PROCEDURE — 36415 COLL VENOUS BLD VENIPUNCTURE: CPT

## 2019-12-24 PROCEDURE — 83540 ASSAY OF IRON: CPT

## 2019-12-24 PROCEDURE — 83550 IRON BINDING TEST: CPT

## 2019-12-30 ENCOUNTER — OFFICE VISIT (OUTPATIENT)
Dept: ENDOCRINOLOGY | Facility: MEDICAL CENTER | Age: 19
End: 2019-12-30
Payer: COMMERCIAL

## 2019-12-30 VITALS
DIASTOLIC BLOOD PRESSURE: 76 MMHG | BODY MASS INDEX: 24.91 KG/M2 | SYSTOLIC BLOOD PRESSURE: 116 MMHG | WEIGHT: 155 LBS | OXYGEN SATURATION: 99 % | HEIGHT: 66 IN | HEART RATE: 100 BPM

## 2019-12-30 DIAGNOSIS — Z79.4 ENCOUNTER FOR LONG-TERM (CURRENT) USE OF INSULIN (HCC): ICD-10-CM

## 2019-12-30 DIAGNOSIS — E10.65 TYPE 1 DIABETES MELLITUS WITH HYPERGLYCEMIA (HCC): ICD-10-CM

## 2019-12-30 DIAGNOSIS — Z46.81 FITTING AND ADJUSTMENT OF INSULIN PUMP: ICD-10-CM

## 2019-12-30 PROCEDURE — 99214 OFFICE O/P EST MOD 30 MIN: CPT | Performed by: PHYSICIAN ASSISTANT

## 2019-12-30 NOTE — PROGRESS NOTES
Return to office Patient Consult Note  Referred by: Gregory Grande M.D.    Reason for consult: Diabetes Management Type 1    HPI:  Martinez Smith is a 19 y.o. old patient who is seeing us today for diabetes care.  This is a pleasant patient with diabetes and I appreciate the opportunity to participate in the care of this patient.    Medtronic Insulin 630G insulin pump     Labs of 12/17/2019 HbA1c is 8.8    BG Diary:12/30/2019  In the AM: no log        1. Type 1 diabetes mellitus with hyperglycemia (HCC)  This is a new patient with me on 12/17/2019  They are on:  1.  Humalog     Basal rate  00:00  1.25  03:00  1.35  07:00  1.40  09:00  1.30  11:00  1.30  17:00  1.45     Carb ratio  00:00  8  11:00  7.5  15:30  7.0  17:00  6.0  20:00  8.0     ICF  1:35 above 130     Active insulin time    2. Fitting and adjustment of insulin pump    12/30/19Average   Basal 70%  Bolus 30%    3. Encounter for long-term (current) use of insulin (HCC)  Is on a high risk medication Insulin and we will continue to follow       ROS:   Constitutional: No night sweats.  Eyes:  No visual changes.  Cardiac: No chest pain, No palpitations or racing heart rate.  Resp: No shortness of breath, No cough,   Gi: No Diarrhea    All other systems were reviewed and were/are negative.      Past Medical History:  Patient Active Problem List    Diagnosis Date Noted   • Diabetic gastroparesis (HCC) 12/19/2019   • Encounter for long-term (current) use of insulin (HCC) 12/17/2019   • Fitting and adjustment of insulin pump 12/17/2019   • Intractable nausea and vomiting 12/17/2019   • Type 1 diabetes mellitus with hyperglycemia (HCC) 06/15/2017   • Celiac disease 06/15/2017   • Weakness of right lower extremity 06/15/2017   • Lipohyperplasia 06/15/2017   • Dyslipidemia 06/15/2017       Past Surgical History:  Past Surgical History:   Procedure Laterality Date   • HYPOSPADIAS REPAIR         Allergies:  Beef flavor and Gluten meal    Social  History:  Social History     Socioeconomic History   • Marital status: Single     Spouse name: Not on file   • Number of children: Not on file   • Years of education: Not on file   • Highest education level: Not on file   Occupational History   • Not on file   Social Needs   • Financial resource strain: Not on file   • Food insecurity:     Worry: Not on file     Inability: Not on file   • Transportation needs:     Medical: Not on file     Non-medical: Not on file   Tobacco Use   • Smoking status: Never Smoker   • Smokeless tobacco: Never Used   Substance and Sexual Activity   • Alcohol use: No   • Drug use: No   • Sexual activity: Not on file   Lifestyle   • Physical activity:     Days per week: Not on file     Minutes per session: Not on file   • Stress: Not on file   Relationships   • Social connections:     Talks on phone: Not on file     Gets together: Not on file     Attends Orthodox service: Not on file     Active member of club or organization: Not on file     Attends meetings of clubs or organizations: Not on file     Relationship status: Not on file   • Intimate partner violence:     Fear of current or ex partner: Not on file     Emotionally abused: Not on file     Physically abused: Not on file     Forced sexual activity: Not on file   Other Topics Concern   • Behavioral problems Not Asked   • Interpersonal relationships Not Asked   • Sad or not enjoying activities Not Asked   • Suicidal thoughts Not Asked   • Poor school performance Not Asked   • Reading difficulties Not Asked   • Speech difficulties Not Asked   • Writing difficulties Not Asked   • Inadequate sleep Not Asked   • Excessive TV viewing Not Asked   • Excessive video game use Not Asked   • Inadequate exercise Not Asked   • Sports related Not Asked   • Poor diet Not Asked   • Family concerns for drug/alcohol abuse Not Asked   • Poor oral hygiene Not Asked   • Bike safety Not Asked   • Family concerns vehicle safety Not Asked   Social History  "Narrative   • Not on file       Family History:  Family History   Problem Relation Age of Onset   • Arthritis Maternal Grandmother        Medications:    Current Outpatient Medications:   •  ondansetron (ZOFRAN ODT) 4 MG TABLET DISPERSIBLE, Take 1 Tab by mouth every 8 hours as needed for Nausea., Disp: 10 Tab, Rfl: 1  •  vitamin D (CHOLECALCIFEROL) 1000 UNIT Tab, Take 5,000 Units by mouth every day., Disp: , Rfl:   •  insulin infusion pump Device, Inject 1.25-1.4 Units/hr as instructed Continuous. Patient's own SQ insulin pump  Type of Insulin: Humalog Last change of tubin/15/19 - Change tubing and site every 72 hours  Dosing: Basal rate: 0000 - 0300 = 1.25 units 0300 - 0700 = 1.35 units 0700 - 0900 = 1.40 units 0900 - 1700  = 1.30 units 1700 - 0000 = 1.45 units Bolus ratio:  1 unit : 8 g carbohydrate at 0000 - 1100  1 unit : 7.5 g carbohydrate at 1100 - 1530              1 unit : 7 g CHO 1530 - 1700             1 unit : 6 g CHO 2886-3963             1 unit : 8 g CHO 8103-8362  Sensitivity ratio:  1 units for every 35 over 130-150 mg/dL  Disconnect pump if patient becomes hypoglycemic and altered., Disp: , Rfl:   •  fexofenadine (ALLEGRA) 60 MG Tab, Take 60 mg by mouth every day. Indications: Hayfever, Disp: , Rfl:         Physical Examination:   Vital signs: /76 (BP Location: Left arm, Patient Position: Sitting)   Pulse 100   Ht 1.676 m (5' 6\")   Wt 70.3 kg (155 lb)   SpO2 99%   BMI 25.02 kg/m²   General: No distress, cooperative, well dressed and well nourished.   Eyes: No scleral icterus or discharge, No hyposphagma  ENMT: Normal on external inspection of nose, lips, No nasal drainage   Neck: No abnormal masses on inspection  Resp: Normal effort, Bilateral clear to auscultation, No wheezing, No rales  CVS: Regular rate and rhythm, S1 S2 normal, No murmur. No gallop  Extremities: No edema bilateral extremities  Neuro: Alert and oriented  Skin: No rash, No Ulcers  Psych: Normal mood and " affect      Assessment and Plan:    1. Type 1 diabetes mellitus with hyperglycemia (HCC)  This is a new patient with me on 12/17/2019  They are on:  1.  Humalog     Basal rate  00:00  1.25  ( change to 1.00)  03:00  1.35   (change to 1.10)  07:00  1.40  09:00  1.30  11:00  1.30  17:00  1.45 (I.25 change)     Carb ratio  00:00  8  11:00  7.5 (change ot 9  15:30  7.0  (change to 9  17:00  6.0  (change to 8  20:00  8.0  (change to 9     ICF  1:35 above 130     Active insulin time      2. Fitting and adjustment of insulin pump  Is on a high risk medication Insulin and we will continue to follow       3. Encounter for long-term (current) use of insulin (HCC)  Is on a high risk medication Insulin and we will continue to follow     During today's visit we went over the patients insulin pump Medtronic and settings and the cloud based pump reports.  See report that has been scanned into the chart. This should be considered part of the chart record for today, hand written notes were placed on this record. Recommendations were made and pump settings may have been changed.  See the written notes on the scanned in report.   If this patient has a CGM then a blood glucose was taken in the office today.    Return in about 2 weeks (around 1/13/2020).      Thank you kindly for allowing me to participate in the diabetes care plan for this patient.    Kingsley Ríos PA-C, BC-ADM  Board Certified - [facility] Diabetes [facility]  12/30/19    CC:   Gregory Grande M.D.

## 2020-01-13 ENCOUNTER — OFFICE VISIT (OUTPATIENT)
Dept: ENDOCRINOLOGY | Facility: MEDICAL CENTER | Age: 20
End: 2020-01-13
Payer: COMMERCIAL

## 2020-01-13 VITALS
BODY MASS INDEX: 25.23 KG/M2 | WEIGHT: 157 LBS | DIASTOLIC BLOOD PRESSURE: 70 MMHG | SYSTOLIC BLOOD PRESSURE: 116 MMHG | HEART RATE: 86 BPM | OXYGEN SATURATION: 95 % | HEIGHT: 66 IN

## 2020-01-13 DIAGNOSIS — Z46.81 FITTING AND ADJUSTMENT OF INSULIN PUMP: ICD-10-CM

## 2020-01-13 DIAGNOSIS — Z79.4 ENCOUNTER FOR LONG-TERM (CURRENT) USE OF INSULIN (HCC): ICD-10-CM

## 2020-01-13 DIAGNOSIS — E10.65 TYPE 1 DIABETES MELLITUS WITH HYPERGLYCEMIA (HCC): ICD-10-CM

## 2020-01-13 PROCEDURE — 99214 OFFICE O/P EST MOD 30 MIN: CPT | Performed by: PHYSICIAN ASSISTANT

## 2020-01-13 NOTE — PROGRESS NOTES
Return to office Patient Consult Note  Referred by: Gregory Grande M.D.    Reason for consult: Diabetes Management Type 1 w/ Pump    HPI:  Martinez Smith is a 19 y.o. old patient who is seeing us today for diabetes care.  This is a pleasant patient with diabetes and I appreciate the opportunity to participate in the care of this patient.    Medtronic Insulin 630G insulin pump     Labs of 12/17/2019 HbA1c is 8.8    BG Diary:1/13/2020  In the AM:  See pump report  Patient is wearing personal, therapeutic CGM for the past 60 days      1. Type 1 diabetes mellitus with hyperglycemia (HCC)    This is a new patient with me on 12/17/2019  They are on:  1.  Humalog     Basal rate  00:00  1.00  03:00  1.10  07:00  1.40  09:00  1.30  11:00  1.30  17:00  1.25     Carb ratio  00:00  8  11:00  9  15:30  9  17:00  8  20:00  9     ICF  1:35 above 130     Active insulin time 3 hours     2. Fitting and adjustment of insulin pump     12/30/19  Average   Basal 70%  Bolus 30%    3. Encounter for long-term (current) use of insulin (HCC)  Is on a high risk medication Insulin and we will continue to follow       ROS:   Constitutional: No night sweats.  Eyes:  No visual changes.  Cardiac: No chest pain, No palpitations or racing heart rate.  Resp: No shortness of breath, No cough,   Gi: No Diarrhea    All other systems were reviewed and were/are negative.      Past Medical History:  Patient Active Problem List    Diagnosis Date Noted   • Diabetic gastroparesis (HCC) 12/19/2019   • Encounter for long-term (current) use of insulin (HCC) 12/17/2019   • Fitting and adjustment of insulin pump 12/17/2019   • Intractable nausea and vomiting 12/17/2019   • Type 1 diabetes mellitus with hyperglycemia (HCC) 06/15/2017   • Celiac disease 06/15/2017   • Weakness of right lower extremity 06/15/2017   • Lipohyperplasia 06/15/2017   • Dyslipidemia 06/15/2017       Past Surgical History:  Past Surgical History:   Procedure Laterality  Date   • HYPOSPADIAS REPAIR         Allergies:  Beef flavor and Gluten meal    Social History:  Social History     Socioeconomic History   • Marital status: Single     Spouse name: Not on file   • Number of children: Not on file   • Years of education: Not on file   • Highest education level: Not on file   Occupational History   • Not on file   Social Needs   • Financial resource strain: Not on file   • Food insecurity:     Worry: Not on file     Inability: Not on file   • Transportation needs:     Medical: Not on file     Non-medical: Not on file   Tobacco Use   • Smoking status: Never Smoker   • Smokeless tobacco: Never Used   Substance and Sexual Activity   • Alcohol use: No   • Drug use: No   • Sexual activity: Not on file   Lifestyle   • Physical activity:     Days per week: Not on file     Minutes per session: Not on file   • Stress: Not on file   Relationships   • Social connections:     Talks on phone: Not on file     Gets together: Not on file     Attends Baptism service: Not on file     Active member of club or organization: Not on file     Attends meetings of clubs or organizations: Not on file     Relationship status: Not on file   • Intimate partner violence:     Fear of current or ex partner: Not on file     Emotionally abused: Not on file     Physically abused: Not on file     Forced sexual activity: Not on file   Other Topics Concern   • Behavioral problems Not Asked   • Interpersonal relationships Not Asked   • Sad or not enjoying activities Not Asked   • Suicidal thoughts Not Asked   • Poor school performance Not Asked   • Reading difficulties Not Asked   • Speech difficulties Not Asked   • Writing difficulties Not Asked   • Inadequate sleep Not Asked   • Excessive TV viewing Not Asked   • Excessive video game use Not Asked   • Inadequate exercise Not Asked   • Sports related Not Asked   • Poor diet Not Asked   • Family concerns for drug/alcohol abuse Not Asked   • Poor oral hygiene Not Asked   •  "Bike safety Not Asked   • Family concerns vehicle safety Not Asked   Social History Narrative   • Not on file       Family History:  Family History   Problem Relation Age of Onset   • Arthritis Maternal Grandmother        Medications:    Current Outpatient Medications:   •  ondansetron (ZOFRAN ODT) 4 MG TABLET DISPERSIBLE, Take 1 Tab by mouth every 8 hours as needed for Nausea., Disp: 10 Tab, Rfl: 1  •  insulin infusion pump Device, Inject 1.25-1.4 Units/hr as instructed Continuous. Patient's own SQ insulin pump  Type of Insulin: Humalog Last change of tubin/15/19 - Change tubing and site every 72 hours  Dosing: Basal rate: 0000 - 0300 = 1.25 units 0300 - 0700 = 1.35 units 0700 - 0900 = 1.40 units 0900 - 1700  = 1.30 units 1700 - 0000 = 1.45 units Bolus ratio:  1 unit : 8 g carbohydrate at 0000 - 1100  1 unit : 7.5 g carbohydrate at 1100 - 1530              1 unit : 7 g CHO 1530 - 1700             1 unit : 6 g CHO 4190-9788             1 unit : 8 g CHO 7720-3178  Sensitivity ratio:  1 units for every 35 over 130-150 mg/dL  Disconnect pump if patient becomes hypoglycemic and altered., Disp: , Rfl:   •  fexofenadine (ALLEGRA) 60 MG Tab, Take 60 mg by mouth every day. Indications: Hayfever, Disp: , Rfl:   •  vitamin D (CHOLECALCIFEROL) 1000 UNIT Tab, Take 5,000 Units by mouth every day., Disp: , Rfl:         Physical Examination:   Vital signs: /70 (BP Location: Left arm, Patient Position: Sitting)   Pulse 86   Ht 1.676 m (5' 6\")   Wt 71.2 kg (157 lb)   SpO2 95%   BMI 25.34 kg/m²   General: No distress, cooperative, well dressed and well nourished.   Eyes: No scleral icterus or discharge, No hyposphagma  ENMT: Normal on external inspection of nose, lips, No nasal drainage   Neck: No abnormal masses on inspection  Resp: Normal effort, Bilateral clear to auscultation, No wheezing, No rales  CVS: Regular rate and rhythm, S1 S2 normal, No murmur. No gallop  Extremities: No edema bilateral " extremities  Neuro: Alert and oriented  Skin: No rash, No Ulcers  Psych: Normal mood and affect      Assessment and Plan:    1. Type 1 diabetes mellitus with hyperglycemia (HCC)    Basal rate  00:00  1.00  03:00  1.10  07:00  1.40  09:00  1.30  11:00  1.30  17:00  1.25     Carb ratio  00:00  8  11:00  9  15:30  9  17:00  8 (chagne to 7.5)  20:00  9 (chagne to 8)     ICF  1:35 above 130     Active insulin time 3 hours    2. Fitting and adjustment of insulin pump  During today's visit we went over the patients insulin pump Medtronic and settings and the cloud based pump reports.  See report that has been scanned into the chart. This should be considered part of the chart record for today, hand written notes were placed on this record. Recommendations were made and pump settings may have been changed.  See the written notes on the scanned in report.   If this patient has a CGM then a blood glucose was taken in the office today.      3. Encounter for long-term (current) use of insulin (HCC)  Is on a high risk medication Insulin and we will continue to follow       Return in about 3 months (around 4/13/2020).      Thank you kindly for allowing me to participate in the diabetes care plan for this patient.    Kingsley Ríos PA-C, BC-ADM  Board Certified - [facility] Diabetes [facility]  01/13/20    CC:   Gregory Grande M.D.

## 2020-02-10 ENCOUNTER — TELEPHONE (OUTPATIENT)
Dept: ENDOCRINOLOGY | Facility: MEDICAL CENTER | Age: 20
End: 2020-02-10

## 2020-02-11 NOTE — TELEPHONE ENCOUNTER
Received request via: Patient    Was the patient seen in the last year in this department? Yes    Does the patient have an active prescription (recently filled or refills available) for medication(s) requested? No     Patient [facility]     Humalog

## 2020-02-22 ENCOUNTER — OFFICE VISIT (OUTPATIENT)
Dept: URGENT CARE | Facility: CLINIC | Age: 20
End: 2020-02-22
Payer: COMMERCIAL

## 2020-02-22 VITALS
SYSTOLIC BLOOD PRESSURE: 110 MMHG | TEMPERATURE: 98.6 F | BODY MASS INDEX: 25.39 KG/M2 | WEIGHT: 158 LBS | OXYGEN SATURATION: 97 % | HEIGHT: 66 IN | DIASTOLIC BLOOD PRESSURE: 68 MMHG | HEART RATE: 78 BPM | RESPIRATION RATE: 17 BRPM

## 2020-02-22 DIAGNOSIS — H92.03 OTALGIA OF BOTH EARS: Primary | ICD-10-CM

## 2020-02-22 DIAGNOSIS — R09.81 SINUS CONGESTION: ICD-10-CM

## 2020-02-22 PROBLEM — Z96.41 INSULIN PUMP STATUS: Status: ACTIVE | Noted: 2017-06-27

## 2020-02-22 PROBLEM — T85.694A OTHER MECHANICAL COMPLICATION OF INSULIN PUMP: Status: ACTIVE | Noted: 2018-01-18

## 2020-02-22 PROBLEM — E11.43 DIABETIC GASTROPARESIS (HCC): Status: RESOLVED | Noted: 2019-12-19 | Resolved: 2020-02-22

## 2020-02-22 PROBLEM — K31.84 DIABETIC GASTROPARESIS (HCC): Status: RESOLVED | Noted: 2019-12-19 | Resolved: 2020-02-22

## 2020-02-22 PROBLEM — E78.5 HYPERLIPIDEMIA: Status: ACTIVE | Noted: 2018-01-18

## 2020-02-22 PROCEDURE — 99214 OFFICE O/P EST MOD 30 MIN: CPT | Performed by: PHYSICIAN ASSISTANT

## 2020-02-22 ASSESSMENT — ENCOUNTER SYMPTOMS
COUGH: 0
SORE THROAT: 0
HEADACHES: 1
FEVER: 0
RHINORRHEA: 1
SINUS PAIN: 0
CHILLS: 0

## 2020-02-22 NOTE — PROGRESS NOTES
Subjective:      Martinez Smith is a 19 y.o. male who presents with Ear Pain (bilateral ear aches , sinus pressure , headaches x 2 days )    PMH:  has a past medical history of Celiac disease and Diabetes type I (HCC).  MEDS:   Current Outpatient Medications:   •  insulin infusion pump Device, Inject 1.25-1.4 Units/hr as instructed Continuous. Patient's own SQ insulin pump  Type of Insulin: Humalog Last change of tubin/15/19 - Change tubing and site every 72 hours  Dosing: Basal rate: 0000 - 0300 = 1.25 units 0300 - 0700 = 1.35 units 0700 - 0900 = 1.40 units 0900 - 1700  = 1.30 units 1700 - 0000 = 1.45 units Bolus ratio:  1 unit : 8 g carbohydrate at 0000 - 1100  1 unit : 7.5 g carbohydrate at 1100 - 1530              1 unit : 7 g CHO 1530 - 1700             1 unit : 6 g CHO 9313-2959             1 unit : 8 g CHO 7150-7553  Sensitivity ratio:  1 units for every 35 over 130-150 mg/dL  Disconnect pump if patient becomes hypoglycemic and altered., Disp: , Rfl:   •  HUMALOG 100 UNIT/ML, Inject 100 Units as instructed Continuous. (Patient not taking: Reported on 2020), Disp: 9 Vial, Rfl: 1  •  ondansetron (ZOFRAN ODT) 4 MG TABLET DISPERSIBLE, Take 1 Tab by mouth every 8 hours as needed for Nausea. (Patient not taking: Reported on 2020), Disp: 10 Tab, Rfl: 1  •  fexofenadine (ALLEGRA) 60 MG Tab, Take 60 mg by mouth every day. Indications: Hayfever, Disp: , Rfl:   •  vitamin D (CHOLECALCIFEROL) 1000 UNIT Tab, Take 5,000 Units by mouth every day., Disp: , Rfl:   ALLERGIES:   Allergies   Allergen Reactions   • Beef Flavor Nausea     Beef causes Nausea and vomiting    • Gluten Meal      SURGHX:   Past Surgical History:   Procedure Laterality Date   • HYPOSPADIAS REPAIR       SOCHX:  reports that he has never smoked. He has never used smokeless tobacco. He reports that he does not drink alcohol or use drugs.  FH: Reviewed with patient, not pertinent to this visit.           Patient presents with:  Ear  "Pain: bilateral ear aches , sinus pressure , headaches x 2 days . No fever chills, body aches.  Pt states he is feeling pressure rather than pain in ears.  Wants to make sure they are not infected.  Pt has not taken any otc medications for symptoms.         Otalgia    There is pain in both ears. This is a new problem. The current episode started yesterday. The problem has been waxing and waning. There has been no fever. The pain is at a severity of 4/10. The pain is moderate. Associated symptoms include headaches and rhinorrhea. Pertinent negatives include no coughing, ear discharge or sore throat. He has tried nothing for the symptoms. The treatment provided no relief. There is no history of a chronic ear infection or hearing loss.       Review of Systems   Constitutional: Negative for chills and fever.   HENT: Positive for congestion, ear pain and rhinorrhea. Negative for ear discharge, sinus pain and sore throat.    Respiratory: Negative for cough.    Neurological: Positive for headaches.   All other systems reviewed and are negative.         Objective:     /68 (BP Location: Left arm, Patient Position: Sitting, BP Cuff Size: Adult)   Pulse 78   Temp 37 °C (98.6 °F) (Temporal)   Resp 17   Ht 1.676 m (5' 6\")   Wt 71.7 kg (158 lb)   SpO2 97%   BMI 25.50 kg/m²      Physical Exam  Vitals signs and nursing note reviewed.   Constitutional:       General: He is not in acute distress.     Appearance: Normal appearance. He is well-developed.   HENT:      Head: Normocephalic and atraumatic.      Right Ear: Tympanic membrane normal.      Left Ear: Tympanic membrane normal.      Nose: Mucosal edema and congestion present. No rhinorrhea.      Right Sinus: No maxillary sinus tenderness.      Left Sinus: No maxillary sinus tenderness.      Mouth/Throat:      Lips: Pink.      Mouth: Mucous membranes are moist.      Pharynx: Oropharynx is clear. Uvula midline.   Eyes:      Extraocular Movements: Extraocular movements " intact.      Conjunctiva/sclera: Conjunctivae normal.      Pupils: Pupils are equal, round, and reactive to light.   Neck:      Musculoskeletal: Normal range of motion and neck supple.   Cardiovascular:      Rate and Rhythm: Normal rate and regular rhythm.      Pulses: Normal pulses.      Heart sounds: Normal heart sounds.   Pulmonary:      Effort: Pulmonary effort is normal.      Breath sounds: Normal breath sounds.   Abdominal:      General: Bowel sounds are normal.      Palpations: Abdomen is soft.   Musculoskeletal: Normal range of motion.   Skin:     General: Skin is warm and dry.      Capillary Refill: Capillary refill takes less than 2 seconds.   Neurological:      General: No focal deficit present.      Mental Status: He is alert and oriented to person, place, and time.      Gait: Gait normal.   Psychiatric:         Mood and Affect: Mood normal.            Assessment/Plan:     1. Otalgia of both ears     2. Sinus congestion       Discussed that I felt this was viral in nature. Did not see any evidence of a bacterial process. Discussed natural progression and sx care.    Motrin/Advil/Ibuprophen 600 mg every 6 hours as needed for pain or fever.    PT can try OTC decongestant medications, increase fluids and rest until symptoms improve.     PT should follow up with PCP in 1-2 days for re-evaluation if symptoms have not improved.  Discussed red flags and reasons to return to UC or ED.  Pt and/or family verbalized understanding of diagnosis and follow up instructions and was offered informational handout on diagnosis.  PT discharged.

## 2020-03-02 ENCOUNTER — TELEPHONE (OUTPATIENT)
Dept: ENDOCRINOLOGY | Facility: MEDICAL CENTER | Age: 20
End: 2020-03-02

## 2020-03-04 NOTE — TELEPHONE ENCOUNTER
Phone Number Called: [phone] (home)       Call outcome: Spoke to patient regarding message below.    Message: Spoke to patient about pa approved for test strips. He requested the test strips be sent o [facility]    Contour Next test strips Qty 450 with 3 refills to [facility]

## 2020-03-08 ENCOUNTER — OFFICE VISIT (OUTPATIENT)
Dept: URGENT CARE | Facility: CLINIC | Age: 20
End: 2020-03-08
Payer: COMMERCIAL

## 2020-03-08 VITALS
RESPIRATION RATE: 20 BRPM | OXYGEN SATURATION: 95 % | WEIGHT: 156.2 LBS | TEMPERATURE: 98 F | HEIGHT: 66 IN | DIASTOLIC BLOOD PRESSURE: 80 MMHG | SYSTOLIC BLOOD PRESSURE: 124 MMHG | HEART RATE: 110 BPM | BODY MASS INDEX: 25.1 KG/M2

## 2020-03-08 DIAGNOSIS — J02.9 PHARYNGITIS, UNSPECIFIED ETIOLOGY: ICD-10-CM

## 2020-03-08 PROCEDURE — 99214 OFFICE O/P EST MOD 30 MIN: CPT | Performed by: FAMILY MEDICINE

## 2020-03-08 RX ORDER — AMOXICILLIN 500 MG/1
1000 CAPSULE ORAL 2 TIMES DAILY
Qty: 40 CAP | Refills: 0 | Status: SHIPPED | OUTPATIENT
Start: 2020-03-08 | End: 2020-03-18

## 2020-03-08 ASSESSMENT — ENCOUNTER SYMPTOMS
COUGH: 1
CHILLS: 1
SHORTNESS OF BREATH: 0
SORE THROAT: 1
FEVER: 1

## 2020-03-08 ASSESSMENT — FIBROSIS 4 INDEX: FIB4 SCORE: 0.24

## 2020-03-08 NOTE — LETTER
March 8, 2020         Patient: Martinez Smith   YOB: 2000   Date of Visit: 3/8/2020           To Whom it May Concern:    Martinez Smith was seen in my clinic on 3/8/2020. He may return to school on 3/10/2020., may return to work on 3/10/2020..    If you have any questions or concerns, please don't hesitate to call.        Sincerely,           Keo Grande M.D.  Electronically Signed

## 2020-03-08 NOTE — PROGRESS NOTES
"Subjective:   Martinez Smith  is a 19 y.o. male who presents for Cough (x3days, fever, cough, sore throat, runny nose, vomiting due to high blood sugar, chills, sneezing)        Cough   This is a new problem. The current episode started in the past 7 days. The problem has been waxing and waning. The problem occurs every few minutes. The cough is non-productive. Associated symptoms include chills, a fever and a sore throat. Pertinent negatives include no nasal congestion or shortness of breath.     Review of Systems   Constitutional: Positive for chills and fever.   HENT: Positive for sore throat.    Respiratory: Positive for cough. Negative for shortness of breath.      Allergies   Allergen Reactions   • Beef Flavor Nausea     Beef causes Nausea and vomiting    • Gluten Meal    • Tree Nuts Food Allergy Anaphylaxis, Cough, Itching, Nausea and Shortness of Breath      Objective:   /80 (BP Location: Left arm, Patient Position: Sitting, BP Cuff Size: Adult)   Pulse (!) 110   Temp 36.7 °C (98 °F) (Temporal)   Resp 20   Ht 1.676 m (5' 6\")   Wt 70.9 kg (156 lb 3.2 oz)   SpO2 95%   BMI 25.21 kg/m²   Physical Exam  Vitals signs reviewed.   Constitutional:       General: He is not in acute distress.     Appearance: He is well-developed.   HENT:      Head: Normocephalic and atraumatic.      Mouth/Throat:      Pharynx: Uvula midline. Posterior oropharyngeal erythema present. No oropharyngeal exudate.      Tonsils: No tonsillar abscesses.   Eyes:      Conjunctiva/sclera: Conjunctivae normal.      Pupils: Pupils are equal, round, and reactive to light.   Cardiovascular:      Rate and Rhythm: Normal rate and regular rhythm.      Heart sounds: No murmur.   Pulmonary:      Effort: Pulmonary effort is normal. No respiratory distress.      Breath sounds: Normal breath sounds.   Abdominal:      General: There is no distension.      Palpations: Abdomen is soft.      Tenderness: There is no abdominal tenderness. "   Skin:     General: Skin is warm and dry.   Neurological:      Mental Status: He is alert and oriented to person, place, and time.      Sensory: No sensory deficit.      Deep Tendon Reflexes: Reflexes are normal and symmetric.           Assessment/Plan:   1. Pharyngitis, unspecified etiology  - amoxicillin (AMOXIL) 500 MG Cap; Take 2 Caps by mouth 2 times a day for 10 days.  Dispense: 40 Cap; Refill: 0    Other orders  - guaiFENesin (MUCINEX PO); Take  by mouth.    Differential diagnosis, natural history, supportive care, and indications for immediate follow-up discussed.

## 2020-03-17 DIAGNOSIS — E10.65 TYPE 1 DIABETES MELLITUS WITH HYPERGLYCEMIA (HCC): ICD-10-CM

## 2020-04-17 ENCOUNTER — TELEMEDICINE (OUTPATIENT)
Dept: ENDOCRINOLOGY | Facility: MEDICAL CENTER | Age: 20
End: 2020-04-17
Payer: COMMERCIAL

## 2020-04-17 DIAGNOSIS — E10.65 TYPE 1 DIABETES MELLITUS WITH HYPERGLYCEMIA (HCC): ICD-10-CM

## 2020-04-17 DIAGNOSIS — Z46.81 FITTING AND ADJUSTMENT OF INSULIN PUMP: ICD-10-CM

## 2020-04-17 DIAGNOSIS — Z79.4 ENCOUNTER FOR LONG-TERM (CURRENT) USE OF INSULIN (HCC): ICD-10-CM

## 2020-04-17 PROCEDURE — 99214 OFFICE O/P EST MOD 30 MIN: CPT | Mod: 95,CR | Performed by: INTERNAL MEDICINE

## 2020-04-17 NOTE — PROGRESS NOTES
CHIEF COMPLAINT: Patient is here for follow up of Type 1 Diabetes Mellitus. Patient was presented for a telehealth consultation via secure and encrypted videoconferencing technology. This encounter was conducted via Zoom . Verbal consent was obtained. Patient's identity was verified.    HPI:     Martinez Smith is a 19 y.o. male with Type 1 Diabetes Mellitus here for follow up.    Labs from 12/13/2020 show HbA1c is elevated at 8.8%    He was previously seen by the PA    He is on a medtronic 630g pump with no CGM due to financial constraints.  He is testing BG 10x a day with a contour next BG meter.     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  Last eye exam: He forgot the details  Neuropathy: Denies paresthesias or numbness in hands or feet. Denies any foot wounds.  Exercise: Minimal.  Diet: Fair.  Patient's medications, allergies, and social histories were reviewed and updated as appropriate.    ROS:     CONS:     No fever, no chills   EYES:     No diplopia, no blurry vision   CV:           No chest pain, no palpitations   PULM:     No SOB, no cough, no hemoptysis.   GI:            No nausea, no vomiting, no diarrhea, no constipation   ENDO:     No polyuria, no polydipsia, no heat intolerance, no cold intolerance       Past Medical  History:  Problem List:  2019-12: Diabetic gastroparesis (HCC)  2019-12: Encounter for long-term (current) use of insulin ([facility])  2019-12: Fitting and adjustment of insulin pump  2019-12: Intractable nausea and vomiting  2018-01: Hyperlipidemia  2018-01: Other mechanical complication of insulin pump  2017-06: Insulin pump status  2017-06: Type 1 diabetes mellitus with hyperglycemia ([facility])  2017-06: Celiac disease  2017-06: Weakness of right lower extremity  2017-06: Lipohyperplasia  2017-06: Dyslipidemia      Past Surgical History:  Past Surgical History:   Procedure Laterality Date   • HYPOSPADIAS REPAIR          Allergies:  Beef flavor; Gluten meal; and Tree nuts food allergy     Social History:  Social History     Tobacco Use   • Smoking status: Never Smoker   • Smokeless tobacco: Never Used   Substance Use Topics   • Alcohol use: No   • Drug use: No        Family History:   family history includes Arthritis in his maternal grandmother.      PHYSICAL EXAM:   OBJECTIVE:  Vital signs: There were no vitals taken for this visit.  GENERAL: Well-developed, well-nourished in no apparent distress.   EYE:  No ocular asymmetry, PERRLA  HENT: Pink, moist mucous membranes.    NECK: No thyromegaly.   CARDIOVASCULAR: Normal precordial impulse seen with normal carotid pulsation  LUNGS: Symmetrical chest expansion with normal phonation of voice   ABDOMEN: non obese abdomen with no visible organomegaly  EXTREMITIES: No clubbing, cyanosis, or edema.   NEUROLOGICAL: No gross focal motor abnormalities   LYMPH: No cervical adenopathy seen.   SKIN: No rashes, lesions.       Labs:  Lab Results   Component Value Date/Time    HBA1C 8.8 (A) 12/13/2019 02:40 PM        Lab Results   Component Value Date/Time    WBC 6.7 12/24/2019 07:29 AM    RBC 5.16 12/24/2019 07:29 AM    HEMOGLOBIN 15.1 12/24/2019 07:29 AM    MCV 87.6 12/24/2019 07:29 AM    MCH 29.3 12/24/2019 07:29 AM    MCHC 33.4 (L) 12/24/2019 07:29 AM    RDW 39.2 12/24/2019 07:29 AM     MPV 11.4 12/24/2019 07:29 AM       Lab Results   Component Value Date/Time    SODIUM 139 12/24/2019 07:29 AM    POTASSIUM 3.6 12/24/2019 07:29 AM    CHLORIDE 101 12/24/2019 07:29 AM    CO2 30 12/24/2019 07:29 AM    ANION 8.0 12/24/2019 07:29 AM    GLUCOSE 116 (H) 12/24/2019 07:29 AM    BUN 3 (L) 12/24/2019 07:29 AM    CREATININE 0.73 12/24/2019 07:29 AM    CREATININE 0.4 05/25/2007 04:40 AM    CALCIUM 9.6 12/24/2019 07:29 AM    ASTSGOT 14 12/24/2019 07:29 AM    ALTSGPT 11 12/24/2019 07:29 AM    TBILIRUBIN 0.7 12/24/2019 07:29 AM    ALBUMIN 4.9 12/24/2019 07:29 AM    ALBUMIN 4.80 10/22/2016 09:46 AM    TOTPROTEIN 7.7 12/24/2019 07:29 AM    TOTPROTEIN 7.70 10/22/2016 09:46 AM    GLOBULIN 2.8 12/24/2019 07:29 AM    AGRATIO 1.8 12/24/2019 07:29 AM       Lab Results   Component Value Date/Time    CHOLSTRLTOT 177 12/24/2019 0729    TRIGLYCERIDE 103 12/24/2019 0729    HDL 28 (A) 12/24/2019 0729     (H) 12/24/2019 0729       Lab Results   Component Value Date/Time    MALBCRT see below 07/13/2019 08:00 AM    MICROALBUR <0.7 07/13/2019 08:00 AM        Lab Results   Component Value Date/Time    TSHULTRASEN 2.140 09/14/2019 0727     No results found for: FREEDIR  Lab Results   Component Value Date/Time    FREET3 3.53 09/14/2019 0727     No results found for: THYSTIMIG        ASSESSMENT/PLAN:     1. Type 1 diabetes mellitus with hyperglycemia (HCC)  Uncontrolled due to hyperglycemia and early morning hypoglycemia  He will get labs this week via [facility] including a1c, cbc, and cmp  Recommend low MN basal rate to 095  Change carb ratio to 7.0  Recommend he watch his carb intake  Recommend exercise regularly  Will plan for follow up in 3 months with his a1c    2. Fitting and adjustment of insulin pump  Pump is in place    3. Encounter for long-term (current) use of insulin (HCC)  Patient is on long term insulin due to type 1 dm       Return in about 3 months (around 7/17/2020).      Thank you kindly for allowing me to  participate in the diabetes care plan for this patient.    Gabo Benoit MD, [facility], [facility]  04/17/20    CC:   Gregory Grande M.D.

## 2020-05-30 LAB — HBA1C MFR BLD: 7.7 % (ref 0–5.6)

## 2020-06-05 ENCOUNTER — TELEPHONE (OUTPATIENT)
Dept: ENDOCRINOLOGY | Facility: MEDICAL CENTER | Age: 20
End: 2020-06-05

## 2020-06-05 NOTE — TELEPHONE ENCOUNTER
----- Message from Gabo Benoit M.D. sent at 6/4/2020 11:05 PM PDT -----  To Mr. Smith,    We got your labs - your a1c is better at 7.7%, thyroid is normal and your kidney and liver tests look great    You dont have diabetic kidney damage    Please follow up as planned    Thank you    Dr. Benoit

## 2020-07-31 ENCOUNTER — OFFICE VISIT (OUTPATIENT)
Dept: ENDOCRINOLOGY | Facility: MEDICAL CENTER | Age: 20
End: 2020-07-31
Attending: INTERNAL MEDICINE
Payer: COMMERCIAL

## 2020-07-31 VITALS
DIASTOLIC BLOOD PRESSURE: 70 MMHG | HEIGHT: 66 IN | OXYGEN SATURATION: 97 % | HEART RATE: 91 BPM | SYSTOLIC BLOOD PRESSURE: 110 MMHG | BODY MASS INDEX: 21.05 KG/M2 | WEIGHT: 131 LBS

## 2020-07-31 DIAGNOSIS — R10.11 RUQ ABDOMINAL PAIN: ICD-10-CM

## 2020-07-31 DIAGNOSIS — E10.65 TYPE 1 DIABETES MELLITUS WITH HYPERGLYCEMIA (HCC): ICD-10-CM

## 2020-07-31 DIAGNOSIS — Z46.81 FITTING AND ADJUSTMENT OF INSULIN PUMP: ICD-10-CM

## 2020-07-31 DIAGNOSIS — Z79.4 ENCOUNTER FOR LONG-TERM (CURRENT) USE OF INSULIN (HCC): ICD-10-CM

## 2020-07-31 LAB
HBA1C MFR BLD: 7.9 % (ref 0–5.6)
INT CON NEG: ABNORMAL
INT CON POS: ABNORMAL

## 2020-07-31 PROCEDURE — 99213 OFFICE O/P EST LOW 20 MIN: CPT | Performed by: INTERNAL MEDICINE

## 2020-07-31 PROCEDURE — 83036 HEMOGLOBIN GLYCOSYLATED A1C: CPT | Performed by: INTERNAL MEDICINE

## 2020-07-31 PROCEDURE — 99214 OFFICE O/P EST MOD 30 MIN: CPT | Performed by: INTERNAL MEDICINE

## 2020-07-31 RX ORDER — PROMETHAZINE HYDROCHLORIDE 25 MG/1
25 SUPPOSITORY RECTAL EVERY 6 HOURS PRN
COMMUNITY
End: 2022-05-01

## 2020-07-31 ASSESSMENT — FIBROSIS 4 INDEX: FIB4 SCORE: 0.24

## 2020-07-31 NOTE — PROGRESS NOTES
CHIEF COMPLAINT: Patient is here for follow up of Type 1 Diabetes Mellitus.    HPI:     Martinez Smith is a 19 y.o. male with Type 1 Diabetes Mellitus here for follow up.    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  He is testing BG 10x a day with a contour next BG meter.     Since his last visit he has been admitted to [facility] for unexplained nausea and vomiting with a negative GI work-up for H pylori and gastroparesis.  He has not had an ultrasound of his abdomen.  He is unsure if his amylase and lipase has been checked.  We do not have access to his hospital records.    Because he has been sick and has been experiencing right upper quadrant pain with nausea and vomiting he has had a rather severe reduction in his appetite and he lowered his basal rates    Basal rate  00:00 0.475  03:00 0.  550  07:00 0.60  09:00 0.575  11:00 0.525  17:00 0.40     Carb ratio  00:00  8  11:00 7.4  15:30 7.6  17:00 7.7  20:00 7.7     ICF  1:35 above 130     Active insulin time 3 hours     Average   Basal 70%  Bolus 30%    He reports that he is happy with his pump  He admits to variable sugars because of abdominal pain    He is happy to the he is better and that his average blood sugar has drop to the 150s to 160s previously his average sugar was in the 200s      He denies a history of thyroid dse and hyperlipidemia          BG Diary:     30 day average 157    Weight has been stable    Diabetes Complications   Retinopathy: No known retinopathy.  Last eye exam: He forgot the details  Neuropathy: Denies paresthesias or numbness in hands or feet. Denies any foot wounds.  Exercise: Minimal.  Diet: Fair.  Patient's medications, allergies, and social histories were reviewed and updated as  "appropriate.    ROS:     CONS:     No fever, no chills   EYES:     No diplopia, no blurry vision   CV:           No chest pain, no palpitations   PULM:     No SOB, no cough, no hemoptysis.   GI:            No nausea, no vomiting, no diarrhea, no constipation   ENDO:     No polyuria, no polydipsia, no heat intolerance, no cold intolerance       Past Medical History:  Problem List:  2020-07: RUQ abdominal pain  2019-12: Diabetic gastroparesis ([facility])  2019-12: Encounter for long-term (current) use of insulin ([facility])  2019-12: Fitting and adjustment of insulin pump  2019-12: Intractable nausea and vomiting  2018-01: Hyperlipidemia  2018-01: Other mechanical complication of insulin pump  2017-06: Insulin pump status  2017-06: Type 1 diabetes mellitus with hyperglycemia ([facility])  2017-06: Celiac disease  2017-06: Weakness of right lower extremity  2017-06: Lipohyperplasia  2017-06: Dyslipidemia      Past Surgical History:  Past Surgical History:   Procedure Laterality Date   • HYPOSPADIAS REPAIR          Allergies:  Beef flavor; Gluten meal; and Tree nuts food allergy     Social History:  Social History     Tobacco Use   • Smoking status: Never Smoker   • Smokeless tobacco: Never Used   Substance Use Topics   • Alcohol use: No   • Drug use: No        Family History:   family history includes Arthritis in his maternal grandmother.      PHYSICAL EXAM:   OBJECTIVE:  Vital signs: /70   Pulse 91   Ht 1.676 m (5' 6\")   Wt 59.4 kg (131 lb)   SpO2 97%   BMI 21.14 kg/m²   GENERAL: Well-developed, well-nourished in no apparent distress.   EYE:  No ocular asymmetry, PERRLA  HENT: Pink, moist mucous membranes.    NECK: No thyromegaly.   CARDIOVASCULAR: Normal precordial impulse seen with normal carotid pulsation  LUNGS: Symmetrical chest expansion with normal phonation of voice   ABDOMEN: non obese abdomen with no visible organomegaly right upper quadrant pain tenderness noted with direct palpation  EXTREMITIES: No clubbing, " cyanosis, or edema.   NEUROLOGICAL: No gross focal motor abnormalities   LYMPH: No cervical adenopathy seen.   SKIN: No rashes, lesions.       Labs:  Lab Results   Component Value Date/Time    HBA1C 7.9 (A) 07/31/2020 03:29 PM        Lab Results   Component Value Date/Time    WBC 6.7 12/24/2019 07:29 AM    RBC 5.16 12/24/2019 07:29 AM    HEMOGLOBIN 15.1 12/24/2019 07:29 AM    MCV 87.6 12/24/2019 07:29 AM    MCH 29.3 12/24/2019 07:29 AM    MCHC 33.4 (L) 12/24/2019 07:29 AM    RDW 39.2 12/24/2019 07:29 AM    MPV 11.4 12/24/2019 07:29 AM       Lab Results   Component Value Date/Time    SODIUM 139 12/24/2019 07:29 AM    POTASSIUM 3.6 12/24/2019 07:29 AM    CHLORIDE 101 12/24/2019 07:29 AM    CO2 30 12/24/2019 07:29 AM    ANION 8.0 12/24/2019 07:29 AM    GLUCOSE 116 (H) 12/24/2019 07:29 AM    BUN 3 (L) 12/24/2019 07:29 AM    CREATININE 0.73 12/24/2019 07:29 AM    CREATININE 0.4 05/25/2007 04:40 AM    CALCIUM 9.6 12/24/2019 07:29 AM    ASTSGOT 14 12/24/2019 07:29 AM    ALTSGPT 11 12/24/2019 07:29 AM    TBILIRUBIN 0.7 12/24/2019 07:29 AM    ALBUMIN 4.9 12/24/2019 07:29 AM    ALBUMIN 4.80 10/22/2016 09:46 AM    TOTPROTEIN 7.7 12/24/2019 07:29 AM    TOTPROTEIN 7.70 10/22/2016 09:46 AM    GLOBULIN 2.8 12/24/2019 07:29 AM    AGRATIO 1.8 12/24/2019 07:29 AM       Lab Results   Component Value Date/Time    CHOLSTRLTOT 177 12/24/2019 0729    TRIGLYCERIDE 103 12/24/2019 0729    HDL 28 (A) 12/24/2019 0729     (H) 12/24/2019 0729       Lab Results   Component Value Date/Time    MALBCRT see below 07/13/2019 08:00 AM    MICROALBUR <0.7 07/13/2019 08:00 AM        Lab Results   Component Value Date/Time    TSHULTRASEN 2.140 09/14/2019 0727     No results found for: FREEDIR  Lab Results   Component Value Date/Time    FREET3 3.53 09/14/2019 0727     No results found for: THYSTIMIG        ASSESSMENT/PLAN:     1. Type 1 diabetes mellitus with hyperglycemia (HCC)  Uncontrolled at baseline secondary hyperglycemia with improved  control  A1c slightly better but still suboptimal  Adjusted basal rates as follows midnight 0.55 units/h  3 AM 0.6 units/h  Carb ratio 1 unit for every 7 g  Recommend that he watch his carb intake and exercise regularly  We will plan for follow-up in 6 weeks to review blood sugars    2. RUQ abdominal pain  Positive Ortiz sign noted  Possible gallbladder disease specifically cholecystitis  We will schedule for ultrasound  I am also going to get other labs including a CBC CMP TSH amylase and lipase  We will update patient    3. Fitting and adjustment of insulin pump  Insulin pump is in place    4. Encounter for long-term (current) use of insulin (HCC)  Patient is on long-term insulin therapy due to type 1 diabetes      Return in about 6 weeks (around 9/11/2020).      Thank you kindly for allowing me to participate in the diabetes care plan for this patient.    Gabo Benoit MD, [facility], [facility]  04/17/20    CC:   Gregory Grande M.D.

## 2020-08-02 ENCOUNTER — HOSPITAL ENCOUNTER (OUTPATIENT)
Dept: RADIOLOGY | Facility: MEDICAL CENTER | Age: 20
End: 2020-08-02
Attending: INTERNAL MEDICINE
Payer: COMMERCIAL

## 2020-08-02 DIAGNOSIS — R10.11 RUQ ABDOMINAL PAIN: ICD-10-CM

## 2020-08-02 PROCEDURE — 76700 US EXAM ABDOM COMPLETE: CPT

## 2020-08-28 LAB
ALBUMIN SERPL-MCNC: 4.6 G/DL (ref 4.1–5.2)
ALBUMIN/CREAT UR: 3 MG/G CREAT (ref 0–29)
ALBUMIN/GLOB SERPL: 1.7 {RATIO} (ref 1.2–2.2)
ALP SERPL-CCNC: 80 IU/L (ref 39–117)
ALT SERPL-CCNC: 11 IU/L (ref 0–44)
AMYLASE SERPL-CCNC: 48 U/L (ref 31–110)
AST SERPL-CCNC: 15 IU/L (ref 0–40)
BASOPHILS # BLD AUTO: 0.1 X10E3/UL (ref 0–0.2)
BASOPHILS NFR BLD AUTO: 1 %
BILIRUB SERPL-MCNC: 0.5 MG/DL (ref 0–1.2)
BUN SERPL-MCNC: 15 MG/DL (ref 6–20)
BUN/CREAT SERPL: 19 (ref 9–20)
C PEPTIDE SERPL-MCNC: <0.1 NG/ML (ref 1.1–4.4)
CALCIUM SERPL-MCNC: 10.1 MG/DL (ref 8.7–10.2)
CHLORIDE SERPL-SCNC: 98 MMOL/L (ref 96–106)
CHOLEST SERPL-MCNC: 196 MG/DL (ref 100–199)
CO2 SERPL-SCNC: 23 MMOL/L (ref 20–29)
CREAT SERPL-MCNC: 0.78 MG/DL (ref 0.76–1.27)
CREAT UR-MCNC: 243 MG/DL
EOSINOPHIL # BLD AUTO: 0.4 X10E3/UL (ref 0–0.4)
EOSINOPHIL NFR BLD AUTO: 7 %
ERYTHROCYTE [DISTWIDTH] IN BLOOD BY AUTOMATED COUNT: 11.8 % (ref 11.6–15.4)
GAD65 AB SER IA-ACNC: <5 U/ML (ref 0–5)
GLOBULIN SER CALC-MCNC: 2.7 G/DL (ref 1.5–4.5)
GLUCOSE SERPL-MCNC: 282 MG/DL (ref 65–99)
HCT VFR BLD AUTO: 44.4 % (ref 37.5–51)
HDLC SERPL-MCNC: 38 MG/DL
HGB BLD-MCNC: 14.7 G/DL (ref 13–17.7)
IMM GRANULOCYTES # BLD AUTO: 0 X10E3/UL (ref 0–0.1)
IMM GRANULOCYTES NFR BLD AUTO: 0 %
IMMATURE CELLS  115398: NORMAL
LABORATORY COMMENT REPORT: ABNORMAL
LDLC SERPL CALC-MCNC: 137 MG/DL (ref 0–99)
LIPASE SERPL-CCNC: 27 U/L (ref 13–78)
LYMPHOCYTES # BLD AUTO: 1.6 X10E3/UL (ref 0.7–3.1)
LYMPHOCYTES NFR BLD AUTO: 31 %
MCH RBC QN AUTO: 29.6 PG (ref 26.6–33)
MCHC RBC AUTO-ENTMCNC: 33.1 G/DL (ref 31.5–35.7)
MCV RBC AUTO: 90 FL (ref 79–97)
MICROALBUMIN UR-MCNC: 8.1 UG/ML
MONOCYTES # BLD AUTO: 0.4 X10E3/UL (ref 0.1–0.9)
MONOCYTES NFR BLD AUTO: 7 %
MORPHOLOGY BLD-IMP: NORMAL
NEUTROPHILS # BLD AUTO: 2.8 X10E3/UL (ref 1.4–7)
NEUTROPHILS NFR BLD AUTO: 54 %
NRBC BLD AUTO-RTO: NORMAL %
PLATELET # BLD AUTO: 276 X10E3/UL (ref 150–450)
POTASSIUM SERPL-SCNC: 4.5 MMOL/L (ref 3.5–5.2)
PROT SERPL-MCNC: 7.3 G/DL (ref 6–8.5)
RBC # BLD AUTO: 4.96 X10E6/UL (ref 4.14–5.8)
SODIUM SERPL-SCNC: 137 MMOL/L (ref 134–144)
TRIGL SERPL-MCNC: 106 MG/DL (ref 0–149)
TSH SERPL DL<=0.005 MIU/L-ACNC: 1.91 UIU/ML (ref 0.45–4.5)
VLDLC SERPL CALC-MCNC: 21 MG/DL (ref 5–40)
WBC # BLD AUTO: 5.3 X10E3/UL (ref 3.4–10.8)

## 2020-09-11 ENCOUNTER — OFFICE VISIT (OUTPATIENT)
Dept: ENDOCRINOLOGY | Facility: MEDICAL CENTER | Age: 20
End: 2020-09-11
Attending: INTERNAL MEDICINE
Payer: COMMERCIAL

## 2020-09-11 VITALS
WEIGHT: 129 LBS | BODY MASS INDEX: 20.73 KG/M2 | HEIGHT: 66 IN | DIASTOLIC BLOOD PRESSURE: 70 MMHG | HEART RATE: 88 BPM | OXYGEN SATURATION: 97 % | SYSTOLIC BLOOD PRESSURE: 110 MMHG

## 2020-09-11 DIAGNOSIS — Z79.4 ENCOUNTER FOR LONG-TERM (CURRENT) USE OF INSULIN (HCC): ICD-10-CM

## 2020-09-11 DIAGNOSIS — Z46.81 FITTING AND ADJUSTMENT OF INSULIN PUMP: ICD-10-CM

## 2020-09-11 DIAGNOSIS — G89.29 CHRONIC ABDOMINAL PAIN: ICD-10-CM

## 2020-09-11 DIAGNOSIS — E55.9 VITAMIN D DEFICIENCY: ICD-10-CM

## 2020-09-11 DIAGNOSIS — R10.9 CHRONIC ABDOMINAL PAIN: ICD-10-CM

## 2020-09-11 DIAGNOSIS — E10.65 TYPE 1 DIABETES MELLITUS WITH HYPERGLYCEMIA (HCC): ICD-10-CM

## 2020-09-11 PROCEDURE — 99213 OFFICE O/P EST LOW 20 MIN: CPT | Performed by: INTERNAL MEDICINE

## 2020-09-11 PROCEDURE — 99214 OFFICE O/P EST MOD 30 MIN: CPT | Performed by: INTERNAL MEDICINE

## 2020-09-11 RX ORDER — ALUMINUM ZIRCONIUM OCTACHLOROHYDREX GLY 16 G/100G
1 GEL TOPICAL DAILY
COMMUNITY
End: 2021-04-05

## 2020-09-11 RX ORDER — AMITRIPTYLINE HYDROCHLORIDE 25 MG/1
25 TABLET, FILM COATED ORAL
COMMUNITY
Start: 2020-09-03 | End: 2021-04-05

## 2020-09-11 ASSESSMENT — FIBROSIS 4 INDEX: FIB4 SCORE: 0.34

## 2020-09-11 NOTE — PROGRESS NOTES
CHIEF COMPLAINT: Patient is here for follow up of Type 1 Diabetes Mellitus.    HPI:     Martinez Smith is a 19 y.o. male with Type 1 Diabetes Mellitus here for follow up.    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  He is testing BG 10x a day with a contour next BG meter.     Since his last visit he has been admitted to [facility] for unexplained nausea and vomiting with a negative GI work-up for H pylori and gastroparesis.  He had an abdominal US on 8/2020 which was normal.  Amylase and lipase were negative    Because he has been sick and has been experiencing right upper quadrant pain with nausea and vomiting he has had a rather severe reduction in his appetite and he lowered his basal rates    Basal rate  00:00 0.5.0  0300  0.600     Carb ratio  00:00  7.5     ICF  1:35 above 130     Active insulin time 3 hours     Average   Basal 70%  Bolus 30%    He reports that he is happy with his pump  He admits to variable sugars because of abdominal pain    His gastroenterologist is still working with him and is planning to do another endoscopy.  He has been treated now with Elavil for his abdominal pain and is also on probiotics    His average blood sugar is stable at 150 based on his recent pump download.    His most recent labs showed normal TSH and fair lipid panel on September 2020.  He has no signs of microalbuminuria or diabetic kidney disease.    Download of his pump shows elevated morning sugars and postprandial hypoglycemia and he may need an adjustment of his basal rate overnight and adjustment of his carb ratio      He is taking vitamin D and we do not have a recent 25-hydroxy vitamin D level on hand benefits calcium levels were  "normal        BG Diary:     30 day average 157    Weight has been stable    Diabetes Complications   Retinopathy: No known retinopathy.  Last eye exam: 5/2020 Dr. Noguera  Neuropathy: Denies paresthesias or numbness in hands or feet. Denies any foot wounds.  Exercise: Minimal.  Diet: Fair.  Patient's medications, allergies, and social histories were reviewed and updated as appropriate.    ROS:     CONS:     No fever, no chills   EYES:     No diplopia, no blurry vision   CV:           No chest pain, no palpitations   PULM:     No SOB, no cough, no hemoptysis.   GI:            No nausea, no vomiting, no diarrhea, no constipation   ENDO:     No polyuria, no polydipsia, no heat intolerance, no cold intolerance       Past Medical History:  Problem List:  2020-07: RUQ abdominal pain  2019-12: Diabetic gastroparesis (HCC)  2019-12: Encounter for long-term (current) use of insulin ([facility])  2019-12: Fitting and adjustment of insulin pump  2019-12: Intractable nausea and vomiting  2018-01: Hyperlipidemia  2018-01: Other mechanical complication of insulin pump  2017-06: Insulin pump status  2017-06: Type 1 diabetes mellitus with hyperglycemia ([facility])  2017-06: Celiac disease  2017-06: Weakness of right lower extremity  2017-06: Lipohyperplasia  2017-06: Dyslipidemia      Past Surgical History:  Past Surgical History:   Procedure Laterality Date   • HYPOSPADIAS REPAIR          Allergies:  Beef flavor; Gluten meal; and Tree nuts food allergy     Social History:  Social History     Tobacco Use   • Smoking status: Never Smoker   • Smokeless tobacco: Never Used   Substance Use Topics   • Alcohol use: No   • Drug use: No        Family History:   family history includes Arthritis in his maternal grandmother.      PHYSICAL EXAM:   OBJECTIVE:  Vital signs: /70   Pulse 88   Ht 1.676 m (5' 6\")   Wt 58.5 kg (129 lb)   SpO2 97%   BMI 20.82 kg/m²   GENERAL: Well-developed, well-nourished in no apparent distress.   EYE:  No ocular " asymmetry, PERRLA  HENT: Pink, moist mucous membranes.    NECK: No thyromegaly.   CARDIOVASCULAR: Normal precordial impulse seen with normal carotid pulsation  LUNGS: Symmetrical chest expansion with normal phonation of voice   ABDOMEN: non obese abdomen with no visible organomegaly right upper quadrant pain tenderness noted with direct palpation  EXTREMITIES: No clubbing, cyanosis, or edema.   NEUROLOGICAL: No gross focal motor abnormalities   LYMPH: No cervical adenopathy seen.   SKIN: No rashes, lesions.   Monofilament testing with a 10 gram force: sensation: intact bilaterally   Visual Inspection: Feet without maceration, ulcers, or fissures.   Pedal pulses: intact bilaterally       Labs:  Lab Results   Component Value Date/Time    HBA1C 7.9 (A) 07/31/2020 03:29 PM        Lab Results   Component Value Date/Time    WBC 5.3 08/20/2020 05:40 AM    WBC 6.7 12/24/2019 07:29 AM    RBC 4.96 08/20/2020 05:40 AM    RBC 5.16 12/24/2019 07:29 AM    HEMOGLOBIN 14.7 08/20/2020 05:40 AM    HEMOGLOBIN 15.1 12/24/2019 07:29 AM    MCV 90 08/20/2020 05:40 AM    MCV 87.6 12/24/2019 07:29 AM    MCH 29.6 08/20/2020 05:40 AM    MCH 29.3 12/24/2019 07:29 AM    MCHC 33.1 08/20/2020 05:40 AM    MCHC 33.4 (L) 12/24/2019 07:29 AM    RDW 11.8 08/20/2020 05:40 AM    RDW 39.2 12/24/2019 07:29 AM    MPV 11.4 12/24/2019 07:29 AM       Lab Results   Component Value Date/Time    SODIUM 137 08/20/2020 05:40 AM    SODIUM 139 12/24/2019 07:29 AM    POTASSIUM 4.5 08/20/2020 05:40 AM    POTASSIUM 3.6 12/24/2019 07:29 AM    CHLORIDE 98 08/20/2020 05:40 AM    CHLORIDE 101 12/24/2019 07:29 AM    CO2 23 08/20/2020 05:40 AM    CO2 30 12/24/2019 07:29 AM    ANION 8.0 12/24/2019 07:29 AM    GLUCOSE 282 (H) 08/20/2020 05:40 AM    GLUCOSE 116 (H) 12/24/2019 07:29 AM    BUN 15 08/20/2020 05:40 AM    BUN 3 (L) 12/24/2019 07:29 AM    CREATININE 0.78 08/20/2020 05:40 AM    CREATININE 0.73 12/24/2019 07:29 AM    CREATININE 0.4 05/25/2007 04:40 AM    CALCIUM 10.1  08/20/2020 05:40 AM    CALCIUM 9.6 12/24/2019 07:29 AM    ASTSGOT 15 08/20/2020 05:40 AM    ASTSGOT 14 12/24/2019 07:29 AM    ALTSGPT 11 08/20/2020 05:40 AM    ALTSGPT 11 12/24/2019 07:29 AM    TBILIRUBIN 0.5 08/20/2020 05:40 AM    TBILIRUBIN 0.7 12/24/2019 07:29 AM    ALBUMIN 4.6 08/20/2020 05:40 AM    ALBUMIN 4.9 12/24/2019 07:29 AM    ALBUMIN 4.80 10/22/2016 09:46 AM    TOTPROTEIN 7.3 08/20/2020 05:40 AM    TOTPROTEIN 7.7 12/24/2019 07:29 AM    TOTPROTEIN 7.70 10/22/2016 09:46 AM    GLOBULIN 2.7 08/20/2020 05:40 AM    GLOBULIN 2.8 12/24/2019 07:29 AM    AGRATIO 1.7 08/20/2020 05:40 AM    AGRATIO 1.8 12/24/2019 07:29 AM       Lab Results   Component Value Date/Time    CHOLSTRLTOT 177 12/24/2019 0729    TRIGLYCERIDE 103 12/24/2019 0729    HDL 28 (A) 12/24/2019 0729     (H) 12/24/2019 0729       Lab Results   Component Value Date/Time    MALBCRT see below 07/13/2019 08:00 AM    MICROALBUR <0.7 07/13/2019 08:00 AM    MICRALB 8.1 08/20/2020 05:40 AM        Lab Results   Component Value Date/Time    TSHULTRASEN 2.140 09/14/2019 0727     No results found for: FREEDIR  Lab Results   Component Value Date/Time    FREET3 3.53 09/14/2019 0727     No results found for: THYSTIMIG        ASSESSMENT/PLAN:     1. Type 1 diabetes mellitus with hyperglycemia (HCC)  Uncontrolled secondary to hyperglycemia and hypoglycemia  A1c is stable at 7.9%  I adjusted the basal rate overnight to 0.60 units/h which is an increase  I adjusted his carb ratio to 1 unit for every 10 g so that he does not have a lot of postprandial hypoglycemia  I recommend that he watch his carb intake  I recommend that he exercise regularly  I will see him again in 3 months with a repeat of his A1c    2. Chronic abdominal pain  Unstable  Continue probiotics and antidepressants, continue follow-up with gastroenterology    3. Fitting and adjustment of insulin pump  Insulin pump is in place    4. Vitamin D deficiency  Stable  Continue supplementation I am  checking a calcium and 25-hydroxy vitamin D level with his next labs    5. Encounter for long-term (current) use of insulin (HCC)  Patient is on long-term insulin therapy due to type 1 diabetes      Return in about 3 months (around 12/11/2020).      Thank you kindly for allowing me to participate in the diabetes care plan for this patient.    Gabo Benoit MD, [facility], [facility]  04/17/20    CC:   Gregory Grande M.D.

## 2020-09-11 NOTE — PROGRESS NOTES
Monofilament testing with a 10 gram force: sensation: intact bilaterally  Visual Inspection: Feet without maceration, ulcers, or fissures.  Pedal pulses: intact bilaterally

## 2020-10-15 ENCOUNTER — HOSPITAL ENCOUNTER (OUTPATIENT)
Dept: RADIOLOGY | Facility: MEDICAL CENTER | Age: 20
End: 2020-10-15
Attending: INTERNAL MEDICINE
Payer: COMMERCIAL

## 2020-10-15 DIAGNOSIS — R10.9 ABDOMINAL PAIN, UNSPECIFIED ABDOMINAL LOCATION: ICD-10-CM

## 2020-10-15 DIAGNOSIS — R11.2 NAUSEA AND VOMITING, INTRACTABILITY OF VOMITING NOT SPECIFIED, UNSPECIFIED VOMITING TYPE: ICD-10-CM

## 2020-10-15 PROCEDURE — 700111 HCHG RX REV CODE 636 W/ 250 OVERRIDE (IP)

## 2020-10-15 PROCEDURE — A9537 TC99M MEBROFENIN: HCPCS

## 2020-10-15 RX ORDER — MORPHINE SULFATE 4 MG/ML
2 INJECTION, SOLUTION INTRAMUSCULAR; INTRAVENOUS ONCE
Status: COMPLETED | OUTPATIENT
Start: 2020-10-15 | End: 2020-10-15

## 2020-10-15 RX ORDER — MORPHINE SULFATE 4 MG/ML
INJECTION, SOLUTION INTRAMUSCULAR; INTRAVENOUS
Status: COMPLETED
Start: 2020-10-15 | End: 2020-10-15

## 2020-10-15 RX ADMIN — MORPHINE SULFATE 2 MG: 4 INJECTION INTRAVENOUS at 10:41

## 2020-10-15 RX ADMIN — MORPHINE SULFATE 2 MG: 4 INJECTION, SOLUTION INTRAMUSCULAR; INTRAVENOUS at 10:41

## 2020-11-09 NOTE — TELEPHONE ENCOUNTER
CPT: 26635  BODY PART: left knee  AUTHORIZATION: approved    Need dictation from 11/9/20 to submit to insurance    Faxed clinical to STEPHANIE Caraballo 19 11/12/20    Approved #[id number] Phone Number Called: [phone]    Call outcome: Left detailed message for patient. Informed to call back with any additional questions.    Message: Contacted patient to let him know dr. Benoit sent him a [employer] message regarding lab results. I asked the patient to call us he had any questions to please call us or send us a [employer] message.

## 2020-12-04 ENCOUNTER — OFFICE VISIT (OUTPATIENT)
Dept: ENDOCRINOLOGY | Facility: MEDICAL CENTER | Age: 20
End: 2020-12-04
Attending: INTERNAL MEDICINE
Payer: COMMERCIAL

## 2020-12-04 VITALS
DIASTOLIC BLOOD PRESSURE: 70 MMHG | BODY MASS INDEX: 21.38 KG/M2 | SYSTOLIC BLOOD PRESSURE: 120 MMHG | OXYGEN SATURATION: 98 % | HEART RATE: 96 BPM | HEIGHT: 66 IN | WEIGHT: 133 LBS

## 2020-12-04 DIAGNOSIS — E55.9 VITAMIN D DEFICIENCY: ICD-10-CM

## 2020-12-04 DIAGNOSIS — Z79.4 LONG-TERM INSULIN USE (HCC): ICD-10-CM

## 2020-12-04 DIAGNOSIS — G89.29 CHRONIC ABDOMINAL PAIN: ICD-10-CM

## 2020-12-04 DIAGNOSIS — E10.65 TYPE 1 DIABETES MELLITUS WITH HYPERGLYCEMIA (HCC): ICD-10-CM

## 2020-12-04 DIAGNOSIS — Z96.41 INSULIN PUMP STATUS: ICD-10-CM

## 2020-12-04 DIAGNOSIS — R10.9 CHRONIC ABDOMINAL PAIN: ICD-10-CM

## 2020-12-04 LAB
25(OH)D3+25(OH)D2 SERPL-MCNC: 22.6 NG/ML (ref 30–100)
ALBUMIN SERPL-MCNC: 4.9 G/DL (ref 4.1–5.2)
ALBUMIN/GLOB SERPL: 2 {RATIO} (ref 1.2–2.2)
ALP SERPL-CCNC: 115 IU/L (ref 39–117)
ALT SERPL-CCNC: 12 IU/L (ref 0–44)
AST SERPL-CCNC: 18 IU/L (ref 0–40)
BILIRUB SERPL-MCNC: 0.7 MG/DL (ref 0–1.2)
BUN SERPL-MCNC: 10 MG/DL (ref 6–20)
BUN/CREAT SERPL: 12 (ref 9–20)
CALCIUM SERPL-MCNC: 9.9 MG/DL (ref 8.7–10.2)
CHLORIDE SERPL-SCNC: 96 MMOL/L (ref 96–106)
CO2 SERPL-SCNC: 25 MMOL/L (ref 20–29)
CREAT SERPL-MCNC: 0.81 MG/DL (ref 0.76–1.27)
GLOBULIN SER CALC-MCNC: 2.5 G/DL (ref 1.5–4.5)
GLUCOSE SERPL-MCNC: 240 MG/DL (ref 65–99)
HBA1C MFR BLD: 7.3 % (ref 4.8–5.6)
POTASSIUM SERPL-SCNC: 4.4 MMOL/L (ref 3.5–5.2)
PROT SERPL-MCNC: 7.4 G/DL (ref 6–8.5)
SODIUM SERPL-SCNC: 137 MMOL/L (ref 134–144)

## 2020-12-04 PROCEDURE — 99212 OFFICE O/P EST SF 10 MIN: CPT | Performed by: INTERNAL MEDICINE

## 2020-12-04 PROCEDURE — 99214 OFFICE O/P EST MOD 30 MIN: CPT | Performed by: INTERNAL MEDICINE

## 2020-12-04 RX ORDER — PANTOPRAZOLE SODIUM 20 MG/1
TABLET, DELAYED RELEASE ORAL
COMMUNITY
Start: 2020-11-10 | End: 2021-04-05

## 2020-12-04 ASSESSMENT — FIBROSIS 4 INDEX: FIB4 SCORE: 0.4

## 2020-12-04 NOTE — PROGRESS NOTES
CHIEF COMPLAINT: Patient is here for follow up of Type 1 Diabetes Mellitus.    HPI:     Martinez Smith is a 19 y.o. male with Type 1 Diabetes Mellitus here for follow up.    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  He is testing BG 10x a day with a contour next BG meter.     He has a history of chronic abdominal pain and nausea and vomiting of unknown etiology.  He is being followed up by gastroenterologist.  He had a negative GI work-up for H pylori and gastroparesis.  He had an abdominal US on 8/2020 which was normal.  Amylase and lipase were negative        Basal rate  00:00 0.60  0300  0.600     Carb ratio  00:00  10     ICF  1:35 above 130     Active insulin time 3 hours     Average   Basal 38%  Bolus 62%      His gastroenterologist did an UGI and found gastritis and started him on Protonix. He is on Elavil for his abdominal pain and is also on probiotics  He reports that he is happy with his pump          He reports that he is happy that his A1c is down to 7.3%.  He is not interested in the  Eversense continuous glucose monitor    His average blood sugar is stable at 140 based on his recent pump download.    His most recent labs showed normal TSH and fair lipid panel on September 2020.  He has no signs of microalbuminuria or diabetic kidney disease.    He is not taking vitamin D, and vitamin D is low at 22.6      BG Diary:   30 day average 140    Weight has been stable    Diabetes Complications   Retinopathy: No known retinopathy.  Last eye exam: 5/2020 Dr. Noguera  Neuropathy: Denies paresthesias or numbness in hands or feet. Denies any foot wounds.  Exercise: Minimal.  Diet: Fair.  Patient's  "medications, allergies, and social histories were reviewed and updated as appropriate.    ROS:     CONS:     No fever, no chills   EYES:     No diplopia, no blurry vision   CV:           No chest pain, no palpitations   PULM:     No SOB, no cough, no hemoptysis.   GI:            No nausea, no vomiting, no diarrhea, no constipation   ENDO:     No polyuria, no polydipsia, no heat intolerance, no cold intolerance       Past Medical History:  Problem List:  2020-09: Chronic abdominal pain  2020-09: Vitamin D deficiency  2020-07: RUQ abdominal pain  2019-12: Diabetic gastroparesis (HCC)  2019-12: Long-term insulin use ([facility])  2019-12: Fitting and adjustment of insulin pump  2019-12: Intractable nausea and vomiting  2018-01: Hyperlipidemia  2018-01: Other mechanical complication of insulin pump  2017-06: Insulin pump status  2017-06: Type 1 diabetes mellitus with hyperglycemia ([facility])  2017-06: Celiac disease  2017-06: Weakness of right lower extremity  2017-06: Lipohyperplasia  2017-06: Dyslipidemia      Past Surgical History:  Past Surgical History:   Procedure Laterality Date   • HYPOSPADIAS REPAIR          Allergies:  Beef flavor; Gluten meal; and Tree nuts food allergy     Social History:  Social History     Tobacco Use   • Smoking status: Never Smoker   • Smokeless tobacco: Never Used   Substance Use Topics   • Alcohol use: No   • Drug use: No        Family History:   family history includes Arthritis in his maternal grandmother.      PHYSICAL EXAM:   OBJECTIVE:  Vital signs: /70   Pulse 96   Ht 1.676 m (5' 6\")   Wt 60.3 kg (133 lb)   SpO2 98%   BMI 21.47 kg/m²   GENERAL: Well-developed, well-nourished in no apparent distress.   EYE:  No ocular asymmetry, PERRLA  HENT: Pink, moist mucous membranes.    NECK: No thyromegaly.   CARDIOVASCULAR: Normal precordial impulse seen with normal carotid pulsation  LUNGS: Symmetrical chest expansion with normal phonation of voice   ABDOMEN: non obese abdomen with no " visible organomegaly right upper quadrant pain tenderness noted with direct palpation  EXTREMITIES: No clubbing, cyanosis, or edema.   NEUROLOGICAL: No gross focal motor abnormalities   LYMPH: No cervical adenopathy seen.   SKIN: No rashes, lesions.         Labs:  Lab Results   Component Value Date/Time    HBA1C 7.3 (H) 12/03/2020 05:18 AM    HBA1C 7.9 (A) 07/31/2020 03:29 PM        Lab Results   Component Value Date/Time    WBC 5.3 08/20/2020 05:40 AM    WBC 6.7 12/24/2019 07:29 AM    RBC 4.96 08/20/2020 05:40 AM    RBC 5.16 12/24/2019 07:29 AM    HEMOGLOBIN 14.7 08/20/2020 05:40 AM    HEMOGLOBIN 15.1 12/24/2019 07:29 AM    MCV 90 08/20/2020 05:40 AM    MCV 87.6 12/24/2019 07:29 AM    MCH 29.6 08/20/2020 05:40 AM    MCH 29.3 12/24/2019 07:29 AM    MCHC 33.1 08/20/2020 05:40 AM    MCHC 33.4 (L) 12/24/2019 07:29 AM    RDW 11.8 08/20/2020 05:40 AM    RDW 39.2 12/24/2019 07:29 AM    MPV 11.4 12/24/2019 07:29 AM       Lab Results   Component Value Date/Time    SODIUM 137 12/03/2020 05:18 AM    SODIUM 139 12/24/2019 07:29 AM    POTASSIUM 4.4 12/03/2020 05:18 AM    POTASSIUM 3.6 12/24/2019 07:29 AM    CHLORIDE 96 12/03/2020 05:18 AM    CHLORIDE 101 12/24/2019 07:29 AM    CO2 25 12/03/2020 05:18 AM    CO2 30 12/24/2019 07:29 AM    ANION 8.0 12/24/2019 07:29 AM    GLUCOSE 240 (H) 12/03/2020 05:18 AM    GLUCOSE 116 (H) 12/24/2019 07:29 AM    BUN 10 12/03/2020 05:18 AM    BUN 3 (L) 12/24/2019 07:29 AM    CREATININE 0.81 12/03/2020 05:18 AM    CREATININE 0.73 12/24/2019 07:29 AM    CREATININE 0.4 05/25/2007 04:40 AM    CALCIUM 9.9 12/03/2020 05:18 AM    CALCIUM 9.6 12/24/2019 07:29 AM    ASTSGOT 18 12/03/2020 05:18 AM    ASTSGOT 14 12/24/2019 07:29 AM    ALTSGPT 12 12/03/2020 05:18 AM    ALTSGPT 11 12/24/2019 07:29 AM    TBILIRUBIN 0.7 12/03/2020 05:18 AM    TBILIRUBIN 0.7 12/24/2019 07:29 AM    ALBUMIN 4.9 12/03/2020 05:18 AM    ALBUMIN 4.9 12/24/2019 07:29 AM    ALBUMIN 4.80 10/22/2016 09:46 AM    TOTPROTEIN 7.4 12/03/2020  05:18 AM    TOTPROTEIN 7.7 12/24/2019 07:29 AM    TOTPROTEIN 7.70 10/22/2016 09:46 AM    GLOBULIN 2.5 12/03/2020 05:18 AM    GLOBULIN 2.8 12/24/2019 07:29 AM    AGRATIO 2.0 12/03/2020 05:18 AM    AGRATIO 1.8 12/24/2019 07:29 AM       Lab Results   Component Value Date/Time    CHOLSTRLTOT 177 12/24/2019 0729    TRIGLYCERIDE 103 12/24/2019 0729    HDL 28 (A) 12/24/2019 0729     (H) 12/24/2019 0729       Lab Results   Component Value Date/Time    MALBCRT see below 07/13/2019 08:00 AM    MICROALBUR <0.7 07/13/2019 08:00 AM    MICRALB 8.1 08/20/2020 05:40 AM        Lab Results   Component Value Date/Time    TSHULTRASEN 2.140 09/14/2019 0727     No results found for: FREEDIR  Lab Results   Component Value Date/Time    FREET3 3.53 09/14/2019 0727     No results found for: THYSTIMIG        ASSESSMENT/PLAN:     1. Type 1 diabetes mellitus with hyperglycemia (HCC)  Uncontrolled secondary to hyperglycemia and hypoglycemia  A1c is stable at 7.3%  I adjusted the basal rate at 3 am to 0.70 units/h which is an increase  I am ordering the Eversense  implantable continuous ambulatory CGM via the [employer] system  I recommend that he watch his carb intake  I recommend that he exercise regularly  I will see him again in 4 months with a repeat of his A1c    2. Chronic abdominal pain  Unstable  Continue PPI, probiotics and antidepressants,   continue follow-up with gastroenterology    3. Fitting and adjustment of insulin pump  Insulin pump is in place    4. Vitamin D deficiency  Unstable restart vitamin D 3 5000 units daily  Continue supplementation I am checking a calcium and 25-hydroxy vitamin D level with his next labs    5. Encounter for long-term (current) use of insulin (HCC)  Patient is on long-term insulin therapy due to type 1 diabetes      Return in about 4 months (around 4/4/2021).      Thank you kindly for allowing me to participate in the diabetes care plan for this patient.    Gabo Benoit MD, FACE,  ECNU  04/17/20    CC:   Gregory Grande M.D.

## 2020-12-04 NOTE — PROGRESS NOTES
RN-CDE Note    Subjective:   Endocrinology Clinic Progress Note  PCP: Gregory Grande M.D.    HPI:  Martinez Smith is a 19 y.o. old patient who is seen today for review of type 1 Diabetes on insulin pump.  He continues to have abdomen pain and thinks it is his gallbladder.    DM:   Last A1c:   Lab Results   Component Value Date/Time    HBA1C 7.3 (H) 12/03/2020 05:18 AM    HBA1C 7.9 (A) 07/31/2020 03:29 PM      A1C GOAL: < 7    Diabetes Medications:   Humalog in Medtronic 630 G pump.  Taking above medications as prescribed: yes  Taking daily ASA: No    Exercise: walking and light weights  Diet: Tolerating cereal, potatoes, and chicken.Patient's body mass index is 21.47 kg/m². Exercise and nutrition counseling were performed at this visit.    Glucose monitoring frequency: See pump download    Hypoglycemic episodes: yes - usually after meals.  Last Retinal Exam: on file and up-to-date  Daily Foot Exam: Yes   Foot Exam:  Monofilament: done  Monofilament testing with a 10 gram force: sensation intact: intact bilaterally  Visual Inspection: Feet without maceration, ulcers, fissures.  Pedal pulses: intact bilaterally   Lab Results   Component Value Date/Time    MALBCRT see below 07/13/2019 08:00 AM    MICROALBUR <0.7 07/13/2019 08:00 AM    MICRALB 8.1 08/20/2020 05:40 AM      ACR Albumin/Creatinine Ratio goal <30   Currently Rx ACE/ARB: No   Dyslipidemia:  Lab Results   Component Value Date/Time    CHOLSTRLTOT 196 08/20/2020 05:40 AM    CHOLSTRLTOT 177 12/24/2019 07:29 AM     (H) 08/20/2020 05:40 AM     (H) 12/24/2019 07:29 AM    HDL 38 (L) 08/20/2020 05:40 AM    HDL 28 (A) 12/24/2019 07:29 AM    TRIGLYCERIDE 106 08/20/2020 05:40 AM    TRIGLYCERIDE 103 12/24/2019 07:29 AM       Currently Rx Statin: No     He  reports that he has never smoked. He has never used smokeless tobacco.      Plan:     Discussed and educated on:   - All medications, side effects and compliance (discussed carefully)  -  Annual eye examinations at Ophthalmology  - Home glucose monitoring emphasized  - Weight control and daily exercise    Recommended medication changes: He is interested in the Eversense CGM.  He will get back on his Vitamin D 5000 units daily.

## 2020-12-20 ENCOUNTER — APPOINTMENT (OUTPATIENT)
Dept: RADIOLOGY | Facility: MEDICAL CENTER | Age: 20
End: 2020-12-20
Attending: EMERGENCY MEDICINE
Payer: COMMERCIAL

## 2020-12-20 ENCOUNTER — HOSPITAL ENCOUNTER (EMERGENCY)
Facility: MEDICAL CENTER | Age: 20
End: 2020-12-21
Attending: EMERGENCY MEDICINE
Payer: COMMERCIAL

## 2020-12-20 DIAGNOSIS — R11.0 NAUSEA: ICD-10-CM

## 2020-12-20 DIAGNOSIS — E10.9 TYPE 1 DIABETES MELLITUS WITHOUT COMPLICATION (HCC): ICD-10-CM

## 2020-12-20 DIAGNOSIS — R10.9 ACUTE ABDOMINAL PAIN: ICD-10-CM

## 2020-12-20 DIAGNOSIS — G89.29 CHRONIC ABDOMINAL PAIN: ICD-10-CM

## 2020-12-20 DIAGNOSIS — R10.9 CHRONIC ABDOMINAL PAIN: ICD-10-CM

## 2020-12-20 DIAGNOSIS — K90.0 CELIAC DISEASE: ICD-10-CM

## 2020-12-20 DIAGNOSIS — R14.0 BLOATING: ICD-10-CM

## 2020-12-20 DIAGNOSIS — F41.9 ANXIOUSNESS: ICD-10-CM

## 2020-12-20 LAB
ALBUMIN SERPL BCP-MCNC: 4.8 G/DL (ref 3.2–4.9)
ALBUMIN/GLOB SERPL: 1.7 G/DL
ALP SERPL-CCNC: 100 U/L (ref 30–99)
ALT SERPL-CCNC: 13 U/L (ref 2–50)
ANION GAP SERPL CALC-SCNC: 10 MMOL/L (ref 7–16)
AST SERPL-CCNC: 13 U/L (ref 12–45)
BASE EXCESS BLDV CALC-SCNC: 2 MMOL/L
BASOPHILS # BLD AUTO: 0.9 % (ref 0–1.8)
BASOPHILS # BLD: 0.07 K/UL (ref 0–0.12)
BILIRUB SERPL-MCNC: 0.4 MG/DL (ref 0.1–1.5)
BODY TEMPERATURE: NORMAL CENTIGRADE
BUN SERPL-MCNC: 7 MG/DL (ref 8–22)
CALCIUM SERPL-MCNC: 9.5 MG/DL (ref 8.5–10.5)
CHLORIDE SERPL-SCNC: 102 MMOL/L (ref 96–112)
CO2 SERPL-SCNC: 29 MMOL/L (ref 20–33)
CREAT SERPL-MCNC: 0.65 MG/DL (ref 0.5–1.4)
EOSINOPHIL # BLD AUTO: 0.39 K/UL (ref 0–0.51)
EOSINOPHIL NFR BLD: 5.3 % (ref 0–6.9)
ERYTHROCYTE [DISTWIDTH] IN BLOOD BY AUTOMATED COUNT: 39.1 FL (ref 35.9–50)
GLOBULIN SER CALC-MCNC: 2.8 G/DL (ref 1.9–3.5)
GLUCOSE SERPL-MCNC: 171 MG/DL (ref 65–99)
HCO3 BLDV-SCNC: 28 MMOL/L (ref 24–28)
HCT VFR BLD AUTO: 44.2 % (ref 42–52)
HGB BLD-MCNC: 14.8 G/DL (ref 14–18)
IMM GRANULOCYTES # BLD AUTO: 0.03 K/UL (ref 0–0.11)
IMM GRANULOCYTES NFR BLD AUTO: 0.4 % (ref 0–0.9)
LACTATE BLD-SCNC: 0.7 MMOL/L (ref 0.5–2)
LIPASE SERPL-CCNC: 12 U/L (ref 11–82)
LYMPHOCYTES # BLD AUTO: 2.56 K/UL (ref 1–4.8)
LYMPHOCYTES NFR BLD: 34.6 % (ref 22–41)
MAGNESIUM SERPL-MCNC: 2.2 MG/DL (ref 1.5–2.5)
MCH RBC QN AUTO: 30.1 PG (ref 27–33)
MCHC RBC AUTO-ENTMCNC: 33.5 G/DL (ref 33.7–35.3)
MCV RBC AUTO: 90 FL (ref 81.4–97.8)
MONOCYTES # BLD AUTO: 0.45 K/UL (ref 0–0.85)
MONOCYTES NFR BLD AUTO: 6.1 % (ref 0–13.4)
NEUTROPHILS # BLD AUTO: 3.89 K/UL (ref 1.82–7.42)
NEUTROPHILS NFR BLD: 52.7 % (ref 44–72)
NRBC # BLD AUTO: 0.02 K/UL
NRBC BLD-RTO: 0.3 /100 WBC
PCO2 BLDV: 48.9 MMHG (ref 41–51)
PH BLDV: 7.37 [PH] (ref 7.31–7.45)
PLATELET # BLD AUTO: 301 K/UL (ref 164–446)
PMV BLD AUTO: 10.8 FL (ref 9–12.9)
PO2 BLDV: 25.6 MMHG (ref 25–40)
POTASSIUM SERPL-SCNC: 4 MMOL/L (ref 3.6–5.5)
PROT SERPL-MCNC: 7.6 G/DL (ref 6–8.2)
RBC # BLD AUTO: 4.91 M/UL (ref 4.7–6.1)
SAO2 % BLDV: 46.4 %
SODIUM SERPL-SCNC: 141 MMOL/L (ref 135–145)
TROPONIN T SERPL-MCNC: 12 NG/L (ref 6–19)
WBC # BLD AUTO: 7.4 K/UL (ref 4.8–10.8)

## 2020-12-20 PROCEDURE — 76705 ECHO EXAM OF ABDOMEN: CPT

## 2020-12-20 PROCEDURE — 83605 ASSAY OF LACTIC ACID: CPT

## 2020-12-20 PROCEDURE — 82803 BLOOD GASES ANY COMBINATION: CPT

## 2020-12-20 PROCEDURE — 96375 TX/PRO/DX INJ NEW DRUG ADDON: CPT

## 2020-12-20 PROCEDURE — 700111 HCHG RX REV CODE 636 W/ 250 OVERRIDE (IP): Performed by: EMERGENCY MEDICINE

## 2020-12-20 PROCEDURE — 700105 HCHG RX REV CODE 258: Performed by: EMERGENCY MEDICINE

## 2020-12-20 PROCEDURE — 99285 EMERGENCY DEPT VISIT HI MDM: CPT

## 2020-12-20 PROCEDURE — 85025 COMPLETE CBC W/AUTO DIFF WBC: CPT

## 2020-12-20 PROCEDURE — 80053 COMPREHEN METABOLIC PANEL: CPT

## 2020-12-20 PROCEDURE — 93005 ELECTROCARDIOGRAM TRACING: CPT | Performed by: EMERGENCY MEDICINE

## 2020-12-20 PROCEDURE — 83735 ASSAY OF MAGNESIUM: CPT

## 2020-12-20 PROCEDURE — 84484 ASSAY OF TROPONIN QUANT: CPT

## 2020-12-20 PROCEDURE — 83690 ASSAY OF LIPASE: CPT

## 2020-12-20 RX ORDER — DIPHENHYDRAMINE HYDROCHLORIDE 50 MG/ML
25 INJECTION INTRAMUSCULAR; INTRAVENOUS ONCE
Status: COMPLETED | OUTPATIENT
Start: 2020-12-20 | End: 2020-12-20

## 2020-12-20 RX ORDER — METOCLOPRAMIDE HYDROCHLORIDE 5 MG/ML
10 INJECTION INTRAMUSCULAR; INTRAVENOUS ONCE
Status: COMPLETED | OUTPATIENT
Start: 2020-12-20 | End: 2020-12-20

## 2020-12-20 RX ORDER — SODIUM CHLORIDE, SODIUM LACTATE, POTASSIUM CHLORIDE, CALCIUM CHLORIDE 600; 310; 30; 20 MG/100ML; MG/100ML; MG/100ML; MG/100ML
2000 INJECTION, SOLUTION INTRAVENOUS ONCE
Status: COMPLETED | OUTPATIENT
Start: 2020-12-20 | End: 2020-12-21

## 2020-12-20 RX ADMIN — SODIUM CHLORIDE, POTASSIUM CHLORIDE, SODIUM LACTATE AND CALCIUM CHLORIDE 2000 ML: 600; 310; 30; 20 INJECTION, SOLUTION INTRAVENOUS at 23:03

## 2020-12-20 RX ADMIN — DIPHENHYDRAMINE HYDROCHLORIDE 25 MG: 50 INJECTION INTRAMUSCULAR; INTRAVENOUS at 23:03

## 2020-12-20 RX ADMIN — METOCLOPRAMIDE 10 MG: 5 INJECTION, SOLUTION INTRAMUSCULAR; INTRAVENOUS at 23:03

## 2020-12-20 ASSESSMENT — FIBROSIS 4 INDEX: FIB4 SCORE: 0.41

## 2020-12-21 ENCOUNTER — APPOINTMENT (OUTPATIENT)
Dept: RADIOLOGY | Facility: MEDICAL CENTER | Age: 20
End: 2020-12-21
Attending: EMERGENCY MEDICINE
Payer: COMMERCIAL

## 2020-12-21 VITALS
WEIGHT: 133 LBS | TEMPERATURE: 98.1 F | OXYGEN SATURATION: 99 % | HEIGHT: 66 IN | BODY MASS INDEX: 21.38 KG/M2 | DIASTOLIC BLOOD PRESSURE: 73 MMHG | RESPIRATION RATE: 16 BRPM | HEART RATE: 83 BPM | SYSTOLIC BLOOD PRESSURE: 111 MMHG

## 2020-12-21 PROCEDURE — 700101 HCHG RX REV CODE 250: Performed by: EMERGENCY MEDICINE

## 2020-12-21 PROCEDURE — 700117 HCHG RX CONTRAST REV CODE 255: Performed by: EMERGENCY MEDICINE

## 2020-12-21 PROCEDURE — A9270 NON-COVERED ITEM OR SERVICE: HCPCS | Performed by: EMERGENCY MEDICINE

## 2020-12-21 PROCEDURE — 96365 THER/PROPH/DIAG IV INF INIT: CPT

## 2020-12-21 PROCEDURE — 700105 HCHG RX REV CODE 258: Performed by: EMERGENCY MEDICINE

## 2020-12-21 PROCEDURE — 74177 CT ABD & PELVIS W/CONTRAST: CPT

## 2020-12-21 PROCEDURE — 700102 HCHG RX REV CODE 250 W/ 637 OVERRIDE(OP): Performed by: EMERGENCY MEDICINE

## 2020-12-21 RX ORDER — DICYCLOMINE HCL 20 MG
20 TABLET ORAL ONCE
Status: COMPLETED | OUTPATIENT
Start: 2020-12-21 | End: 2020-12-21

## 2020-12-21 RX ORDER — DICYCLOMINE HCL 20 MG
20 TABLET ORAL EVERY 6 HOURS
Qty: 56 TAB | Refills: 0 | Status: SHIPPED | OUTPATIENT
Start: 2020-12-21 | End: 2021-01-04

## 2020-12-21 RX ADMIN — DICYCLOMINE HYDROCHLORIDE 20 MG: 20 TABLET ORAL at 03:58

## 2020-12-21 RX ADMIN — LIDOCAINE HYDROCHLORIDE 30 ML: 20 SOLUTION OROPHARYNGEAL at 03:59

## 2020-12-21 RX ADMIN — IOHEXOL 60 ML: 350 INJECTION, SOLUTION INTRAVENOUS at 02:05

## 2020-12-21 RX ADMIN — SODIUM CHLORIDE 31.9 MG: 9 INJECTION, SOLUTION INTRAVENOUS at 01:59

## 2020-12-21 NOTE — DISCHARGE INSTRUCTIONS
You were seen and evaluated in the Emergency Department at [facility] for:     Recurrent nausea vomiting and abdominal pain and bloating    You had the following tests and studies:    Thankfully your work-up today is reassuring.  Your ultrasound of your gallbladder is normal, your blood work is very reassuring, your CT scan shows your appendix of the large end of normal.  There are no other inflammatory changes on your scan.    You received the following medications:    Reglan, Benadryl, IV fluids, ketamine, dicyclomine.    You received the following prescriptions:    Dicyclomine, try taking this up to 4 times per day as needed for abdominal pain and bloating.  ----------------------------    Please make sure to follow up with:    Your GI and primary care provider soon as possible for recheck and routine care.  However, you should only be feeling better if you have any worse pain even in the next 6 to 12 hours you need to be seen again in the ER immediately.  Watch for fevers, vomiting, worse pain, or any other new or worse symptoms.    Good luck, we hope you get better soon!  ----------------------------    We always encourage patients to return IMMEDIATELY if they have:  Increased or changing pain, passing out, fevers over 100.4 (taken in your mouth or rectally) for more than 2 days, redness or swelling of skin or tissues, feeling like your heart is beating fast, chest pain that is new or worsening, trouble breathing, feeling like your throat is closing up and can not breath, inability to walk, weakness of any part of your body, new dizziness, severe bleeding that won't stop from any part of your body, if you can't eat or drink, or if you have any other concerns.   If you feel worse, please know that you can always return with any questions, concerns, worse symptoms, or you are feeling unsafe. We certainly cannot say for sure that we have ruled out every illness or dangerous disease, but we feel that at  this specific time, your exam, tests, and vital signs like heart rate and blood pressure are safe for discharge.

## 2020-12-21 NOTE — ED TRIAGE NOTES
"Chief Complaint   Patient presents with   • Abdominal Pain     Pt walked into triage c/o \"gas pressure\", pt states he \"feels like there is so much pressure I will bust\", pt belching during the triage process, denies n/v/d.  FS BS 123mg'/dL just prior to arrival     Pt & staff masked and in appropriate PPE during encounter.  Pt denies fever/travel or being in contact with anyone testing positive for Covid.  Explained pt the triage process, made pt aware to tell the RN/staff of any changes/concerns, pt verbalized understanding of process and instructions given.  Pt to ER lobby.    "

## 2020-12-21 NOTE — ED NOTES
"Patient is resting comfortably. Pt states \"im tired and I dont really feel my stomach anymore\"  "

## 2020-12-21 NOTE — ED PROVIDER NOTES
ED Provider Note    CHIEF COMPLAINT  Chief Complaint   Patient presents with   • Abdominal Pain       \[facility] [place] [facility]\""    Primary care provider: Gregory Grande M.D.  Means of arrival: POV  History obtained from: Patient and Mother  History limited by: Nothing    Martinez Smith is a 20 y.o. male who presents with recurrent abdominal pain and nausea.  The patient has been dealing with this for more than a year.  He is an unfortunate 20-year-old type I diabetic.  Many work-ups in the past, no clear etiology of symptoms.  He has had, H. pylori testing, and nuclear medicine studies.  He is worried that it is his gallbladder.  He has achy nonradiating epigastric lateral pain and nausea.  No vomiting today.  Decreased oral intake today.  No fevers.  No chest pain.  No dyspnea.  No fevers or known Covid contact.  No lower abdominal pain or urinary symptoms.  No groin pain.  No back pain.  No alleviation from home medications no new attempts at alleviating factors noted.  No aggravating factors.  He has been trying an elimination diet for the last several months without any significant relief.  He does have increased stress he just finished finals for finishing his bachelor's degree.  No sick contacts at home or new foods.    REVIEW OF SYSTEMS  Constitutional: Negative for fever or chills.   Respiratory: Negative for cough or shortness of breath.    Cardiovascular: Negative for chest pain or syncope.   Gastrointestinal: Positive for nausea and epigastric abdominal pain no vomiting or diarrhea.  Genitourinary: Negative for dysuria or flank pain.   Musculoskeletal: Negative for back pain or joint pain.   Skin: Negative for itching or rash.   Neurological: Negative for sensory or motor changes.   Psychiatric/Behavioral: Positive for stress and occasional sad mood due to his health, no thoughts of self-harm.  See HPI for further details. All other systems are negative.     PAST MEDICAL HISTORY   has a past medical history of  "Celiac disease and Diabetes type I (HCC).    PAST FAMILY HISTORY  Family History   Problem Relation Age of Onset   • Arthritis Maternal Grandmother        SOCIAL HISTORY  Social History     Tobacco Use   • Smoking status: Never Smoker   • Smokeless tobacco: Never Used   Substance and Sexual Activity   • Alcohol use: No   • Drug use: No   • Sexual activity: Not on file       SURGICAL HISTORY   has a past surgical history that includes hypospadias repair.    CURRENT MEDICATIONS  Pantoprazole, amitriptyline, Phenergan, Humalog, insulin infusion    ALLERGIES  Allergies   Allergen Reactions   • Beef Flavor Nausea     Beef broth causes Nausea and vomiting    • Gluten Meal    • Tree Nuts Food Allergy Anaphylaxis, Shortness of Breath, Itching, Nausea and Cough     Cashews and pistachio       PHYSICAL EXAM  VITAL SIGNS: /73   Pulse 83   Temp 36.7 °C (98.1 °F) (Temporal)   Resp 16   Ht 1.676 m (5' 6\")   Wt 60.3 kg (133 lb)   SpO2 99%   BMI 21.47 kg/m²    Pulse ox interpretation: On room air, I interpret this pulse ox as normal.  Constitutional: Sitting up in the stretcher in moderate distress.  HEENT: Normocephalic, atraumatic. Posterior pharynx clear, mucous membranes very dry.  Eyes:  EOMI. Normal sclerae.  Neck: Supple, nontender.  Chest/Pulmonary: Clear to ausculation bilaterally, no wheezes or rhonchi.  Cardiovascular: Regular rate and rhythm, no murmur.   Abdomen: Soft, mild epigastric tenderness and bloating, belching frequently; no rebound, guarding, or masses.  Back: No CVA or midline tenderness.   Musculoskeletal: No deformity or edema.  Neuro: Clear speech, normal coordination, cranial nerves II-XII grossly intact, no focal asymmetry or sensory deficits.   Psych: Sad, flat affect, but pleasant and cooperative.  Nonsuicidal.  Skin: No rashes, warm and dry.      DIAGNOSTIC STUDIES / PROCEDURES    LABS & EKG  Results for orders placed or performed during the hospital encounter of 12/20/20   CBC WITH " DIFFERENTIAL   Result Value Ref Range    WBC 7.4 4.8 - 10.8 K/uL    RBC 4.91 4.70 - 6.10 M/uL    Hemoglobin 14.8 14.0 - 18.0 g/dL    Hematocrit 44.2 42.0 - 52.0 %    MCV 90.0 81.4 - 97.8 fL    MCH 30.1 27.0 - 33.0 pg    MCHC 33.5 (L) 33.7 - 35.3 g/dL    RDW 39.1 35.9 - 50.0 fL    Platelet Count 301 164 - 446 K/uL    MPV 10.8 9.0 - 12.9 fL    Neutrophils-Polys 52.70 44.00 - 72.00 %    Lymphocytes 34.60 22.00 - 41.00 %    Monocytes 6.10 0.00 - 13.40 %    Eosinophils 5.30 0.00 - 6.90 %    Basophils 0.90 0.00 - 1.80 %    Immature Granulocytes 0.40 0.00 - 0.90 %    Nucleated RBC 0.30 /100 WBC    Neutrophils (Absolute) 3.89 1.82 - 7.42 K/uL    Lymphs (Absolute) 2.56 1.00 - 4.80 K/uL    Monos (Absolute) 0.45 0.00 - 0.85 K/uL    Eos (Absolute) 0.39 0.00 - 0.51 K/uL    Baso (Absolute) 0.07 0.00 - 0.12 K/uL    Immature Granulocytes (abs) 0.03 0.00 - 0.11 K/uL    NRBC (Absolute) 0.02 K/uL   COMP METABOLIC PANEL   Result Value Ref Range    Sodium 141 135 - 145 mmol/L    Potassium 4.0 3.6 - 5.5 mmol/L    Chloride 102 96 - 112 mmol/L    Co2 29 20 - 33 mmol/L    Anion Gap 10.0 7.0 - 16.0    Glucose 171 (H) 65 - 99 mg/dL    Bun 7 (L) 8 - 22 mg/dL    Creatinine 0.65 0.50 - 1.40 mg/dL    Calcium 9.5 8.5 - 10.5 mg/dL    AST(SGOT) 13 12 - 45 U/L    ALT(SGPT) 13 2 - 50 U/L    Alkaline Phosphatase 100 (H) 30 - 99 U/L    Total Bilirubin 0.4 0.1 - 1.5 mg/dL    Albumin 4.8 3.2 - 4.9 g/dL    Total Protein 7.6 6.0 - 8.2 g/dL    Globulin 2.8 1.9 - 3.5 g/dL    A-G Ratio 1.7 g/dL   LIPASE   Result Value Ref Range    Lipase 12 11 - 82 U/L   VENOUS BLOOD GAS   Result Value Ref Range    Venous Bg Ph 7.37 7.31 - 7.45    Venous Bg Pco2 48.9 41.0 - 51.0 mmHg    Venous Bg Po2 25.6 25.0 - 40.0 mmHg    Venous Bg O2 Saturation 46.4 %    Venous Bg Hco3 28 24 - 28 mmol/L    Venous Bg Base Excess 2 mmol/L    Body Temp see below Centigrade   LACTIC ACID   Result Value Ref Range    Lactic Acid 0.7 0.5 - 2.0 mmol/L   MAGNESIUM   Result Value Ref Range     Magnesium 2.2 1.5 - 2.5 mg/dL   TROPONIN   Result Value Ref Range    Troponin T 12 6 - 19 ng/L   ESTIMATED GFR   Result Value Ref Range    GFR If African American >60 >60 mL/min/1.73 m 2    GFR If Non African American >60 >60 mL/min/1.73 m 2   EKG   Result Value Ref Range    Report       [facility] Emergency Dept.    Test Date:  2020  Pt Name:    JUAN JOSE MCINTOSH           Department: ER  MRN:        [id number]                      Room:        09  Gender:     Male                         Technician: [id number]  :        2000                   Requested By:LARS MEREDITH  Order #:    [id number]                    Reading MD: Lars Meredith MD    Measurements  Intervals                                Axis  Rate:       77                           P:          31  NV:         146                          QRS:        81  QRSD:       81                           T:          46  QT:         360  QTc:        408    Interpretive Statements  Sinus rhythm  Compared to ECG 2019 15:49:17  Sinus tachycardia no longer present  No STEMI or strain or dysrhythmia, normal EKG  Electronically Signed On 2020 10:36:27 PST by Lars Meredith MD           RADIOLOGY  CT-ABDOMEN-PELVIS WITH   Final Result         Appendix is suboptimally evaluated, however a portion of the appendix appears borderline dilated. There are no significant surrounding inflammatory changes. This could be normal however cannot exclude early/mild acute appendicitis.      Nonspecific mildly prominent mesenteric lymph nodes are again noted.      No other significant abnormality.               US-RUQ   Final Result      No acute abnormality.          COURSE & MEDICAL DECISION MAKING    This is a 20 y.o. male who presents with recurrent abdominal pain.  Associated with nausea.  Diabetic.    Differential Diagnosis includes but is not limited to:  Gastroparesis, hepatobiliary disease, pancreatitis, gastritis, appendicitis,  obstruction    ED Course:  This is an unfortunate 20-year-old male with recurrent abdominal pain.  Psych mood not suicidal.  Highly suspicious for gastroparesis, but given patient's medical comorbidities plan ultrasound, labs, looks dehydrated and I need to keep him n.p.o. until a surgical process is ruled out so he will be treated with a crystalloid bolus as well as Reglan and Benadryl IV.    Labs very reassuring.  Ultrasound nothing acute.  Patient still symptomatic on recheck, probable chronic abdominal pain however we will obtain a CT scan he has not had one in some time.    CT mostly reassuring possibly minimally dilated appendix but no surrounding inflammatory changes.  No other acute surgical disease identified.  White count normal no vomiting.  No fevers.  Patient feeling better after IV medications.  Tolerating p.o. challenge.  I think he is safe for discharge discussed dilated appendix but no other acute findings on work-up today, patient and mother will return immediately for any new or worsening symptoms recheck in 24 hours, dicyclomine given for symptom relief at home.  High suspicion for stress, gastritis, or gastroparesis.  Obviously these are all diagnoses of exclusion so patient given nonspecific abdominal pain/early possible appendicitis return precautions, and I trust the mom will return immediately for any new or worse symptoms specifically worse pain, vomiting, or fevers.  Follow-up with GI and PCP as soon as possible.    Medications   lactated ringers infusion (BOLUS) (0 mL Intravenous Stopped 12/21/20 0045)   metoclopramide (REGLAN) injection 10 mg (10 mg Intravenous Given 12/20/20 2303)   diphenhydrAMINE (BENADRYL) injection 25 mg (25 mg Intravenous Given 12/20/20 2303)   ketamine (KETALAR) 31.9 mg in NS 50 mL IVPB (Depression/Suicidality) infusion (0 mg/kg × 63.8 kg (Ideal) Intravenous Stopped 12/21/20 0256)   iohexol (OMNIPAQUE) 350 mg/mL (60 mL Intravenous Given 12/21/20 0205)    hyoscyamine-maalox plus-lidocaine viscous (GI COCKTAIL) oral susp 30 mL (30 mL Oral Given 12/21/20 0359)   dicyclomine (BENTYL) tablet 20 mg (20 mg Oral Given 12/21/20 0358)       FINAL IMPRESSION  1. Acute abdominal pain    2. Bloating    3. Nausea    4. Type 1 diabetes mellitus without complication (HCC)    5. Celiac disease    6. Anxiousness    7. Chronic abdominal pain        PRESCRIPTIONS  Discharge Medication List as of 12/21/2020  3:55 AM      START taking these medications    Details   dicyclomine (BENTYL) 20 MG Tab Take 1 Tab by mouth every 6 hours for 14 days., Disp-56 Tab, R-0, Normal             FOLLOW UP  Gregory Grande M.D.  [address] [address]  [phone]    Schedule an appointment as soon as possible for a visit in 2 days      [facility], Emergency Dept  [address] [facility] [address] [phone]  Today  If you have ANY new or worse symptoms!        -DISCHARGE-       Results, exam findings, clinical impression, presumed diagnosis, treatment options, and strict return precautions were discussed with the patient and family, and they verbalized understanding, agreed with, and appreciated the plan of care.    Pertinent Labs & Imaging studies reviewed and verified by myself, as well as nursing notes and the patient's past medical, family, and social histories (See chart for details).    The patient is referred to his primary physician for blood pressure management, diabetic screening, and for all other preventative health concerns.     Portions of this record were made with voice recognition software.  Despite my review, spelling/grammar/context errors may still remain.  Interpretation of this chart should be taken in this context.    Electronically signed by Lars Celestin M.D. on 12/22/2020 at 10:40 AM.

## 2020-12-22 LAB — EKG IMPRESSION: NORMAL

## 2020-12-23 ENCOUNTER — HOSPITAL ENCOUNTER (EMERGENCY)
Facility: MEDICAL CENTER | Age: 20
End: 2020-12-24
Attending: EMERGENCY MEDICINE
Payer: COMMERCIAL

## 2020-12-23 DIAGNOSIS — R10.31 RLQ ABDOMINAL PAIN: ICD-10-CM

## 2020-12-23 LAB
ALBUMIN SERPL BCP-MCNC: 5 G/DL (ref 3.2–4.9)
ALBUMIN/GLOB SERPL: 1.7 G/DL
ALP SERPL-CCNC: 100 U/L (ref 30–99)
ALT SERPL-CCNC: 12 U/L (ref 2–50)
ANION GAP SERPL CALC-SCNC: 8 MMOL/L (ref 7–16)
AST SERPL-CCNC: 19 U/L (ref 12–45)
BASOPHILS # BLD AUTO: 1.1 % (ref 0–1.8)
BASOPHILS # BLD: 0.08 K/UL (ref 0–0.12)
BILIRUB SERPL-MCNC: 0.4 MG/DL (ref 0.1–1.5)
BUN SERPL-MCNC: 6 MG/DL (ref 8–22)
CALCIUM SERPL-MCNC: 9.8 MG/DL (ref 8.5–10.5)
CHLORIDE SERPL-SCNC: 100 MMOL/L (ref 96–112)
CO2 SERPL-SCNC: 31 MMOL/L (ref 20–33)
CREAT SERPL-MCNC: 0.67 MG/DL (ref 0.5–1.4)
EOSINOPHIL # BLD AUTO: 0.43 K/UL (ref 0–0.51)
EOSINOPHIL NFR BLD: 5.9 % (ref 0–6.9)
ERYTHROCYTE [DISTWIDTH] IN BLOOD BY AUTOMATED COUNT: 38.6 FL (ref 35.9–50)
GLOBULIN SER CALC-MCNC: 2.9 G/DL (ref 1.9–3.5)
GLUCOSE SERPL-MCNC: 120 MG/DL (ref 65–99)
HCT VFR BLD AUTO: 43 % (ref 42–52)
HGB BLD-MCNC: 14.6 G/DL (ref 14–18)
IMM GRANULOCYTES # BLD AUTO: 0.01 K/UL (ref 0–0.11)
IMM GRANULOCYTES NFR BLD AUTO: 0.1 % (ref 0–0.9)
LIPASE SERPL-CCNC: 12 U/L (ref 11–82)
LYMPHOCYTES # BLD AUTO: 2.53 K/UL (ref 1–4.8)
LYMPHOCYTES NFR BLD: 34.4 % (ref 22–41)
MCH RBC QN AUTO: 30.7 PG (ref 27–33)
MCHC RBC AUTO-ENTMCNC: 34 G/DL (ref 33.7–35.3)
MCV RBC AUTO: 90.3 FL (ref 81.4–97.8)
MONOCYTES # BLD AUTO: 0.34 K/UL (ref 0–0.85)
MONOCYTES NFR BLD AUTO: 4.6 % (ref 0–13.4)
NEUTROPHILS # BLD AUTO: 3.96 K/UL (ref 1.82–7.42)
NEUTROPHILS NFR BLD: 53.9 % (ref 44–72)
NRBC # BLD AUTO: 0 K/UL
NRBC BLD-RTO: 0 /100 WBC
PLATELET # BLD AUTO: 296 K/UL (ref 164–446)
PMV BLD AUTO: 11.1 FL (ref 9–12.9)
POTASSIUM SERPL-SCNC: 3.8 MMOL/L (ref 3.6–5.5)
PROT SERPL-MCNC: 7.9 G/DL (ref 6–8.2)
RBC # BLD AUTO: 4.76 M/UL (ref 4.7–6.1)
SODIUM SERPL-SCNC: 139 MMOL/L (ref 135–145)
WBC # BLD AUTO: 7.4 K/UL (ref 4.8–10.8)

## 2020-12-23 PROCEDURE — 99284 EMERGENCY DEPT VISIT MOD MDM: CPT

## 2020-12-23 PROCEDURE — 80053 COMPREHEN METABOLIC PANEL: CPT

## 2020-12-23 PROCEDURE — 36415 COLL VENOUS BLD VENIPUNCTURE: CPT

## 2020-12-23 PROCEDURE — 83690 ASSAY OF LIPASE: CPT

## 2020-12-23 PROCEDURE — 85025 COMPLETE CBC W/AUTO DIFF WBC: CPT

## 2020-12-23 ASSESSMENT — FIBROSIS 4 INDEX: FIB4 SCORE: 0.26

## 2020-12-24 ENCOUNTER — APPOINTMENT (OUTPATIENT)
Dept: RADIOLOGY | Facility: MEDICAL CENTER | Age: 20
End: 2020-12-24
Attending: EMERGENCY MEDICINE
Payer: COMMERCIAL

## 2020-12-24 VITALS
HEIGHT: 66 IN | OXYGEN SATURATION: 96 % | DIASTOLIC BLOOD PRESSURE: 77 MMHG | SYSTOLIC BLOOD PRESSURE: 105 MMHG | HEART RATE: 75 BPM | TEMPERATURE: 98 F | RESPIRATION RATE: 14 BRPM | WEIGHT: 132.28 LBS | BODY MASS INDEX: 21.26 KG/M2

## 2020-12-24 LAB
APPEARANCE UR: CLEAR
BILIRUB UR QL STRIP.AUTO: NEGATIVE
COLOR UR: YELLOW
GLUCOSE UR STRIP.AUTO-MCNC: NEGATIVE MG/DL
KETONES UR STRIP.AUTO-MCNC: NEGATIVE MG/DL
LEUKOCYTE ESTERASE UR QL STRIP.AUTO: NEGATIVE
MICRO URNS: NORMAL
NITRITE UR QL STRIP.AUTO: NEGATIVE
PH UR STRIP.AUTO: 7 [PH] (ref 5–8)
PROT UR QL STRIP: NEGATIVE MG/DL
RBC UR QL AUTO: NEGATIVE
SP GR UR STRIP.AUTO: 1.01
UROBILINOGEN UR STRIP.AUTO-MCNC: 1 MG/DL

## 2020-12-24 PROCEDURE — 99284 EMERGENCY DEPT VISIT MOD MDM: CPT

## 2020-12-24 PROCEDURE — 81003 URINALYSIS AUTO W/O SCOPE: CPT

## 2020-12-24 PROCEDURE — 74177 CT ABD & PELVIS W/CONTRAST: CPT

## 2020-12-24 PROCEDURE — 96375 TX/PRO/DX INJ NEW DRUG ADDON: CPT

## 2020-12-24 PROCEDURE — 700117 HCHG RX CONTRAST REV CODE 255: Performed by: EMERGENCY MEDICINE

## 2020-12-24 PROCEDURE — 700111 HCHG RX REV CODE 636 W/ 250 OVERRIDE (IP): Performed by: EMERGENCY MEDICINE

## 2020-12-24 PROCEDURE — 96374 THER/PROPH/DIAG INJ IV PUSH: CPT

## 2020-12-24 RX ORDER — ONDANSETRON 2 MG/ML
4 INJECTION INTRAMUSCULAR; INTRAVENOUS ONCE
Status: COMPLETED | OUTPATIENT
Start: 2020-12-24 | End: 2020-12-24

## 2020-12-24 RX ORDER — ONDANSETRON 4 MG/1
4 TABLET, ORALLY DISINTEGRATING ORAL EVERY 6 HOURS PRN
Qty: 5 TAB | Refills: 0 | Status: SHIPPED | OUTPATIENT
Start: 2020-12-24 | End: 2022-05-01

## 2020-12-24 RX ORDER — MORPHINE SULFATE 4 MG/ML
4 INJECTION, SOLUTION INTRAMUSCULAR; INTRAVENOUS ONCE
Status: COMPLETED | OUTPATIENT
Start: 2020-12-24 | End: 2020-12-24

## 2020-12-24 RX ADMIN — ONDANSETRON 4 MG: 2 INJECTION INTRAMUSCULAR; INTRAVENOUS at 01:19

## 2020-12-24 RX ADMIN — IOHEXOL 80 ML: 350 INJECTION, SOLUTION INTRAVENOUS at 00:58

## 2020-12-24 RX ADMIN — MORPHINE SULFATE 4 MG: 4 INJECTION INTRAVENOUS at 01:19

## 2020-12-24 ASSESSMENT — LIFESTYLE VARIABLES
EVER HAD A DRINK FIRST THING IN THE MORNING TO STEADY YOUR NERVES TO GET RID OF A HANGOVER: NO
HAVE YOU EVER FELT YOU SHOULD CUT DOWN ON YOUR DRINKING: NO
TOTAL SCORE: 0
TOTAL SCORE: 0
CONSUMPTION TOTAL: INCOMPLETE
HAVE PEOPLE ANNOYED YOU BY CRITICIZING YOUR DRINKING: NO
TOTAL SCORE: 0
EVER FELT BAD OR GUILTY ABOUT YOUR DRINKING: NO
DO YOU DRINK ALCOHOL: YES

## 2020-12-24 NOTE — ED PROVIDER NOTES
ED Provider Note    CHIEF COMPLAINT  Chief Complaint   Patient presents with   • Abdominal Pain     Discharge 12/21/20 for same complaints, states improved for one day but returned, described as stabbing, right lower quadrant, phenergan improves pain, nothing makes pain worse   • Nausea     Occurs with pain        HPI    Primary care provider: Gregory Grande M.D.   History obtained from: Patient and mother  History limited by: None     Martinez Smith is a 20 y.o. male who presents to the ED with mother complaining of worsening right lower quadrant abdominal pain.  He was seen in this ED on December 21 for the same complaint.  He reports that the pain became severe sharp and felt like a dagger around 6 or 7 tonight associated with nausea which prompted him to come to the ED.  He denies fever but has had some chills earlier today.  He denies vomiting with his nausea.  He denies any change with urination or hematuria.  He reports having mild diarrhea earlier today without gross blood noted.  No prior abdominal surgeries according to mother.  He denies recent travels or known ill contacts.  No recent cough/sore throat/change in taste or smell/rash.  He has not noticed anything that makes his symptoms better.  He reports that movement was making his abdominal pain worse.    REVIEW OF SYSTEMS  Please see HPI for pertinent positives/negatives.  All other systems reviewed and are negative.     PAST MEDICAL HISTORY  Past Medical History:   Diagnosis Date   • Celiac disease    • Diabetes type I (HCC)     insulin pump        SURGICAL HISTORY  Past Surgical History:   Procedure Laterality Date   • HYPOSPADIAS REPAIR          SOCIAL HISTORY  Social History     Tobacco Use   • Smoking status: Never Smoker   • Smokeless tobacco: Never Used   Substance and Sexual Activity   • Alcohol use: No   • Drug use: No   • Sexual activity: Not on file        FAMILY HISTORY  Family History   Problem Relation Age of Onset   •  "Arthritis Maternal Grandmother         CURRENT MEDICATIONS  Home Medications     Reviewed by Satish Rosenberg R.N. (Registered Nurse) on 12/23/20 at 2252  Med List Status: Partial   Medication Last Dose Status   amitriptyline (ELAVIL) 25 MG Tab  Active   Blood Glucose Test Strips  Active   CONTOUR NEXT TEST strip  Active   dicyclomine (BENTYL) 20 MG Tab  Active   fexofenadine (ALLEGRA) 60 MG Tab  Active   guaiFENesin (MUCINEX PO)  Active   HUMALOG 100 UNIT/ML  Active   insulin infusion pump Device  Active   Multiple Vitamins-Minerals (MULTIVITAMIN ADULTS PO)  Active   pantoprazole (PROTONIX) 20 MG tablet  Active   Probiotic Product (ALIGN) 4 MG Cap  Active   promethazine (PHENERGAN) 25 MG Suppos  Active   vitamin D (CHOLECALCIFEROL) 1000 UNIT Tab  Active                 ALLERGIES  Allergies   Allergen Reactions   • Beef Flavor Nausea     Beef broth causes Nausea and vomiting    • Gluten Meal    • Tree Nuts Food Allergy Anaphylaxis, Shortness of Breath, Itching, Nausea and Cough     Cashews and pistachio        PHYSICAL EXAM  VITAL SIGNS: /77   Pulse 75   Temp 36.7 °C (98 °F) (Temporal)   Resp 14   Ht 1.676 m (5' 6\")   Wt 60 kg (132 lb 4.4 oz)   SpO2 96%   BMI 21.35 kg/m²  @COSMO[[id number]::@     Pulse ox interpretation: 97% I interpret this pulse ox as normal     Constitutional: Well developed, well nourished, alert in mild discomfort, nontoxic appearance    HENT: No external signs of trauma, normocephalic, mask on due to COVID-19 pandemic  Eyes: PERRL, conjunctiva without erythema, no discharge, no icterus    Neck: Soft and supple, trachea midline, no stridor, no tenderness, no LAD, no JVD, good ROM    Cardiovascular: Regular rate and rhythm, no murmurs/rubs/gallops, strong distal pulses and good perfusion    Thorax & Lungs: No respiratory distress, CTAB   Abdomen: Soft, right lower quadrant tenderness to palpation with guarding, nondistended, no rebound, normal BS    : NEMG, circumcised, testis descended " bilaterally and nontender, no hernia/rash/lesions/discharge/LAD    Back: No CVAT    Extremities: No cyanosis, no edema, no gross deformity, good ROM, no tenderness, intact distal pulses with brisk cap refill    Skin: Warm, dry, no pallor/cyanosis, no rash noted    Lymphatic: No lymphadenopathy noted    Neuro: A/O times 3, no focal deficits noted    Psychiatric: Cooperative, normal mood and affect, normal judgement, appropriate for clinical situation        DIAGNOSTIC STUDIES / PROCEDURES        LABS  All labs reviewed by me.     Results for orders placed or performed during the hospital encounter of 12/23/20   CBC WITH DIFFERENTIAL   Result Value Ref Range    WBC 7.4 4.8 - 10.8 K/uL    RBC 4.76 4.70 - 6.10 M/uL    Hemoglobin 14.6 14.0 - 18.0 g/dL    Hematocrit 43.0 42.0 - 52.0 %    MCV 90.3 81.4 - 97.8 fL    MCH 30.7 27.0 - 33.0 pg    MCHC 34.0 33.7 - 35.3 g/dL    RDW 38.6 35.9 - 50.0 fL    Platelet Count 296 164 - 446 K/uL    MPV 11.1 9.0 - 12.9 fL    Neutrophils-Polys 53.90 44.00 - 72.00 %    Lymphocytes 34.40 22.00 - 41.00 %    Monocytes 4.60 0.00 - 13.40 %    Eosinophils 5.90 0.00 - 6.90 %    Basophils 1.10 0.00 - 1.80 %    Immature Granulocytes 0.10 0.00 - 0.90 %    Nucleated RBC 0.00 /100 WBC    Neutrophils (Absolute) 3.96 1.82 - 7.42 K/uL    Lymphs (Absolute) 2.53 1.00 - 4.80 K/uL    Monos (Absolute) 0.34 0.00 - 0.85 K/uL    Eos (Absolute) 0.43 0.00 - 0.51 K/uL    Baso (Absolute) 0.08 0.00 - 0.12 K/uL    Immature Granulocytes (abs) 0.01 0.00 - 0.11 K/uL    NRBC (Absolute) 0.00 K/uL   COMP METABOLIC PANEL   Result Value Ref Range    Sodium 139 135 - 145 mmol/L    Potassium 3.8 3.6 - 5.5 mmol/L    Chloride 100 96 - 112 mmol/L    Co2 31 20 - 33 mmol/L    Anion Gap 8.0 7.0 - 16.0    Glucose 120 (H) 65 - 99 mg/dL    Bun 6 (L) 8 - 22 mg/dL    Creatinine 0.67 0.50 - 1.40 mg/dL    Calcium 9.8 8.5 - 10.5 mg/dL    AST(SGOT) 19 12 - 45 U/L    ALT(SGPT) 12 2 - 50 U/L    Alkaline Phosphatase 100 (H) 30 - 99 U/L    Total  Bilirubin 0.4 0.1 - 1.5 mg/dL    Albumin 5.0 (H) 3.2 - 4.9 g/dL    Total Protein 7.9 6.0 - 8.2 g/dL    Globulin 2.9 1.9 - 3.5 g/dL    A-G Ratio 1.7 g/dL   LIPASE   Result Value Ref Range    Lipase 12 11 - 82 U/L   URINALYSIS    Specimen: Urine, Clean Catch   Result Value Ref Range    Color Yellow     Character Clear     Specific Gravity 1.011 <1.035    Ph 7.0 5.0 - 8.0    Glucose Negative Negative mg/dL    Ketones Negative Negative mg/dL    Protein Negative Negative mg/dL    Bilirubin Negative Negative    Urobilinogen, Urine 1.0 Negative    Nitrite Negative Negative    Leukocyte Esterase Negative Negative    Occult Blood Negative Negative    Micro Urine Req see below    ESTIMATED GFR   Result Value Ref Range    GFR If African American >60 >60 mL/min/1.73 m 2    GFR If Non African American >60 >60 mL/min/1.73 m 2        RADIOLOGY  The radiologist's interpretation of all radiological studies have been reviewed by me.     CT-ABDOMEN-PELVIS WITH   Final Result      No acute abnormality in the abdomen or pelvis. Normal appendix.             COURSE & MEDICAL DECISION MAKING  Nursing notes, VS, PMSFHx reviewed in chart.     Review of past medical records shows the patient was last seen in this ED December 20, 2020 for his abdominal pain and discharged on December 21, 2020.  Labs were fairly unremarkable and CT abdomen/pelvis with the following findings:    Appendix is suboptimally evaluated, however a portion of the appendix appears borderline dilated. There are no significant surrounding inflammatory changes. This could be normal however cannot exclude early/mild acute appendicitis.     Nonspecific mildly prominent mesenteric lymph nodes are again noted.     No other significant abnormality.     Right upper quadrant abdominal ultrasound was also performed without evidence for acute abnormality.      Differential diagnoses considered include but are not limited to: Appendicitis, bowel perforation/obstruction, ileus,  colitis, muscle strain, hernia, testicular torsion, porphyria       History and physical exam as above.  Due to the patient's severe right lower quadrant abdominal pain and recent CT findings, repeat CT was performed to evaluate for appendicitis with findings as above.  Labs were again fairly unremarkable.  Patient was treated with Zofran and morphine and subsequently tolerated oral fluids without difficulty.  I discussed the findings with the patient and mother.  Patient noted to be resting comfortably now in no acute distress and nontoxic in appearance.  No signs of acute abdomen to suggest a surgical emergency.  Low clinical suspicion at this time for acute serious pathology given history/exam/findings.  Mother reports that patient has follow-up with GI next Tuesday.  They were given return to ED precautions.  Patient will be prescribed Zofran to use as needed.  They verbalized understanding and agreed with plan of care with no further questions or concerns.      The patient is referred to a primary physician for blood pressure management, diabetic screening, and for all other preventative health concerns.       FINAL IMPRESSION  1. RLQ abdominal pain Acute          DISPOSITION  Patient will be discharged home in stable condition.       FOLLOW UP  Gregory Grande M.D.  [address] [address]  [phone]    Schedule an appointment as soon as possible for a visit       Please keep your appointment with your GI next Tuesday          [facility], Emergency Dept  [address] [facility] [address] [phone]    If symptoms worsen         OUTPATIENT MEDICATIONS  Discharge Medication List as of 12/24/2020  2:03 AM      START taking these medications    Details   ondansetron (ZOFRAN ODT) 4 MG TABLET DISPERSIBLE Take 1 Tab by mouth every 6 hours as needed., Disp-5 Tab, R-0, Print Rx Paper                Electronically signed by: Hamzah Crandall D.O., 12/24/2020 12:08  AM      Portions of this record were made with voice recognition software.  Despite my review, spelling/grammar/context errors may still remain.  Interpretation of this chart should be taken in this context.

## 2020-12-24 NOTE — ED NOTES
Discharge education provided. Discharge paperwork signed and given to pt. Prescription x1 given to pt. All questions answered. All belongings with pt. Pt ambulated to lobby with parent.

## 2020-12-24 NOTE — ED TRIAGE NOTES
Chief Complaint   Patient presents with   • Abdominal Pain     Discharge 12/21/20 for same complaints, states improved for one day but returned, described as stabbing, right lower quadrant, phenergan improves pain, nothing makes pain worse   • Nausea     Occurs with pain

## 2020-12-31 ENCOUNTER — HOSPITAL ENCOUNTER (EMERGENCY)
Facility: MEDICAL CENTER | Age: 20
End: 2020-12-31
Attending: EMERGENCY MEDICINE
Payer: COMMERCIAL

## 2020-12-31 VITALS
SYSTOLIC BLOOD PRESSURE: 145 MMHG | TEMPERATURE: 97.5 F | WEIGHT: 129.19 LBS | DIASTOLIC BLOOD PRESSURE: 84 MMHG | BODY MASS INDEX: 20.76 KG/M2 | HEART RATE: 107 BPM | OXYGEN SATURATION: 98 % | RESPIRATION RATE: 14 BRPM | HEIGHT: 66 IN

## 2020-12-31 DIAGNOSIS — R10.84 GENERALIZED ABDOMINAL PAIN: ICD-10-CM

## 2020-12-31 DIAGNOSIS — R11.2 NAUSEA AND VOMITING, INTRACTABILITY OF VOMITING NOT SPECIFIED, UNSPECIFIED VOMITING TYPE: ICD-10-CM

## 2020-12-31 LAB
ALBUMIN SERPL BCP-MCNC: 5.1 G/DL (ref 3.2–4.9)
ALBUMIN/GLOB SERPL: 1.6 G/DL
ALP SERPL-CCNC: 104 U/L (ref 30–99)
ALT SERPL-CCNC: 12 U/L (ref 2–50)
ANION GAP SERPL CALC-SCNC: 8 MMOL/L (ref 7–16)
AST SERPL-CCNC: 17 U/L (ref 12–45)
BASOPHILS # BLD AUTO: 1.2 % (ref 0–1.8)
BASOPHILS # BLD: 0.08 K/UL (ref 0–0.12)
BILIRUB SERPL-MCNC: 0.4 MG/DL (ref 0.1–1.5)
BUN SERPL-MCNC: 8 MG/DL (ref 8–22)
CALCIUM SERPL-MCNC: 9.9 MG/DL (ref 8.5–10.5)
CHLORIDE SERPL-SCNC: 99 MMOL/L (ref 96–112)
CO2 SERPL-SCNC: 31 MMOL/L (ref 20–33)
CREAT SERPL-MCNC: 0.86 MG/DL (ref 0.5–1.4)
EOSINOPHIL # BLD AUTO: 0.41 K/UL (ref 0–0.51)
EOSINOPHIL NFR BLD: 5.9 % (ref 0–6.9)
ERYTHROCYTE [DISTWIDTH] IN BLOOD BY AUTOMATED COUNT: 37.9 FL (ref 35.9–50)
GLOBULIN SER CALC-MCNC: 3.2 G/DL (ref 1.9–3.5)
GLUCOSE SERPL-MCNC: 164 MG/DL (ref 65–99)
HCT VFR BLD AUTO: 47.4 % (ref 42–52)
HGB BLD-MCNC: 15.7 G/DL (ref 14–18)
IMM GRANULOCYTES # BLD AUTO: 0.02 K/UL (ref 0–0.11)
IMM GRANULOCYTES NFR BLD AUTO: 0.3 % (ref 0–0.9)
LIPASE SERPL-CCNC: 16 U/L (ref 11–82)
LYMPHOCYTES # BLD AUTO: 1.83 K/UL (ref 1–4.8)
LYMPHOCYTES NFR BLD: 26.5 % (ref 22–41)
MCH RBC QN AUTO: 29.6 PG (ref 27–33)
MCHC RBC AUTO-ENTMCNC: 33.1 G/DL (ref 33.7–35.3)
MCV RBC AUTO: 89.4 FL (ref 81.4–97.8)
MONOCYTES # BLD AUTO: 0.41 K/UL (ref 0–0.85)
MONOCYTES NFR BLD AUTO: 5.9 % (ref 0–13.4)
NEUTROPHILS # BLD AUTO: 4.16 K/UL (ref 1.82–7.42)
NEUTROPHILS NFR BLD: 60.2 % (ref 44–72)
NRBC # BLD AUTO: 0 K/UL
NRBC BLD-RTO: 0 /100 WBC
PLATELET # BLD AUTO: 311 K/UL (ref 164–446)
PMV BLD AUTO: 11.4 FL (ref 9–12.9)
POTASSIUM SERPL-SCNC: 3.9 MMOL/L (ref 3.6–5.5)
PROT SERPL-MCNC: 8.3 G/DL (ref 6–8.2)
RBC # BLD AUTO: 5.3 M/UL (ref 4.7–6.1)
SODIUM SERPL-SCNC: 138 MMOL/L (ref 135–145)
WBC # BLD AUTO: 6.9 K/UL (ref 4.8–10.8)

## 2020-12-31 PROCEDURE — 83690 ASSAY OF LIPASE: CPT

## 2020-12-31 PROCEDURE — 700105 HCHG RX REV CODE 258: Performed by: EMERGENCY MEDICINE

## 2020-12-31 PROCEDURE — 96374 THER/PROPH/DIAG INJ IV PUSH: CPT

## 2020-12-31 PROCEDURE — 80053 COMPREHEN METABOLIC PANEL: CPT

## 2020-12-31 PROCEDURE — 96375 TX/PRO/DX INJ NEW DRUG ADDON: CPT

## 2020-12-31 PROCEDURE — 700111 HCHG RX REV CODE 636 W/ 250 OVERRIDE (IP): Performed by: EMERGENCY MEDICINE

## 2020-12-31 PROCEDURE — 85025 COMPLETE CBC W/AUTO DIFF WBC: CPT

## 2020-12-31 PROCEDURE — 99284 EMERGENCY DEPT VISIT MOD MDM: CPT

## 2020-12-31 RX ORDER — DIPHENHYDRAMINE HYDROCHLORIDE 50 MG/ML
25 INJECTION INTRAMUSCULAR; INTRAVENOUS ONCE
Status: COMPLETED | OUTPATIENT
Start: 2020-12-31 | End: 2020-12-31

## 2020-12-31 RX ORDER — HYDROXYZINE HYDROCHLORIDE 25 MG/1
25 TABLET, FILM COATED ORAL 3 TIMES DAILY PRN
Qty: 30 TAB | Refills: 0 | Status: SHIPPED | OUTPATIENT
Start: 2020-12-31 | End: 2022-05-01

## 2020-12-31 RX ORDER — SODIUM CHLORIDE, SODIUM LACTATE, POTASSIUM CHLORIDE, CALCIUM CHLORIDE 600; 310; 30; 20 MG/100ML; MG/100ML; MG/100ML; MG/100ML
1000 INJECTION, SOLUTION INTRAVENOUS ONCE
Status: COMPLETED | OUTPATIENT
Start: 2020-12-31 | End: 2020-12-31

## 2020-12-31 RX ORDER — ONDANSETRON 2 MG/ML
4 INJECTION INTRAMUSCULAR; INTRAVENOUS ONCE
Status: COMPLETED | OUTPATIENT
Start: 2020-12-31 | End: 2020-12-31

## 2020-12-31 RX ORDER — PROCHLORPERAZINE EDISYLATE 5 MG/ML
10 INJECTION INTRAMUSCULAR; INTRAVENOUS ONCE
Status: COMPLETED | OUTPATIENT
Start: 2020-12-31 | End: 2020-12-31

## 2020-12-31 RX ADMIN — DIPHENHYDRAMINE HYDROCHLORIDE 25 MG: 50 INJECTION INTRAMUSCULAR; INTRAVENOUS at 21:42

## 2020-12-31 RX ADMIN — SODIUM CHLORIDE, POTASSIUM CHLORIDE, SODIUM LACTATE AND CALCIUM CHLORIDE 1000 ML: 600; 310; 30; 20 INJECTION, SOLUTION INTRAVENOUS at 21:22

## 2020-12-31 RX ADMIN — PROCHLORPERAZINE EDISYLATE 10 MG: 5 INJECTION INTRAMUSCULAR; INTRAVENOUS at 21:22

## 2020-12-31 RX ADMIN — ONDANSETRON 4 MG: 2 INJECTION INTRAMUSCULAR; INTRAVENOUS at 21:57

## 2020-12-31 ASSESSMENT — FIBROSIS 4 INDEX
FIB4 SCORE: 0.41
FIB4 SCORE: 0.41

## 2021-01-01 NOTE — ED PROVIDER NOTES
ED Provider Note    CHIEF COMPLAINT  Chief Complaint   Patient presents with   • N/V     VOMITING X3-4 HOURS. DIFFUSE ABD PAIN STARTING PRIOR TO VOMITING. TOOK PHENERGAN AT HOME WITHOUT RELIEF.        HPI  Martinez Smith is a 20 y.o. male who presents for evaluation of vomiting.  Over the past year the patient has been extensively worked up for his abdominal pain and vomiting.  He is a type I diabetic.  He has had gastric emptying studies.  He has had endoscopy.  Had HIDA scans.  He has no history of previous abdominal surgery.  In the past week here in the emergency department he has had 2 CT scans.  He has Zofran at home which has not been effective.  He is now had persistent vomiting over the past 3 to 4 hours.  He is also tried Phenergan.  At the time of my evaluation he states his abdominal pain is actually minimal.  He does have a history of celiac disease however he states that he follows a strict gluten-free diet.  He does not smoke marijuana, he does not drink alcohol.  He does not use any street drugs.    REVIEW OF SYSTEMS  See HPI for further details. All other systems negative.    PAST MEDICAL HISTORY  Past Medical History:   Diagnosis Date   • Celiac disease    • Diabetes type I (HCC)     insulin pump       FAMILY HISTORY  Family History   Problem Relation Age of Onset   • Arthritis Maternal Grandmother        SOCIAL HISTORY  Social History     Socioeconomic History   • Marital status: Single     Spouse name: Not on file   • Number of children: Not on file   • Years of education: Not on file   • Highest education level: Not on file   Occupational History   • Not on file   Social Needs   • Financial resource strain: Not on file   • Food insecurity     Worry: Not on file     Inability: Not on file   • Transportation needs     Medical: Not on file     Non-medical: Not on file   Tobacco Use   • Smoking status: Never Smoker   • Smokeless tobacco: Never Used   Substance and Sexual Activity   •  Alcohol use: No   • Drug use: No   • Sexual activity: Not on file   Lifestyle   • Physical activity     Days per week: Not on file     Minutes per session: Not on file   • Stress: Not on file   Relationships   • Social connections     Talks on phone: Not on file     Gets together: Not on file     Attends Scientologist service: Not on file     Active member of club or organization: Not on file     Attends meetings of clubs or organizations: Not on file     Relationship status: Not on file   • Intimate partner violence     Fear of current or ex partner: Not on file     Emotionally abused: Not on file     Physically abused: Not on file     Forced sexual activity: Not on file   Other Topics Concern   • Behavioral problems Not Asked   • Interpersonal relationships Not Asked   • Sad or not enjoying activities Not Asked   • Suicidal thoughts Not Asked   • Poor school performance Not Asked   • Reading difficulties Not Asked   • Speech difficulties Not Asked   • Writing difficulties Not Asked   • Inadequate sleep Not Asked   • Excessive TV viewing Not Asked   • Excessive video game use Not Asked   • Inadequate exercise Not Asked   • Sports related Not Asked   • Poor diet Not Asked   • Family concerns for drug/alcohol abuse Not Asked   • Poor oral hygiene Not Asked   • Bike safety Not Asked   • Family concerns vehicle safety Not Asked   Social History Narrative   • Not on file       SURGICAL HISTORY  Past Surgical History:   Procedure Laterality Date   • HYPOSPADIAS REPAIR         CURRENT MEDICATIONS  Home Medications     Reviewed by Mary العلي R.N. (Registered Nurse) on 12/31/20 at 2013  Med List Status: <None>   Medication Last Dose Status   amitriptyline (ELAVIL) 25 MG Tab  Active   Blood Glucose Test Strips  Active   CONTOUR NEXT TEST strip  Active   dicyclomine (BENTYL) 20 MG Tab  Active   fexofenadine (ALLEGRA) 60 MG Tab  Active   guaiFENesin (MUCINEX PO)  Active   HUMALOG 100 UNIT/ML  Active   insulin infusion  "pump Device  Active   Multiple Vitamins-Minerals (MULTIVITAMIN ADULTS PO)  Active   ondansetron (ZOFRAN ODT) 4 MG TABLET DISPERSIBLE  Active   pantoprazole (PROTONIX) 20 MG tablet  Active   Probiotic Product (ALIGN) 4 MG Cap  Active   promethazine (PHENERGAN) 25 MG Suppos  Active   vitamin D (CHOLECALCIFEROL) 1000 UNIT Tab  Active                ALLERGIES  Allergies   Allergen Reactions   • Beef Flavor Nausea     Beef broth causes Nausea and vomiting    • Gluten Meal    • Tree Nuts Food Allergy Anaphylaxis, Shortness of Breath, Itching, Nausea and Cough     Cashews and pistachio       PHYSICAL EXAM  VITAL SIGNS: /90   Pulse 100   Temp 36.4 °C (97.5 °F)   Resp 14   Ht 1.676 m (5' 6\")   Wt 58.6 kg (129 lb 3 oz)   SpO2 97%   BMI 20.85 kg/m²   Constitutional: Well developed, thin, Non-toxic appearance.   HENT: Normocephalic, Atraumatic.  Eyes:  EOMI, Conjunctiva normal, No discharge.   Cardiovascular: Tachycardic.   Thorax & Lungs: No respiratory distress.  Abdomen: Soft, mild diffuse tenderness without guarding or rebound.  No masses.  Skin: Warm, Dry.  Musculoskeletal: Good range of motion in all major joints.   Neurologic: Awake and alert, No focal deficits noted.           COURSE & MEDICAL DECISION MAKING  Pertinent Labs & Imaging studies reviewed. (See chart for details)  Is a 20-year-old type I diabetic who is here for evaluation of abdominal pain and vomiting.  He has been having the symptoms over the past year.  He has had an extensive work-up to include 2 visits to the ER in the past week with CT scans.  He is followed by GI.  He has had endoscopy as well as gastric emptying studies and HIDA scans.  He has no history of previous abdominal surgeries.  On presentation he is afebrile.  He has a slight tachycardia.  He is not hypotensive.  He has minimal tenderness.  An IV is established and he is hydrated with LR.  This is based on his intractable vomiting that he has been having.  He is treated with " Compazine.  He has not had that medicine in the past and I was quite hopeful.  Unfortunately he appears to have had an akathetic reaction to the medication.  He is subsequently treated with Benadryl and Zofran with improvement.  His laboratory work-up is completely unremarkable.  His CBC is normal.  His chemistries are normal with the exception of a glucose of 162.  I see no indication for imaging at this time as I do not suspect an acute intra-abdominal process.  I discussed the results of all the studies with the patient and his mother.  At this point he will be discharged home.  Since he has Phenergan and Zofran at home I have elected to give him a prescription for hydroxyzine and see if that helps with his nausea.  I will have him follow-up with his doctors as needed.  He will return here for any worsening symptoms.  He is given discharge instructions on abdominal pain and on vomiting.    FINAL IMPRESSION  1.  Chronic intermittent abdominal pain  2.  Chronic nausea and vomiting  3.  Hyperglycemia without acidemia in a known type I diabetic         Electronically signed by: Christiano Tobias M.D., 12/31/2020 9:16 PM

## 2021-01-01 NOTE — ED TRIAGE NOTES
"Chief Complaint   Patient presents with   • N/V     VOMITING X3-4 HOURS. DIFFUSE ABD PAIN STARTING PRIOR TO VOMITING. TOOK PHENERGAN AT HOME WITHOUT RELIEF.        SPOKE WITH GI SPECIALIST ON Tuesday WAS TOLD TO SUCK ON ICE AND DRINK ANGIE TEA WHICH WORKED FOR A FEW DAYS BUT IS NO LONGER EFFECTIVE. DENIES URINARY ISSUES, BLOOD IN VOMIT AND DIARRHEA.     /90   Pulse 100   Temp 36.4 °C (97.5 °F)   Resp 14   Ht 1.676 m (5' 6\")   Wt 58.6 kg (129 lb 3 oz)   SpO2 97%   BMI 20.85 kg/m²   BLOOD SUGAR AT 1900- 174  "

## 2021-01-24 DIAGNOSIS — E10.65 TYPE 1 DIABETES MELLITUS WITH HYPERGLYCEMIA (HCC): ICD-10-CM

## 2021-01-25 NOTE — TELEPHONE ENCOUNTER
Received request via: Patient    Was the patient seen in the last year in this department? Yes    Does the patient have an active prescription (recently filled or refills available) for medication(s) requested? No       CONTOUR NEXT TEST strip    Sig: TEST 10 TIMES A DAY FOR INSULIN DOSE ADJUSTMENTS

## 2021-03-03 ENCOUNTER — HOSPITAL ENCOUNTER (EMERGENCY)
Facility: MEDICAL CENTER | Age: 21
End: 2021-03-03
Attending: EMERGENCY MEDICINE
Payer: COMMERCIAL

## 2021-03-03 VITALS
WEIGHT: 120 LBS | TEMPERATURE: 98.2 F | HEIGHT: 66 IN | BODY MASS INDEX: 19.29 KG/M2 | RESPIRATION RATE: 16 BRPM | SYSTOLIC BLOOD PRESSURE: 109 MMHG | DIASTOLIC BLOOD PRESSURE: 68 MMHG | OXYGEN SATURATION: 97 % | HEART RATE: 92 BPM

## 2021-03-03 DIAGNOSIS — R10.11 RIGHT UPPER QUADRANT ABDOMINAL PAIN: ICD-10-CM

## 2021-03-03 LAB
ALBUMIN SERPL BCP-MCNC: 4.4 G/DL (ref 3.2–4.9)
ALBUMIN/GLOB SERPL: 1.7 G/DL
ALP SERPL-CCNC: 78 U/L (ref 30–99)
ALT SERPL-CCNC: 6 U/L (ref 2–50)
ANION GAP SERPL CALC-SCNC: 12 MMOL/L (ref 7–16)
APPEARANCE UR: CLEAR
AST SERPL-CCNC: 15 U/L (ref 12–45)
BASOPHILS # BLD AUTO: 0.8 % (ref 0–1.8)
BASOPHILS # BLD: 0.07 K/UL (ref 0–0.12)
BILIRUB SERPL-MCNC: 0.3 MG/DL (ref 0.1–1.5)
BILIRUB UR QL STRIP.AUTO: NEGATIVE
BUN SERPL-MCNC: 5 MG/DL (ref 8–22)
CALCIUM SERPL-MCNC: 9.1 MG/DL (ref 8.5–10.5)
CHLORIDE SERPL-SCNC: 96 MMOL/L (ref 96–112)
CO2 SERPL-SCNC: 26 MMOL/L (ref 20–33)
COLOR UR: YELLOW
CREAT SERPL-MCNC: 0.63 MG/DL (ref 0.5–1.4)
EOSINOPHIL # BLD AUTO: 0.7 K/UL (ref 0–0.51)
EOSINOPHIL NFR BLD: 7.6 % (ref 0–6.9)
ERYTHROCYTE [DISTWIDTH] IN BLOOD BY AUTOMATED COUNT: 38.5 FL (ref 35.9–50)
GLOBULIN SER CALC-MCNC: 2.6 G/DL (ref 1.9–3.5)
GLUCOSE SERPL-MCNC: 192 MG/DL (ref 65–99)
GLUCOSE UR STRIP.AUTO-MCNC: NEGATIVE MG/DL
HCT VFR BLD AUTO: 43 % (ref 42–52)
HGB BLD-MCNC: 14.3 G/DL (ref 14–18)
IMM GRANULOCYTES # BLD AUTO: 0.04 K/UL (ref 0–0.11)
IMM GRANULOCYTES NFR BLD AUTO: 0.4 % (ref 0–0.9)
KETONES UR STRIP.AUTO-MCNC: ABNORMAL MG/DL
LEUKOCYTE ESTERASE UR QL STRIP.AUTO: NEGATIVE
LIPASE SERPL-CCNC: 21 U/L (ref 11–82)
LYMPHOCYTES # BLD AUTO: 2.14 K/UL (ref 1–4.8)
LYMPHOCYTES NFR BLD: 23.3 % (ref 22–41)
MCH RBC QN AUTO: 29.9 PG (ref 27–33)
MCHC RBC AUTO-ENTMCNC: 33.3 G/DL (ref 33.7–35.3)
MCV RBC AUTO: 89.8 FL (ref 81.4–97.8)
MICRO URNS: ABNORMAL
MONOCYTES # BLD AUTO: 0.52 K/UL (ref 0–0.85)
MONOCYTES NFR BLD AUTO: 5.7 % (ref 0–13.4)
NEUTROPHILS # BLD AUTO: 5.71 K/UL (ref 1.82–7.42)
NEUTROPHILS NFR BLD: 62.2 % (ref 44–72)
NITRITE UR QL STRIP.AUTO: NEGATIVE
NRBC # BLD AUTO: 0 K/UL
NRBC BLD-RTO: 0 /100 WBC
PH UR STRIP.AUTO: 5.5 [PH] (ref 5–8)
PLATELET # BLD AUTO: 280 K/UL (ref 164–446)
PMV BLD AUTO: 11.4 FL (ref 9–12.9)
POTASSIUM SERPL-SCNC: 3.3 MMOL/L (ref 3.6–5.5)
PROT SERPL-MCNC: 7 G/DL (ref 6–8.2)
PROT UR QL STRIP: NEGATIVE MG/DL
RBC # BLD AUTO: 4.79 M/UL (ref 4.7–6.1)
RBC UR QL AUTO: NEGATIVE
SODIUM SERPL-SCNC: 134 MMOL/L (ref 135–145)
SP GR UR STRIP.AUTO: 1.01
UROBILINOGEN UR STRIP.AUTO-MCNC: 0.2 MG/DL
WBC # BLD AUTO: 9.2 K/UL (ref 4.8–10.8)

## 2021-03-03 PROCEDURE — 96374 THER/PROPH/DIAG INJ IV PUSH: CPT

## 2021-03-03 PROCEDURE — 83690 ASSAY OF LIPASE: CPT

## 2021-03-03 PROCEDURE — 700111 HCHG RX REV CODE 636 W/ 250 OVERRIDE (IP): Performed by: EMERGENCY MEDICINE

## 2021-03-03 PROCEDURE — 36415 COLL VENOUS BLD VENIPUNCTURE: CPT

## 2021-03-03 PROCEDURE — 96361 HYDRATE IV INFUSION ADD-ON: CPT

## 2021-03-03 PROCEDURE — 99285 EMERGENCY DEPT VISIT HI MDM: CPT

## 2021-03-03 PROCEDURE — 700105 HCHG RX REV CODE 258: Performed by: EMERGENCY MEDICINE

## 2021-03-03 PROCEDURE — 85025 COMPLETE CBC W/AUTO DIFF WBC: CPT

## 2021-03-03 PROCEDURE — 700102 HCHG RX REV CODE 250 W/ 637 OVERRIDE(OP): Performed by: EMERGENCY MEDICINE

## 2021-03-03 PROCEDURE — A9270 NON-COVERED ITEM OR SERVICE: HCPCS | Performed by: EMERGENCY MEDICINE

## 2021-03-03 PROCEDURE — 80053 COMPREHEN METABOLIC PANEL: CPT

## 2021-03-03 PROCEDURE — 96375 TX/PRO/DX INJ NEW DRUG ADDON: CPT

## 2021-03-03 PROCEDURE — 81003 URINALYSIS AUTO W/O SCOPE: CPT

## 2021-03-03 RX ORDER — METOCLOPRAMIDE HYDROCHLORIDE 5 MG/ML
10 INJECTION INTRAMUSCULAR; INTRAVENOUS ONCE
Status: COMPLETED | OUTPATIENT
Start: 2021-03-03 | End: 2021-03-03

## 2021-03-03 RX ORDER — DIPHENHYDRAMINE HYDROCHLORIDE 50 MG/ML
25 INJECTION INTRAMUSCULAR; INTRAVENOUS ONCE
Status: COMPLETED | OUTPATIENT
Start: 2021-03-03 | End: 2021-03-03

## 2021-03-03 RX ORDER — SODIUM CHLORIDE 9 MG/ML
1000 INJECTION, SOLUTION INTRAVENOUS ONCE
Status: COMPLETED | OUTPATIENT
Start: 2021-03-03 | End: 2021-03-03

## 2021-03-03 RX ADMIN — LIDOCAINE HYDROCHLORIDE 30 ML: 20 SOLUTION OROPHARYNGEAL at 04:37

## 2021-03-03 RX ADMIN — DIPHENHYDRAMINE HYDROCHLORIDE 25 MG: 50 INJECTION INTRAMUSCULAR; INTRAVENOUS at 04:38

## 2021-03-03 RX ADMIN — METOCLOPRAMIDE 10 MG: 5 INJECTION, SOLUTION INTRAMUSCULAR; INTRAVENOUS at 04:38

## 2021-03-03 RX ADMIN — SODIUM CHLORIDE 1000 ML: 9 INJECTION, SOLUTION INTRAVENOUS at 04:38

## 2021-03-03 ASSESSMENT — FIBROSIS 4 INDEX: FIB4 SCORE: 0.35

## 2021-03-03 NOTE — ED NOTES
Pt resting in bed, VSS on RA, GCS 15, NAD, updated POC to wait for lab results, will continue to monitor.

## 2021-03-03 NOTE — ED TRIAGE NOTES
Abdominal cramping that started 2 days ago.  This morning had more severe RUQ abdominal pain and n/v.  Hx: celiac, DM1.  bs before arrival was 172.

## 2021-03-03 NOTE — ED PROVIDER NOTES
ED Provider Note    CHIEF COMPLAINT  Chief Complaint   Patient presents with   • Abdominal Pain       HPI  Martinez Smith is a 20 y.o. male who presents to the emergency room with abdominal pain, nausea and vomiting.  Patient has a history of chronic abdominal pain and recurrent nausea.  Been dealing with problems since April of last year.  He is followed by gastroenterology.  He and his mother report he has had every test possible and everything always looks fine, but he is still sick.  3 days ago he started experiencing increasing sharp right upper quadrant abdominal pain.  This is associated with nausea and vomiting.  The pain radiates slightly to the right, but not to the back or elsewhere.  Is associated with increased vomiting.  He has vomited dark brown material.  Very acid tasting and uncomfortable.  He has had normal bowel movements most recently yesterday.  No bloody stool.  No cough, congestion, runny nose or sore throat, but states after the window over the weekend his allergies seem to act up.  Reports he tends to experience more abdominal discomfort and GI symptoms from allergies then respiratory; however, does state that he was short of breath and had blotchy skin consistent with allergy over the weekend.  No dysuria.  He has not had fever.  No abnormal foods or known ill exposures.  Blood sugars were low yesterday periodically and bit high today but overall generally well controlled.  No drugs alcohol tobacco.    Reviewed chart.  To the negative CT scans in December of this past year.  Has had a nuclear hepatobiliary scan that was negative.  Ultrasound of the right upper quadrant in December without gallstones.    REVIEW OF SYSTEMS  As per HPI, otherwise a 10 point review of systems is negative    PAST MEDICAL HISTORY  Past Medical History:   Diagnosis Date   • Celiac disease    • Diabetes type I (HCC)     insulin pump       SOCIAL HISTORY  Social History     Tobacco Use   • Smoking status:  "Never Smoker   • Smokeless tobacco: Never Used   Substance Use Topics   • Alcohol use: No   • Drug use: No       SURGICAL HISTORY  Past Surgical History:   Procedure Laterality Date   • HYPOSPADIAS REPAIR         CURRENT MEDICATIONS  Home Medications    **Home medications have not yet been reviewed for this encounter**         ALLERGIES  Allergies   Allergen Reactions   • Beef Flavor Nausea     Beef broth causes Nausea and vomiting    • Compazine      Anxiety    • Gluten Meal    • Tree Nuts Food Allergy Anaphylaxis, Shortness of Breath, Itching, Nausea and Cough     Cashews and pistachio       PHYSICAL EXAM  VITAL SIGNS: /76   Pulse (!) 110   Temp 36.5 °C (97.7 °F) (Temporal)   Resp 18   Ht 1.676 m (5' 6\")   Wt 54.4 kg (120 lb)   SpO2 98%   BMI 19.37 kg/m²    Constitutional: Awake and alert thin young male.  Holding emesis basin that has brown vomit in it.  Generally pale appearing  HENT: Mildly dry mucous membranes  Eyes: Normal inspection  Neck: Grossly normal range of motion.  Cardiovascular: Mildly elevated heart rate.  Symmetric peripheral pulses.   Thorax & Lungs: No respiratory distress  Abdomen: Bowel sounds normal, soft, non-distended, mild tenderness to palpation in the right upper quadrant  Skin: No obvious rash.  Generally pale  Extremities: No clubbing, cyanosis, edema, no Homans or cords.  Neurologic: Grossly normal   Psychiatric: Normal for situation      Labs:  Results for orders placed or performed during the hospital encounter of 03/03/21   CBC WITH DIFFERENTIAL   Result Value Ref Range    WBC 9.2 4.8 - 10.8 K/uL    RBC 4.79 4.70 - 6.10 M/uL    Hemoglobin 14.3 14.0 - 18.0 g/dL    Hematocrit 43.0 42.0 - 52.0 %    MCV 89.8 81.4 - 97.8 fL    MCH 29.9 27.0 - 33.0 pg    MCHC 33.3 (L) 33.7 - 35.3 g/dL    RDW 38.5 35.9 - 50.0 fL    Platelet Count 280 164 - 446 K/uL    MPV 11.4 9.0 - 12.9 fL    Neutrophils-Polys 62.20 44.00 - 72.00 %    Lymphocytes 23.30 22.00 - 41.00 %    Monocytes 5.70 0.00 " - 13.40 %    Eosinophils 7.60 (H) 0.00 - 6.90 %    Basophils 0.80 0.00 - 1.80 %    Immature Granulocytes 0.40 0.00 - 0.90 %    Nucleated RBC 0.00 /100 WBC    Neutrophils (Absolute) 5.71 1.82 - 7.42 K/uL    Lymphs (Absolute) 2.14 1.00 - 4.80 K/uL    Monos (Absolute) 0.52 0.00 - 0.85 K/uL    Eos (Absolute) 0.70 (H) 0.00 - 0.51 K/uL    Baso (Absolute) 0.07 0.00 - 0.12 K/uL    Immature Granulocytes (abs) 0.04 0.00 - 0.11 K/uL    NRBC (Absolute) 0.00 K/uL   COMP METABOLIC PANEL   Result Value Ref Range    Sodium 134 (L) 135 - 145 mmol/L    Potassium 3.3 (L) 3.6 - 5.5 mmol/L    Chloride 96 96 - 112 mmol/L    Co2 26 20 - 33 mmol/L    Anion Gap 12.0 7.0 - 16.0    Glucose 192 (H) 65 - 99 mg/dL    Bun 5 (L) 8 - 22 mg/dL    Creatinine 0.63 0.50 - 1.40 mg/dL    Calcium 9.1 8.5 - 10.5 mg/dL    AST(SGOT) 15 12 - 45 U/L    ALT(SGPT) 6 2 - 50 U/L    Alkaline Phosphatase 78 30 - 99 U/L    Total Bilirubin 0.3 0.1 - 1.5 mg/dL    Albumin 4.4 3.2 - 4.9 g/dL    Total Protein 7.0 6.0 - 8.2 g/dL    Globulin 2.6 1.9 - 3.5 g/dL    A-G Ratio 1.7 g/dL   LIPASE   Result Value Ref Range    Lipase 21 11 - 82 U/L   URINALYSIS    Specimen: Urine   Result Value Ref Range    Color Yellow     Character Clear     Specific Gravity 1.009 <1.035    Ph 5.5 5.0 - 8.0    Glucose Negative Negative mg/dL    Ketones Trace (A) Negative mg/dL    Protein Negative Negative mg/dL    Bilirubin Negative Negative    Urobilinogen, Urine 0.2 Negative    Nitrite Negative Negative    Leukocyte Esterase Negative Negative    Occult Blood Negative Negative    Micro Urine Req see below    ESTIMATED GFR   Result Value Ref Range    GFR If African American >60 >60 mL/min/1.73 m 2    GFR If Non African American >60 >60 mL/min/1.73 m 2       Medications   hyoscyamine-maalox plus-lidocaine viscous (GI COCKTAIL) oral susp 30 mL (30 mL Oral Given 3/3/21 2657)   NS infusion 1,000 mL (0 mL Intravenous Stopped 3/3/21 0630)   metoclopramide (REGLAN) injection 10 mg (10 mg Intravenous  Given 3/3/21 0438)   diphenhydrAMINE (BENADRYL) injection 25 mg (25 mg Intravenous Given 3/3/21 0438)       HYDRATION: Based on the patient's presentation of Dehydration the patient was given IV fluids. IV Hydration was used because oral hydration was not adequate alone. Upon recheck following hydration, the patient was Stable.    COURSE & MEDICAL DECISION MAKING  Patient presents with abdominal pain, nausea vomiting.  This is not an uncommon occurrence for him and he describes this as being very typical for his exacerbations.  He believes potentially he is having allergic type reaction.  I obtained laboratory data.  Ordered IV fluid.  Ordered Reglan for nausea.  Benadryl given intravenously.  He was given a GI cocktail.    Patient reports that his acute symptoms resolved and he was back to his typical ongoing chronic nausea.  He was given oral fluids which she is able to tolerate.  Repeat abdominal exam is benign.  Laboratory data without leukocytosis.  He does have eosinophilia suggesting allergy.  Have advised continued daily Allegra or increase to twice daily.  He has prescriptions for nausea medication at home.  At this point there is no suggestion of a surgical process.  He will continue with plan to follow-up with GI.  I precautioned him to return to the ER for fever, right lower quadrant abdominal pain, uncontrolled symptoms or concern.      FINAL IMPRESSION  1.  Acute exacerbation of chronic abdominal pain and vomiting  2.  Mild hypokalemia  3.  Eosinophilia  4.  Mild hyperglycemia without metabolic acidosis      This dictation was created using voice recognition software. The accuracy of the dictation is limited to the abilities of the software.  The nursing notes were reviewed and certain aspects of this information were incorporated into this note.      Electronically signed by: Jeffery Hernández M.D., 3/3/2021 4:34 AM

## 2021-04-02 LAB
25(OH)D3+25(OH)D2 SERPL-MCNC: 64.8 NG/ML (ref 30–100)
ALBUMIN SERPL-MCNC: 4.6 G/DL (ref 4.1–5.2)
ALBUMIN/CREAT UR: 3 MG/G CREAT (ref 0–29)
ALBUMIN/GLOB SERPL: 1.7 {RATIO} (ref 1.2–2.2)
ALP SERPL-CCNC: 91 IU/L (ref 39–117)
ALT SERPL-CCNC: 13 IU/L (ref 0–44)
AST SERPL-CCNC: 19 IU/L (ref 0–40)
BILIRUB SERPL-MCNC: 0.6 MG/DL (ref 0–1.2)
BUN SERPL-MCNC: 7 MG/DL (ref 6–20)
BUN/CREAT SERPL: 10 (ref 9–20)
CALCIUM SERPL-MCNC: 9.6 MG/DL (ref 8.7–10.2)
CHLORIDE SERPL-SCNC: 97 MMOL/L (ref 96–106)
CO2 SERPL-SCNC: 25 MMOL/L (ref 20–29)
CREAT SERPL-MCNC: 0.7 MG/DL (ref 0.76–1.27)
CREAT UR-MCNC: 110.5 MG/DL
GLOBULIN SER CALC-MCNC: 2.7 G/DL (ref 1.5–4.5)
GLUCOSE SERPL-MCNC: 240 MG/DL (ref 65–99)
HBA1C MFR BLD: 6.7 % (ref 4.8–5.6)
MICROALBUMIN UR-MCNC: 3.7 UG/ML
POTASSIUM SERPL-SCNC: 4.5 MMOL/L (ref 3.5–5.2)
PROT SERPL-MCNC: 7.3 G/DL (ref 6–8.5)
SODIUM SERPL-SCNC: 136 MMOL/L (ref 134–144)
T4 FREE SERPL-MCNC: 1.24 NG/DL (ref 0.82–1.77)
TSH SERPL DL<=0.005 MIU/L-ACNC: 2.03 UIU/ML (ref 0.45–4.5)

## 2021-04-05 ENCOUNTER — OFFICE VISIT (OUTPATIENT)
Dept: ENDOCRINOLOGY | Facility: MEDICAL CENTER | Age: 21
End: 2021-04-05
Attending: INTERNAL MEDICINE
Payer: COMMERCIAL

## 2021-04-05 VITALS
RESPIRATION RATE: 15 BRPM | OXYGEN SATURATION: 98 % | SYSTOLIC BLOOD PRESSURE: 110 MMHG | HEIGHT: 66 IN | WEIGHT: 124 LBS | HEART RATE: 118 BPM | BODY MASS INDEX: 19.93 KG/M2 | DIASTOLIC BLOOD PRESSURE: 72 MMHG

## 2021-04-05 DIAGNOSIS — Z96.41 INSULIN PUMP STATUS: ICD-10-CM

## 2021-04-05 DIAGNOSIS — E10.65 TYPE 1 DIABETES MELLITUS WITH HYPERGLYCEMIA (HCC): ICD-10-CM

## 2021-04-05 DIAGNOSIS — E55.9 VITAMIN D DEFICIENCY: ICD-10-CM

## 2021-04-05 DIAGNOSIS — R10.9 CHRONIC ABDOMINAL PAIN: ICD-10-CM

## 2021-04-05 DIAGNOSIS — Z79.4 LONG-TERM INSULIN USE (HCC): ICD-10-CM

## 2021-04-05 DIAGNOSIS — G89.29 CHRONIC ABDOMINAL PAIN: ICD-10-CM

## 2021-04-05 PROCEDURE — 99214 OFFICE O/P EST MOD 30 MIN: CPT | Performed by: INTERNAL MEDICINE

## 2021-04-05 PROCEDURE — 99212 OFFICE O/P EST SF 10 MIN: CPT | Performed by: INTERNAL MEDICINE

## 2021-04-05 RX ORDER — AMITRIPTYLINE HYDROCHLORIDE 50 MG/1
50 TABLET, FILM COATED ORAL NIGHTLY
COMMUNITY
End: 2021-08-06

## 2021-04-05 RX ORDER — MONTELUKAST SODIUM 10 MG/1
10 TABLET ORAL DAILY
COMMUNITY
End: 2022-10-27

## 2021-04-05 ASSESSMENT — FIBROSIS 4 INDEX: FIB4 SCORE: 0.41

## 2021-04-05 NOTE — PROGRESS NOTES
CHIEF COMPLAINT: Patient is here for follow up of Type 1 Diabetes Mellitus.    HPI:     Martinez Smith is a 19 y.o. male with Type 1 Diabetes Mellitus here for follow up.    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  He is on a medtronic 630g pump with no CGM due to financial constraints.  He is testing BG 10x a day with a contour next BG meter.     He has a history of chronic abdominal pain and nausea and vomiting of unknown etiology.  He is being followed up by gastroenterologist.  He had a negative GI work-up for H pylori and gastroparesis.  He had an abdominal US on 8/2020 which was normal.  Amylase and lipase were negative        Basal rate  00:00 0.575  0300  0.600     Carb ratio  00:00  10     ICF  1:35 above 130     Active insulin time 3 hours     Average   Basal 38%  Bolus 62%      He reports that he is happy with his pump  He reports that he is happy that his A1c is down  We did try to get him an eversense but it is not covered by his insurance anymore        We couldn't download his pump due to technical reasons  He reports that he is having some episodes of hypoglycemia after correcting  We discussed adjusting his sensitivity factor today        His gastroenterologist did an UGI and found gastritis and started him on Protonix which he tried and failed.  He also tried and failed probiotics  He is still on Elavil for his abdominal pain   His new medicine now is Creon or pancreatic enzymes 3000u (TID)  He feels this is working        His most recent labs showed normal TSH of 2.0 on April 2021  He has no signs of microalbuminuria or diabetic kidney disease on April 2021      He has vitamin D deficiency and it improved from 22 to 64 on April 2021      BG  Diary:   We could not download his pump today due to technical reasons    Weight has been stable    Diabetes Complications   Retinopathy: No known retinopathy.  Last eye exam: 5/2020 Dr. Noguera  Neuropathy: Denies paresthesias or numbness in hands or feet. Denies any foot wounds.  Exercise: Minimal.  Diet: Fair.  Patient's medications, allergies, and social histories were reviewed and updated as appropriate.    ROS:     CONS:     No fever, no chills   EYES:     No diplopia, no blurry vision   CV:           No chest pain, no palpitations   PULM:     No SOB, no cough, no hemoptysis.   GI:            No nausea, no vomiting, no diarrhea, no constipation   ENDO:     No polyuria, no polydipsia, no heat intolerance, no cold intolerance       Past Medical History:  Problem List:  2020-09: Chronic abdominal pain  2020-09: Vitamin D deficiency  2020-07: RUQ abdominal pain  2019-12: Diabetic gastroparesis (HCC)  2019-12: Long-term insulin use (HCC)  2019-12: Fitting and adjustment of insulin pump  2019-12: Intractable nausea and vomiting  2018-01: Hyperlipidemia  2018-01: Other mechanical complication of insulin pump  2017-06: Insulin pump status  2017-06: Type 1 diabetes mellitus with hyperglycemia ([facility])  2017-06: Celiac disease  2017-06: Weakness of right lower extremity  2017-06: Lipohyperplasia  2017-06: Dyslipidemia      Past Surgical History:  Past Surgical History:   Procedure Laterality Date   • HYPOSPADIAS REPAIR          Allergies:  Beef flavor; Gluten meal; and Tree nuts food allergy     Social History:  Social History     Tobacco Use   • Smoking status: Never Smoker   • Smokeless tobacco: Never Used   Substance Use Topics   • Alcohol use: No   • Drug use: No        Family History:   family history includes Arthritis in his maternal grandmother.      PHYSICAL EXAM:   OBJECTIVE:  Vital signs: /72 (BP Location: Left arm, Patient Position: Sitting, BP Cuff Size: Adult)   Pulse (!) 118   Resp 15   Ht 1.676 m  "(5' 6\")   Wt 56.2 kg (124 lb)   SpO2 98%   BMI 20.01 kg/m²   GENERAL: Well-developed, well-nourished in no apparent distress.   EYE:  No ocular asymmetry, PERRLA  HENT: Pink, moist mucous membranes.    NECK: No thyromegaly.   CARDIOVASCULAR: Normal precordial impulse seen with normal carotid pulsation  LUNGS: Symmetrical chest expansion with normal phonation of voice   ABDOMEN: non obese abdomen with no visible organomegaly right upper quadrant pain tenderness noted with direct palpation  EXTREMITIES: No clubbing, cyanosis, or edema.   NEUROLOGICAL: No gross focal motor abnormalities   LYMPH: No cervical adenopathy seen.   SKIN: No rashes, lesions.         Labs:  Lab Results   Component Value Date/Time    HBA1C 6.7 (H) 04/01/2021 05:36 AM    HBA1C 7.9 (A) 07/31/2020 03:29 PM        Lab Results   Component Value Date/Time    WBC 9.2 03/03/2021 04:44 AM    RBC 4.79 03/03/2021 04:44 AM    HEMOGLOBIN 14.3 03/03/2021 04:44 AM    MCV 89.8 03/03/2021 04:44 AM    MCH 29.9 03/03/2021 04:44 AM    MCHC 33.3 (L) 03/03/2021 04:44 AM    RDW 38.5 03/03/2021 04:44 AM    MPV 11.4 03/03/2021 04:44 AM       Lab Results   Component Value Date/Time    SODIUM 136 04/01/2021 05:36 AM    SODIUM 134 (L) 03/03/2021 04:44 AM    POTASSIUM 4.5 04/01/2021 05:36 AM    POTASSIUM 3.3 (L) 03/03/2021 04:44 AM    CHLORIDE 97 04/01/2021 05:36 AM    CHLORIDE 96 03/03/2021 04:44 AM    CO2 25 04/01/2021 05:36 AM    CO2 26 03/03/2021 04:44 AM    ANION 12.0 03/03/2021 04:44 AM    GLUCOSE 240 (H) 04/01/2021 05:36 AM    GLUCOSE 192 (H) 03/03/2021 04:44 AM    BUN 7 04/01/2021 05:36 AM    BUN 5 (L) 03/03/2021 04:44 AM    CREATININE 0.70 (L) 04/01/2021 05:36 AM    CREATININE 0.63 03/03/2021 04:44 AM    CREATININE 0.4 05/25/2007 04:40 AM    CALCIUM 9.6 04/01/2021 05:36 AM    CALCIUM 9.1 03/03/2021 04:44 AM    ASTSGOT 19 04/01/2021 05:36 AM    ASTSGOT 15 03/03/2021 04:44 AM    ALTSGPT 13 04/01/2021 05:36 AM    ALTSGPT 6 03/03/2021 04:44 AM    TBILIRUBIN 0.6 " 04/01/2021 05:36 AM    TBILIRUBIN 0.3 03/03/2021 04:44 AM    ALBUMIN 4.6 04/01/2021 05:36 AM    ALBUMIN 4.4 03/03/2021 04:44 AM    ALBUMIN 4.80 10/22/2016 09:46 AM    TOTPROTEIN 7.3 04/01/2021 05:36 AM    TOTPROTEIN 7.0 03/03/2021 04:44 AM    TOTPROTEIN 7.70 10/22/2016 09:46 AM    GLOBULIN 2.7 04/01/2021 05:36 AM    GLOBULIN 2.6 03/03/2021 04:44 AM    AGRATIO 1.7 04/01/2021 05:36 AM    AGRATIO 1.7 03/03/2021 04:44 AM       Lab Results   Component Value Date/Time    CHOLSTRLTOT 177 12/24/2019 0729    TRIGLYCERIDE 103 12/24/2019 0729    HDL 28 (A) 12/24/2019 0729     (H) 12/24/2019 0729       Lab Results   Component Value Date/Time    MALBCRT see below 07/13/2019 08:00 AM    MICROALBUR <0.7 07/13/2019 08:00 AM    MICRALB 3.7 04/01/2021 05:36 AM        Lab Results   Component Value Date/Time    TSHULTRASEN 2.140 09/14/2019 0727     No results found for: FREEDIR  Lab Results   Component Value Date/Time    FREET3 3.53 09/14/2019 0727     No results found for: THYSTIMIG        ASSESSMENT/PLAN:     1. Type 1 diabetes mellitus with hyperglycemia (HCC)  Uncontrolled secondary to hyperglycemia and hypoglycemia  A1c is better at 6.7%  I adjusted his sensitivity to 45 today I did not make any further changes  I recommend that he watch his carb intake  I recommend that he exercise regularly  I will see him again in 4 months with a repeat of his A1c    2. Chronic abdominal pain  Stable  Continue Elavil and continue Creon  continue follow-up with gastroenterology    3. Fitting and adjustment of insulin pump  Insulin pump is in place    4. Vitamin D deficiency  Controlled  Continue vitamin D3 5000 units daily  Repeat calcium and 25-hydroxy vitamin levels after 6 months    5. Encounter for long-term (current) use of insulin (HCC)  Patient is on long-term insulin therapy due to type 1 diabetes      Return in about 3 months (around 7/5/2021).      Thank you kindly for allowing me to participate in the diabetes care plan for  this patient.    Gabo Benoit MD, FACE, [facility]  04/17/20    CC:   Gregory Grande M.D.

## 2021-05-04 ENCOUNTER — IMMUNIZATION (OUTPATIENT)
Dept: FAMILY PLANNING/WOMEN'S HEALTH CLINIC | Facility: IMMUNIZATION CENTER | Age: 21
End: 2021-05-04
Payer: COMMERCIAL

## 2021-05-04 DIAGNOSIS — Z23 ENCOUNTER FOR VACCINATION: Primary | ICD-10-CM

## 2021-05-04 PROCEDURE — 0001A PFIZER SARS-COV-2 VACCINE: CPT

## 2021-05-04 PROCEDURE — 91300 PFIZER SARS-COV-2 VACCINE: CPT

## 2021-05-27 ENCOUNTER — IMMUNIZATION (OUTPATIENT)
Dept: FAMILY PLANNING/WOMEN'S HEALTH CLINIC | Facility: IMMUNIZATION CENTER | Age: 21
End: 2021-05-27
Payer: COMMERCIAL

## 2021-05-27 DIAGNOSIS — Z23 ENCOUNTER FOR VACCINATION: Primary | ICD-10-CM

## 2021-05-27 PROCEDURE — 0002A PFIZER SARS-COV-2 VACCINE: CPT | Performed by: INTERNAL MEDICINE

## 2021-05-27 PROCEDURE — 91300 PFIZER SARS-COV-2 VACCINE: CPT | Performed by: INTERNAL MEDICINE

## 2021-08-06 ENCOUNTER — OFFICE VISIT (OUTPATIENT)
Dept: ENDOCRINOLOGY | Facility: MEDICAL CENTER | Age: 21
End: 2021-08-06
Attending: INTERNAL MEDICINE
Payer: COMMERCIAL

## 2021-08-06 VITALS
HEIGHT: 66 IN | BODY MASS INDEX: 21.05 KG/M2 | DIASTOLIC BLOOD PRESSURE: 60 MMHG | SYSTOLIC BLOOD PRESSURE: 96 MMHG | WEIGHT: 131 LBS | HEART RATE: 92 BPM | OXYGEN SATURATION: 97 %

## 2021-08-06 DIAGNOSIS — Z96.41 INSULIN PUMP STATUS: ICD-10-CM

## 2021-08-06 DIAGNOSIS — K86.81 EXOCRINE PANCREATIC INSUFFICIENCY: ICD-10-CM

## 2021-08-06 DIAGNOSIS — E10.65 TYPE 1 DIABETES MELLITUS WITH HYPERGLYCEMIA (HCC): ICD-10-CM

## 2021-08-06 DIAGNOSIS — Z79.4 LONG-TERM INSULIN USE (HCC): ICD-10-CM

## 2021-08-06 DIAGNOSIS — E55.9 VITAMIN D DEFICIENCY: ICD-10-CM

## 2021-08-06 LAB
HBA1C MFR BLD: 7.3 % (ref 0–5.6)
INT CON NEG: ABNORMAL
INT CON POS: ABNORMAL

## 2021-08-06 PROCEDURE — 99213 OFFICE O/P EST LOW 20 MIN: CPT | Performed by: INTERNAL MEDICINE

## 2021-08-06 PROCEDURE — 83036 HEMOGLOBIN GLYCOSYLATED A1C: CPT | Performed by: INTERNAL MEDICINE

## 2021-08-06 PROCEDURE — 99214 OFFICE O/P EST MOD 30 MIN: CPT | Performed by: INTERNAL MEDICINE

## 2021-08-06 RX ORDER — AMITRIPTYLINE HYDROCHLORIDE 25 MG/1
25 TABLET, FILM COATED ORAL
COMMUNITY
Start: 2021-07-13 | End: 2023-09-28

## 2021-08-06 RX ORDER — PANCRELIPASE LIPASE, PANCRELIPASE AMYLASE, AND PANCRELIPASE PROTEASE 21000; 83900; 54700 [USP'U]/1; [USP'U]/1; [USP'U]/1
1 CAPSULE, DELAYED RELEASE ORAL
COMMUNITY
Start: 2021-08-03

## 2021-08-06 ASSESSMENT — FIBROSIS 4 INDEX: FIB4 SCORE: 0.41

## 2021-08-06 ASSESSMENT — PATIENT HEALTH QUESTIONNAIRE - PHQ9: CLINICAL INTERPRETATION OF PHQ2 SCORE: 0

## 2021-08-06 NOTE — PROGRESS NOTES
CHIEF COMPLAINT: Patient is here for follow up of Type 1 Diabetes Mellitus.    HPI:     Martinez Smith is a 19 y.o. male with Type 1 Diabetes Mellitus here for follow up.    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  He is testing BG 10x a day with a contour next BG meter.     He has a history of chronic abdominal pain and nausea and vomiting with a negative extensive work-up for pancreatitis, gastritis and ultimately was discovered that he has pancreatic enzyme deficiency .        Basal rate  00:00 0.575  0300  0.600  1200 0.575     Carb ratio  00:00  10     ICF  1:45 above 130     Active insulin time 3 hours     Average  (140)  Basal 38%  Bolus 62%      He reports stress with school because it is his finals  His GI   Symptoms have resolved after finally starting therapeutic doses of pancreatic enzyme    Download of his pump shows hyperglycemia after breakfast because he is eating more  He does not have hyperglycemia after lunch  He has hypoglycemia after dinner and then he over treats the lows leading to highs extending into the early mornings        His lipids are at goal and he does not need statin therapy because he is young  He has no signs of microalbuminuria or diabetic kidney disease on April 2021  He just had a eye exam with Dr. Noguera last month but we do not have records        His most recent labs showed normal TSH of 2.0 on April 2021  He has vitamin D deficiency and it improved from 22 to 64 on April 2021      BG Diary:   Pump was download and reviewed today  He uses contour next test strips which is the only meter that works with his pump  He tested sugars 8 times a day  and is needing a prior authorization for his Contour next test strips    Weight has been stable    Diabetes Complications   Retinopathy: No known retinopathy.  Last eye exam: 5/2020 Dr. Noguera  We are requesting records of his eye exam from Dr. Noguera    Neuropathy: Denies paresthesias or numbness in hands or feet. Denies any foot wounds.  Exercise: Minimal.  Diet: Fair.  Patient's medications, allergies, and social histories were reviewed and updated as appropriate.    ROS:     CONS:     No fever, no chills   EYES:     No diplopia, no blurry vision   CV:           No chest pain, no palpitations   PULM:     No SOB, no cough, no hemoptysis.   GI:            No nausea, no vomiting, no diarrhea, no constipation   ENDO:     No polyuria, no polydipsia, no heat intolerance, no cold intolerance       Past Medical History:  Problem List:  2020-09: Chronic abdominal pain  2020-09: Vitamin D deficiency  2020-07: RUQ abdominal pain  2019-12: Diabetic gastroparesis (HCC)  2019-12: Long-term insulin use (HCC)  2019-12: Fitting and adjustment of insulin pump  2019-12: Intractable nausea and vomiting  2018-01: Hyperlipidemia  2018-01: Other mechanical complication of insulin pump  2017-06: Insulin pump status  2017-06: Type 1 diabetes mellitus with hyperglycemia (HCC)  2017-06: Celiac disease  2017-06: Weakness of right lower extremity  2017-06: Lipohyperplasia  2017-06: Dyslipidemia      Past Surgical History:  Past Surgical History:   Procedure Laterality Date   • HYPOSPADIAS REPAIR          Allergies:  Beef flavor; Gluten meal; and Tree nuts food allergy     Social History:  Social History     Tobacco Use   • Smoking status: Never Smoker   • Smokeless tobacco: Never Used   Vaping Use   • Vaping Use: Never used   Substance Use Topics   • Alcohol use: No   • Drug use: No        Family History:   family history includes Arthritis in his maternal grandmother.      PHYSICAL EXAM:   OBJECTIVE:  Vital signs: BP (!) 96/60   Pulse 92   " Ht 1.676 m (5' 6\")   Wt 59.4 kg (131 lb)   SpO2 97%   BMI 21.14 kg/m²   GENERAL: Well-developed, well-nourished in no apparent distress.   EYE:  No ocular asymmetry, PERRLA  HENT: Pink, moist mucous membranes.    NECK: No thyromegaly.   CARDIOVASCULAR: Normal precordial impulse seen with normal carotid pulsation  LUNGS: Symmetrical chest expansion with normal phonation of voice   ABDOMEN: non obese abdomen with no visible organomegaly right upper quadrant pain tenderness noted with direct palpation  EXTREMITIES: No clubbing, cyanosis, or edema.   NEUROLOGICAL: No gross focal motor abnormalities   LYMPH: No cervical adenopathy seen.   SKIN: No rashes, lesions.         Labs:  Lab Results   Component Value Date/Time    HBA1C 7.3 (A) 08/06/2021 09:40 AM        Lab Results   Component Value Date/Time    WBC 9.2 03/03/2021 04:44 AM    RBC 4.79 03/03/2021 04:44 AM    HEMOGLOBIN 14.3 03/03/2021 04:44 AM    MCV 89.8 03/03/2021 04:44 AM    MCH 29.9 03/03/2021 04:44 AM    MCHC 33.3 (L) 03/03/2021 04:44 AM    RDW 38.5 03/03/2021 04:44 AM    MPV 11.4 03/03/2021 04:44 AM       Lab Results   Component Value Date/Time    SODIUM 136 04/01/2021 05:36 AM    SODIUM 134 (L) 03/03/2021 04:44 AM    POTASSIUM 4.5 04/01/2021 05:36 AM    POTASSIUM 3.3 (L) 03/03/2021 04:44 AM    CHLORIDE 97 04/01/2021 05:36 AM    CHLORIDE 96 03/03/2021 04:44 AM    CO2 25 04/01/2021 05:36 AM    CO2 26 03/03/2021 04:44 AM    ANION 12.0 03/03/2021 04:44 AM    GLUCOSE 240 (H) 04/01/2021 05:36 AM    GLUCOSE 192 (H) 03/03/2021 04:44 AM    BUN 7 04/01/2021 05:36 AM    BUN 5 (L) 03/03/2021 04:44 AM    CREATININE 0.70 (L) 04/01/2021 05:36 AM    CREATININE 0.63 03/03/2021 04:44 AM    CREATININE 0.4 05/25/2007 04:40 AM    CALCIUM 9.6 04/01/2021 05:36 AM    CALCIUM 9.1 03/03/2021 04:44 AM    ASTSGOT 19 04/01/2021 05:36 AM    ASTSGOT 15 03/03/2021 04:44 AM    ALTSGPT 13 04/01/2021 05:36 AM    ALTSGPT 6 03/03/2021 04:44 AM    TBILIRUBIN 0.6 04/01/2021 05:36 AM    " TBILIRUBIN 0.3 03/03/2021 04:44 AM    ALBUMIN 4.6 04/01/2021 05:36 AM    ALBUMIN 4.4 03/03/2021 04:44 AM    ALBUMIN 4.80 10/22/2016 09:46 AM    TOTPROTEIN 7.3 04/01/2021 05:36 AM    TOTPROTEIN 7.0 03/03/2021 04:44 AM    TOTPROTEIN 7.70 10/22/2016 09:46 AM    GLOBULIN 2.7 04/01/2021 05:36 AM    GLOBULIN 2.6 03/03/2021 04:44 AM    AGRATIO 1.7 04/01/2021 05:36 AM    AGRATIO 1.7 03/03/2021 04:44 AM       Lab Results   Component Value Date/Time    CHOLSTRLTOT 177 12/24/2019 0729    TRIGLYCERIDE 103 12/24/2019 0729    HDL 28 (A) 12/24/2019 0729     (H) 12/24/2019 0729       Lab Results   Component Value Date/Time    MALBCRT see below 07/13/2019 08:00 AM    MICROALBUR <0.7 07/13/2019 08:00 AM    MICRALB 3.7 04/01/2021 05:36 AM        Lab Results   Component Value Date/Time    TSHULTRASEN 2.140 09/14/2019 0727     No results found for: FREEDIR  Lab Results   Component Value Date/Time    FREET3 3.53 09/14/2019 0727     No results found for: THYSTIMIG        ASSESSMENT/PLAN:     1. Type 1 diabetes mellitus with hyperglycemia (HCC)  Controlled  A1c is 7.3%  I adjusted his carb ratio for breakfast to 8  I lowered his basal rate at 4 PM to 0.50 units/h so that he does not have severe lows in the afternoon leading to rebound highs  We are requesting records of his eye exam  I want him to follow-up with our diabetes nurse in 3 months    2. Exocrine pancreatic insufficiency  Stable continue pancreatic enzymes 21,000 units 1 tablet with each meal and snack    3. Insulin pump status  Insulin pump is in place    4. Vitamin D deficiency  Controlled  Continue vitamin D3 5000 units daily  Repeat calcium and 25-hydroxy vitamin levels after 3 months    5. Long-term insulin use (HCC)  Patient is on long-term insulin therapy due to type 1 diabetes      Return in about 3 months (around 11/6/2021).      Thank you kindly for allowing me to participate in the diabetes care plan for this patient.    Gabo Benoit MD, FACE,  ECNU  04/17/20    CC:   Gregory Grande M.D.

## 2021-08-06 NOTE — PROGRESS NOTES
RN-CDE Note    Subjective:   Endocrinology Clinic Progress Note  PCP: Gregory Grande M.D.    HPI:  Martinez Smith is a 20 y.o. old patient who is seen today for review of Type 1 Diabetes on 630 G pump. He was recently started on Pancraze that has helped his stomach issues.    DM:   Last A1c:   Lab Results   Component Value Date/Time    HBA1C 7.3 (A) 08/06/2021 09:40 AM      Previous A1c was 6.7 on 4/1/21  A1C GOAL: < 7    Diabetes Medications:   Humalog in pump    Taking daily ASA: No    Exercise: Walking daily and video fitness game.  Diet: carbohydrate counting  Patient's body mass index is 21.14 kg/m². Exercise and nutrition counseling were performed at this visit.    Glucose monitoring frequency: See pump download    Hypoglycemic episodes: yes - random low blood sugars  Last Retinal Exam: 2 months ago with Dr. Noguera  Daily Foot Exam: Yes   Foot Exam:  Monofilament: doneMonofilament testing with a 10 gram force: sensation intact: intact bilaterally  Visual Inspection: Feet without maceration, ulcers, fissures.  Pedal pulses: intact bilaterally   Lab Results   Component Value Date/Time    MALBCRT see below 07/13/2019 08:00 AM    MICROALBUR <0.7 07/13/2019 08:00 AM    MICRALB 3.7 04/01/2021 05:36 AM      ACR Albumin/Creatinine Ratio goal <30   Currently Rx ACE/ARB: No   Dyslipidemia:  Lab Results   Component Value Date/Time    CHOLSTRLTOT 196 08/20/2020 05:40 AM    CHOLSTRLTOT 177 12/24/2019 07:29 AM     (H) 08/20/2020 05:40 AM     (H) 12/24/2019 07:29 AM    HDL 38 (L) 08/20/2020 05:40 AM    HDL 28 (A) 12/24/2019 07:29 AM    TRIGLYCERIDE 106 08/20/2020 05:40 AM    TRIGLYCERIDE 103 12/24/2019 07:29 AM       Currently Rx Statin: No     He  reports that he has never smoked. He has never used smokeless tobacco.      Plan:     Discussed and educated on:   - All medications, side effects and compliance (discussed carefully)  - Annual eye examinations at Ophthalmology  - Home glucose monitoring  emphasized  - Weight control and daily exercise    Recommended medication changes: Adjust insulin as needed .

## 2021-08-09 DIAGNOSIS — E10.65 TYPE 1 DIABETES MELLITUS WITH HYPERGLYCEMIA (HCC): ICD-10-CM

## 2021-08-21 ENCOUNTER — HOSPITAL ENCOUNTER (EMERGENCY)
Facility: MEDICAL CENTER | Age: 21
End: 2021-08-21
Attending: EMERGENCY MEDICINE
Payer: COMMERCIAL

## 2021-08-21 ENCOUNTER — APPOINTMENT (OUTPATIENT)
Dept: RADIOLOGY | Facility: MEDICAL CENTER | Age: 21
End: 2021-08-21
Attending: EMERGENCY MEDICINE
Payer: COMMERCIAL

## 2021-08-21 VITALS
HEART RATE: 84 BPM | RESPIRATION RATE: 13 BRPM | OXYGEN SATURATION: 98 % | SYSTOLIC BLOOD PRESSURE: 114 MMHG | WEIGHT: 127.21 LBS | DIASTOLIC BLOOD PRESSURE: 59 MMHG | HEIGHT: 66 IN | TEMPERATURE: 98.2 F | BODY MASS INDEX: 20.44 KG/M2

## 2021-08-21 DIAGNOSIS — R10.9 ABDOMINAL PAIN, UNSPECIFIED ABDOMINAL LOCATION: ICD-10-CM

## 2021-08-21 DIAGNOSIS — R73.9 HYPERGLYCEMIA: ICD-10-CM

## 2021-08-21 LAB
ALBUMIN SERPL BCP-MCNC: 5.1 G/DL (ref 3.2–4.9)
ALBUMIN/GLOB SERPL: 1.6 G/DL
ALP SERPL-CCNC: 100 U/L (ref 30–99)
ALT SERPL-CCNC: 18 U/L (ref 2–50)
ANION GAP SERPL CALC-SCNC: 15 MMOL/L (ref 7–16)
AST SERPL-CCNC: 30 U/L (ref 12–45)
BASOPHILS # BLD AUTO: 0.8 % (ref 0–1.8)
BASOPHILS # BLD: 0.08 K/UL (ref 0–0.12)
BILIRUB SERPL-MCNC: 0.6 MG/DL (ref 0.1–1.5)
BUN SERPL-MCNC: 9 MG/DL (ref 8–22)
CALCIUM SERPL-MCNC: 9.8 MG/DL (ref 8.5–10.5)
CHLORIDE SERPL-SCNC: 97 MMOL/L (ref 96–112)
CO2 SERPL-SCNC: 23 MMOL/L (ref 20–33)
CREAT SERPL-MCNC: 0.7 MG/DL (ref 0.5–1.4)
EOSINOPHIL # BLD AUTO: 0.28 K/UL (ref 0–0.51)
EOSINOPHIL NFR BLD: 2.8 % (ref 0–6.9)
ERYTHROCYTE [DISTWIDTH] IN BLOOD BY AUTOMATED COUNT: 39.1 FL (ref 35.9–50)
GLOBULIN SER CALC-MCNC: 3.1 G/DL (ref 1.9–3.5)
GLUCOSE BLD-MCNC: 199 MG/DL (ref 65–99)
GLUCOSE SERPL-MCNC: 308 MG/DL (ref 65–99)
HCT VFR BLD AUTO: 46.9 % (ref 42–52)
HGB BLD-MCNC: 15.9 G/DL (ref 14–18)
IMM GRANULOCYTES # BLD AUTO: 0.03 K/UL (ref 0–0.11)
IMM GRANULOCYTES NFR BLD AUTO: 0.3 % (ref 0–0.9)
LIPASE SERPL-CCNC: 18 U/L (ref 11–82)
LYMPHOCYTES # BLD AUTO: 1.42 K/UL (ref 1–4.8)
LYMPHOCYTES NFR BLD: 14.1 % (ref 22–41)
MCH RBC QN AUTO: 30.2 PG (ref 27–33)
MCHC RBC AUTO-ENTMCNC: 33.9 G/DL (ref 33.7–35.3)
MCV RBC AUTO: 89.2 FL (ref 81.4–97.8)
MONOCYTES # BLD AUTO: 0.46 K/UL (ref 0–0.85)
MONOCYTES NFR BLD AUTO: 4.6 % (ref 0–13.4)
NEUTROPHILS # BLD AUTO: 7.8 K/UL (ref 1.82–7.42)
NEUTROPHILS NFR BLD: 77.4 % (ref 44–72)
NRBC # BLD AUTO: 0 K/UL
NRBC BLD-RTO: 0 /100 WBC
PLATELET # BLD AUTO: 315 K/UL (ref 164–446)
PMV BLD AUTO: 11.7 FL (ref 9–12.9)
POTASSIUM SERPL-SCNC: 5 MMOL/L (ref 3.6–5.5)
PROT SERPL-MCNC: 8.2 G/DL (ref 6–8.2)
RBC # BLD AUTO: 5.26 M/UL (ref 4.7–6.1)
SODIUM SERPL-SCNC: 135 MMOL/L (ref 135–145)
WBC # BLD AUTO: 10.1 K/UL (ref 4.8–10.8)

## 2021-08-21 PROCEDURE — 700111 HCHG RX REV CODE 636 W/ 250 OVERRIDE (IP): Performed by: EMERGENCY MEDICINE

## 2021-08-21 PROCEDURE — 82962 GLUCOSE BLOOD TEST: CPT

## 2021-08-21 PROCEDURE — 85025 COMPLETE CBC W/AUTO DIFF WBC: CPT

## 2021-08-21 PROCEDURE — 700105 HCHG RX REV CODE 258: Performed by: EMERGENCY MEDICINE

## 2021-08-21 PROCEDURE — 96374 THER/PROPH/DIAG INJ IV PUSH: CPT

## 2021-08-21 PROCEDURE — 99285 EMERGENCY DEPT VISIT HI MDM: CPT

## 2021-08-21 PROCEDURE — 80053 COMPREHEN METABOLIC PANEL: CPT

## 2021-08-21 PROCEDURE — 83690 ASSAY OF LIPASE: CPT

## 2021-08-21 PROCEDURE — 76705 ECHO EXAM OF ABDOMEN: CPT

## 2021-08-21 PROCEDURE — 74022 RADEX COMPL AQT ABD SERIES: CPT

## 2021-08-21 PROCEDURE — 94760 N-INVAS EAR/PLS OXIMETRY 1: CPT

## 2021-08-21 PROCEDURE — 700102 HCHG RX REV CODE 250 W/ 637 OVERRIDE(OP): Performed by: EMERGENCY MEDICINE

## 2021-08-21 PROCEDURE — 96375 TX/PRO/DX INJ NEW DRUG ADDON: CPT

## 2021-08-21 RX ORDER — SODIUM CHLORIDE 9 MG/ML
1000 INJECTION, SOLUTION INTRAVENOUS ONCE
Status: COMPLETED | OUTPATIENT
Start: 2021-08-21 | End: 2021-08-21

## 2021-08-21 RX ORDER — ONDANSETRON 2 MG/ML
4 INJECTION INTRAMUSCULAR; INTRAVENOUS ONCE
Status: COMPLETED | OUTPATIENT
Start: 2021-08-21 | End: 2021-08-21

## 2021-08-21 RX ORDER — MORPHINE SULFATE 4 MG/ML
4 INJECTION, SOLUTION INTRAMUSCULAR; INTRAVENOUS ONCE
Status: COMPLETED | OUTPATIENT
Start: 2021-08-21 | End: 2021-08-21

## 2021-08-21 RX ADMIN — INSULIN HUMAN 5 UNITS: 100 INJECTION, SOLUTION PARENTERAL at 03:24

## 2021-08-21 RX ADMIN — SODIUM CHLORIDE 1000 ML: 9 INJECTION, SOLUTION INTRAVENOUS at 03:25

## 2021-08-21 RX ADMIN — MORPHINE SULFATE 4 MG: 4 INJECTION INTRAVENOUS at 01:32

## 2021-08-21 RX ADMIN — ONDANSETRON 4 MG: 2 INJECTION INTRAMUSCULAR; INTRAVENOUS at 01:32

## 2021-08-21 RX ADMIN — SODIUM CHLORIDE 1000 ML: 9 INJECTION, SOLUTION INTRAVENOUS at 01:33

## 2021-08-21 ASSESSMENT — FIBROSIS 4 INDEX: FIB4 SCORE: 0.41

## 2021-08-21 ASSESSMENT — PAIN DESCRIPTION - PAIN TYPE
TYPE: ACUTE PAIN

## 2021-08-21 ASSESSMENT — PAIN DESCRIPTION - DESCRIPTORS: DESCRIPTORS: SHARP

## 2021-08-21 NOTE — ED NOTES
FSBG 199. Pt and family member at bedside verbalize understanding of discharge instructions and follow up/prescription . Pt able to ambulate out to ED lobby with all belongings.

## 2021-08-21 NOTE — ED TRIAGE NOTES
Chief Complaint   Patient presents with   • RUQ Pain     pt states his pain started around 1700, pain 4/10 currently, pt states pain was at 10/10 before, pt is type 1 diabetic blood sugar 267, pt has been having nausea and vomit since 1700     Pt ambulatory to triage for above complaint. Pt states he has a pancreatic enzyme insufficiency     Pt is alert/oriented and follows commands. Pt speaking in full sentences and responds appropriately to questions. No acute distress noted in triage and respirations are even and unlabored.     Pt placed in lobby and educated on triage process. Pt encouraged to alert staff for any changes in condition.

## 2021-08-21 NOTE — ED NOTES
Pt able to ambulate back to red 4 with a steady gait. Pt also reports he was diagnosed with pancreatic enzyme insufficiency recently by his GI doctor and has been taking enzymes PO at home for that.

## 2021-08-21 NOTE — ED PROVIDER NOTES
ED Provider Note        Primary care provider: Gregory Grande M.D.    I verified that the patient was wearing a mask and I was wearing appropriate PPE every time I entered the room. The patient's mask was on the patient at all times during my encounter except for a brief view of the oropharynx.      CHIEF COMPLAINT  Chief Complaint   Patient presents with   • RUQ Pain     pt states his pain started around 1700, pain 4/10 currently, pt states pain was at 10/10 before, pt is type 1 diabetic blood sugar 267, pt has been having nausea and vomit since 1700       HPI  Martinez Smith is a 20 y.o. male who presents to the Emergency Department with chief complaint of epigastric and right upper quadrant abdominal pain.  Patient reports that his lunch did not sit well today.  Had minor intermittent nausea and a couple episodes of emesis throughout the afternoon.  Approximately 5 PM the pain got much more severe radiated to the right upper quadrant more episodes of emesis.  10 out of 10 at that time.  Pain is somewhat subsided at this time but still 4 out of 10.  Minor ongoing nausea.  Patient does have a history of type 1 diabetes pancreatic insufficiency and celiac disease.  States that since he began vomiting his sugars been running moderately high in the mid 200s.  Slight chills no fever no headache altered mental status cough congestion chest pain or shortness of breath no other acute symptoms or concerns at this time.    REVIEW OF SYSTEMS  10 systems reviewed and otherwise negative, pertinent positives and negatives listed in the history of present illness.    PAST MEDICAL HISTORY   has a past medical history of Celiac disease and Diabetes type I (HCC).    SURGICAL HISTORY   has a past surgical history that includes hypospadias repair.    SOCIAL HISTORY  Social History     Tobacco Use   • Smoking status: Never Smoker   • Smokeless tobacco: Never Used   Vaping Use   • Vaping Use: Never used   Substance Use  "Topics   • Alcohol use: No   • Drug use: No      Social History     Substance and Sexual Activity   Drug Use No       FAMILY HISTORY  Non-Contributory    CURRENT MEDICATIONS  Home Medications     Reviewed by Saravanan Bundy R.N. (Registered Nurse) on 08/21/21 at 0019  Med List Status: Not Addressed   Medication Last Dose Status   amitriptyline (ELAVIL) 25 MG Tab  Active   CONTOUR NEXT TEST strip  Active   CONTOUR NEXT TEST strip  Active   guaiFENesin (MUCINEX PO)  Active   HUMALOG 100 UNIT/ML  Active   hydrOXYzine HCl (ATARAX) 25 MG Tab  Active   insulin infusion pump Device  Active   montelukast (SINGULAIR) 10 MG Tab  Active   Multiple Vitamins-Minerals (MULTIVITAMIN ADULTS PO)  Active   ondansetron (ZOFRAN ODT) 4 MG TABLET DISPERSIBLE  Active   PANCREAZE 11427-63060 units Cap DR Particles  Active   promethazine (PHENERGAN) 25 MG Suppos  Active   vitamin D (CHOLECALCIFEROL) 1000 UNIT Tab  Active                ALLERGIES  Allergies   Allergen Reactions   • Beef Flavor Nausea     Beef broth causes Nausea and vomiting    • Compazine      Anxiety    • Dairy Food Allergy Nausea   • Gluten Meal    • Tree Nuts Food Allergy Anaphylaxis, Shortness of Breath, Itching, Nausea and Cough     Cashews and pistachio       PHYSICAL EXAM  VITAL SIGNS: /82   Pulse (!) 106   Temp 37 °C (98.6 °F) (Temporal)   Resp 18   Ht 1.676 m (5' 6\")   Wt 57.7 kg (127 lb 3.3 oz)   SpO2 96% Comment: Room air  BMI 20.53 kg/m²   Pulse ox interpretation: I interpret this pulse ox as normal.  Constitutional: Alert and oriented x 3, moderate distress  HEENT: Atraumatic normocephalic, pupils are equal round, extraocular movements are intact. The nares is clear, external ears are normal, mouth shows dry mucous membranes  Neck: no obvious JVD or tracheal deviation  Cardiovascular: Tachycardic no murmur rub or gallop   Thorax & Lungs: No respiratory distress, no wheezes rales or rhonchi, No chest tenderness.   GI: Tender to palpation the " epigastrium the right upper quadrant no rebound or guarding positive bowel sounds nondistended  Skin: Warm dry no obvious acute rash or lesion  Musculoskeletal: Moving all extremities with normal range strength, no acute  deformity  Neurologic: Cranial nerves III through XII are grossly intact, no sensory deficit, no cerebellar dysfunction   Psychiatric: Appropriate affect for situation at this time      DIAGNOSTIC STUDIES / PROCEDURES  LABS      Results for orders placed or performed during the hospital encounter of 08/21/21   CBC WITH DIFFERENTIAL   Result Value Ref Range    WBC 10.1 4.8 - 10.8 K/uL    RBC 5.26 4.70 - 6.10 M/uL    Hemoglobin 15.9 14.0 - 18.0 g/dL    Hematocrit 46.9 42.0 - 52.0 %    MCV 89.2 81.4 - 97.8 fL    MCH 30.2 27.0 - 33.0 pg    MCHC 33.9 33.7 - 35.3 g/dL    RDW 39.1 35.9 - 50.0 fL    Platelet Count 315 164 - 446 K/uL    MPV 11.7 9.0 - 12.9 fL    Neutrophils-Polys 77.40 (H) 44.00 - 72.00 %    Lymphocytes 14.10 (L) 22.00 - 41.00 %    Monocytes 4.60 0.00 - 13.40 %    Eosinophils 2.80 0.00 - 6.90 %    Basophils 0.80 0.00 - 1.80 %    Immature Granulocytes 0.30 0.00 - 0.90 %    Nucleated RBC 0.00 /100 WBC    Neutrophils (Absolute) 7.80 (H) 1.82 - 7.42 K/uL    Lymphs (Absolute) 1.42 1.00 - 4.80 K/uL    Monos (Absolute) 0.46 0.00 - 0.85 K/uL    Eos (Absolute) 0.28 0.00 - 0.51 K/uL    Baso (Absolute) 0.08 0.00 - 0.12 K/uL    Immature Granulocytes (abs) 0.03 0.00 - 0.11 K/uL    NRBC (Absolute) 0.00 K/uL   COMP METABOLIC PANEL   Result Value Ref Range    Sodium 135 135 - 145 mmol/L    Potassium 5.0 3.6 - 5.5 mmol/L    Chloride 97 96 - 112 mmol/L    Co2 23 20 - 33 mmol/L    Anion Gap 15.0 7.0 - 16.0    Glucose 308 (H) 65 - 99 mg/dL    Bun 9 8 - 22 mg/dL    Creatinine 0.70 0.50 - 1.40 mg/dL    Calcium 9.8 8.5 - 10.5 mg/dL    AST(SGOT) 30 12 - 45 U/L    ALT(SGPT) 18 2 - 50 U/L    Alkaline Phosphatase 100 (H) 30 - 99 U/L    Total Bilirubin 0.6 0.1 - 1.5 mg/dL    Albumin 5.1 (H) 3.2 - 4.9 g/dL    Total  "Protein 8.2 6.0 - 8.2 g/dL    Globulin 3.1 1.9 - 3.5 g/dL    A-G Ratio 1.6 g/dL   LIPASE   Result Value Ref Range    Lipase 18 11 - 82 U/L   ESTIMATED GFR   Result Value Ref Range    GFR If African American >60 >60 mL/min/1.73 m 2    GFR If Non African American >60 >60 mL/min/1.73 m 2   POCT glucose device results   Result Value Ref Range    Glucose - Accu-Ck 199 (H) 65 - 99 mg/dL       All labs reviewed by me.      RADIOLOGY  US-RUQ   Final Result      Negative right upper quadrant ultrasound.      DX-ABDOMEN COMPLETE WITH AP OR PA CXR   Final Result      1.  No acute cardiopulmonary abnormality.   2. Nonobstructive bowel gas pattern.        The radiologist's interpretation of all radiological studies have been reviewed by me.    COURSE & MEDICAL DECISION MAKING  Pertinent Labs & Imaging studies reviewed. (See chart for details)    12:54 AM - Patient seen and examined at bedside.       Patient noted to have slightly elevated blood pressure likely circumstantial secondary to presenting complaint. Referred to primary care physician for further evaluation.     Patient was given IV fluids based on tachycardia dry mucous membranes concern for GI losses, oral hydration was not attempted due to insufficiency for hydration, after fluids had improvement of symptoms and vital signs      Medical Decision Making: X-ray negative right upper quadrant ultrasound negative labs as above are only remarkable for hyperglycemia.  Patient was given 2 L of fluids 5 units IV insulin is sugars are vastly improving his symptoms have completely resolved.  Patient understands return for worsening pain nausea vomiting blood in emesis blood in stools fevers chills any other acute symptoms or concerns otherwise observe sugars closely over the next couple days.  Discharged in stable and improved condition.    /59   Pulse 84   Temp 36.8 °C (98.2 °F) (Temporal)   Resp 13   Ht 1.676 m (5' 6\")   Wt 57.7 kg (127 lb 3.3 oz)   SpO2 98%   " BMI 20.53 kg/m²     Gregory Grande M.D.  [address]  [address]  [facility] [address]  [phone]    In 2 days  if symptoms persist    [facility], Emergency Dept  [address] [facility] [address] [phone]    in 12-24 hours if symptoms persist, immediately If symptoms worsen, or if you develop any other symptoms or concerns          FINAL IMPRESSION  1. Abdominal pain, unspecified abdominal location Active   2. Hyperglycemia Active         This dictation has been created using voice recognition software and/or scribes. The accuracy of the dictation is limited by the abilities of the software and the expertise of the scribes. I expect there may be some errors of grammar and possibly content. I made every attempt to manually correct the errors within my dictation. However, errors related to voice recognition software and/or scribes may still exist and should be interpreted within the appropriate context.

## 2021-08-21 NOTE — ED NOTES
Pt medicated per MAR. Resting in [facility], no acute distress noted at this time, call light in reach.

## 2021-11-03 LAB
25(OH)D3+25(OH)D2 SERPL-MCNC: 52.4 NG/ML (ref 30–100)
ALBUMIN SERPL-MCNC: 4.8 G/DL (ref 4.1–5.2)
ALBUMIN/GLOB SERPL: 1.8 {RATIO} (ref 1.2–2.2)
ALP SERPL-CCNC: 105 IU/L (ref 44–121)
ALT SERPL-CCNC: 18 IU/L (ref 0–44)
AST SERPL-CCNC: 20 IU/L (ref 0–40)
BILIRUB SERPL-MCNC: 0.5 MG/DL (ref 0–1.2)
BUN SERPL-MCNC: 8 MG/DL (ref 6–20)
BUN/CREAT SERPL: 10 (ref 9–20)
CALCIUM SERPL-MCNC: 9.7 MG/DL (ref 8.7–10.2)
CHLORIDE SERPL-SCNC: 94 MMOL/L (ref 96–106)
CHOLEST SERPL-MCNC: 179 MG/DL (ref 100–199)
CO2 SERPL-SCNC: 23 MMOL/L (ref 20–29)
CREAT SERPL-MCNC: 0.81 MG/DL (ref 0.76–1.27)
GLOBULIN SER CALC-MCNC: 2.7 G/DL (ref 1.5–4.5)
GLUCOSE SERPL-MCNC: 243 MG/DL (ref 65–99)
HDLC SERPL-MCNC: 44 MG/DL
LABORATORY COMMENT REPORT: ABNORMAL
LDLC SERPL CALC-MCNC: 115 MG/DL (ref 0–99)
POTASSIUM SERPL-SCNC: 5 MMOL/L (ref 3.5–5.2)
PROT SERPL-MCNC: 7.5 G/DL (ref 6–8.5)
SODIUM SERPL-SCNC: 131 MMOL/L (ref 134–144)
TRIGL SERPL-MCNC: 110 MG/DL (ref 0–149)
VLDLC SERPL CALC-MCNC: 20 MG/DL (ref 5–40)

## 2021-11-09 ENCOUNTER — NON-PROVIDER VISIT (OUTPATIENT)
Dept: ENDOCRINOLOGY | Facility: MEDICAL CENTER | Age: 21
End: 2021-11-09
Attending: INTERNAL MEDICINE
Payer: COMMERCIAL

## 2021-11-09 VITALS
OXYGEN SATURATION: 100 % | SYSTOLIC BLOOD PRESSURE: 100 MMHG | WEIGHT: 130 LBS | HEART RATE: 88 BPM | HEIGHT: 66 IN | BODY MASS INDEX: 20.89 KG/M2 | DIASTOLIC BLOOD PRESSURE: 62 MMHG

## 2021-11-09 DIAGNOSIS — E10.65 TYPE 1 DIABETES MELLITUS WITH HYPERGLYCEMIA (HCC): ICD-10-CM

## 2021-11-09 LAB
HBA1C MFR BLD: 7.3 % (ref 0–5.6)
INT CON NEG: ABNORMAL
INT CON POS: ABNORMAL

## 2021-11-09 PROCEDURE — 83036 HEMOGLOBIN GLYCOSYLATED A1C: CPT

## 2021-11-09 PROCEDURE — 99211 OFF/OP EST MAY X REQ PHY/QHP: CPT | Performed by: INTERNAL MEDICINE

## 2021-11-09 ASSESSMENT — FIBROSIS 4 INDEX: FIB4 SCORE: 0.33

## 2021-11-09 NOTE — PROGRESS NOTES
RN-CDE Note    Subjective:   Endocrinology Clinic Progress Note  PCP: Gregory Grande M.D.    HPI:  Martinez Smith is a 20 y.o. old patient who is seen today for review of type 1 Diabetes.   Recent changes in health: States under a lot of stress and having allergies and stomach issues.  DM:   Last A1c:   Lab Results   Component Value Date/Time    HBA1C 7.3 (A) 11/09/2021 09:32 AM      Previous A1c was 7.3 on 8/6/21  A1C GOAL: < 7    Diabetes Medications:   Humalog in pump  MN= .625  0300=.600  12 pm=.575  1600= .575  Carbohydrate ratio:  MN=8  9 am=10  Sensitivity=45  Lotxul=276-371    Exercise: Walking  Diet: carbohydrate counting  Patient's body mass index is 20.98 kg/m². Exercise and nutrition counseling were performed at this visit.    Glucose monitoring frequency: See pump download  Testing 8-14 times daily    Hypoglycemic episodes: no catching early  Last Retinal Exam: May 2021 with Dr. Hernandez  Daily Foot Exam: Yes   Foot Exam:  Monofilament: done  Monofilament testing with a 10 gram force: sensation intact: intact bilaterally  Visual Inspection: Feet without maceration, ulcers, fissures.  Pedal pulses: intact bilaterally   Lab Results   Component Value Date/Time    MALBCRT see below 07/13/2019 08:00 AM    MICROALBUR <0.7 07/13/2019 08:00 AM    MICRALB 3.7 04/01/2021 05:36 AM     He  reports that he has never smoked. He has never used smokeless tobacco.      Plan:     Discussed and educated on:   - All medications, side effects and compliance (discussed carefully)  - Annual eye examinations at Ophthalmology  - Home glucose monitoring emphasized  - Weight control and daily exercise    Recommended medication changes: He will try taking his breakfast insulin earlier before his cereal and then adjust the ratio as needed.   He will follow up with Dr. Benoit in 3 months.

## 2022-01-17 ENCOUNTER — OFFICE VISIT (OUTPATIENT)
Dept: URGENT CARE | Facility: PHYSICIAN GROUP | Age: 22
End: 2022-01-17
Payer: COMMERCIAL

## 2022-01-17 VITALS
WEIGHT: 126.2 LBS | BODY MASS INDEX: 20.28 KG/M2 | OXYGEN SATURATION: 98 % | HEART RATE: 109 BPM | TEMPERATURE: 98.9 F | RESPIRATION RATE: 16 BRPM | HEIGHT: 66 IN | DIASTOLIC BLOOD PRESSURE: 70 MMHG | SYSTOLIC BLOOD PRESSURE: 100 MMHG

## 2022-01-17 DIAGNOSIS — H60.503 ACUTE OTITIS EXTERNA OF BOTH EARS, UNSPECIFIED TYPE: ICD-10-CM

## 2022-01-17 DIAGNOSIS — H61.23 BILATERAL HEARING LOSS DUE TO CERUMEN IMPACTION: ICD-10-CM

## 2022-01-17 PROCEDURE — 99213 OFFICE O/P EST LOW 20 MIN: CPT | Performed by: PHYSICIAN ASSISTANT

## 2022-01-17 RX ORDER — NEOMYCIN SULFATE, POLYMYXIN B SULFATE AND HYDROCORTISONE 10; 3.5; 1 MG/ML; MG/ML; [USP'U]/ML
4 SUSPENSION/ DROPS AURICULAR (OTIC) 4 TIMES DAILY
Qty: 16 ML | Refills: 0 | Status: SHIPPED | OUTPATIENT
Start: 2022-01-17 | End: 2022-01-27

## 2022-01-17 ASSESSMENT — ENCOUNTER SYMPTOMS
VOMITING: 0
FEVER: 0
SORE THROAT: 0
COUGH: 0
ABDOMINAL PAIN: 0
NAUSEA: 1
DIARRHEA: 0
CONSTIPATION: 0
HEADACHES: 0
EYE PAIN: 0
CHILLS: 0
DIZZINESS: 1
MYALGIAS: 0
SHORTNESS OF BREATH: 0

## 2022-01-17 ASSESSMENT — FIBROSIS 4 INDEX: FIB4 SCORE: 0.34

## 2022-01-17 NOTE — PROGRESS NOTES
"Subjective:   Martinez Smith is a 21 y.o. male who presents for Vertigo (bilateral ear pain, lightheadedness, nausea, x1 week )      21-year-old male presents urgent care noticing bilateral ear discomfort, occasional lightheadedness, occasional vertigo and occasional nausea mainly for 1 week.  Seems to be worse when he is wearing headphones or obtaining or similar object that places pressure on the ears.  He notes mildly decreased hearing.  He also notes he is noticed some lower levels on his glucometer, he reports that he has had several incidences where his glucose is gone in the 60s and 1 or 2 times into the 50s.      Review of Systems   Constitutional: Negative for chills and fever.   HENT: Positive for ear pain and hearing loss. Negative for congestion and sore throat.    Eyes: Negative for pain.   Respiratory: Negative for cough and shortness of breath.    Cardiovascular: Negative for chest pain.   Gastrointestinal: Positive for nausea. Negative for abdominal pain, constipation, diarrhea and vomiting.   Genitourinary: Negative for dysuria.   Musculoskeletal: Negative for myalgias.   Skin: Negative for rash.   Neurological: Positive for dizziness. Negative for headaches.       Medications, Allergies, and current problem list reviewed today in Epic.     Objective:     /70 (BP Location: Left arm, Patient Position: Sitting, BP Cuff Size: Adult)   Pulse (!) 109   Temp 37.2 °C (98.9 °F) (Temporal)   Resp 16   Ht 1.676 m (5' 6\")   Wt 57.2 kg (126 lb 3.2 oz)   SpO2 98%     Physical Exam  Vitals reviewed.   Constitutional:       Appearance: Normal appearance.   HENT:      Head: Normocephalic and atraumatic.      Right Ear: External ear normal. There is impacted cerumen.      Left Ear: External ear normal. There is impacted cerumen.      Ears:      Comments: Impacted cerumen bilaterally, after lavage the canal was found to be erythematous and edematous.  TM is pearly gray, no loss of landmarks     " Nose: Nose normal.      Mouth/Throat:      Mouth: Mucous membranes are moist.   Eyes:      Conjunctiva/sclera: Conjunctivae normal.   Cardiovascular:      Rate and Rhythm: Normal rate.   Pulmonary:      Effort: Pulmonary effort is normal.   Skin:     General: Skin is warm and dry.      Capillary Refill: Capillary refill takes less than 2 seconds.   Neurological:      Mental Status: He is alert and oriented to person, place, and time.         Assessment/Plan:     Diagnosis and associated orders:     1. Acute otitis externa of both ears, unspecified type  neomycin-polymyxin-HC (PEDIOTIC HC) [id number] Suspension   2. Bilateral hearing loss due to cerumen impaction        Comments/MDM:     • I will reach out to the patient's endocrinologist, I recommend that he consider lowering his basal insulin level given the low readings.  I its likely the contributory factor to his nausea and his dizziness.  We will prescribe Pediotic drops.  Lavaged and I was eventually successful in releasing the cerumen.  Recommend against the use of ovary or any other devices until symptoms have resolved completely         Differential diagnosis, natural history, supportive care, and indications for immediate follow-up discussed.    Advised the patient to follow-up with the primary care physician for recheck, reevaluation, and consideration of further management.    Please note that this dictation was created using voice recognition software. I have made a reasonable attempt to correct obvious errors, but I expect that there are errors of grammar and possibly content that I did not discover before finalizing the note.    This note was electronically signed by Wayne Atkins PA-C

## 2022-01-19 ENCOUNTER — OFFICE VISIT (OUTPATIENT)
Dept: ENDOCRINOLOGY | Facility: MEDICAL CENTER | Age: 22
End: 2022-01-19
Attending: INTERNAL MEDICINE
Payer: COMMERCIAL

## 2022-01-19 VITALS
SYSTOLIC BLOOD PRESSURE: 100 MMHG | OXYGEN SATURATION: 100 % | HEART RATE: 121 BPM | HEIGHT: 66 IN | RESPIRATION RATE: 16 BRPM | WEIGHT: 127.2 LBS | DIASTOLIC BLOOD PRESSURE: 64 MMHG | BODY MASS INDEX: 20.44 KG/M2

## 2022-01-19 DIAGNOSIS — Z96.41 INSULIN PUMP STATUS: ICD-10-CM

## 2022-01-19 DIAGNOSIS — Z79.4 LONG-TERM INSULIN USE (HCC): ICD-10-CM

## 2022-01-19 DIAGNOSIS — E55.9 VITAMIN D DEFICIENCY: ICD-10-CM

## 2022-01-19 DIAGNOSIS — E10.649 TYPE 1 DIABETES MELLITUS WITH HYPOGLYCEMIA AND WITHOUT COMA (HCC): ICD-10-CM

## 2022-01-19 PROCEDURE — 99212 OFFICE O/P EST SF 10 MIN: CPT | Performed by: INTERNAL MEDICINE

## 2022-01-19 PROCEDURE — 99214 OFFICE O/P EST MOD 30 MIN: CPT | Performed by: INTERNAL MEDICINE

## 2022-01-19 ASSESSMENT — FIBROSIS 4 INDEX: FIB4 SCORE: 0.34

## 2022-01-19 ASSESSMENT — PATIENT HEALTH QUESTIONNAIRE - PHQ9: CLINICAL INTERPRETATION OF PHQ2 SCORE: 0

## 2022-01-19 NOTE — PROGRESS NOTES
CHIEF COMPLAINT: Patient is here for follow up of Type 1 Diabetes Mellitus.    HPI:     Martinez Smith is a 19 y.o. male with Type 1 Diabetes Mellitus here for follow up.    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  He is testing BG 10x a day with a contour next BG meter.     He has a history of chronic abdominal pain and nausea and vomiting with a negative extensive work-up for pancreatitis, gastritis and ultimately was discovered that he has pancreatic enzyme deficiency .        Basal rate  00:00 0.625  0300  0.600  1200 0.575     Carb ratio  00:00  11     ICF  1:45 above 130     Active insulin time 3 hours     Average  (190, 140)  Basal 44%  Bolus 56%    He was seen in the urgent care due to earache but now feels better  He also reported lows and the PA reached out to me  His original appointment was in March but he wanted to be seen sooner because of the low sugars  Download of his pump shows lows in between meals  He reports that he is eating less carbs and his insulin requirements are much lower    He no longer has abdominal pain and discovered that he has an egg allergy and this was the root underlying cause of his abdominal pain        His lipids are at goal and he does not need statin therapy because he is young  He doesn't have diabetic kidney disease on April 2021      He just had a eye exam with Dr. Noguera last month but we do not have records    His most recent labs showed normal TSH of 2.0 on April 2021  He has vitamin D deficiency and it improved from 22 to 64 on April 2021      BG Diary:   Pump was download and reviewed today  He  uses contour next test strips which is the only meter that works with his pump  He tested sugars 8 times a day and is needing a prior authorization for his Contour next test strips    Weight has been stable    Diabetes Complications   Retinopathy: No known retinopathy.  Last eye exam: 5/2020 Dr. Noguera  We are requesting records of his eye exam from Dr. Noguera    Neuropathy: Denies paresthesias or numbness in hands or feet. Denies any foot wounds.  Exercise: Minimal.  Diet: Fair.  Patient's medications, allergies, and social histories were reviewed and updated as appropriate.    ROS:     CONS:     No fever, no chills   EYES:     No diplopia, no blurry vision   CV:           No chest pain, no palpitations   PULM:     No SOB, no cough, no hemoptysis.   GI:            No nausea, no vomiting, no diarrhea, no constipation   ENDO:     No polyuria, no polydipsia, no heat intolerance, no cold intolerance       Past Medical History:  Problem List:  2022-01: Type 1 diabetes mellitus with hypoglycemia and without coma   ([facility])  2021-08: Exocrine pancreatic insufficiency  2020-09: Chronic abdominal pain  2020-09: Vitamin D deficiency  2020-07: RUQ abdominal pain  2019-12: Diabetic gastroparesis ([facility])  2019-12: Long-term insulin use ([facility])  2019-12: Fitting and adjustment of insulin pump  2019-12: Intractable nausea and vomiting  2018-01: Hyperlipidemia  2018-01: Other mechanical complication of insulin pump  2017-06: Insulin pump status  2017-06: Type 1 diabetes mellitus with hyperglycemia ([facility])  2017-06: Celiac disease  2017-06: Weakness of right lower extremity  2017-06: Lipohyperplasia  2017-06: Dyslipidemia      Past Surgical History:  Past Surgical History:   Procedure Laterality Date   • HYPOSPADIAS REPAIR          Allergies:  Beef flavor; Gluten meal; and Tree nuts food allergy     Social History:  Social History     Tobacco Use   • Smoking status: Never Smoker   • Smokeless tobacco: Never Used   Vaping Use   • Vaping Use:  "Never used   Substance Use Topics   • Alcohol use: No   • Drug use: No        Family History:   family history includes Arthritis in his maternal grandmother.      PHYSICAL EXAM:   OBJECTIVE:  Vital signs: /64 (BP Location: Left arm, Patient Position: Sitting, BP Cuff Size: Adult)   Pulse (!) 121   Resp 16   Ht 1.676 m (5' 6\")   Wt 57.7 kg (127 lb 3.2 oz)   SpO2 100%   BMI 20.53 kg/m²   GENERAL: Well-developed, well-nourished in no apparent distress.   EYE:  No ocular asymmetry, PERRLA  HENT: Pink, moist mucous membranes.    NECK: No thyromegaly.   CARDIOVASCULAR: Normal precordial impulse seen with normal carotid pulsation  LUNGS: Symmetrical chest expansion with normal phonation of voice   ABDOMEN: non obese abdomen with no visible organomegaly right upper quadrant pain tenderness noted with direct palpation  EXTREMITIES: No clubbing, cyanosis, or edema.   NEUROLOGICAL: No gross focal motor abnormalities   LYMPH: No cervical adenopathy seen.   SKIN: No rashes, lesions.         Labs:  Lab Results   Component Value Date/Time    HBA1C 7.3 (A) 11/09/2021 09:32 AM        Lab Results   Component Value Date/Time    WBC 10.1 08/21/2021 01:09 AM    RBC 5.26 08/21/2021 01:09 AM    HEMOGLOBIN 15.9 08/21/2021 01:09 AM    MCV 89.2 08/21/2021 01:09 AM    MCH 30.2 08/21/2021 01:09 AM    MCHC 33.9 08/21/2021 01:09 AM    RDW 39.1 08/21/2021 01:09 AM    MPV 11.7 08/21/2021 01:09 AM       Lab Results   Component Value Date/Time    SODIUM 131 (L) 11/02/2021 06:29 AM    SODIUM 135 08/21/2021 01:09 AM    POTASSIUM 5.0 11/02/2021 06:29 AM    POTASSIUM 5.0 08/21/2021 01:09 AM    CHLORIDE 94 (L) 11/02/2021 06:29 AM    CHLORIDE 97 08/21/2021 01:09 AM    CO2 23 11/02/2021 06:29 AM    CO2 23 08/21/2021 01:09 AM    ANION 15.0 08/21/2021 01:09 AM    GLUCOSE 243 (H) 11/02/2021 06:29 AM    GLUCOSE 308 (H) 08/21/2021 01:09 AM    BUN 8 11/02/2021 06:29 AM    BUN 9 08/21/2021 01:09 AM    CREATININE 0.81 11/02/2021 06:29 AM    CREATININE " 0.70 08/21/2021 01:09 AM    CREATININE 0.4 05/25/2007 04:40 AM    CALCIUM 9.7 11/02/2021 06:29 AM    CALCIUM 9.8 08/21/2021 01:09 AM    ASTSGOT 20 11/02/2021 06:29 AM    ASTSGOT 30 08/21/2021 01:09 AM    ALTSGPT 18 11/02/2021 06:29 AM    ALTSGPT 18 08/21/2021 01:09 AM    TBILIRUBIN 0.5 11/02/2021 06:29 AM    TBILIRUBIN 0.6 08/21/2021 01:09 AM    ALBUMIN 4.8 11/02/2021 06:29 AM    ALBUMIN 5.1 (H) 08/21/2021 01:09 AM    ALBUMIN 4.80 10/22/2016 09:46 AM    TOTPROTEIN 7.5 11/02/2021 06:29 AM    TOTPROTEIN 8.2 08/21/2021 01:09 AM    TOTPROTEIN 7.70 10/22/2016 09:46 AM    GLOBULIN 2.7 11/02/2021 06:29 AM    GLOBULIN 3.1 08/21/2021 01:09 AM    AGRATIO 1.8 11/02/2021 06:29 AM    AGRATIO 1.6 08/21/2021 01:09 AM       Lab Results   Component Value Date/Time    CHOLSTRLTOT 177 12/24/2019 0729    TRIGLYCERIDE 103 12/24/2019 0729    HDL 28 (A) 12/24/2019 0729     (H) 12/24/2019 0729       Lab Results   Component Value Date/Time    MALBCRT see below 07/13/2019 08:00 AM    MICROALBUR <0.7 07/13/2019 08:00 AM    MICRALB 3.7 04/01/2021 05:36 AM        Lab Results   Component Value Date/Time    TSHULTRASEN 2.140 09/14/2019 0727     No results found for: FREEDIR  Lab Results   Component Value Date/Time    FREET3 3.53 09/14/2019 0727     No results found for: THYSTIMIG        ASSESSMENT/PLAN:     1. Type 1 diabetes mellitus with hyperglycemia (HCC)  Controlled  A1c is 7.3%  Based on rule of 450   I adjusted his carb ratio back to 14 now that he is eating less carbs and insulin requirements are smaller  Based on rule of 1700 I adjusted his sensitivity back to 50  I want him to follow-up in March.  Repeat of his serum creatinine, fasting lipids, TSH and urine albumin    2. Insulin pump status  Insulin pump is in place    3. Vitamin D deficiency  Controlled  Continue vitamin D3 5000 units daily  Repeat calcium and 25-hydroxy vitamin levels after 3 months    4. Long-term insulin use (HCC)  Patient is on long-term insulin therapy due  to type 1 diabetes      Return in about 2 months (around 3/19/2022).      Thank you kindly for allowing me to participate in the diabetes care plan for this patient.    Gabo Benoit MD, [facility], [facility]  04/17/20    CC:   Gregory Grande M.D.

## 2022-02-19 LAB
25(OH)D3+25(OH)D2 SERPL-MCNC: 37.5 NG/ML (ref 30–100)
ALBUMIN SERPL-MCNC: 5 G/DL (ref 4.1–5.2)
ALBUMIN/CREAT UR: <5 MG/G CREAT (ref 0–29)
ALBUMIN/GLOB SERPL: 1.9 {RATIO} (ref 1.2–2.2)
ALP SERPL-CCNC: 88 IU/L (ref 44–121)
ALT SERPL-CCNC: 14 IU/L (ref 0–44)
AST SERPL-CCNC: 21 IU/L (ref 0–40)
BILIRUB SERPL-MCNC: 0.6 MG/DL (ref 0–1.2)
BUN SERPL-MCNC: 5 MG/DL (ref 6–20)
BUN/CREAT SERPL: 7 (ref 9–20)
CALCIUM SERPL-MCNC: 10.1 MG/DL (ref 8.7–10.2)
CHLORIDE SERPL-SCNC: 96 MMOL/L (ref 96–106)
CHOLEST SERPL-MCNC: 181 MG/DL (ref 100–199)
CO2 SERPL-SCNC: 23 MMOL/L (ref 20–29)
CREAT SERPL-MCNC: 0.7 MG/DL (ref 0.76–1.27)
CREAT UR-MCNC: 55.3 MG/DL
GLOBULIN SER CALC-MCNC: 2.7 G/DL (ref 1.5–4.5)
GLUCOSE SERPL-MCNC: 183 MG/DL (ref 65–99)
HDLC SERPL-MCNC: 41 MG/DL
LABORATORY COMMENT REPORT: ABNORMAL
LDLC SERPL CALC-MCNC: 120 MG/DL (ref 0–99)
MICROALBUMIN UR-MCNC: <3 UG/ML
POTASSIUM SERPL-SCNC: 4.5 MMOL/L (ref 3.5–5.2)
PROT SERPL-MCNC: 7.7 G/DL (ref 6–8.5)
SODIUM SERPL-SCNC: 136 MMOL/L (ref 134–144)
TRIGL SERPL-MCNC: 111 MG/DL (ref 0–149)
TSH SERPL DL<=0.005 MIU/L-ACNC: 2.93 UIU/ML (ref 0.45–4.5)
VLDLC SERPL CALC-MCNC: 20 MG/DL (ref 5–40)

## 2022-03-04 ENCOUNTER — OFFICE VISIT (OUTPATIENT)
Dept: ENDOCRINOLOGY | Facility: MEDICAL CENTER | Age: 22
End: 2022-03-04
Attending: INTERNAL MEDICINE
Payer: COMMERCIAL

## 2022-03-04 VITALS
SYSTOLIC BLOOD PRESSURE: 108 MMHG | HEART RATE: 88 BPM | DIASTOLIC BLOOD PRESSURE: 70 MMHG | HEIGHT: 66 IN | OXYGEN SATURATION: 99 % | WEIGHT: 128 LBS | BODY MASS INDEX: 20.57 KG/M2

## 2022-03-04 DIAGNOSIS — Z96.41 INSULIN PUMP STATUS: ICD-10-CM

## 2022-03-04 DIAGNOSIS — Z79.4 LONG-TERM INSULIN USE (HCC): ICD-10-CM

## 2022-03-04 DIAGNOSIS — K90.0 CELIAC DISEASE: ICD-10-CM

## 2022-03-04 DIAGNOSIS — E55.9 VITAMIN D DEFICIENCY: ICD-10-CM

## 2022-03-04 DIAGNOSIS — E10.649 TYPE 1 DIABETES MELLITUS WITH HYPOGLYCEMIA AND WITHOUT COMA (HCC): ICD-10-CM

## 2022-03-04 LAB
HBA1C MFR BLD: 7 % (ref 0–5.6)
INT CON NEG: ABNORMAL
INT CON POS: ABNORMAL

## 2022-03-04 PROCEDURE — 99213 OFFICE O/P EST LOW 20 MIN: CPT | Performed by: INTERNAL MEDICINE

## 2022-03-04 PROCEDURE — 99214 OFFICE O/P EST MOD 30 MIN: CPT | Performed by: INTERNAL MEDICINE

## 2022-03-04 PROCEDURE — 83036 HEMOGLOBIN GLYCOSYLATED A1C: CPT | Performed by: INTERNAL MEDICINE

## 2022-03-04 RX ORDER — IBUPROFEN 600 MG/1
1 TABLET ORAL PRN
Qty: 1 KIT | Refills: 1 | Status: SHIPPED | OUTPATIENT
Start: 2022-03-04 | End: 2022-09-09 | Stop reason: SDUPTHER

## 2022-03-04 ASSESSMENT — FIBROSIS 4 INDEX: FIB4 SCORE: 0.41

## 2022-03-05 NOTE — PROGRESS NOTES
CHIEF COMPLAINT: Patient is here for follow up of Type 1 Diabetes Mellitus.    HPI:     Martinez Smith is a 19 y.o. male with Type 1 Diabetes Mellitus here for follow up.    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  He is testing BG 10x a day with a contour next BG meter.     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 He also diagnosed himself with food allergies.     He also has celiac disease, he is on a gluten free diet since age of 11         Basal rate  00:00 0.625  0300  0.600  1200 0.575     Carb ratio  00:00  14     ICF  1:50 above 130     Active insulin time 3 hours     Average  (190, 140)  Basal 44%  Bolus 56%        He reports less hypoglycemia since we adjusted his carb ratio and ISF    He denies recurrence of severe abdominal pain.    He is watching his diet due t food allergies  He is trying to finish his masters at [employer]      His lipids are at goal and he does not need statin therapy because he is young  His LDL-C was 120 on Feb 2022      He doesn't have diabetic kidney disease  UACR was < 30 on 2/18/2022      He just had a eye exam with Dr. Noguera last month but we do not have records    His most recent labs showed normal TSH of 2.0 on April 2021    His vitamin D is 37.5 on 2/2022        BG Diary:   Pump was download and reviewed today  He uses contour next test strips which is the only meter that works with his pump  He tested sugars 8 times a day and is needing a prior  authorization for his Contour next test strips    Weight has been stable    Diabetes Complications   Retinopathy: No known retinopathy.  Last eye exam: 5/2020 Dr. Noguera  We are requesting records of his eye exam from Dr. Noguera    Neuropathy: Denies paresthesias or numbness in hands or feet. Denies any foot wounds.  Exercise: Minimal.  Diet: Fair.  Patient's medications, allergies, and social histories were reviewed and updated as appropriate.    ROS:     CONS:     No fever, no chills   EYES:     No diplopia, no blurry vision   CV:           No chest pain, no palpitations   PULM:     No SOB, no cough, no hemoptysis.   GI:            No nausea, no vomiting, no diarrhea, no constipation   ENDO:     No polyuria, no polydipsia, no heat intolerance, no cold intolerance       Past Medical History:  Problem List:  2022-01: Type 1 diabetes mellitus with hypoglycemia and without coma   ([facility])  2021-08: Exocrine pancreatic insufficiency  2020-09: Chronic abdominal pain  2020-09: Vitamin D deficiency  2020-07: RUQ abdominal pain  2019-12: Diabetic gastroparesis ([facility])  2019-12: Long-term insulin use ([facility])  2019-12: Fitting and adjustment of insulin pump  2019-12: Intractable nausea and vomiting  2018-01: Hyperlipidemia  2018-01: Other mechanical complication of insulin pump  2017-06: Insulin pump status  2017-06: Type 1 diabetes mellitus with hyperglycemia ([facility])  2017-06: Celiac disease  2017-06: Weakness of right lower extremity  2017-06: Lipohyperplasia  2017-06: Dyslipidemia      Past Surgical History:  Past Surgical History:   Procedure Laterality Date   • HYPOSPADIAS REPAIR          Allergies:  Beef flavor; Gluten meal; and Tree nuts food allergy     Social History:  Social History     Tobacco Use   • Smoking status: Never Smoker   • Smokeless tobacco: Never Used   Vaping Use   • Vaping Use: Never used   Substance Use Topics   • Alcohol use: No   • Drug use: No        Family History:   family history includes Arthritis in  "his maternal grandmother.      PHYSICAL EXAM:   OBJECTIVE:  Vital signs: /70   Pulse 88   Ht 1.676 m (5' 6\")   Wt 58.1 kg (128 lb)   SpO2 99%   BMI 20.66 kg/m²   GENERAL: Well-developed, well-nourished in no apparent distress.   EYE:  No ocular asymmetry, PERRLA  HENT: Pink, moist mucous membranes.    NECK: No thyromegaly.   CARDIOVASCULAR: Normal precordial impulse seen with normal carotid pulsation  LUNGS: Symmetrical chest expansion with normal phonation of voice   ABDOMEN: non obese abdomen with no visible organomegaly right upper quadrant pain tenderness noted with direct palpation  EXTREMITIES: No clubbing, cyanosis, or edema.   NEUROLOGICAL: No gross focal motor abnormalities   LYMPH: No cervical adenopathy seen.   SKIN: No rashes, lesions.         Labs:  Lab Results   Component Value Date/Time    HBA1C 7.0 (A) 03/04/2022 04:34 PM        Lab Results   Component Value Date/Time    WBC 10.1 08/21/2021 01:09 AM    RBC 5.26 08/21/2021 01:09 AM    HEMOGLOBIN 15.9 08/21/2021 01:09 AM    MCV 89.2 08/21/2021 01:09 AM    MCH 30.2 08/21/2021 01:09 AM    MCHC 33.9 08/21/2021 01:09 AM    RDW 39.1 08/21/2021 01:09 AM    MPV 11.7 08/21/2021 01:09 AM       Lab Results   Component Value Date/Time    SODIUM 136 02/18/2022 04:48 AM    SODIUM 135 08/21/2021 01:09 AM    POTASSIUM 4.5 02/18/2022 04:48 AM    POTASSIUM 5.0 08/21/2021 01:09 AM    CHLORIDE 96 02/18/2022 04:48 AM    CHLORIDE 97 08/21/2021 01:09 AM    CO2 23 02/18/2022 04:48 AM    CO2 23 08/21/2021 01:09 AM    ANION 15.0 08/21/2021 01:09 AM    GLUCOSE 183 (H) 02/18/2022 04:48 AM    GLUCOSE 308 (H) 08/21/2021 01:09 AM    BUN 5 (L) 02/18/2022 04:48 AM    BUN 9 08/21/2021 01:09 AM    CREATININE 0.70 (L) 02/18/2022 04:48 AM    CREATININE 0.70 08/21/2021 01:09 AM    CREATININE 0.4 05/25/2007 04:40 AM    CALCIUM 10.1 02/18/2022 04:48 AM    CALCIUM 9.8 08/21/2021 01:09 AM    ASTSGOT 21 02/18/2022 04:48 AM    ASTSGOT 30 08/21/2021 01:09 AM    ALTSGPT 14 02/18/2022 " 04:48 AM    ALTSGPT 18 08/21/2021 01:09 AM    TBILIRUBIN 0.6 02/18/2022 04:48 AM    TBILIRUBIN 0.6 08/21/2021 01:09 AM    ALBUMIN 5.0 02/18/2022 04:48 AM    ALBUMIN 5.1 (H) 08/21/2021 01:09 AM    ALBUMIN 4.80 10/22/2016 09:46 AM    TOTPROTEIN 7.7 02/18/2022 04:48 AM    TOTPROTEIN 8.2 08/21/2021 01:09 AM    TOTPROTEIN 7.70 10/22/2016 09:46 AM    GLOBULIN 2.7 02/18/2022 04:48 AM    GLOBULIN 3.1 08/21/2021 01:09 AM    AGRATIO 1.9 02/18/2022 04:48 AM    AGRATIO 1.6 08/21/2021 01:09 AM       Lab Results   Component Value Date/Time    CHOLSTRLTOT 177 12/24/2019 0729    TRIGLYCERIDE 103 12/24/2019 0729    HDL 28 (A) 12/24/2019 0729     (H) 12/24/2019 0729       Lab Results   Component Value Date/Time    MALBCRT see below 07/13/2019 08:00 AM    MICROALBUR <0.7 07/13/2019 08:00 AM    MICRALB <3.0 02/18/2022 04:48 AM        Lab Results   Component Value Date/Time    TSHULTRASEN 2.140 09/14/2019 0727     No results found for: FREEDIR  Lab Results   Component Value Date/Time    FREET3 3.53 09/14/2019 0727     No results found for: THYSTIMIG        ASSESSMENT/PLAN:     1. Type 1 diabetes mellitus with hyperglycemia (HCC)  Controlled  A1c is better at 7.0%  I am not adjusting his pump settings at this time  He is up to date with his labs  I recmmend he get eye exam records  Follow up in 3 mos with Yadi      2. Vitamin D deficiency  Controlled  Continue vitamin D3 5000 units daily  Repeat calcium and 25-hydroxy vitamin levels after 3 months      3. Celiac disease  Will check Tissue TGA with next labs      4. Insulin pump status  Insulin pump in place      5. Long-term insulin use (HCC)  Patient is on long-term insulin therapy due to type 1 diabetes      Return in about 3 months (around 6/4/2022).      Thank you kindly for allowing me to participate in the diabetes care plan for this patient.    Gabo Benoit MD, [facility], [facility]  04/17/20    CC:   Gregory Grande M.D.

## 2022-03-05 NOTE — PROGRESS NOTES
RN-CDE Note    Subjective:   Endocrinology Clinic Progress Note  PCP: Gregory Grande M.D.    HPI:  Martinez Smith is a 21 y.o. old patient who is seen today for review of Typ[e 1 Diabetes on insulin pump.  Recent changes in health: Finishing his master's degree.  DM:   Last A1c:   Lab Results   Component Value Date/Time    HBA1C 7.0 (A) 03/04/2022 04:34 PM      Previous A1c was 7.3 on 11/9/21  A1C GOAL: < 7    Diabetes Medications:   Humalog in pump      Exercise: Walking  Diet: Many food allergies  Patient's body mass index is 20.66 kg/m². Exercise and nutrition counseling were performed at this visit.    Glucose monitoring frequency: See pump download    Hypoglycemic episodes: Having a lot less low blood sugars  Last Retinal Exam: Last May with Dr. Noguera  Daily Foot Exam: Yes   Foot Exam:  Monofilament: done  Monofilament testing with a 10 gram force: sensation intact: intact bilaterally  Visual Inspection: Feet without maceration, ulcers, fissures.  Pedal pulses: intact bilaterally   Lab Results   Component Value Date/Time    MALBCRT see below 07/13/2019 08:00 AM    MICROALBUR <0.7 07/13/2019 08:00 AM    MICRALB <3.0 02/18/2022 04:48 AM     He  reports that he has never smoked. He has never used smokeless tobacco.      Plan:     Discussed and educated on:   - All medications, side effects and compliance (discussed carefully)  - Annual eye examinations at Ophthalmology  - Home glucose monitoring emphasized  - Weight control and daily exercise    Recommended medication changes: Adjust pump as needed.  He is figuring out his food allergies.

## 2022-05-01 ENCOUNTER — HOSPITAL ENCOUNTER (OUTPATIENT)
Facility: MEDICAL CENTER | Age: 22
End: 2022-05-02
Attending: EMERGENCY MEDICINE | Admitting: FAMILY MEDICINE
Payer: COMMERCIAL

## 2022-05-01 ENCOUNTER — OFFICE VISIT (OUTPATIENT)
Dept: URGENT CARE | Facility: CLINIC | Age: 22
End: 2022-05-01
Payer: COMMERCIAL

## 2022-05-01 ENCOUNTER — APPOINTMENT (OUTPATIENT)
Dept: RADIOLOGY | Facility: MEDICAL CENTER | Age: 22
End: 2022-05-01
Attending: EMERGENCY MEDICINE
Payer: COMMERCIAL

## 2022-05-01 VITALS
OXYGEN SATURATION: 96 % | SYSTOLIC BLOOD PRESSURE: 130 MMHG | HEIGHT: 66 IN | TEMPERATURE: 101 F | RESPIRATION RATE: 24 BRPM | DIASTOLIC BLOOD PRESSURE: 60 MMHG | BODY MASS INDEX: 20.89 KG/M2 | HEART RATE: 134 BPM | WEIGHT: 130 LBS

## 2022-05-01 DIAGNOSIS — J11.1 INFLUENZA: ICD-10-CM

## 2022-05-01 DIAGNOSIS — Z79.4 LONG-TERM INSULIN USE (HCC): ICD-10-CM

## 2022-05-01 DIAGNOSIS — R73.9 HYPERGLYCEMIA: ICD-10-CM

## 2022-05-01 DIAGNOSIS — E10.65 TYPE 1 DIABETES MELLITUS WITH HYPERGLYCEMIA (HCC): ICD-10-CM

## 2022-05-01 DIAGNOSIS — J10.1 INFLUENZA A: ICD-10-CM

## 2022-05-01 DIAGNOSIS — R11.2 NAUSEA AND VOMITING, INTRACTABILITY OF VOMITING NOT SPECIFIED, UNSPECIFIED VOMITING TYPE: ICD-10-CM

## 2022-05-01 LAB
ALBUMIN SERPL BCP-MCNC: 4.8 G/DL (ref 3.2–4.9)
ALBUMIN/GLOB SERPL: 1.7 G/DL
ALP SERPL-CCNC: 82 U/L (ref 30–99)
ALT SERPL-CCNC: 22 U/L (ref 2–50)
ANION GAP SERPL CALC-SCNC: 16 MMOL/L (ref 7–16)
AST SERPL-CCNC: 27 U/L (ref 12–45)
B-OH-BUTYR SERPL-MCNC: 1.75 MMOL/L (ref 0.02–0.27)
BASE EXCESS BLDV CALC-SCNC: -2 MMOL/L
BASOPHILS # BLD AUTO: 0.4 % (ref 0–1.8)
BASOPHILS # BLD: 0.03 K/UL (ref 0–0.12)
BILIRUB SERPL-MCNC: 0.5 MG/DL (ref 0.1–1.5)
BODY TEMPERATURE: 36.7 CENTIGRADE
BUN SERPL-MCNC: 10 MG/DL (ref 8–22)
CALCIUM SERPL-MCNC: 9.5 MG/DL (ref 8.5–10.5)
CHLORIDE SERPL-SCNC: 87 MMOL/L (ref 96–112)
CO2 SERPL-SCNC: 21 MMOL/L (ref 20–33)
CREAT SERPL-MCNC: 0.76 MG/DL (ref 0.5–1.4)
EOSINOPHIL # BLD AUTO: 0 K/UL (ref 0–0.51)
EOSINOPHIL NFR BLD: 0 % (ref 0–6.9)
ERYTHROCYTE [DISTWIDTH] IN BLOOD BY AUTOMATED COUNT: 38.5 FL (ref 35.9–50)
EXTERNAL QUALITY CONTROL: NORMAL
FLUAV RNA SPEC QL NAA+PROBE: POSITIVE
FLUAV+FLUBV AG SPEC QL IA: POSITIVE
FLUBV RNA SPEC QL NAA+PROBE: NEGATIVE
GFR SERPLBLD CREATININE-BSD FMLA CKD-EPI: 129 ML/MIN/1.73 M 2
GLOBULIN SER CALC-MCNC: 2.9 G/DL (ref 1.9–3.5)
GLUCOSE BLD STRIP.AUTO-MCNC: 262 MG/DL (ref 65–99)
GLUCOSE SERPL-MCNC: 283 MG/DL (ref 65–99)
HCO3 BLDV-SCNC: 22 MMOL/L (ref 24–28)
HCT VFR BLD AUTO: 38.7 % (ref 42–52)
HGB BLD-MCNC: 13.7 G/DL (ref 14–18)
IMM GRANULOCYTES # BLD AUTO: 0.03 K/UL (ref 0–0.11)
IMM GRANULOCYTES NFR BLD AUTO: 0.4 % (ref 0–0.9)
INT CON NEG: NEGATIVE
INT CON POS: POSITIVE
LACTATE BLD-SCNC: 1.3 MMOL/L (ref 0.5–2)
LIPASE SERPL-CCNC: 12 U/L (ref 11–82)
LYMPHOCYTES # BLD AUTO: 0.36 K/UL (ref 1–4.8)
LYMPHOCYTES NFR BLD: 5.2 % (ref 22–41)
MCH RBC QN AUTO: 30.7 PG (ref 27–33)
MCHC RBC AUTO-ENTMCNC: 35.4 G/DL (ref 33.7–35.3)
MCV RBC AUTO: 86.8 FL (ref 81.4–97.8)
MONOCYTES # BLD AUTO: 0.55 K/UL (ref 0–0.85)
MONOCYTES NFR BLD AUTO: 8 % (ref 0–13.4)
NEUTROPHILS # BLD AUTO: 5.93 K/UL (ref 1.82–7.42)
NEUTROPHILS NFR BLD: 86 % (ref 44–72)
NRBC # BLD AUTO: 0 K/UL
NRBC BLD-RTO: 0 /100 WBC
PCO2 BLDV: 36.1 MMHG (ref 41–51)
PH BLDV: 7.41 [PH] (ref 7.31–7.45)
PLATELET # BLD AUTO: 231 K/UL (ref 164–446)
PMV BLD AUTO: 11.2 FL (ref 9–12.9)
PO2 BLDV: 46.1 MMHG (ref 25–40)
POTASSIUM SERPL-SCNC: 4 MMOL/L (ref 3.6–5.5)
PROCALCITONIN SERPL-MCNC: 0.12 NG/ML
PROT SERPL-MCNC: 7.7 G/DL (ref 6–8.2)
RBC # BLD AUTO: 4.46 M/UL (ref 4.7–6.1)
RSV RNA SPEC QL NAA+PROBE: NEGATIVE
SAO2 % BLDV: 81.2 %
SARS-COV+SARS-COV-2 AG RESP QL IA.RAPID: NEGATIVE
SARS-COV-2 RNA RESP QL NAA+PROBE: NOTDETECTED
SODIUM SERPL-SCNC: 124 MMOL/L (ref 135–145)
SPECIMEN SOURCE: ABNORMAL
WBC # BLD AUTO: 6.9 K/UL (ref 4.8–10.8)

## 2022-05-01 PROCEDURE — 0241U HCHG SARS-COV-2 COVID-19 NFCT DS RESP RNA 4 TRGT MIC: CPT

## 2022-05-01 PROCEDURE — 99219 PR INITIAL OBSERVATION CARE,LEVL II: CPT | Mod: GC | Performed by: FAMILY MEDICINE

## 2022-05-01 PROCEDURE — 36415 COLL VENOUS BLD VENIPUNCTURE: CPT

## 2022-05-01 PROCEDURE — 87426 SARSCOV CORONAVIRUS AG IA: CPT | Performed by: PHYSICIAN ASSISTANT

## 2022-05-01 PROCEDURE — 700105 HCHG RX REV CODE 258: Performed by: EMERGENCY MEDICINE

## 2022-05-01 PROCEDURE — 83690 ASSAY OF LIPASE: CPT

## 2022-05-01 PROCEDURE — 82962 GLUCOSE BLOOD TEST: CPT

## 2022-05-01 PROCEDURE — 82803 BLOOD GASES ANY COMBINATION: CPT

## 2022-05-01 PROCEDURE — 80053 COMPREHEN METABOLIC PANEL: CPT

## 2022-05-01 PROCEDURE — C9803 HOPD COVID-19 SPEC COLLECT: HCPCS | Performed by: EMERGENCY MEDICINE

## 2022-05-01 PROCEDURE — 700105 HCHG RX REV CODE 258: Performed by: STUDENT IN AN ORGANIZED HEALTH CARE EDUCATION/TRAINING PROGRAM

## 2022-05-01 PROCEDURE — 700102 HCHG RX REV CODE 250 W/ 637 OVERRIDE(OP): Performed by: EMERGENCY MEDICINE

## 2022-05-01 PROCEDURE — A9270 NON-COVERED ITEM OR SERVICE: HCPCS | Performed by: STUDENT IN AN ORGANIZED HEALTH CARE EDUCATION/TRAINING PROGRAM

## 2022-05-01 PROCEDURE — 85025 COMPLETE CBC W/AUTO DIFF WBC: CPT

## 2022-05-01 PROCEDURE — 96376 TX/PRO/DX INJ SAME DRUG ADON: CPT

## 2022-05-01 PROCEDURE — 700111 HCHG RX REV CODE 636 W/ 250 OVERRIDE (IP): Performed by: EMERGENCY MEDICINE

## 2022-05-01 PROCEDURE — 700102 HCHG RX REV CODE 250 W/ 637 OVERRIDE(OP): Performed by: STUDENT IN AN ORGANIZED HEALTH CARE EDUCATION/TRAINING PROGRAM

## 2022-05-01 PROCEDURE — 99214 OFFICE O/P EST MOD 30 MIN: CPT | Performed by: PHYSICIAN ASSISTANT

## 2022-05-01 PROCEDURE — 96374 THER/PROPH/DIAG INJ IV PUSH: CPT

## 2022-05-01 PROCEDURE — 82010 KETONE BODYS QUAN: CPT

## 2022-05-01 PROCEDURE — 93005 ELECTROCARDIOGRAM TRACING: CPT | Performed by: STUDENT IN AN ORGANIZED HEALTH CARE EDUCATION/TRAINING PROGRAM

## 2022-05-01 PROCEDURE — A9270 NON-COVERED ITEM OR SERVICE: HCPCS | Performed by: EMERGENCY MEDICINE

## 2022-05-01 PROCEDURE — 99285 EMERGENCY DEPT VISIT HI MDM: CPT

## 2022-05-01 PROCEDURE — 87804 INFLUENZA ASSAY W/OPTIC: CPT | Performed by: PHYSICIAN ASSISTANT

## 2022-05-01 PROCEDURE — 83605 ASSAY OF LACTIC ACID: CPT

## 2022-05-01 PROCEDURE — 87040 BLOOD CULTURE FOR BACTERIA: CPT | Mod: 91

## 2022-05-01 PROCEDURE — 96361 HYDRATE IV INFUSION ADD-ON: CPT

## 2022-05-01 PROCEDURE — 84145 PROCALCITONIN (PCT): CPT

## 2022-05-01 PROCEDURE — G0378 HOSPITAL OBSERVATION PER HR: HCPCS

## 2022-05-01 PROCEDURE — 71045 X-RAY EXAM CHEST 1 VIEW: CPT

## 2022-05-01 RX ORDER — ONDANSETRON 2 MG/ML
4 INJECTION INTRAMUSCULAR; INTRAVENOUS ONCE
Status: COMPLETED | OUTPATIENT
Start: 2022-05-01 | End: 2022-05-01

## 2022-05-01 RX ORDER — ENOXAPARIN SODIUM 100 MG/ML
40 INJECTION SUBCUTANEOUS DAILY
Status: DISCONTINUED | OUTPATIENT
Start: 2022-05-02 | End: 2022-05-02 | Stop reason: HOSPADM

## 2022-05-01 RX ORDER — SODIUM CHLORIDE 9 MG/ML
INJECTION, SOLUTION INTRAVENOUS CONTINUOUS
Status: DISCONTINUED | OUTPATIENT
Start: 2022-05-01 | End: 2022-05-02 | Stop reason: HOSPADM

## 2022-05-01 RX ORDER — SODIUM CHLORIDE, SODIUM LACTATE, POTASSIUM CHLORIDE, CALCIUM CHLORIDE 600; 310; 30; 20 MG/100ML; MG/100ML; MG/100ML; MG/100ML
1000 INJECTION, SOLUTION INTRAVENOUS ONCE
Status: COMPLETED | OUTPATIENT
Start: 2022-05-01 | End: 2022-05-01

## 2022-05-01 RX ORDER — POLYETHYLENE GLYCOL 3350 17 G/17G
1 POWDER, FOR SOLUTION ORAL
Status: DISCONTINUED | OUTPATIENT
Start: 2022-05-01 | End: 2022-05-02 | Stop reason: HOSPADM

## 2022-05-01 RX ORDER — MONTELUKAST SODIUM 10 MG/1
10 TABLET ORAL DAILY
Status: DISCONTINUED | OUTPATIENT
Start: 2022-05-02 | End: 2022-05-02 | Stop reason: HOSPADM

## 2022-05-01 RX ORDER — DEXTROSE MONOHYDRATE 25 G/50ML
25 INJECTION, SOLUTION INTRAVENOUS
Status: DISCONTINUED | OUTPATIENT
Start: 2022-05-01 | End: 2022-05-02 | Stop reason: HOSPADM

## 2022-05-01 RX ORDER — ONDANSETRON 2 MG/ML
4 INJECTION INTRAMUSCULAR; INTRAVENOUS EVERY 4 HOURS PRN
Status: DISCONTINUED | OUTPATIENT
Start: 2022-05-01 | End: 2022-05-02 | Stop reason: HOSPADM

## 2022-05-01 RX ORDER — ACETAMINOPHEN 325 MG/1
650 TABLET ORAL ONCE
Status: COMPLETED | OUTPATIENT
Start: 2022-05-01 | End: 2022-05-01

## 2022-05-01 RX ORDER — BISACODYL 10 MG
10 SUPPOSITORY, RECTAL RECTAL
Status: DISCONTINUED | OUTPATIENT
Start: 2022-05-01 | End: 2022-05-02 | Stop reason: HOSPADM

## 2022-05-01 RX ORDER — AMOXICILLIN 250 MG
2 CAPSULE ORAL 2 TIMES DAILY
Status: DISCONTINUED | OUTPATIENT
Start: 2022-05-02 | End: 2022-05-02 | Stop reason: HOSPADM

## 2022-05-01 RX ORDER — OSELTAMIVIR PHOSPHATE 75 MG/1
75 CAPSULE ORAL 2 TIMES DAILY
Status: DISCONTINUED | OUTPATIENT
Start: 2022-05-01 | End: 2022-05-02 | Stop reason: HOSPADM

## 2022-05-01 RX ORDER — HYDROXYZINE HYDROCHLORIDE 25 MG/1
25 TABLET, FILM COATED ORAL 3 TIMES DAILY PRN
Status: DISCONTINUED | OUTPATIENT
Start: 2022-05-01 | End: 2022-05-01

## 2022-05-01 RX ORDER — SODIUM CHLORIDE 9 MG/ML
1000 INJECTION, SOLUTION INTRAVENOUS ONCE
Status: COMPLETED | OUTPATIENT
Start: 2022-05-01 | End: 2022-05-01

## 2022-05-01 RX ORDER — FEXOFENADINE HCL 60 MG/1
60 TABLET, FILM COATED ORAL 2 TIMES DAILY
COMMUNITY

## 2022-05-01 RX ORDER — OSELTAMIVIR PHOSPHATE 75 MG/1
75 CAPSULE ORAL 2 TIMES DAILY
Qty: 10 CAPSULE | Refills: 0 | Status: SHIPPED | OUTPATIENT
Start: 2022-05-01 | End: 2022-05-01

## 2022-05-01 RX ORDER — ACETAMINOPHEN 325 MG/1
650 TABLET ORAL EVERY 6 HOURS PRN
Status: DISCONTINUED | OUTPATIENT
Start: 2022-05-01 | End: 2022-05-02 | Stop reason: HOSPADM

## 2022-05-01 RX ORDER — ONDANSETRON 4 MG/1
4 TABLET, ORALLY DISINTEGRATING ORAL EVERY 4 HOURS PRN
Status: DISCONTINUED | OUTPATIENT
Start: 2022-05-01 | End: 2022-05-02 | Stop reason: HOSPADM

## 2022-05-01 RX ADMIN — ACETAMINOPHEN 650 MG: 325 TABLET, FILM COATED ORAL at 17:30

## 2022-05-01 RX ADMIN — SODIUM CHLORIDE 1000 ML: 9 INJECTION, SOLUTION INTRAVENOUS at 21:45

## 2022-05-01 RX ADMIN — ONDANSETRON 4 MG: 2 INJECTION INTRAMUSCULAR; INTRAVENOUS at 18:59

## 2022-05-01 RX ADMIN — SODIUM CHLORIDE, POTASSIUM CHLORIDE, SODIUM LACTATE AND CALCIUM CHLORIDE 1000 ML: 600; 310; 30; 20 INJECTION, SOLUTION INTRAVENOUS at 18:58

## 2022-05-01 RX ADMIN — OSELTAMIVIR PHOSPHATE 75 MG: 75 CAPSULE ORAL at 22:41

## 2022-05-01 RX ADMIN — SODIUM CHLORIDE, POTASSIUM CHLORIDE, SODIUM LACTATE AND CALCIUM CHLORIDE 1000 ML: 600; 310; 30; 20 INJECTION, SOLUTION INTRAVENOUS at 17:23

## 2022-05-01 RX ADMIN — ONDANSETRON 4 MG: 2 INJECTION INTRAMUSCULAR; INTRAVENOUS at 17:23

## 2022-05-01 RX ADMIN — SODIUM CHLORIDE: 9 INJECTION, SOLUTION INTRAVENOUS at 21:37

## 2022-05-01 ASSESSMENT — ENCOUNTER SYMPTOMS
HEADACHES: 0
COUGH: 1
EYE PAIN: 0
VOMITING: 1
SORE THROAT: 1
DIARRHEA: 0
SHORTNESS OF BREATH: 0
MYALGIAS: 1
CONSTIPATION: 0
FEVER: 1
CHILLS: 1
ABDOMINAL PAIN: 0
NAUSEA: 1

## 2022-05-01 ASSESSMENT — PATIENT HEALTH QUESTIONNAIRE - PHQ9
SUM OF ALL RESPONSES TO PHQ9 QUESTIONS 1 AND 2: 0
1. LITTLE INTEREST OR PLEASURE IN DOING THINGS: NOT AT ALL
2. FEELING DOWN, DEPRESSED, IRRITABLE, OR HOPELESS: NOT AT ALL

## 2022-05-01 ASSESSMENT — PAIN DESCRIPTION - PAIN TYPE: TYPE: ACUTE PAIN

## 2022-05-01 ASSESSMENT — FIBROSIS 4 INDEX
FIB4 SCORE: 0.41
FIB4 SCORE: 0.41
FIB4 SCORE: 0.57

## 2022-05-01 NOTE — ED TRIAGE NOTES
Amb to triage w/ c/o N/V, flu-like symptoms x 1 day.  Pt went to UC this morning, tested + for Influenza A.  Discharged home, feeling worse, unable to keep po down.  Pt is a diabetic.  FSBS just pta was 258.

## 2022-05-01 NOTE — ED PROVIDER NOTES
ED Provider Note    ER PROVIDER NOTE        CHIEF COMPLAINT  Chief Complaint   Patient presents with   • Flu Like Symptoms   • N/V       HPI  Martinez Smith is a 21 y.o. male who presents to the emergency department complaining of nausea vomiting, fevers and body aches.  Patient reports that he began to feel ill yesterday, mainly achy and tired.  Then today he developed fever, as well as some cough and sinus pressure.  He is also feeling nauseated at that time.  He went to urgent care was diagnosed with influenza, however since noon he has had multiple episodes of nonbloody emesis and unable to hold anything down.  He reports no real abdominal pain other than the crampy pain when he vomits.  No diarrhea.  No neck stiffness or rashes.  He is having some headache.  He reports mainly achiness in both of his legs.  No chest pain or shortness of breath.    He does have a history of type 1 diabetes on insulin and reports his blood sugar has been running 2     REVIEW OF SYSTEMS  Pertinent positives include vomiting, fever. Pertinent negatives include no chest pain or shortness of breath. See HPI for details. All other systems reviewed and are negative.    PAST MEDICAL HISTORY   has a past medical history of Celiac disease, Celiac disease, and Diabetes type I (HCC).    SURGICAL HISTORY   has a past surgical history that includes hypospadias repair.    FAMILY HISTORY  Family History   Problem Relation Age of Onset   • Arthritis Maternal Grandmother        SOCIAL HISTORY  Social History     Socioeconomic History   • Marital status: Single   Tobacco Use   • Smoking status: Never Smoker   • Smokeless tobacco: Never Used   Vaping Use   • Vaping Use: Never used   Substance and Sexual Activity   • Alcohol use: No   • Drug use: No      Social History     Substance and Sexual Activity   Drug Use No       CURRENT MEDICATIONS  Home Medications    **Home medications have not yet been reviewed for this encounter**    "      ALLERGIES  Allergies   Allergen Reactions   • Beef Flavor Nausea     Beef broth causes Nausea and vomiting    • Compazine      Anxiety    • Dairy Food Allergy Nausea   • Eggs      Nausea, vomiting, chest tightness   • Gluten Meal    • Mustard Seed      Nausea and vomiting,   • Peanut (Diagnostic)      Nausea, vomiting, and chest tightness   • Shellfish Allergy      Nausea, vomiting, chest tightness   • Tree Nuts Food Allergy Anaphylaxis, Shortness of Breath, Itching, Nausea and Cough     Cashews and pistachio and coconut        PHYSICAL EXAM  VITAL SIGNS: /61   Pulse (!) 124   Temp (!) 38.5 °C (101.3 °F) (Oral)   Resp 20   Ht 1.676 m (5' 6\")   Wt 59.7 kg (131 lb 9.8 oz)   SpO2 96%   BMI 21.24 kg/m²   Pulse ox interpretation: I interpret this pulse ox as normal.    Constitutional: Alert ill-appearing  HENT: No signs of trauma, Bilateral external ears normal, Nose normal.  Mucous membranes dry  Eyes: Pupils are equal and reactive, Conjunctiva normal, Non-icteric.   Neck: Normal range of motion, No tenderness, Supple, No stridor.   Cardiovascular: Tachycardic, regular, no murmurs.   Thorax & Lungs: Normal breath sounds, No respiratory distress, No wheezing, No chest tenderness.   Abdomen: Bowel sounds normal, Soft, No tenderness, No masses, No pulsatile masses. No peritoneal signs.  Skin: Warm, Dry, No erythema, No rash.   Back: No bony tenderness, No CVA tenderness.   Extremities: Intact distal pulses, No edema, No tenderness, No cyanosis,  Musculoskeletal: Good range of motion in all major joints. No tenderness to palpation or major deformities noted.   Neurologic: Alert , Normal motor function, Normal sensory function, No focal deficits noted.   Psychiatric: Affect normal, Judgment normal, Mood normal.     DIAGNOSTIC STUDIES / PROCEDURES    LABS  Labs Reviewed   CBC WITH DIFFERENTIAL - Abnormal; Notable for the following components:       Result Value    RBC 4.46 (*)     Hemoglobin 13.7 (*)     " "Hematocrit 38.7 (*)     MCHC 35.4 (*)     Neutrophils-Polys 86.00 (*)     Lymphocytes 5.20 (*)     Lymphs (Absolute) 0.36 (*)     All other components within normal limits   COMP METABOLIC PANEL - Abnormal; Notable for the following components:    Sodium 124 (*)     Chloride 87 (*)     Glucose 283 (*)     All other components within normal limits   VENOUS BLOOD GAS - Abnormal; Notable for the following components:    Venous Bg Pco2 36.1 (*)     Venous Bg Po2 46.1 (*)     Venous Bg Hco3 22 (*)     All other components within normal limits   BETA-HYDROXYBUTYRIC ACID - Abnormal; Notable for the following components:    beta-Hydroxybutyric Acid 1.75 (*)     All other components within normal limits   COV-2, FLU A/B, AND RSV BY PCR (Focus) - Abnormal; Notable for the following components:    Influenza virus A RNA POSITIVE (*)     All other components within normal limits    Narrative:     ER tel.  05/01/2022, 18:22, RB PERF. RESULTS CALLED TO: RN: [id number]   POCT GLUCOSE DEVICE RESULTS - Abnormal; Notable for the following components:    POC Glucose, Blood 262 (*)     All other components within normal limits   LACTIC ACID   ESTIMATED GFR   LIPASE   PROCALCITONIN   LACTIC ACID   LACTIC ACID   URINALYSIS   URINE CULTURE(NEW)   BLOOD CULTURE    Narrative:     Per Hospital Policy: Only change Specimen Src: to \"Line\" if  specified by physician order.   BLOOD CULTURE    Narrative:     Per Hospital Policy: Only change Specimen Src: to \"Line\" if  specified by physician order.       All labs reviewed by me.    RADIOLOGY  DX-CHEST-PORTABLE (1 VIEW)   Final Result         No acute cardiac or pulmonary abnormality is identified.        The radiologist's interpretation of all radiological studies have been reviewed and images independently viewed by me.    COURSE & MEDICAL DECISION MAKING  Nursing notes, VS, PMSFHx reviewed in chart.    4:56 PM Patient seen and examined at bedside. Patient will be treated with IV fluid, Zofran. " Ordered for sepsis bundle, COVID swabs, procalcitonin, lipase venous blood gas, beta hydroxybutyrate to evaluate his symptoms.     5:56 PM  Patient is reevaluated, he reports another episode of vomiting although currently nausea is improved,    6:43 PM  Patient is reevaluated, still some nausea, tachycardic to 130, I have ordered for additional fluids, Zofran, insulin and will plan for admission    I discussed the case with [employer] family for admission    HYDRATION: Based on the patient's presentation of Acute Vomiting, Dehydration, Hyperglycemia and Tachycardia the patient was given IV fluids. IV Hydration was used because oral hydration was not as rapid as required. Upon recheck following hydration, the patient was improved.    Decision Making:  This is a 21 y.o. male nausea vomiting as well as fevers.  Patient does have influenza A likely causing his fevers and the majority of his symptoms.  However he is diabetic and is unable to tolerate orals at this time and will be admitted for further IV fluids and symptom management.  He is hyperglycemic although no gap acidosis, will be treated with 2 L of IV fluid as well as 10 units of subcu insulin.  There is no other significant metabolic disturbance.  His lipase is normal.  No leukocytosis or lactic acidosis.  COVID is negative, x-ray shows no pneumonia.  Procalcitonin is normal and no other focal symptoms suggestive of other infectious process or bacterial infection at this time    Patient is admitted in guarded condition    FINAL IMPRESSION  1. Nausea and vomiting, intractability of vomiting not specified, unspecified vomiting type    2. Hyperglycemia    3. Influenza A          The note accurately reflects work and decisions made by me.  jA Ruiz M.D.  5/1/2022  7:15 PM

## 2022-05-01 NOTE — LETTER
May 1, 2022    To Whom It May Concern:         This is confirmation that Martinez Smith attended his scheduled appointment with Wayne Atkins P.A.-C. on 5/01/22.  This patient is quite sick with influenza and I have recommended very strictly that he go home and rest. He reports he has a school deadline and I'm concerned about him not getting the required rest and recovery.  He is quite sick and I encourage any educators to consider any reasonable accommodations in his case.         If you have any questions please do not hesitate to call me at the phone number listed below.    Sincerely,    Wayne Atkins P.A.-C.  [phone]  (signed electronically)

## 2022-05-01 NOTE — PROGRESS NOTES
"Subjective:   Martinez Smith is a 21 y.o. male who presents for Fever (VOMITING, BODY ACHES, CHILLS, COUGHING,TETO HEART RATE X 1 DAY)      21-year-old male with history of insulin-dependent diabetes that has been well controlled presents with 1 day of acute nausea, vomiting, body aches, fevers and chills, increased heart rate.  His family members home sick with similar symptoms.  Onset was fairly acute, he has been very diligent with his blood sugars, only noted increased blood sugar today, it was not increased during onset of symptoms.      Review of Systems   Constitutional: Positive for chills, fever and malaise/fatigue.   HENT: Positive for congestion and sore throat. Negative for ear pain.    Eyes: Negative for pain.   Respiratory: Positive for cough. Negative for shortness of breath.    Cardiovascular: Negative for chest pain.   Gastrointestinal: Positive for nausea and vomiting. Negative for abdominal pain, constipation and diarrhea.   Genitourinary: Negative for dysuria.   Musculoskeletal: Positive for myalgias.   Skin: Negative for rash.   Neurological: Negative for headaches.       Medications, Allergies, and current problem list reviewed today in Epic.     Objective:     /60 (BP Location: Left arm, Patient Position: Sitting, BP Cuff Size: Adult)   Pulse (!) 134   Temp (!) 38.3 °C (101 °F) (Temporal)   Resp (!) 24   Ht 1.676 m (5' 6\")   Wt 59 kg (130 lb)   SpO2 96%     Physical Exam  Vitals reviewed.   Constitutional:       Appearance: Normal appearance. He is not ill-appearing or toxic-appearing.   HENT:      Head: Normocephalic and atraumatic.      Right Ear: Tympanic membrane, ear canal and external ear normal.      Left Ear: Tympanic membrane, ear canal and external ear normal.      Nose: Congestion and rhinorrhea present.      Mouth/Throat:      Mouth: Mucous membranes are moist.      Pharynx: No oropharyngeal exudate or posterior oropharyngeal erythema.   Eyes:      " Conjunctiva/sclera: Conjunctivae normal.      Pupils: Pupils are equal, round, and reactive to light.   Cardiovascular:      Rate and Rhythm: Regular rhythm. Tachycardia present.      Heart sounds: Normal heart sounds.   Pulmonary:      Effort: Pulmonary effort is normal.      Breath sounds: Normal breath sounds.   Musculoskeletal:         General: Normal range of motion.      Cervical back: Normal range of motion.      Right lower leg: No edema.      Left lower leg: No edema.   Lymphadenopathy:      Cervical: No cervical adenopathy.   Skin:     General: Skin is warm and dry.      Capillary Refill: Capillary refill takes less than 2 seconds.   Neurological:      Mental Status: He is alert and oriented to person, place, and time.         Assessment/Plan:     Diagnosis and associated orders:     1. Influenza A  POCT Influenza A/B    POCT SARS-COV Antigen LILY (Symptomatic only)    oseltamivir (TAMIFLU) 75 MG Cap   2. Type 1 diabetes mellitus with hyperglycemia (HCC)     3. Long-term insulin use (HCC)        Comments/MDM:     • Point-of-care positive for influenza A.  Less than 36 hours of symptoms, I think he would benefit from Tamiflu.  He has Phenergan suppositories as well as hydroxyzine which has been helpful for controlling his nausea in the past.  Patient with some fever and likely compensatory tachycardia.  Overall he is actually quite well-appearing despite these abnormal vital signs.  I encouraged ER evaluation if he notes any decompensation but currently I think is a good candidate for outpatient follow-up.  Continue to monitor sugars and encouraged hydration and supportive care.  Provided with note for school.         Differential diagnosis, natural history, supportive care, and indications for immediate follow-up discussed.    Advised the patient to follow-up with the primary care physician for recheck, reevaluation, and consideration of further management.    Please note that this dictation was created  using voice recognition software. I have made a reasonable attempt to correct obvious errors, but I expect that there are errors of grammar and possibly content that I did not discover before finalizing the note.    This note was electronically signed by Wayne Atkins PA-C

## 2022-05-02 ENCOUNTER — PHARMACY VISIT (OUTPATIENT)
Dept: PHARMACY | Facility: MEDICAL CENTER | Age: 22
End: 2022-05-02
Payer: COMMERCIAL

## 2022-05-02 VITALS
OXYGEN SATURATION: 95 % | TEMPERATURE: 101.6 F | DIASTOLIC BLOOD PRESSURE: 57 MMHG | RESPIRATION RATE: 16 BRPM | WEIGHT: 133.38 LBS | HEART RATE: 117 BPM | HEIGHT: 66 IN | SYSTOLIC BLOOD PRESSURE: 100 MMHG | BODY MASS INDEX: 21.44 KG/M2

## 2022-05-02 LAB
ALBUMIN SERPL BCP-MCNC: 3.6 G/DL (ref 3.2–4.9)
ALBUMIN/GLOB SERPL: 1.8 G/DL
ALP SERPL-CCNC: 61 U/L (ref 30–99)
ALT SERPL-CCNC: 16 U/L (ref 2–50)
ANION GAP SERPL CALC-SCNC: 11 MMOL/L (ref 7–16)
APPEARANCE UR: CLEAR
AST SERPL-CCNC: 26 U/L (ref 12–45)
BILIRUB SERPL-MCNC: 0.3 MG/DL (ref 0.1–1.5)
BILIRUB UR QL STRIP.AUTO: NEGATIVE
BUN SERPL-MCNC: 9 MG/DL (ref 8–22)
CALCIUM SERPL-MCNC: 7.7 MG/DL (ref 8.5–10.5)
CHLORIDE SERPL-SCNC: 96 MMOL/L (ref 96–112)
CO2 SERPL-SCNC: 20 MMOL/L (ref 20–33)
COLOR UR: YELLOW
CREAT SERPL-MCNC: 0.68 MG/DL (ref 0.5–1.4)
EKG IMPRESSION: NORMAL
ERYTHROCYTE [DISTWIDTH] IN BLOOD BY AUTOMATED COUNT: 38.2 FL (ref 35.9–50)
GFR SERPLBLD CREATININE-BSD FMLA CKD-EPI: 134 ML/MIN/1.73 M 2
GLOBULIN SER CALC-MCNC: 2 G/DL (ref 1.9–3.5)
GLUCOSE BLD STRIP.AUTO-MCNC: 254 MG/DL (ref 65–99)
GLUCOSE SERPL-MCNC: 227 MG/DL (ref 65–99)
GLUCOSE UR STRIP.AUTO-MCNC: 500 MG/DL
HCT VFR BLD AUTO: 33.7 % (ref 42–52)
HGB BLD-MCNC: 11.7 G/DL (ref 14–18)
KETONES UR STRIP.AUTO-MCNC: 80 MG/DL
LEUKOCYTE ESTERASE UR QL STRIP.AUTO: NEGATIVE
MCH RBC QN AUTO: 30.8 PG (ref 27–33)
MCHC RBC AUTO-ENTMCNC: 34.7 G/DL (ref 33.7–35.3)
MCV RBC AUTO: 88.7 FL (ref 81.4–97.8)
MICRO URNS: ABNORMAL
NITRITE UR QL STRIP.AUTO: NEGATIVE
OSMOLALITY SERPL: 274 MOSM/KG H2O (ref 278–298)
OSMOLALITY UR: 573 MOSM/KG H2O (ref 300–900)
PH UR STRIP.AUTO: 6 [PH] (ref 5–8)
PLATELET # BLD AUTO: 200 K/UL (ref 164–446)
PMV BLD AUTO: 11.3 FL (ref 9–12.9)
POTASSIUM SERPL-SCNC: 4.5 MMOL/L (ref 3.6–5.5)
PROT SERPL-MCNC: 5.6 G/DL (ref 6–8.2)
PROT UR QL STRIP: NEGATIVE MG/DL
RBC # BLD AUTO: 3.8 M/UL (ref 4.7–6.1)
RBC UR QL AUTO: NEGATIVE
SODIUM SERPL-SCNC: 127 MMOL/L (ref 135–145)
SP GR UR STRIP.AUTO: 1.02
UROBILINOGEN UR STRIP.AUTO-MCNC: 0.2 MG/DL
WBC # BLD AUTO: 6.1 K/UL (ref 4.8–10.8)

## 2022-05-02 PROCEDURE — RXMED WILLOW AMBULATORY MEDICATION CHARGE: Performed by: HOSPITALIST

## 2022-05-02 PROCEDURE — 85027 COMPLETE CBC AUTOMATED: CPT

## 2022-05-02 PROCEDURE — 700102 HCHG RX REV CODE 250 W/ 637 OVERRIDE(OP): Performed by: STUDENT IN AN ORGANIZED HEALTH CARE EDUCATION/TRAINING PROGRAM

## 2022-05-02 PROCEDURE — 80053 COMPREHEN METABOLIC PANEL: CPT

## 2022-05-02 PROCEDURE — 81003 URINALYSIS AUTO W/O SCOPE: CPT

## 2022-05-02 PROCEDURE — 83930 ASSAY OF BLOOD OSMOLALITY: CPT

## 2022-05-02 PROCEDURE — 93010 ELECTROCARDIOGRAM REPORT: CPT | Performed by: INTERNAL MEDICINE

## 2022-05-02 PROCEDURE — G0378 HOSPITAL OBSERVATION PER HR: HCPCS

## 2022-05-02 PROCEDURE — A9270 NON-COVERED ITEM OR SERVICE: HCPCS | Performed by: STUDENT IN AN ORGANIZED HEALTH CARE EDUCATION/TRAINING PROGRAM

## 2022-05-02 PROCEDURE — 87086 URINE CULTURE/COLONY COUNT: CPT

## 2022-05-02 PROCEDURE — 82962 GLUCOSE BLOOD TEST: CPT

## 2022-05-02 PROCEDURE — 99217 PR OBSERVATION CARE DISCHARGE: CPT | Performed by: NURSE PRACTITIONER

## 2022-05-02 PROCEDURE — 83935 ASSAY OF URINE OSMOLALITY: CPT

## 2022-05-02 PROCEDURE — 700105 HCHG RX REV CODE 258: Performed by: STUDENT IN AN ORGANIZED HEALTH CARE EDUCATION/TRAINING PROGRAM

## 2022-05-02 RX ORDER — OSELTAMIVIR PHOSPHATE 45 MG/1
45 CAPSULE ORAL EVERY 12 HOURS
Qty: 10 CAPSULE | Refills: 0 | Status: SHIPPED | OUTPATIENT
Start: 2022-05-02 | End: 2022-05-02 | Stop reason: SDUPTHER

## 2022-05-02 RX ORDER — OSELTAMIVIR PHOSPHATE 75 MG/1
75 CAPSULE ORAL EVERY 12 HOURS
Qty: 8 CAPSULE | Refills: 0 | Status: SHIPPED | OUTPATIENT
Start: 2022-05-02 | End: 2022-05-02 | Stop reason: SDUPTHER

## 2022-05-02 RX ORDER — OSELTAMIVIR PHOSPHATE 75 MG/1
75 CAPSULE ORAL EVERY 12 HOURS
Qty: 8 CAPSULE | Refills: 0 | Status: SHIPPED | OUTPATIENT
Start: 2022-05-02 | End: 2022-05-06

## 2022-05-02 RX ADMIN — MONTELUKAST 10 MG: 10 TABLET, FILM COATED ORAL at 05:44

## 2022-05-02 RX ADMIN — OSELTAMIVIR PHOSPHATE 75 MG: 75 CAPSULE ORAL at 08:38

## 2022-05-02 RX ADMIN — ACETAMINOPHEN 650 MG: 325 TABLET, FILM COATED ORAL at 05:44

## 2022-05-02 RX ADMIN — ACETAMINOPHEN 650 MG: 325 TABLET, FILM COATED ORAL at 00:36

## 2022-05-02 RX ADMIN — BENZOCAINE AND MENTHOL 1 LOZENGE: 15; 3.6 LOZENGE ORAL at 03:45

## 2022-05-02 RX ADMIN — SODIUM CHLORIDE: 9 INJECTION, SOLUTION INTRAVENOUS at 05:44

## 2022-05-02 ASSESSMENT — LIFESTYLE VARIABLES
TOTAL SCORE: 0
TOTAL SCORE: 0
CONSUMPTION TOTAL: NEGATIVE
EVER HAD A DRINK FIRST THING IN THE MORNING TO STEADY YOUR NERVES TO GET RID OF A HANGOVER: NO
TOTAL SCORE: 0
HAVE YOU EVER FELT YOU SHOULD CUT DOWN ON YOUR DRINKING: NO
HOW MANY TIMES IN THE PAST YEAR HAVE YOU HAD 5 OR MORE DRINKS IN A DAY: 0
DOES PATIENT WANT TO STOP DRINKING: NO
EVER FELT BAD OR GUILTY ABOUT YOUR DRINKING: NO
ON A TYPICAL DAY WHEN YOU DRINK ALCOHOL HOW MANY DRINKS DO YOU HAVE: 0
ALCOHOL_USE: NO
HAVE PEOPLE ANNOYED YOU BY CRITICIZING YOUR DRINKING: NO
AVERAGE NUMBER OF DAYS PER WEEK YOU HAVE A DRINK CONTAINING ALCOHOL: 0

## 2022-05-02 NOTE — DISCHARGE SUMMARY
Discharge Summary    CHIEF COMPLAINT ON ADMISSION  Chief Complaint   Patient presents with   • Flu Like Symptoms   • N/V       Reason for Admission  Flu Like Symptoms     Admission Date  5/1/2022    CODE STATUS  Full Code    HPI & HOSPITAL COURSE    Mr. Martinez Smith is a 31-year-old male with a PMHx of DMT2, pancreatic insufficiency, celiac disease, who presented on 5/1/2022 due to cough, vomiting, dehydration secondary to influenza.    Patient reports that he has been sick with nausea, vomiting, cough and cold for approximately 1 day.  He also noted that his father was positive for influenza as well as strep throat.  Patient states that for the past day, he progressively started to have worsening cough associated with vomiting with greater than 10 episodes and some mild associate abdominal pain.  Patient states that he had fevers at home with highest at 103F.  Patient also reports he is unable to tolerate oral intake and decided to present himself to urgent care for further evaluation.    [facility] tested a positive patient for influenza and was discharged home with Tamiflu.  Patient notes that his nausea and vomiting progressively got worse and noticed that his heart rate from his Fitbit showed 120-140 in rate which endorsed him to present himself to the ER.    Patient reports that his nausea significantly improved.  He endorses sore throat and a cough.  He also notes that he only has chest pain with coughing, but denies any shortness of breath.  Patient denies any history of DKA and denies dysuria.  He follows Dr. Núñez endocrinology for his DM T1 and has an insulin pump with a carb ratio of 1:15 with a correction of 1 unit for every 50 points over 150.  His sugars had been elevated the past few days up to 200s and ensures that his insulin pump is working well and typically is in the low to mid 100s.    In ER, patient noted to be tachycardiac with HR range 120-130s.  Notable lab findings note sodium 124,  potassium 4, chloride 87, glucose 283, mildly elevated beta hydroxybutyrate at 1.75, normal lipase, influenza positive, COVID-negative, WBC 6.9.  CXR results no acute cardiopulmonary process.  Patient was admitted into the observation unit for evaluation and treatment.    No events overnight noted.  Patient reports improvement in nausea and vomiting along with cough.  Discussed with patient possibly getting vaccinated for influenza.  Patient will be prescribed with Tamiflu again as his pharmacy ran out of Tamiflu.  Patient highly recommended to follow-up with PCP s/p hospitalization.  All questions and concerns answered prior to being discharged.  Patient discharged home.      Therefore, he is discharged in good and stable condition to home with close outpatient follow-up.    The patient recovered much more quickly than anticipated on admission.    Discharge Date  05/02/22      FOLLOW UP ITEMS POST DISCHARGE  Please call [phone] to schedule PCP appointment for patient.    Required specialty appointments include:       Discharge Instructions per URMILA Ocampo    -Follow-up with PCP s/p hospitalization  -Start taking Tamiflu for 5 days  -Recommend getting the flu vaccination    DIET: As tolerated    ACTIVITY: As tolerated    DIAGNOSIS: Influenza    Return to ER if symptoms persist, chest pain, palpitations, shortness of breath, numbness, tingling, weakness, and high fevers.      DISCHARGE DIAGNOSES  Principal Problem:    Influenza POA: Yes  Resolved Problems:    * No resolved hospital problems. *      FOLLOW UP  Future Appointments   Date Time Provider Department Center   6/8/2022  1:00 PM URMILA Christiansen M.D.  [address] [facility] [id number] [phone]    Schedule an appointment as soon as possible for a visit in 1 week        MEDICATIONS ON DISCHARGE     Medication List      START taking these medications      Instructions    oseltamivir 45 MG Caps  Commonly known as: Tamiflu   Take 1 Capsule by mouth every 12 hours for 5 days.  Dose: 45 mg        CONTINUE taking these medications      Instructions   amitriptyline 25 MG Tabs  Commonly known as: ELAVIL   Take 25 mg by mouth at bedtime.  Dose: 25 mg     Contour Next Test strip  Generic drug: glucose blood   TEST 10 TIMES A DAY FOR INSULIN PUMP ADJUSTMENT. Links to insulin pump     fexofenadine 60 MG Tabs  Commonly known as: ALLEGRA   Take 60 mg by mouth 2 times a day.  Dose: 60 mg     Glucagon Emergency 1 MG Kit   Inject 1 mg as directed as needed. For severe hypoglycemia  Dose: 1 mg     HumaLOG 100 UNIT/ML  Generic drug: insulin lispro   Inject 50 units into pump reservoir daily  30 day supply     insulin infusion pump Terri   Inject  under the skin continuous. Patient's own SQ insulin pump    Type of Insulin: Humalog  Last change of tubin22 - Change tubing and site every 72 hours    Dosing:  Basal rate:  0000 - 0300 = .625 units  0300 - 1200 = .6 units  1200 - 0000= .575 units  Bolus ratio:   1 unit : 14 g carbohydrate      Sensitivity ratio:   1 units for every 50 over 130-150 mg/dL    Disconnect pump if patient becomes hypoglycemic and altered.     montelukast 10 MG Tabs  Commonly known as: SINGULAIR   Take 10 mg by mouth every day.  Dose: 10 mg     NASACORT AQ NA   Administer 2 Sprays into affected nostril(S) every day.  Dose: 2 Spray     Pancreaze 36746-77684 units Cpep  Generic drug: Pancrelipase (Lip-Prot-Amyl)   Take 1 Capsule by mouth 3 times a day before meals.  Dose: 1 Capsule     vitamin D 1000 Unit (25 mcg) Tabs  Commonly known as: cholecalciferol   Take 5,000 Units by mouth every day.  Dose: 5,000 Units            Allergies  Allergies   Allergen Reactions   • Beef Flavor Nausea     Beef broth causes Nausea and vomiting    • Compazine      Anxiety    • Dairy Food Allergy Nausea   • Eggs      Nausea, vomiting, chest tightness   • Gluten Meal    • Mustard Seed       Nausea and vomiting,   • Peanut (Diagnostic)      Nausea, vomiting, and chest tightness   • Shellfish Allergy      Nausea, vomiting, chest tightness   • Tree Nuts Food Allergy Anaphylaxis, Shortness of Breath, Itching, Nausea and Cough     Cashews and pistachio and coconut        DIET  Orders Placed This Encounter   Procedures   • Diet Order Diet: Consistent CHO (Diabetic)     Standing Status:   Standing     Number of Occurrences:   1     Order Specific Question:   Diet:     Answer:   Consistent CHO (Diabetic) [4]       ACTIVITY  As tolerated.  Weight bearing as tolerated    CONSULTATIONS  NONE    PROCEDURES  NONE    IMAGING  DX-CHEST-PORTABLE (1 VIEW)   Final Result         No acute cardiac or pulmonary abnormality is identified.            LABORATORY  Lab Results   Component Value Date    SODIUM 127 (L) 05/02/2022    POTASSIUM 4.5 05/02/2022    CHLORIDE 96 05/02/2022    CO2 20 05/02/2022    GLUCOSE 227 (H) 05/02/2022    BUN 9 05/02/2022    CREATININE 0.68 05/02/2022    CREATININE 0.4 05/25/2007        Lab Results   Component Value Date    WBC 6.1 05/02/2022    HEMOGLOBIN 11.7 (L) 05/02/2022    HEMATOCRIT 33.7 (L) 05/02/2022    PLATELETCT 200 05/02/2022        Total time of the discharge process exceeds 36 minutes.    ==========================================================================================================  Please note that this dictation was created using voice recognition software. I have made every reasonable attempt to correct obvious errors, but there may be errors of grammar and possibly content that I did not discover before finalizing the note.    Electronically signed by:  JOE Chauhan, MSN, APRN, FNP-C  Hospitalist Services  [facility]  [phone]  [email or web address]  05/02/22                  [id number]

## 2022-05-02 NOTE — ED NOTES
Report given to RN.   Subjective:      Patient ID: Quinton May Salomon Torres MD  [facility] [id number]

## 2022-05-02 NOTE — ED NOTES
Med Rec complete per Pt at bedside.  Allergies reviewed.  Home Pharmacy:  [facility]    Pt has insulin pump currently connected.

## 2022-05-02 NOTE — DISCHARGE INSTRUCTIONS
FOLLOW UP ITEMS POST DISCHARGE  Please call [phone] to schedule PCP appointment for patient.    Required specialty appointments include:       Discharge Instructions per Xiomara Chauhan A.P.R.NGermán    -Follow-up with PCP s/p hospitalization  -Start taking Tamiflu for 5 days  -Recommend getting the flu vaccination    DIET: As tolerated    ACTIVITY: As tolerated    DIAGNOSIS: Influenza    Return to ER if symptoms persist, chest pain, palpitations, shortness of breath, numbness, tingling, weakness, and high fevers.      Discharge Instructions    Discharged to home by car with relative. Discharged via wheelchair, hospital escort: Yes.  Special equipment needed: Not Applicable    Be sure to schedule a follow-up appointment with your primary care doctor or any specialists as instructed.     Discharge Plan:   Diet Plan: Discussed  Activity Level: Discussed  Confirmed Follow up Appointment: Patient to Call and Schedule Appointment  Confirmed Symptoms Management: Discussed  Medication Reconciliation Updated: Yes    I understand that a diet low in cholesterol, fat, and sodium is recommended for good health. Unless I have been given specific instructions below for another diet, I accept this instruction as my diet prescription.   Other diet: diet as tolerated    Special Instructions: None    · Is patient discharged on Warfarin / Coumadin?   No     Depression / Suicide Risk    As you are discharged from this [facility] facility, it is important to learn how to keep safe from harming yourself.    Recognize the warning signs:  · Abrupt changes in personality, positive or negative- including increase in energy   · Giving away possessions  · Change in eating patterns- significant weight changes-  positive or negative  · Change in sleeping patterns- unable to sleep or sleeping all the time   · Unwillingness or inability to communicate  · Depression  · Unusual sadness, discouragement and loneliness  · Talk of  "wanting to die  · Neglect of personal appearance   · Rebelliousness- reckless behavior  · Withdrawal from people/activities they love  · Confusion- inability to concentrate     If you or a loved one observes any of these behaviors or has concerns about self-harm, here's what you can do:  · Talk about it- your feelings and reasons for harming yourself  · Remove any means that you might use to hurt yourself (examples: pills, rope, extension cords, firearm)  · Get professional help from the community (Mental Health, Substance Abuse, psychological counseling)  · Do not be alone:Call your Safe Contact- someone whom you trust who will be there for you.  · Call your local CRISIS HOTLINE 007-4082 or 004-143-2513  · Call your local Children's Mobile Crisis Response Team [place] (954) 712-8445 or www.[employer]  · Call the toll free National Suicide Prevention Hotlines   · National Suicide Prevention Lifeline 640-595-UQFA (3574)  · National Hope Line Network 800-SUICIDE (256-5376)      Influenza, Adult  Influenza is also called \"the flu.\" It is an infection in the lungs, nose, and throat (respiratory tract). It is caused by a virus. The flu causes symptoms that are similar to symptoms of a cold. It also causes a high fever and body aches.  The flu spreads easily from person to person (is contagious). Getting a flu shot (influenza vaccination) every year is the best way to prevent the flu.  What are the causes?  This condition is caused by the influenza virus. You can get the virus by:  · Breathing in droplets that are in the air from the cough or sneeze of a person who has the virus.  · Touching something that has the virus on it (is contaminated) and then touching your mouth, nose, or eyes.  What increases the risk?  Certain things may make you more likely to get the flu. These include:  · Not washing your hands often.  · Having close contact with many people during cold and flu season.  · Touching your mouth, " eyes, or nose without first washing your hands.  · Not getting a flu shot every year.  You may have a higher risk for the flu, along with serious problems such as a lung infection (pneumonia), if you:  · Are older than 65.  · Are pregnant.  · Have a weakened disease-fighting system (immune system) because of a disease or taking certain medicines.  · Have a long-term (chronic) illness, such as:  ? Heart, kidney, or lung disease.  ? Diabetes.  ? Asthma.  · Have a liver disorder.  · Are very overweight (morbidly obese).  · Have anemia. This is a condition that affects your red blood cells.  What are the signs or symptoms?  Symptoms usually begin suddenly and last 4-14 days. They may include:  · Fever and chills.  · Headaches, body aches, or muscle aches.  · Sore throat.  · Cough.  · Runny or stuffy (congested) nose.  · Chest discomfort.  · Not wanting to eat as much as normal (poor appetite).  · Weakness or feeling tired (fatigue).  · Dizziness.  · Feeling sick to your stomach (nauseous) or throwing up (vomiting).  How is this treated?  If the flu is found early, you can be treated with medicine that can help reduce how bad the illness is and how long it lasts (antiviral medicine). This may be given by mouth (orally) or through an IV tube.  Taking care of yourself at home can help your symptoms get better. Your doctor may suggest:  · Taking over-the-counter medicines.  · Drinking plenty of fluids.  The flu often goes away on its own. If you have very bad symptoms or other problems, you may be treated in a hospital.  Follow these instructions at home:         Activity  · Rest as needed. Get plenty of sleep.  · Stay home from work or school as told by your doctor.  ? Do not leave home until you do not have a fever for 24 hours without taking medicine.  ? Leave home only to visit your doctor.  Eating and drinking  · Take an ORS (oral rehydration solution). This is a drink that is sold at pharmacies and stores.  · Drink  "enough fluid to keep your pee (urine) pale yellow.  · Drink clear fluids in small amounts as you are able. Clear fluids include:  ? Water.  ? Ice chips.  ? Fruit juice that has water added (diluted fruit juice).  ? Low-calorie sports drinks.  · Eat bland, easy-to-digest foods in small amounts as you are able. These foods include:  ? Bananas.  ? Applesauce.  ? Rice.  ? Lean meats.  ? Toast.  ? Crackers.  · Do not eat or drink:  ? Fluids that have a lot of sugar or caffeine.  ? Alcohol.  ? Spicy or fatty foods.  General instructions  · Take over-the-counter and prescription medicines only as told by your doctor.  · Use a cool mist humidifier to add moisture to the air in your home. This can make it easier for you to breathe.  · Cover your mouth and nose when you cough or sneeze.  · Wash your hands with soap and water often, especially after you cough or sneeze. If you cannot use soap and water, use alcohol-based hand .  · Keep all follow-up visits as told by your doctor. This is important.  How is this prevented?    · Get a flu shot every year. You may get the flu shot in late summer, fall, or winter. Ask your doctor when you should get your flu shot.  · Avoid contact with people who are sick during fall and winter (cold and flu season).  Contact a doctor if:  · You get new symptoms.  · You have:  ? Chest pain.  ? Watery poop (diarrhea).  ? A fever.  · Your cough gets worse.  · You start to have more mucus.  · You feel sick to your stomach.  · You throw up.  Get help right away if you:  · Have shortness of breath.  · Have trouble breathing.  · Have skin or nails that turn a bluish color.  · Have very bad pain or stiffness in your neck.  · Get a sudden headache.  · Get sudden pain in your face or ear.  · Cannot eat or drink without throwing up.  Summary  · Influenza (\"the flu\") is an infection in the lungs, nose, and throat. It is caused by a virus.  · Take over-the-counter and prescription medicines only as " told by your doctor.  · Getting a flu shot every year is the best way to avoid getting the flu.  This information is not intended to replace advice given to you by your health care provider. Make sure you discuss any questions you have with your health care provider.  Document Released: 09/26/2009 Document Revised: 06/05/2019 Document Reviewed: 06/05/2019  [employer] Patient Education © 2020 [employer]    Oseltamivir capsules  What is this medicine?  OSELTAMIVIR (os el GEE i vir) is an antiviral medicine. It is used to prevent and to treat some kinds of influenza or the flu. It will not work for colds or other viral infections.  This medicine may be used for other purposes; ask your health care provider or pharmacist if you have questions.  COMMON BRAND NAME(S): Tamiflu  What should I tell my health care provider before I take this medicine?  They need to know if you have any of the following conditions:  · heart disease  · immune system problems  · kidney disease  · liver disease  · lung disease  · an unusual or allergic reaction to oseltamivir, other medicines, foods, dyes, or preservatives  · pregnant or trying to get pregnant  · breast-feeding  How should I use this medicine?  Take this medicine by mouth with a glass of water. Follow the directions on the prescription label. Start this medicine at the first sign of flu symptoms. You can take it with or without food. If it upsets your stomach, take it with food. Take your medicine at regular intervals. Do not take your medicine more often than directed. Take all of your medicine as directed even if you think you are better. Do not skip doses or stop your medicine early.  Talk to your pediatrician regarding the use of this medicine in children. While this drug may be prescribed for children as young as 14 days for selected conditions, precautions do apply.  Overdosage: If you think you have taken too much of this medicine contact a poison control center or  emergency room at once.  NOTE: This medicine is only for you. Do not share this medicine with others.  What if I miss a dose?  If you miss a dose, take it as soon as you remember. If it is almost time for your next dose (within 2 hours), take only that dose. Do not take double or extra doses.  What may interact with this medicine?  ·   intranasal influenza vaccine  This list may not describe all possible interactions. Give your health care provider a list of all the medicines, herbs, non-prescription drugs, or dietary supplements you use. Also tell them if you smoke, drink alcohol, or use illegal drugs. Some items may interact with your medicine.  What should I watch for while using this medicine?  Visit your doctor or health care professional for regular check ups. Tell your doctor if your symptoms do not start to get better or if they get worse.  If you have the flu, you may be at an increased risk of developing seizures, confusion, or abnormal behavior. This occurs early in the illness, and more frequently in children and teens. These events are not common, but may result in accidental injury to the patient. Families and caregivers of patients should watch for signs of unusual behavior and contact a doctor or health care professional right away if the patient shows signs of unusual behavior.  This medicine is not a substitute for the flu shot. Talk to your doctor each year about an annual flu shot.  What side effects may I notice from receiving this medicine?  Side effects that you should report to your doctor or health care professional as soon as possible:  · allergic reactions like skin rash, itching or hives, swelling of the face, lips, or tongue  · anxiety, confusion, unusual behavior  · breathing problems  · hallucination, loss of contact with reality  · redness, blistering, peeling or loosening of the skin, including inside the mouth  · seizures  Side effects that usually do not require medical attention  (report to your doctor or health care professional if they continue or are bothersome):  · diarrhea  · headache  · nausea, vomiting  · pain  This list may not describe all possible side effects. Call your doctor for medical advice about side effects. You may report side effects to FDA at 8-399-OKM-3875.  Where should I keep my medicine?  Keep out of the reach of children.  Store at room temperature between 15 and 30 degrees C (59 and 86 degrees F). Throw away any unused medicine after the expiration date.  NOTE: This sheet is a summary. It may not cover all possible information. If you have questions about this medicine, talk to your doctor, pharmacist, or health care provider.  © 2020 [employer] (2018-05-30 16:45:14)

## 2022-05-02 NOTE — H&P
[facility] MEDICINE HISTORY AND PHYSICAL     PATIENT ID:  NAME:  Martinez Smith  MRN:               [id number]  YOB: 2000    Date of Admission: 5/1/2022     Attending: Carmelo Boles MD    Resident: Shaquille Spencer DO    Primary Care Physician:  Gregory Grande M.D.    CC:  Cough/fatigue/Flu+      HPI: Martinez Smith is a 21 y.o. male with a history of type 1 diabetes, pancreatic insufficiency, celiac disease presenting for cough, vomiting, dehydration secondary influenza.  Patient reports that he has been sick with nausea, vomiting, cough and cold symptoms for approximately 1 day.  He notes that his father was recently positive for influenza as well as strep throat.  He states that for the past day he has had a progressive worsening cough and for the last day he has been vomiting greater than 10 times and has some mild associated abdominal pain.  He states he has had fevers at home, highest of 103 Fahrenheit.     He was unable to keep down food for the last day and decided to go to the urgent care for further evaluation.  At the urgent care he tested positive for influenza and was discharged home with Tamiflu.  He notes that his nausea and vomiting were progressively worsening and he noted that his heart rate on the monitor was in the 120s to 140s on his Fitbit.  He decided to come to the ED for further evaluation.    Currently, the patient notes that his nausea is significantly improved, and he has not vomited while in the ED.  He does complain of a sore throat and cough.  He notes that he only has chest pain with coughing, denies any shortness of breath.  He denies any history of DKA.  Denies any dysuria.      He has a history of type 1 diabetes, he is followed by Dr. Núñez of endocrinology outpatient.  He has a insulin pump, states that his insulin to carb ratio is 1:15 with a correction of 1 unit for every 50 points over 150.  He believes that his pump is been working properly but  notes that his sugars have been elevated for the past few days, up approximately in the low 200s to the 200s.  Typically is in the low to mid 100s.       ERCourse:  In the ED, patient was noted to be tachycardic in the 120s to 130s.  CMP showed a sodium of 124, potassium of 4, chloride of 87, glucose of 283, mildly elevated beta hydroxybutyrate at 1.75, normal lipase, influenza positive, COVID-negative, hemoglobin of 13.7, white count normal at 6.9.  Checks x-ray without any acute abnormalities.    REVIEW OF SYSTEMS:   Ten systems reviewed and were negative except as noted in the HPI.                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  No alcohol  No tobacco  No recreational drugs    DIET:   Orders Placed This Encounter   Procedures   • Diet Order Diet: Consistent CHO (Diabetic)     Standing Status:   Standing     Number of Occurrences:   1     Order Specific Question:   Diet:     Answer:   Consistent CHO (Diabetic) [4]       ALLERGIES:  Allergies   Allergen Reactions   • Beef Flavor Nausea     Beef broth causes Nausea and vomiting    • Compazine      Anxiety    • Dairy Food Allergy Nausea   • Eggs      Nausea, vomiting, chest tightness   • Gluten Meal    • Mustard Seed      Nausea and vomiting,   • Peanut (Diagnostic)      Nausea, vomiting, and chest tightness   • Shellfish Allergy      Nausea, vomiting, chest tightness   • Tree Nuts Food Allergy Anaphylaxis, Shortness of Breath, Itching, Nausea and Cough     Cashews and pistachio and coconut        OUTPATIENT MEDICATIONS:    Current Facility-Administered Medications:   •   insulin regular (HumuLIN R,NovoLIN R) injection, 10 Units, Subcutaneous, Once, Aj Ruiz M.D.  •  [START ON 5/2/2022] senna-docusate (PERICOLACE or SENOKOT S) 8.6-50 MG per tablet 2 Tablet, 2 Tablet, Oral, BID **AND** polyethylene glycol/lytes (MIRALAX) PACKET 1 Packet, 1 Packet, Oral, QDAY PRN **AND** magnesium hydroxide (MILK OF MAGNESIA) suspension 30 mL, 30 mL, Oral, QDAY PRN **AND** bisacodyl (DULCOLAX) suppository 10 mg, 10 mg, Rectal, QDAY PRN, Shaquille Spencer D.O.  •  NS infusion, , Intravenous, Continuous, Shaquille Spencer D.O.  •  [START ON 5/2/2022] enoxaparin (Lovenox) inj 40 mg, 40 mg, Subcutaneous, DAILY, Shaquille Spencer D.O.  •  acetaminophen (Tylenol) tablet 650 mg, 650 mg, Oral, Q6HRS PRN, Shaquille Spencer D.O.  •  ondansetron (ZOFRAN) syringe/vial injection 4 mg, 4 mg, Intravenous, Q4HRS PRN, Shaquille Spencer D.O.  •  ondansetron (ZOFRAN ODT) dispertab 4 mg, 4 mg, Oral, Q4HRS PRN, Shaquille Spencer D.O.  •  insulin infusion pump (patient's own), 1.25-1.4 Units/hr, Subcutaneous, Continuous, Shaquille Spencer D.O.  •  [START ON 5/2/2022] Pancrelipase (Lip-Prot-Amyl) 08584-99951 units CPEP 1 Capsule, 1 Capsule, Oral, TID AC, Shaquille Spencer D.O.  •  oseltamivir (TAMIFLU) capsule 75 mg, 75 mg, Oral, BID, Shaquille Spencer D.O.  •  [START ON 5/2/2022] montelukast (SINGULAIR) tablet 10 mg, 10 mg, Oral, DAILY, Shaquille Spencer D.O.  •  hydrOXYzine HCl (ATARAX) tablet 25 mg, 25 mg, Oral, TID PRN, Shaquille Spencer D.O.  •  Notify MD and PharmD if FSBG level is less than or equal to 70 mg/dL or patient is showing signs/symptoms of hypoglycemia (tachycardia, palpitations, diaphoresis, clammy, tremulousness, nausea, confused), , , Once **AND** Administer 20 grams of glucose (approximately 8 ounces of fruit juice) every 15 minutes PRN FSBS less than 70 mg/dL, , , PRN **AND** dextrose 50% (D50W) injection 25 g, 25 g, Intravenous, Q15 MIN PRN, Shaquille Spencer D.O.    Current Outpatient Medications:   •  Triamcinolone  Acetonide (NASACORT AQ NA), Administer 2 Sprays into affected nostril(S) every day., Disp: , Rfl:   •  Glucagon, rDNA, (GLUCAGON EMERGENCY) 1 MG Kit, Inject 1 mg as directed as needed. For severe hypoglycemia, Disp: 1 Kit, Rfl: 1  •  HUMALOG 100 UNIT/ML, Inject 50 units into pump reservoir daily  30 day supply, Disp: 20 mL, Rfl: 11  •  amitriptyline (ELAVIL) 25 MG Tab, Take 25 mg by mouth at bedtime., Disp: , Rfl:   •  PANCREAZE 29410-20760 units Cap DR Particles, Take 1 Capsule by mouth 3 times a day before meals., Disp: , Rfl:   •  CONTOUR NEXT TEST strip, TEST 10 TIMES A DAY FOR INSULIN PUMP ADJUSTMENT. Links to insulin pump, Disp: 900 Strip, Rfl: 3  •  montelukast (SINGULAIR) 10 MG Tab, Take 10 mg by mouth every day., Disp: , Rfl:   •  insulin infusion pump Device, Inject 1.25-1.4 Units/hr as instructed Continuous. Patient's own SQ insulin pump  Type of Insulin: Humalog Last change of tubin/15/19 - Change tubing and site every 72 hours  Dosing: Basal rate: 0000 - 0300 = 1.25 units 0300 - 0700 = 1.35 units 0700 - 0900 = 1.40 units 0900 - 1700  = 1.30 units 1700 - 0000 = 1.45 units Bolus ratio:  1 unit : 8 g carbohydrate at 0000 - 1100  1 unit : 7.5 g carbohydrate at 1100 - 1530              1 unit : 7 g CHO 1530 - 1700             1 unit : 6 g CHO 1258-7836             1 unit : 8 g CHO 9280-8764  Sensitivity ratio:  1 units for every 35 over 130-150 mg/dL  Disconnect pump if patient becomes hypoglycemic and altered., Disp: , Rfl:   •  vitamin D (CHOLECALCIFEROL) 1000 UNIT Tab, Take 5,000 Units by mouth every day., Disp: , Rfl:     PHYSICAL EXAM:  Vitals:    22 1800 22 1852 22 1900 229   BP: 113/67  105/61 102/54   Pulse: (!) 120  (!) 124 (!) 116   Resp:    Temp:  (!) 38.5 °C (101.3 °F)  (!) 38.1 °C (100.6 °F)   TempSrc:  Oral  Temporal   SpO2: 98%  96% 96%   Weight:       Height:       , Temp (24hrs), Av.4 °C (101.2 °F), Min:38.1 °C (100.6 °F), Max:39 °C (102.2 °F)  ,  "Pulse Oximetry: 96 %, O2 Delivery Device: None - Room Air    General: Pt resting in NAD, cooperative   Skin: Pale  HEENT: NC/AT. PERRL. EOMI. moderately dry mucous membranes, posterior oropharynx is mildly erythematous, no exudates noted on tonsils  Lungs:  Symmetrical.  CTAB with no W/R/R.  Good air movement   Cardiovascular: Tachycardic rate, no murmurs  Abdomen:  Abdomen is soft, very mild diffuse tenderness on palpation without any rebound or guarding  Extremities:  Full range of motion. No gross deformities noted.  No edema   Spine:  Straight without vertebral anomalies.  CNS:  Muscle tone is normal. No gross focal neurologic deficits      LAB TESTS:   Recent Labs     05/01/22  1641   WBC 6.9   RBC 4.46*   HEMOGLOBIN 13.7*   HEMATOCRIT 38.7*   MCV 86.8   MCH 30.7   RDW 38.5   PLATELETCT 231   MPV 11.2   NEUTSPOLYS 86.00*   LYMPHOCYTES 5.20*   MONOCYTES 8.00   EOSINOPHILS 0.00   BASOPHILS 0.40         Recent Labs     05/01/22  1641   SODIUM 124*   POTASSIUM 4.0   CHLORIDE 87*   CO2 21   BUN 10   CREATININE 0.76   CALCIUM 9.5   ALBUMIN 4.8       CULTURES:   Results     Procedure Component Value Units Date/Time    CoV-2, FLU A/B, and RSV by PCR (2-4 Hours [facility]) : Collect NP swab in VTM [[id number]]  (Abnormal) Collected: 05/01/22 1728    Order Status: Completed Specimen: Respirate Updated: 05/01/22 1822     Influenza virus A RNA POSITIVE     Influenza virus B, PCR Negative     RSV, PCR Negative     SARS-CoV-2 by PCR NotDetected     Comment: PATIENTS: Important information regarding your results and instructions can  be found at [email or web address]   \"After your  Covid-19 Test\"    [facility] providers: PLEASE REFER TO DE-ESCALATION AND RETESTING PROTOCOL  on [email or web address]    **The [facility] GeneXpert Xpress SARS-CoV-2 RT-PCR Test has been made  available for use under the Emergency Use Authorization (EUA) only.          SARS-CoV-2 Source NP Swab    Narrative:      ER tel.  05/01/2022, " "18:22, RB PERF. RESULTS CALLED TO: RN: [id number]    BLOOD CULTURE [[id number]] Collected: 05/01/22 1658    Order Status: Sent Specimen: Blood from Peripheral Updated: 05/01/22 1715    Narrative:      Per Hospital Policy: Only change Specimen Src: to \"Line\" if  specified by physician order.    BLOOD CULTURE [[id number]] Collected: 05/01/22 1641    Order Status: Sent Specimen: Blood from Peripheral Updated: 05/01/22 1707    Narrative:      Per Hospital Policy: Only change Specimen Src: to \"Line\" if  specified by physician order.    URINALYSIS [[id number]]     Order Status: Sent Specimen: Urine     URINE CULTURE(NEW) [[id number]]     Order Status: Sent Specimen: Urine           IMAGES:  DX-CHEST-PORTABLE (1 VIEW)   Final Result         No acute cardiac or pulmonary abnormality is identified.          CONSULTS:   None    ASSESSMENT/PLAN:   21 y.o. male with a history of type 1 diabetes, pancreatic insufficiency, celiac disease presenting for cough, vomiting, dehydration secondary influenza.    #Influenza A positive  - Patient with influenza-like symptoms for 1 day including cough, significant vomiting, decreased p.o. intake  -Patient has family member recently positive for influenza as well as strep throat  -Patient evaluated at urgent care on 5/1 and diagnosed with influenza and discharged home with Tamiflu.  - Came to the ED when symptoms worsened and patient was unable to keep down p.o. secondary to nausea and vomiting  -In the ED: Tachycardic in the 120s, normotensive, febrile to 102.2F, normal WBC. Hydrated w/ IVF in the ED and HR slowly improving   Admit to med surg floor   NS at 150 cc/h overnight   Antiemetics as needed   Tamiflu for 5 days    Check rapid strep    #Tachycardia   - Tachycardic at home in the 140s at home today  - In the ED: 130s with improvement to the 120s after fluid resuscitation  - Patient w/o chest pain or shortness of breath or significant palpitations  - Etiology: Likely 2/2 dehydration, low " suspicion for PE given lack of chest pain and hypoxia  - Plan:    Fluid hydrate overnight   EKG pending      #Type 1 diabetes   #Hyperglycemia  - Patient w/ a history of type 1 diabetes, followed by Dr. Núñez of endocrinology outpatient  -Sugars have been slightly elevated the last few days in the 200s which is atypical for patient  -In the ED: Labs showed glucose of 283, low sodium of 127 corrected, bicarb of 22, mildly elevated gap of 16, VBG showed normal pH of 7.41  - Etiology: patient's electrolyte abnormalities likely 2/2 volume depletion. Does not meet DKA criteria   - Plan   Admit to med/surg   Aggressive IV hydration    Recheck BMP in 4 hours    Continue home DM regiment w/ insulin pump     Meals: Humalog 1U per 14g carbs, 1U for every 50 >150     Continuous: Humalog 0.625U/hr from 4916-3321, 0.6U/hr from 3340-4804, 0.575U/hr from 6582-7308        #Hyponatremia   - Patient w/ corrected Na of 127  - Etiology: likely 2/2 GI fluid losses   - Check serum and urine osmol  - NS at 150cc overnight    #Pancreatic insufficiency   - Followed outpatient by GI  - Continue patient's home pancrelipase     #Seasonal allergies  - Continue home singular    Shaquille Spencer, DO  PGY-3  [facility]

## 2022-05-02 NOTE — HOSPITAL COURSE
Mr. Martinez Smith is a 31-year-old male with a PMHx of DMT2, pancreatic insufficiency, celiac disease, who presented on 5/1/2022 due to cough, vomiting, dehydration secondary to influenza.    Patient reports that he has been sick with nausea, vomiting, cough and cold for approximately 1 day.  He also noted that his father was positive for influenza as well as strep throat.  Patient states that for the past day, he progressively started to have worsening cough associated with vomiting with greater than 10 episodes and some mild associate abdominal pain.  Patient states that he had fevers at home with highest at 103F.  Patient also reports he is unable to tolerate oral intake and decided to present himself to urgent care for further evaluation.     tested a positive patient for influenza and was discharged home with Tamiflu.  Patient notes that his nausea and vomiting progressively got worse and noticed that his heart rate from his Fitbit showed 120-140 in rate which endorsed him to present himself to the ER.    Patient reports that his nausea significantly improved.  He endorses sore throat and a cough.  He also notes that he only has chest pain with coughing, but denies any shortness of breath.  Patient denies any history of DKA and denies dysuria.  He follows Dr. Núñez endocrinology for his DM T1 and has an insulin pump with a carb ratio of 1:15 with a correction of 1 unit for every 50 points over 150.  His sugars had been elevated the past few days up to 200s and ensures that his insulin pump is working well and typically is in the low to mid 100s.    In ER, patient noted to be tachycardiac with HR range 120-130s.  Notable lab findings note sodium 124, potassium 4, chloride 87, glucose 283, mildly elevated beta hydroxybutyrate at 1.75, normal lipase, influenza positive, COVID-negative, WBC 6.9.  CXR results no acute cardiopulmonary process.  Patient was admitted into the observation unit for evaluation  and treatment.    No events overnight noted.  Patient reports improvement in nausea and vomiting along with cough.  Discussed with patient possibly getting vaccinated for influenza.  Patient will be prescribed with Tamiflu again as his pharmacy ran out of Tamiflu.  Patient highly recommended to follow-up with PCP s/p hospitalization.  All questions and concerns answered prior to being discharged.  Patient discharged home.

## 2022-05-02 NOTE — PROGRESS NOTES
4 Eyes Skin Assessment Completed by YAYA Chatterjee and YAYA Saba.    Head WDL  Ears WDL  Nose WDL  Mouth WDL  Neck WDL  Breast/Chest WDL  Shoulder Blades WDL  Spine WDL, insulin pump externally placed right hip, CDI   (R) Arm/Elbow/Hand WDL  (L) Arm/Elbow/Hand WDL  Abdomen WDL  Groin WDL  Scrotum/Coccyx/Buttocks WDL  (R) Leg WDL  (L) Leg WDL  (R) Heel/Foot/Toe WDL  (L) Heel/Foot/Toe WDL          Devices In Places Blood Pressure Cuff and Pulse Ox      Interventions In Place Pillows    Possible Skin Injury No    Pictures Uploaded Into Epic N/A  Wound Consult Placed N/A  RN Wound Prevention Protocol Ordered No   
Patient discharged home. Patient AOX 4.  IV removed, discharge instructions provided to patient and reviewed with them. All questions answered, patient provided copy of discharge instructions and instructed on when to F/U with MD. Patient walked off unit. Patient discharged into the care of his mom.     
Patient febrile 1 hr post tylenol admin, blankets and socks removed, cool washcloth on forehead, and ice packs in both armpits.   
Patient requesting cough drop for sore throat d/t throwing up. Orders received from Dr. Spencer.  
Patient up to ER via wheelchair by patient transport. He ambulated from martin to bed with steady gait and tolerated activity without incident. He is A&Ox4, denies nausea at this time. I have assumed care of this patient. Patient has been assessed (see flow sheet) and plan of care has been discussed. Patient verbalizes understanding of plan and denies any further needs at this time. Patient is now resting in bed, bed is in the lowest position and locked, and the call light is within reach.   
independent

## 2022-05-02 NOTE — CARE PLAN
The patient is Watcher - Medium risk of patient condition declining or worsening    Shift Goals  Clinical Goals: Fever and nausea mgmt, IV fluids  Patient Goals: comfort, safety  Family Goals: safety    Progress made toward(s) clinical / shift goals:    Problem: Pain - Standard  Goal: Alleviation of pain or a reduction in pain to the patient’s comfort goal  Outcome: Progressing     Problem: Knowledge Deficit - Standard  Goal: Patient and family/care givers will demonstrate understanding of plan of care, disease process/condition, diagnostic tests and medications  Outcome: Progressing     Problem: Fluid Volume  Goal: Fluid volume balance will be maintained  Outcome: Progressing     Problem: Gastrointestinal Irritability  Goal: Nausea and vomiting will be absent or improve  Outcome: Progressing     Problem: Self Care  Goal: Patient will have the ability to perform ADLs independently or with assistance (bathe, groom, dress, toilet and feed)  Outcome: Progressing     Problem: Infection - Standard  Goal: Patient will remain free from infection  Outcome: Progressing       Patient is not progressing towards the following goals:

## 2022-05-04 LAB
BACTERIA UR CULT: NORMAL
SIGNIFICANT IND 70042: NORMAL
SITE SITE: NORMAL
SOURCE SOURCE: NORMAL

## 2022-05-06 LAB
BACTERIA BLD CULT: NORMAL
BACTERIA BLD CULT: NORMAL
SIGNIFICANT IND 70042: NORMAL
SIGNIFICANT IND 70042: NORMAL
SITE SITE: NORMAL
SITE SITE: NORMAL
SOURCE SOURCE: NORMAL
SOURCE SOURCE: NORMAL

## 2022-05-29 LAB
25(OH)D3+25(OH)D2 SERPL-MCNC: 41.5 NG/ML (ref 30–100)
ALBUMIN SERPL-MCNC: 4.9 G/DL (ref 4.1–5.2)
ALBUMIN/GLOB SERPL: 1.8 {RATIO} (ref 1.2–2.2)
ALP SERPL-CCNC: 79 IU/L (ref 44–121)
ALT SERPL-CCNC: 27 IU/L (ref 0–44)
AST SERPL-CCNC: 26 IU/L (ref 0–40)
BILIRUB SERPL-MCNC: 0.4 MG/DL (ref 0–1.2)
BUN SERPL-MCNC: 6 MG/DL (ref 6–20)
BUN/CREAT SERPL: 8 (ref 9–20)
CALCIUM SERPL-MCNC: 10.2 MG/DL (ref 8.7–10.2)
CHLORIDE SERPL-SCNC: 94 MMOL/L (ref 96–106)
CO2 SERPL-SCNC: 24 MMOL/L (ref 20–29)
CREAT SERPL-MCNC: 0.78 MG/DL (ref 0.76–1.27)
EGFRCR SERPLBLD CKD-EPI 2021: 129 ML/MIN/1.73
ENDOMYSIUM IGA SER QL: NEGATIVE
GLOBULIN SER CALC-MCNC: 2.8 G/DL (ref 1.5–4.5)
GLUCOSE SERPL-MCNC: 224 MG/DL (ref 65–99)
IGA SERPL-MCNC: 260 MG/DL (ref 90–386)
POTASSIUM SERPL-SCNC: 5 MMOL/L (ref 3.5–5.2)
PROT SERPL-MCNC: 7.7 G/DL (ref 6–8.5)
SODIUM SERPL-SCNC: 135 MMOL/L (ref 134–144)
TTG IGA SER-ACNC: <2 U/ML (ref 0–3)

## 2022-06-08 ENCOUNTER — OFFICE VISIT (OUTPATIENT)
Dept: ENDOCRINOLOGY | Facility: MEDICAL CENTER | Age: 22
End: 2022-06-08
Payer: COMMERCIAL

## 2022-06-08 VITALS
HEIGHT: 66 IN | OXYGEN SATURATION: 99 % | WEIGHT: 129 LBS | SYSTOLIC BLOOD PRESSURE: 112 MMHG | DIASTOLIC BLOOD PRESSURE: 72 MMHG | BODY MASS INDEX: 20.73 KG/M2 | HEART RATE: 138 BPM

## 2022-06-08 DIAGNOSIS — E10.649 TYPE 1 DIABETES MELLITUS WITH HYPOGLYCEMIA AND WITHOUT COMA (HCC): ICD-10-CM

## 2022-06-08 DIAGNOSIS — K86.81 EXOCRINE PANCREATIC INSUFFICIENCY: ICD-10-CM

## 2022-06-08 DIAGNOSIS — Z96.41 INSULIN PUMP IN PLACE: ICD-10-CM

## 2022-06-08 DIAGNOSIS — E55.9 VITAMIN D DEFICIENCY: ICD-10-CM

## 2022-06-08 DIAGNOSIS — K90.0 CELIAC DISEASE: ICD-10-CM

## 2022-06-08 LAB
HBA1C MFR BLD: 7.3 % (ref 0–5.6)
INT CON NEG: ABNORMAL
INT CON POS: ABNORMAL

## 2022-06-08 PROCEDURE — 99213 OFFICE O/P EST LOW 20 MIN: CPT

## 2022-06-08 PROCEDURE — 83036 HEMOGLOBIN GLYCOSYLATED A1C: CPT

## 2022-06-08 PROCEDURE — 99214 OFFICE O/P EST MOD 30 MIN: CPT

## 2022-06-08 RX ORDER — AZELASTINE 1 MG/ML
SPRAY, METERED NASAL
COMMUNITY
Start: 2022-05-23 | End: 2023-07-27

## 2022-06-08 RX ORDER — OLOPATADINE HYDROCHLORIDE 665 UG/1
SPRAY NASAL
COMMUNITY
Start: 2022-04-26 | End: 2022-07-25

## 2022-06-08 ASSESSMENT — FIBROSIS 4 INDEX: FIB4 SCORE: 0.58

## 2022-06-08 NOTE — PROGRESS NOTES
Chief Complaint:  Consult requested by Gregory Grande M.D. for initial evaluation of Type 2 Diabetes Mellitus    HPI:   1.  Type 1 diabetes mellitus with hypoglycemia without coma:  Martinez Smith is a 21 y.o. male with Type 2 Diabetes Mellitus diagnosed when he ws 6 years old  He denies hospitalizations for DKA in the past.  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  He is on a medtronic 630g pump with no CGM due to financial constraints.    He is testing BG 10x a day with a contour next BG meter.     He monitors blood glucose  8 times per day.   Blood glucose values range:Fasting at 160-170, before meals 115s, 2 hour after meals B and L 200s. Dinner 2 hours in the 80s           Labs from 11/9/2021 show a1c was 7.3%  Labs from  4/12021 show a1c was 6.8%  Labs from 12/3/2020 show a1c was 7.3%  Labs from 7/31/2020 show a1c was 7.9%  Labs from 5/30/2020 show a1c was 7.7%  Labs from 12/13/2020 show a1c was 8.8%       Diabetes Medications:   Humalog  Basal rates  12 am 0.325  3 am 0.6  12 pm  0.575    Carbohydrate ratio  14    Sensitivity  50    Target  130-150  Insulin action 3 hours    Diet: special diet: has a lot of dietary restriction related to allergies and history of celiac disease.       He reports hypoglycemic episodes occurring 5 times per week and are mild-occurring usually between 11 PM and 12 AM  He  denies hypoglycemic unawareness.   He denies episodes of severe hypoglycemia requiring third party assistance.    He  is wearing a medical alert bracelet or necklace.    He does a glucagon emergency kit.  Expires in October    He denies attending diabetes education classes, and he does not feel he needs a referral.     Diabetes Complications   He  denies history of retinopathy.    He denies laser eye surgery.   Last eye exam: April 2021. One tomorrow [place]    He denies history of peripheral sensory neuropathy.     He denies numbness, tingling in both feet.    He denies history of foot sores.   He denies history of kidney damage.    He is not on ACE inhibitor or ARB.   He denies history of coronary artery disease.    He  denies history of stroke and denies TIA.   He denies history of PAD.  He denies history of hyperlipidemia.    No hyperlipidemia  His LDL-C was 120 on Feb 2022    No microalbuminuria  UACR was < 30 on 2/18/2022    2. Vitamin D deficiency:  Currently taking vitamin D3 5000IUs daily  His vitamin D is 37.5 on 2/2022    3. Celiac Disease:  He also has celiac disease, he is on a gluten free diet since age of 11   Blood work from [facility] on 5/25/2022 showed Iga <2     4. Insulin pump status:  Insulin pump in place    5.  Exocrine pancreatic insufficiency:  He has a history of chronic abdominal pain and nausea and vomiting with a negative extensive work-up for pancreatitis, gastritis and ultimately was discovered that he has pancreatic enzyme deficiency  Currently taking Pancreaze 74247-05931 units-1 capsule before each meal  ROS:     CONS:     No fever, no chills, no weight loss, no fatigue   EYES:      No diplopia, no blurry vision, no redness of eyes, no swelling of eyelids   ENT:    No hearing loss, No ear pain, No sore throat, no dysphagia, no neck swelling   CV:     No chest pain, no palpitations, no claudication, no orthopnea, no PND   PULM:    No SOB, no cough, no hemoptysis, no wheezing    GI:   No nausea, no vomiting, no diarrhea, no constipation, no bloody stools   :  Passing urine well, no dysuria, no hematuria   ENDO:   No polyuria, no polydipsia, no heat intolerance, no cold intolerance   NEURO: No headaches, no dizziness, no convulsions, no tremors   MUSC:  No joint swellings, no arthralgias, no myalgias, no weakness   SKIN:   No rash, no ulcers, no dry skin   PSYCH:   No depression, no anxiety, no difficulty sleeping       Past Medical History:  Patient Active Problem List    Diagnosis Date Noted    • Influenza 05/01/2022   • Type 1 diabetes mellitus with hypoglycemia and without coma (HCC) 01/19/2022   • Exocrine pancreatic insufficiency 08/06/2021   • Chronic abdominal pain 09/11/2020   • Vitamin D deficiency 09/11/2020   • RUQ abdominal pain 07/31/2020   • Long-term insulin use (HCC) 12/17/2019   • Fitting and adjustment of insulin pump 12/17/2019   • Intractable nausea and vomiting 12/17/2019   • Hyperlipidemia 01/18/2018   • Other mechanical complication of insulin pump 01/18/2018   • Insulin pump status 06/27/2017   • Type 1 diabetes mellitus with hyperglycemia (HCC) 06/15/2017   • Celiac disease 06/15/2017   • Weakness of right lower extremity 06/15/2017   • Lipohyperplasia 06/15/2017   • Dyslipidemia 06/15/2017       Past Surgical History:  Past Surgical History:   Procedure Laterality Date   • HYPOSPADIAS REPAIR          Allergies:  Beef flavor, Compazine, Dairy food allergy, Eggs, Gluten meal, Mustard seed, Peanut (diagnostic), Shellfish allergy, and Tree nuts food allergy     Current Medications:    Current Outpatient Medications:   •  fexofenadine (ALLEGRA) 60 MG Tab, Take 60 mg by mouth 2 times a day., Disp: , Rfl:   •  Triamcinolone Acetonide (NASACORT AQ NA), Administer 2 Sprays into affected nostril(S) every day., Disp: , Rfl:   •  Glucagon, rDNA, (GLUCAGON EMERGENCY) 1 MG Kit, Inject 1 mg as directed as needed. For severe hypoglycemia, Disp: 1 Kit, Rfl: 1  •  HUMALOG 100 UNIT/ML, Inject 50 units into pump reservoir daily  30 day supply, Disp: 20 mL, Rfl: 11  •  amitriptyline (ELAVIL) 25 MG Tab, Take 25 mg by mouth at bedtime., Disp: , Rfl:   •  PANCREAZE 55331-48005 units Cap DR Particles, Take 1 Capsule by mouth 3 times a day before meals., Disp: , Rfl:   •  CONTOUR NEXT TEST strip, TEST 10 TIMES A DAY FOR INSULIN PUMP ADJUSTMENT. Links to insulin pump, Disp: 900 Strip, Rfl: 3  •  montelukast (SINGULAIR) 10 MG Tab, Take 10 mg by mouth every day., Disp: , Rfl:   •  insulin infusion pump  Device, Inject  under the skin continuous. Patient's own SQ insulin pump  Type of Insulin: Humalog Last change of tubin22 - Change tubing and site every 72 hours  Dosing: Basal rate: 0000 - 0300 = .625 units 0300 - 1200 = .6 units 1200 - 0000= .575 units Bolus ratio:  1 unit : 14 g carbohydrate    Sensitivity ratio:  1 units for every 50 over 130-150 mg/dL  Disconnect pump if patient becomes hypoglycemic and altered., Disp: , Rfl:   •  vitamin D (CHOLECALCIFEROL) 1000 UNIT Tab, Take 5,000 Units by mouth every day., Disp: , Rfl:     Social History:  Social History     Socioeconomic History   • Marital status: Single     Spouse name: Not on file   • Number of children: Not on file   • Years of education: Not on file   • Highest education level: Not on file   Occupational History   • Not on file   Tobacco Use   • Smoking status: Never Smoker   • Smokeless tobacco: Never Used   Vaping Use   • Vaping Use: Never used   Substance and Sexual Activity   • Alcohol use: No   • Drug use: No   • Sexual activity: Not on file   Other Topics Concern   • Behavioral problems Not Asked   • Interpersonal relationships Not Asked   • Sad or not enjoying activities Not Asked   • Suicidal thoughts Not Asked   • Poor school performance Not Asked   • Reading difficulties Not Asked   • Speech difficulties Not Asked   • Writing difficulties Not Asked   • Inadequate sleep Not Asked   • Excessive TV viewing Not Asked   • Excessive video game use Not Asked   • Inadequate exercise Not Asked   • Sports related Not Asked   • Poor diet Not Asked   • Family concerns for drug/alcohol abuse Not Asked   • Poor oral hygiene Not Asked   • Bike safety Not Asked   • Family concerns vehicle safety Not Asked   Social History Narrative   • Not on file     Social Determinants of Health     Financial Resource Strain: Not on file   Food Insecurity: Not on file   Transportation Needs: Not on file   Physical Activity: Not on file   Stress: Not on file  "  Social Connections: Not on file   Intimate Partner Violence: Not on file   Housing Stability: Not on file        Family History:   Family History   Problem Relation Age of Onset   • Arthritis Maternal Grandmother        PHYSICAL EXAM:   Vital signs: /72 (BP Location: Left arm, Patient Position: Sitting, BP Cuff Size: Adult)   Pulse (!) 138   Ht 1.676 m (5' 6\")   Wt 58.5 kg (129 lb)   SpO2 99%   BMI 20.82 kg/m²   GENERAL: Well-developed, well-nourished  in no apparent distress.   EYE: No ocular and eyelid asymmetry, Anicteric sclerae,  PERRL, No exophthalmos or lidlag  HENT: Hearing grossly intact, Normocephalic, atraumatic.   NECK: Supple. Trachea midline. thyroid is normal in size without nodules or tenderness  CARDIOVASCULAR: Regular rate and rhythm. No murmurs, rubs, or gallops.   LUNGS: Clear to auscultation bilaterally   EXTREMITIES: No clubbing, cyanosis, or edema.   NEUROLOGICAL: Cranial nerves II-XII are grossly intact   Symmetric reflexes at the patella no proximal muscle weakness, No visible tremor with both outstretched hands  LYMPH: No cervical, supraclavicular,  adenopathy palpated.   SKIN: No rashes, lesions. Turgor is normal.  FOOT: Normal sensation to monofilament testing, normal pulses, no ulcers.  Normal Vibration quantitative sensation test.    Labs:  Lab Results   Component Value Date/Time    HBA1C 7.0 (A) 03/04/2022 1634    AVGLUC 177 (H) 04/24/2020 1935       Lab Results   Component Value Date/Time    WBC 6.1 05/02/2022 01:06 AM    RBC 3.80 (L) 05/02/2022 01:06 AM    HEMOGLOBIN 11.7 (L) 05/02/2022 01:06 AM    MCV 88.7 05/02/2022 01:06 AM    MCH 30.8 05/02/2022 01:06 AM    MCHC 34.7 05/02/2022 01:06 AM    RDW 38.2 05/02/2022 01:06 AM    MPV 11.3 05/02/2022 01:06 AM       Lab Results   Component Value Date/Time    SODIUM 135 05/25/2022 04:35 AM    SODIUM 127 (L) 05/02/2022 01:06 AM    POTASSIUM 5.0 05/25/2022 04:35 AM    POTASSIUM 4.5 05/02/2022 01:06 AM    CHLORIDE 94 (L) 05/25/2022 " 04:35 AM    CHLORIDE 96 05/02/2022 01:06 AM    CO2 24 05/25/2022 04:35 AM    CO2 20 05/02/2022 01:06 AM    ANION 11.0 05/02/2022 01:06 AM    GLUCOSE 224 (H) 05/25/2022 04:35 AM    GLUCOSE 227 (H) 05/02/2022 01:06 AM    BUN 6 05/25/2022 04:35 AM    BUN 9 05/02/2022 01:06 AM    CREATININE 0.78 05/25/2022 04:35 AM    CREATININE 0.68 05/02/2022 01:06 AM    CREATININE 0.4 05/25/2007 04:40 AM    CALCIUM 10.2 05/25/2022 04:35 AM    CALCIUM 7.7 (L) 05/02/2022 01:06 AM    ASTSGOT 26 05/25/2022 04:35 AM    ASTSGOT 26 05/02/2022 01:06 AM    ALTSGPT 27 05/25/2022 04:35 AM    ALTSGPT 16 05/02/2022 01:06 AM    TBILIRUBIN 0.4 05/25/2022 04:35 AM    TBILIRUBIN 0.3 05/02/2022 01:06 AM    ALBUMIN 4.9 05/25/2022 04:35 AM    ALBUMIN 3.6 05/02/2022 01:06 AM    ALBUMIN 4.80 10/22/2016 09:46 AM    TOTPROTEIN 7.7 05/25/2022 04:35 AM    TOTPROTEIN 5.6 (L) 05/02/2022 01:06 AM    TOTPROTEIN 7.70 10/22/2016 09:46 AM    GLOBULIN 2.8 05/25/2022 04:35 AM    GLOBULIN 2.0 05/02/2022 01:06 AM    AGRATIO 1.8 05/25/2022 04:35 AM    AGRATIO 1.8 05/02/2022 01:06 AM       Lab Results   Component Value Date/Time    CHOLSTRLTOT 181 02/18/2022 0448    TRIGLYCERIDE 111 02/18/2022 0448    HDL 41 02/18/2022 0448     (H) 08/20/2020 0540       Lab Results   Component Value Date/Time    MALBCRT see below 07/13/2019 08:00 AM    MICROALBUR <0.7 07/13/2019 08:00 AM    MICRALB <3.0 02/18/2022 04:48 AM        Lab Results   Component Value Date/Time    TSHULTRASEN 2.140 09/14/2019 0727     Lab Results   Component Value Date/Time    FREEDIR 1.24 04/01/2021 0536     Lab Results   Component Value Date/Time    FREET3 3.53 09/14/2019 0727         ASSESSMENT/PLAN:   1. Type 1 diabetes mellitus with hypoglycemia and without coma (HCC)  Unstable   A1c of 7.3%, however patient reports hypoglycemic episodes occurring between 11 PM and 12 AM.      Medication regimen as follows:  Humalog  Basal rates  12 am 0.325  3 am 0.6  Added at 10 PM 0.55-to help with hypoglycemic  episodes between 11 PM to 12 AM.  Discussed that he may have to add a basal at 6 AM if his morning blood sugars run too high  12 pm  0.575    Carbohydrate ratio  14    Sensitivity  50    Target  130-150  Insulin action 3 hours    2. Vitamin D deficiency  Unstable  Continue regimen    3. Celiac disease  Stable  Continue regimen    4. Insulin pump in place  Insulin pump in place for diabetes treatment    5. Exocrine pancreatic insufficiency  Stable  Follow-up with PCP    Disposition: Make an appointment to follow-up in 3 months  We will order blood work at that appointment        Thank you kindly for allowing me to participate in the diabetes care plan for this patient.    Nic Ross A.P.R.N.  06/08/22    CC:   Gregory Grande M.D.

## 2022-06-08 NOTE — LETTER
[facility]  Gregory Grande M.D.  [address]  [facility] [address]  Fax: [phone]   Authorization for Release/Disclosure of   Protected Health Information   Name: JUAN JOSE SMITH : 2000 SSN: [id number]   Address: [address] [place] [address] [address] [place] [address] Phone:    [phone] (home)    I authorize the entity listed below to release/disclose the PHI below to:   [facility]/Gregory Grande M.D. .   Provider or Entity Name:   Dr Elena   Address   City, [facility], [facility]   Phone:      Fax:     Reason for request: continuity of care   Information to be released:    [  ] LAST COLONOSCOPY,  including any PATH REPORT and follow-up  [  ] LAST FIT/COLOGUARD RESULT [  ] LAST DEXA  [  ] LAST MAMMOGRAM  [  ] LAST PAP  [  ] LAST LABS xxx] RETINA EXAM  Has appointment on 22  [  ] IMMUNIZATION RECORDS  [  ] Release all info      [  ] Check here and initial the line next to each item to release ALL health information INCLUDING  _____ Care and treatment for drug and / or alcohol abuse  _____ HIV testing, infection status, or AIDS  _____ Genetic Testing    DATES OF SERVICE OR TIME PERIOD TO BE DISCLOSED: _____________  I understand and acknowledge that:  * This Authorization may be revoked at any time by you in writing, except if your health information has already been used or disclosed.  * Your health information that will be used or disclosed as a result of you signing this authorization could be re-disclosed by the recipient. If this occurs, your re-disclosed health information may no longer be protected by State or Federal laws.  * You may refuse to sign this Authorization. Your refusal will not affect your ability to obtain treatment.  * This Authorization becomes effective upon signing and will  on (date) __________.      If no date is indicated, this Authorization will  one (1) year from the signature date.    Name: Juan Jose Smith    Signature: continued medical  care.    Date:     6/8/2022       PLEASE FAX REQUESTED RECORDS BACK TO: [phone]

## 2022-06-08 NOTE — PROGRESS NOTES
RN-CDE Note    Subjective:     HPI:  Martinez Smith is a 21 y.o. old patient who is seen by the Diabetes Nurse Specialist today for review of his type 1 diabetes on insulin pump   Changes in Health: admitted to hospital 5/1-5/2/22 for influenza and DKA    Diabetes Medications:   Humalog  Basal rates  12 am 0.325  3 am 0.6  12 pm  0.575  Carbohydrate ratio  14  Sensitivity  50  Target  130-150  Insulin action 3 hours    Taking above medications as prescribed: yes  Taking daily ASA: No      Diet: special diet: has a lot of dietary restriction related to allergies.     Patient's body mass index is 20.82 kg/m². Exercise and nutrition counseling were performed at this visit.      Health Maintenance:   Health Maintenance Due   Topic Date Due   • IMM VARICELLA (CHICKENPOX) VACCINE (2 of 2 - 2-dose childhood series) 12/17/2002   • IMM PNEUMOCOCCAL VACCINE: 0-64 Years (1 - PCV) Never done   • IMM HPV VACCINE (1 - Male 2-dose series) Never done   • IMM MENINGOCOCCAL B VACCINE HEALTHY PATIENTS AGED 16 TO 23 (1 of 2 - MenB Trumenba 2-Dose Series) Never done   • RETINAL SCREENING  05/27/2021   • COVID-19 Vaccine (3 - Booster for Pfizer series) 10/27/2021         DM:   Last A1c:   Lab Results   Component Value Date/Time    HBA1C 7.3 (A) 06/08/2022 01:11 PM      Previous A1c was 7 on 3/4/22  A1C GOAL: < 7    Glucose monitoring frequency: testing 8-10 times per day    Hypoglycemic episodes: yes - occasional    Last Retinal Exam: has appointment tomorrow. request sent for records  Daily Foot Exam: Yes     Exam:  Monofilament: current due 03/04/23    Lab Results   Component Value Date/Time    MALBCRT see below 07/13/2019 08:00 AM    MICROALBUR <0.7 07/13/2019 08:00 AM    MICRALB <3.0 02/18/2022 04:48 AM        ACR Albumin/Creatinine Ratio goal <30     HTN:   Blood pressure goal <140/<80 at goal.   Currently Rx ACE/ARB: Not Indicated     Dyslipidemia:    Lab Results   Component Value Date/Time    CHOLSTRLTOT 181 02/18/2022  04:48 AM    CHOLSTRLTOT 177 12/24/2019 07:29 AM     (H) 08/20/2020 05:40 AM     (H) 12/24/2019 07:29 AM    HDL 41 02/18/2022 04:48 AM    HDL 28 (A) 12/24/2019 07:29 AM    TRIGLYCERIDE 111 02/18/2022 04:48 AM    TRIGLYCERIDE 103 12/24/2019 07:29 AM         Currently Rx Statin: Not Indicated     He  reports that he has never smoked. He has never used smokeless tobacco.      Plan:     Discussed and educated on:   - All medications, side effects and compliance (discussed carefully)  - Annual eye examinations at Ophthalmology  - HbA1C: target  - Home glucose monitoring emphasized  - Weight control and daily exercise    Recommended medication changes: none

## 2022-07-02 ENCOUNTER — HOSPITAL ENCOUNTER (EMERGENCY)
Facility: MEDICAL CENTER | Age: 22
End: 2022-07-02
Attending: EMERGENCY MEDICINE
Payer: COMMERCIAL

## 2022-07-02 ENCOUNTER — APPOINTMENT (OUTPATIENT)
Dept: RADIOLOGY | Facility: MEDICAL CENTER | Age: 22
End: 2022-07-02
Attending: EMERGENCY MEDICINE
Payer: COMMERCIAL

## 2022-07-02 VITALS
RESPIRATION RATE: 13 BRPM | HEART RATE: 86 BPM | OXYGEN SATURATION: 96 % | HEIGHT: 66 IN | DIASTOLIC BLOOD PRESSURE: 67 MMHG | WEIGHT: 124 LBS | BODY MASS INDEX: 19.93 KG/M2 | SYSTOLIC BLOOD PRESSURE: 112 MMHG | TEMPERATURE: 98.1 F

## 2022-07-02 DIAGNOSIS — R55 SYNCOPE, UNSPECIFIED SYNCOPE TYPE: ICD-10-CM

## 2022-07-02 LAB
ALBUMIN SERPL BCP-MCNC: 5.4 G/DL (ref 3.2–4.9)
ALBUMIN/GLOB SERPL: 1.9 G/DL
ALP SERPL-CCNC: 79 U/L (ref 30–99)
ALT SERPL-CCNC: 13 U/L (ref 2–50)
ANION GAP SERPL CALC-SCNC: 11 MMOL/L (ref 7–16)
AST SERPL-CCNC: 23 U/L (ref 12–45)
BASOPHILS # BLD AUTO: 1.1 % (ref 0–1.8)
BASOPHILS # BLD: 0.06 K/UL (ref 0–0.12)
BILIRUB SERPL-MCNC: 0.3 MG/DL (ref 0.1–1.5)
BUN SERPL-MCNC: 3 MG/DL (ref 8–22)
CALCIUM SERPL-MCNC: 10 MG/DL (ref 8.5–10.5)
CHLORIDE SERPL-SCNC: 95 MMOL/L (ref 96–112)
CO2 SERPL-SCNC: 27 MMOL/L (ref 20–33)
CREAT SERPL-MCNC: 0.71 MG/DL (ref 0.5–1.4)
EKG IMPRESSION: NORMAL
EOSINOPHIL # BLD AUTO: 0.29 K/UL (ref 0–0.51)
EOSINOPHIL NFR BLD: 5.4 % (ref 0–6.9)
ERYTHROCYTE [DISTWIDTH] IN BLOOD BY AUTOMATED COUNT: 38.1 FL (ref 35.9–50)
GFR SERPLBLD CREATININE-BSD FMLA CKD-EPI: 132 ML/MIN/1.73 M 2
GLOBULIN SER CALC-MCNC: 2.9 G/DL (ref 1.9–3.5)
GLUCOSE SERPL-MCNC: 175 MG/DL (ref 65–99)
HCT VFR BLD AUTO: 44.6 % (ref 42–52)
HGB BLD-MCNC: 15.5 G/DL (ref 14–18)
IMM GRANULOCYTES # BLD AUTO: 0.02 K/UL (ref 0–0.11)
IMM GRANULOCYTES NFR BLD AUTO: 0.4 % (ref 0–0.9)
LIPASE SERPL-CCNC: 18 U/L (ref 11–82)
LYMPHOCYTES # BLD AUTO: 1.18 K/UL (ref 1–4.8)
LYMPHOCYTES NFR BLD: 21.9 % (ref 22–41)
MCH RBC QN AUTO: 30.8 PG (ref 27–33)
MCHC RBC AUTO-ENTMCNC: 34.8 G/DL (ref 33.7–35.3)
MCV RBC AUTO: 88.7 FL (ref 81.4–97.8)
MONOCYTES # BLD AUTO: 0.37 K/UL (ref 0–0.85)
MONOCYTES NFR BLD AUTO: 6.9 % (ref 0–13.4)
NEUTROPHILS # BLD AUTO: 3.48 K/UL (ref 1.82–7.42)
NEUTROPHILS NFR BLD: 64.3 % (ref 44–72)
NRBC # BLD AUTO: 0 K/UL
NRBC BLD-RTO: 0 /100 WBC
PLATELET # BLD AUTO: 305 K/UL (ref 164–446)
PMV BLD AUTO: 11.1 FL (ref 9–12.9)
POTASSIUM SERPL-SCNC: 4.2 MMOL/L (ref 3.6–5.5)
PROT SERPL-MCNC: 8.3 G/DL (ref 6–8.2)
RBC # BLD AUTO: 5.03 M/UL (ref 4.7–6.1)
SODIUM SERPL-SCNC: 133 MMOL/L (ref 135–145)
TROPONIN T SERPL-MCNC: <6 NG/L (ref 6–19)
WBC # BLD AUTO: 5.4 K/UL (ref 4.8–10.8)

## 2022-07-02 PROCEDURE — 71045 X-RAY EXAM CHEST 1 VIEW: CPT

## 2022-07-02 PROCEDURE — 36415 COLL VENOUS BLD VENIPUNCTURE: CPT

## 2022-07-02 PROCEDURE — 99284 EMERGENCY DEPT VISIT MOD MDM: CPT

## 2022-07-02 PROCEDURE — 74176 CT ABD & PELVIS W/O CONTRAST: CPT

## 2022-07-02 PROCEDURE — 83690 ASSAY OF LIPASE: CPT

## 2022-07-02 PROCEDURE — 700105 HCHG RX REV CODE 258: Performed by: EMERGENCY MEDICINE

## 2022-07-02 PROCEDURE — 93005 ELECTROCARDIOGRAM TRACING: CPT

## 2022-07-02 PROCEDURE — 80053 COMPREHEN METABOLIC PANEL: CPT

## 2022-07-02 PROCEDURE — 93005 ELECTROCARDIOGRAM TRACING: CPT | Performed by: EMERGENCY MEDICINE

## 2022-07-02 PROCEDURE — 82962 GLUCOSE BLOOD TEST: CPT

## 2022-07-02 PROCEDURE — 85025 COMPLETE CBC W/AUTO DIFF WBC: CPT

## 2022-07-02 PROCEDURE — 84484 ASSAY OF TROPONIN QUANT: CPT

## 2022-07-02 RX ORDER — SODIUM CHLORIDE 9 MG/ML
1000 INJECTION, SOLUTION INTRAVENOUS ONCE
Status: COMPLETED | OUTPATIENT
Start: 2022-07-02 | End: 2022-07-02

## 2022-07-02 RX ADMIN — SODIUM CHLORIDE 1000 ML: 9 INJECTION, SOLUTION INTRAVENOUS at 19:30

## 2022-07-02 ASSESSMENT — FIBROSIS 4 INDEX: FIB4 SCORE: 0.58

## 2022-07-03 LAB — GLUCOSE BLD STRIP.AUTO-MCNC: 134 MG/DL (ref 65–99)

## 2022-07-03 NOTE — ED NOTES
Chief Complaint   Patient presents with   • Syncope   • Low Blood Sugar     Fsbs 115   • N/V   • Abdominal Pain     Right lower    • Shoulder Pain     Right radiates to neck     Pt wheeled to triage he has type 1 diabetes onset of above symptoms 3hours ago . Pt on continuous insulin and had his medication rate adjusted.   fsbs 134

## 2022-07-03 NOTE — ED PROVIDER NOTES
ED Provider Note    CHIEF COMPLAINT  Chief Complaint   Patient presents with   • Syncope   • Low Blood Sugar     Fsbs 115   • N/V   • Abdominal Pain     Right lower    • Shoulder Pain     Right radiates to neck       HPI  Martinez Nick Smith is a 21 y.o. male who presents to the emergency room and after syncopal episode. Past medical history significant for celiac disease as well as type I diabetes. This is blood sugars are typically quite well controlled. He does note that this afternoon he did have a relatively precipitous drop of approximately 100 and his blood sugars afternoon when he started to feel unwell. States that he felt quite fatigue, lightheaded and dizzy and again ultimately passed out. He has over last couple days been noticing some abdominal discomfort specially in the right lower quadrant and he is also had some discomfort in his right shoulder which he thought was peculiar. He states that he returned to just his right shoulder and help with the discomfort when he started to feel unwell this evening and the rest of the events as described above unfolded. Now continues to be symptomatic and that he continues to feel fatigue and as if he might pass out again. He does note that a few months ago he had a few syncopal episodes well that he did not come to the hospital at that time. Does not have any known heart problems. He does have difficulty with a stable diet due to multiple sensitivities. He is on pancreatic enzymes.    Denies any known sick contacts. No other recent illness.    REVIEW OF SYSTEMS  See HPI for further details. All other systems are negative.     PAST MEDICAL HISTORY   has a past medical history of Celiac disease, Celiac disease, and Diabetes type I (HCC).    SOCIAL HISTORY  Social History     Tobacco Use   • Smoking status: Never Smoker   • Smokeless tobacco: Never Used   Vaping Use   • Vaping Use: Never used   Substance and Sexual Activity   • Alcohol use: No   • Drug use: No  "  • Sexual activity: Not on file       SURGICAL HISTORY   has a past surgical history that includes hypospadias repair.    CURRENT MEDICATIONS  Home Medications    **Home medications have not yet been reviewed for this encounter**         ALLERGIES  Allergies   Allergen Reactions   • Beef Flavor Nausea     Beef broth causes Nausea and vomiting    • Compazine      Anxiety    • Dairy Food Allergy Nausea   • Eggs      Nausea, vomiting, chest tightness   • Gluten Meal    • Mustard Seed      Nausea and vomiting,   • Peanut (Diagnostic)      Nausea, vomiting, and chest tightness   • Shellfish Allergy      Nausea, vomiting, chest tightness   • Tree Nuts Food Allergy Anaphylaxis, Shortness of Breath, Itching, Nausea and Cough     Cashews and pistachio and coconut        PHYSICAL EXAM  VITAL SIGNS: /67   Pulse 86   Temp 36.7 °C (98.1 °F) (Temporal)   Resp 13   Ht 1.676 m (5' 6\")   Wt 56.2 kg (124 lb)   SpO2 96%   BMI 20.01 kg/m²  @COSMO[[id number]::@   Pulse ox interpretation: I interpret this pulse ox as normal.  Constitutional: Alert in no apparent distress.  HENT: No signs of trauma, Bilateral external ears normal, Nose normal.   Eyes: Pupils are equal and reactive  Neck: Normal range of motion, No tenderness, Supple  Cardiovascular: Regular rate and rhythm, no murmurs.   Thorax & Lungs: Normal breath sounds, No respiratory distress, No wheezing, No chest tenderness.   Abdomen: Bowel sounds normal, Soft, No tenderness  Skin: Warm, Dry, No erythema, No rash.   Extremities: Intact distal pulses  Musculoskeletal: Good range of motion in all major joints. No tenderness to palpation or major deformities noted.   Neurologic: Alert , Normal motor function, Normal sensory function, No focal deficits noted.   Psychiatric: Affect normal, Judgment normal, Mood normal.       DIAGNOSTIC STUDIES / PROCEDURES      LABS  Results for orders placed or performed during the hospital encounter of 07/02/22   CBC WITH DIFFERENTIAL "   Result Value Ref Range    WBC 5.4 4.8 - 10.8 K/uL    RBC 5.03 4.70 - 6.10 M/uL    Hemoglobin 15.5 14.0 - 18.0 g/dL    Hematocrit 44.6 42.0 - 52.0 %    MCV 88.7 81.4 - 97.8 fL    MCH 30.8 27.0 - 33.0 pg    MCHC 34.8 33.7 - 35.3 g/dL    RDW 38.1 35.9 - 50.0 fL    Platelet Count 305 164 - 446 K/uL    MPV 11.1 9.0 - 12.9 fL    Neutrophils-Polys 64.30 44.00 - 72.00 %    Lymphocytes 21.90 (L) 22.00 - 41.00 %    Monocytes 6.90 0.00 - 13.40 %    Eosinophils 5.40 0.00 - 6.90 %    Basophils 1.10 0.00 - 1.80 %    Immature Granulocytes 0.40 0.00 - 0.90 %    Nucleated RBC 0.00 /100 WBC    Neutrophils (Absolute) 3.48 1.82 - 7.42 K/uL    Lymphs (Absolute) 1.18 1.00 - 4.80 K/uL    Monos (Absolute) 0.37 0.00 - 0.85 K/uL    Eos (Absolute) 0.29 0.00 - 0.51 K/uL    Baso (Absolute) 0.06 0.00 - 0.12 K/uL    Immature Granulocytes (abs) 0.02 0.00 - 0.11 K/uL    NRBC (Absolute) 0.00 K/uL   COMP METABOLIC PANEL   Result Value Ref Range    Sodium 133 (L) 135 - 145 mmol/L    Potassium 4.2 3.6 - 5.5 mmol/L    Chloride 95 (L) 96 - 112 mmol/L    Co2 27 20 - 33 mmol/L    Anion Gap 11.0 7.0 - 16.0    Glucose 175 (H) 65 - 99 mg/dL    Bun 3 (L) 8 - 22 mg/dL    Creatinine 0.71 0.50 - 1.40 mg/dL    Calcium 10.0 8.5 - 10.5 mg/dL    AST(SGOT) 23 12 - 45 U/L    ALT(SGPT) 13 2 - 50 U/L    Alkaline Phosphatase 79 30 - 99 U/L    Total Bilirubin 0.3 0.1 - 1.5 mg/dL    Albumin 5.4 (H) 3.2 - 4.9 g/dL    Total Protein 8.3 (H) 6.0 - 8.2 g/dL    Globulin 2.9 1.9 - 3.5 g/dL    A-G Ratio 1.9 g/dL   LIPASE   Result Value Ref Range    Lipase 18 11 - 82 U/L   TROPONIN   Result Value Ref Range    Troponin T <6 6 - 19 ng/L   ESTIMATED GFR   Result Value Ref Range    GFR (CKD-EPI) 132 >60 mL/min/1.73 m 2   EKG   Result Value Ref Range    Report       [facility] Emergency Dept.    Test Date:  2022-07-02  Pt Name:    JUAN JOSE MCINTOSH           Department: ER  MRN:        [id number]                      Room:  Gender:     Male                          Technician: [id number]  :        2000                   Requested By:ER TRIAGE PROTOCOL  Order #:    [id number]                    Reading MD:    Measurements  Intervals                                Axis  Rate:       108                          P:          27  OH:         164                          QRS:        90  QRSD:       78                           T:          50  QT:         320  QTc:        429    Interpretive Statements  SINUS TACHYCARDIA  BORDERLINE RIGHT AXIS DEVIATION  Compared to ECG 2022 22:03:36  ST (T wave) deviation no longer present           RADIOLOGY  CT-ABDOMEN-PELVIS W/O   Final Result      1.  No evidence of renal or ureteral stone.      2.  No evidence of bowel obstruction or focal inflammatory change.      DX-CHEST-PORTABLE (1 VIEW)   Final Result      No evidence of acute cardiopulmonary process.              COURSE & MEDICAL DECISION MAKING  Pertinent Labs & Imaging studies reviewed. (See chart for details)  21-year-old male presented emergency room with the above presentation. Workup here is benign. Given his abdominal pain, history of celiac in his right shoulder pain which was concerned about intra-abdominal process and referral of pain this exam is also been negative. Currently feeling significantly better. This point all discharged him to home with outpatient followed by his PCP. He is understanding return precautions here the ER.       The patient will return for worsening symptoms and is stable at the time of discharge. The patient verbalizes understanding and will comply.    FINAL IMPRESSION  1. Syncope, unspecified syncope type            Electronically signed by: Rl Mirza M.D., 2022 9:59 PM

## 2022-07-08 ENCOUNTER — TELEPHONE (OUTPATIENT)
Dept: ENDOCRINOLOGY | Facility: MEDICAL CENTER | Age: 22
End: 2022-07-08
Payer: COMMERCIAL

## 2022-07-08 NOTE — TELEPHONE ENCOUNTER
Patient called and stated he needs a new order/rx for his medtronics he states that they discontinued the mini med sharron and needs a updated order. Please contact patient if you have any questions.

## 2022-07-10 DIAGNOSIS — E10.65 TYPE 1 DIABETES MELLITUS WITH HYPERGLYCEMIA (HCC): ICD-10-CM

## 2022-07-18 ENCOUNTER — HOSPITAL ENCOUNTER (EMERGENCY)
Facility: MEDICAL CENTER | Age: 22
End: 2022-07-18
Attending: STUDENT IN AN ORGANIZED HEALTH CARE EDUCATION/TRAINING PROGRAM
Payer: COMMERCIAL

## 2022-07-18 VITALS
HEART RATE: 83 BPM | TEMPERATURE: 97.2 F | DIASTOLIC BLOOD PRESSURE: 72 MMHG | OXYGEN SATURATION: 95 % | HEIGHT: 66 IN | BODY MASS INDEX: 20.27 KG/M2 | WEIGHT: 126.1 LBS | RESPIRATION RATE: 16 BRPM | SYSTOLIC BLOOD PRESSURE: 116 MMHG

## 2022-07-18 DIAGNOSIS — E86.0 DEHYDRATION: ICD-10-CM

## 2022-07-18 DIAGNOSIS — R56.9 SEIZURE (HCC): ICD-10-CM

## 2022-07-18 DIAGNOSIS — R11.2 NON-INTRACTABLE VOMITING WITH NAUSEA, UNSPECIFIED VOMITING TYPE: ICD-10-CM

## 2022-07-18 LAB
ALBUMIN SERPL BCP-MCNC: 5.1 G/DL (ref 3.2–4.9)
ALBUMIN/GLOB SERPL: 2 G/DL
ALP SERPL-CCNC: 80 U/L (ref 30–99)
ALT SERPL-CCNC: 13 U/L (ref 2–50)
ANION GAP SERPL CALC-SCNC: 13 MMOL/L (ref 7–16)
APPEARANCE UR: CLEAR
AST SERPL-CCNC: 19 U/L (ref 12–45)
BASOPHILS # BLD AUTO: 0.9 % (ref 0–1.8)
BASOPHILS # BLD: 0.1 K/UL (ref 0–0.12)
BILIRUB SERPL-MCNC: 0.6 MG/DL (ref 0.1–1.5)
BILIRUB UR QL STRIP.AUTO: NEGATIVE
BUN SERPL-MCNC: 4 MG/DL (ref 8–22)
CALCIUM SERPL-MCNC: 9.4 MG/DL (ref 8.5–10.5)
CHLORIDE SERPL-SCNC: 93 MMOL/L (ref 96–112)
CO2 SERPL-SCNC: 25 MMOL/L (ref 20–33)
COLOR UR: YELLOW
CREAT SERPL-MCNC: 0.65 MG/DL (ref 0.5–1.4)
EOSINOPHIL # BLD AUTO: 0.46 K/UL (ref 0–0.51)
EOSINOPHIL NFR BLD: 4.4 % (ref 0–6.9)
ERYTHROCYTE [DISTWIDTH] IN BLOOD BY AUTOMATED COUNT: 37.8 FL (ref 35.9–50)
GFR SERPLBLD CREATININE-BSD FMLA CKD-EPI: 135 ML/MIN/1.73 M 2
GLOBULIN SER CALC-MCNC: 2.5 G/DL (ref 1.9–3.5)
GLUCOSE SERPL-MCNC: 178 MG/DL (ref 65–99)
GLUCOSE UR STRIP.AUTO-MCNC: NEGATIVE MG/DL
HCT VFR BLD AUTO: 40.2 % (ref 42–52)
HGB BLD-MCNC: 14 G/DL (ref 14–18)
IMM GRANULOCYTES # BLD AUTO: 0.05 K/UL (ref 0–0.11)
IMM GRANULOCYTES NFR BLD AUTO: 0.5 % (ref 0–0.9)
KETONES UR STRIP.AUTO-MCNC: 40 MG/DL
LEUKOCYTE ESTERASE UR QL STRIP.AUTO: NEGATIVE
LIPASE SERPL-CCNC: 12 U/L (ref 11–82)
LYMPHOCYTES # BLD AUTO: 2.11 K/UL (ref 1–4.8)
LYMPHOCYTES NFR BLD: 20 % (ref 22–41)
MCH RBC QN AUTO: 30.6 PG (ref 27–33)
MCHC RBC AUTO-ENTMCNC: 34.8 G/DL (ref 33.7–35.3)
MCV RBC AUTO: 87.8 FL (ref 81.4–97.8)
MICRO URNS: ABNORMAL
MONOCYTES # BLD AUTO: 0.59 K/UL (ref 0–0.85)
MONOCYTES NFR BLD AUTO: 5.6 % (ref 0–13.4)
NEUTROPHILS # BLD AUTO: 7.25 K/UL (ref 1.82–7.42)
NEUTROPHILS NFR BLD: 68.6 % (ref 44–72)
NITRITE UR QL STRIP.AUTO: NEGATIVE
NRBC # BLD AUTO: 0 K/UL
NRBC BLD-RTO: 0 /100 WBC
PH UR STRIP.AUTO: 6 [PH] (ref 5–8)
PLATELET # BLD AUTO: 283 K/UL (ref 164–446)
PMV BLD AUTO: 11.3 FL (ref 9–12.9)
POTASSIUM SERPL-SCNC: 4.1 MMOL/L (ref 3.6–5.5)
PROT SERPL-MCNC: 7.6 G/DL (ref 6–8.2)
PROT UR QL STRIP: NEGATIVE MG/DL
RBC # BLD AUTO: 4.58 M/UL (ref 4.7–6.1)
RBC UR QL AUTO: NEGATIVE
SODIUM SERPL-SCNC: 131 MMOL/L (ref 135–145)
SP GR UR STRIP.AUTO: 1.01
UROBILINOGEN UR STRIP.AUTO-MCNC: 0.2 MG/DL
WBC # BLD AUTO: 10.6 K/UL (ref 4.8–10.8)

## 2022-07-18 PROCEDURE — 83690 ASSAY OF LIPASE: CPT

## 2022-07-18 PROCEDURE — 81003 URINALYSIS AUTO W/O SCOPE: CPT

## 2022-07-18 PROCEDURE — 80053 COMPREHEN METABOLIC PANEL: CPT

## 2022-07-18 PROCEDURE — 96374 THER/PROPH/DIAG INJ IV PUSH: CPT

## 2022-07-18 PROCEDURE — 700105 HCHG RX REV CODE 258: Performed by: STUDENT IN AN ORGANIZED HEALTH CARE EDUCATION/TRAINING PROGRAM

## 2022-07-18 PROCEDURE — 700111 HCHG RX REV CODE 636 W/ 250 OVERRIDE (IP): Performed by: STUDENT IN AN ORGANIZED HEALTH CARE EDUCATION/TRAINING PROGRAM

## 2022-07-18 PROCEDURE — 85025 COMPLETE CBC W/AUTO DIFF WBC: CPT

## 2022-07-18 PROCEDURE — 99284 EMERGENCY DEPT VISIT MOD MDM: CPT

## 2022-07-18 PROCEDURE — 36415 COLL VENOUS BLD VENIPUNCTURE: CPT

## 2022-07-18 RX ORDER — METOCLOPRAMIDE HYDROCHLORIDE 5 MG/ML
10 INJECTION INTRAMUSCULAR; INTRAVENOUS ONCE
Status: DISCONTINUED | OUTPATIENT
Start: 2022-07-18 | End: 2022-07-18

## 2022-07-18 RX ORDER — ONDANSETRON 2 MG/ML
4 INJECTION INTRAMUSCULAR; INTRAVENOUS ONCE
Status: COMPLETED | OUTPATIENT
Start: 2022-07-18 | End: 2022-07-18

## 2022-07-18 RX ORDER — SODIUM CHLORIDE, SODIUM LACTATE, POTASSIUM CHLORIDE, CALCIUM CHLORIDE 600; 310; 30; 20 MG/100ML; MG/100ML; MG/100ML; MG/100ML
1000 INJECTION, SOLUTION INTRAVENOUS ONCE
Status: COMPLETED | OUTPATIENT
Start: 2022-07-18 | End: 2022-07-18

## 2022-07-18 RX ORDER — FAMOTIDINE 20 MG/1
20 TABLET, FILM COATED ORAL 2 TIMES DAILY
Qty: 14 TABLET | Refills: 0 | Status: SHIPPED | OUTPATIENT
Start: 2022-07-18 | End: 2022-09-09

## 2022-07-18 RX ORDER — ONDANSETRON 4 MG/1
4 TABLET, ORALLY DISINTEGRATING ORAL EVERY 6 HOURS PRN
Qty: 20 TABLET | Refills: 0 | Status: SHIPPED | OUTPATIENT
Start: 2022-07-18

## 2022-07-18 RX ADMIN — ONDANSETRON 4 MG: 2 INJECTION, SOLUTION INTRAMUSCULAR; INTRAVENOUS at 02:23

## 2022-07-18 RX ADMIN — SODIUM CHLORIDE, POTASSIUM CHLORIDE, SODIUM LACTATE AND CALCIUM CHLORIDE 1000 ML: 600; 310; 30; 20 INJECTION, SOLUTION INTRAVENOUS at 02:20

## 2022-07-18 ASSESSMENT — FIBROSIS 4 INDEX: FIB4 SCORE: 0.48

## 2022-07-18 NOTE — ED PROVIDER NOTES
CHIEF COMPLAINT  [place]  Siezure    HPI  Martinez Smith is a 21 y.o. male who presents evaluation of nausea and vomiting and a siezure. Parents states he laid down on the floor today and then his right arm was shaking for about 5 minutes states he was awake during the entire event no postictal period no urinary incontinence or oral trauma.  Patient has no prior seizure history.  Patient stated that he laid down on the floor because he was not feeling well.  And does state he was unable to control the arm shaking.  Patient denies any headache visual changes numbness tingling weakness in extremities.  He does state that earlier in the evening he had several episodes of nonbloody nonbilious vomiting.  Patient is a type I diabetic states his blood sugars have been well controlled he has been compliant with his insulin therapy.  He has no associate abdominal pain fevers hematemesis no diarrhea is passing gas having bowel movements.    REVIEW OF SYSTEMS  See HPI for further details. All other systems are negative.     PAST MEDICAL HISTORY   has a past medical history of Celiac disease, Celiac disease, and Diabetes type I (HCC).    SOCIAL HISTORY  Social History     Tobacco Use   • Smoking status: Never Smoker   • Smokeless tobacco: Never Used   Vaping Use   • Vaping Use: Never used   Substance and Sexual Activity   • Alcohol use: No   • Drug use: No   • Sexual activity: Not on file       SURGICAL HISTORY   has a past surgical history that includes hypospadias repair.    CURRENT MEDICATIONS  Home Medications     Reviewed by Janet Hooper R.N. (Registered Nurse) on 07/18/22 at 0058  Med List Status: Partial   Medication Last Dose Status   amitriptyline (ELAVIL) 25 MG Tab  Active   azelastine (ASTELIN) 137 MCG/SPRAY nasal spray  Active   CONTOUR NEXT TEST strip  Active   fexofenadine (ALLEGRA) 60 MG Tab  Active   Glucagon, rDNA, (GLUCAGON EMERGENCY) 1 MG Kit  Active   HUMALOG 100 UNIT/ML  Active   insulin infusion  "pump Device  Active   montelukast (SINGULAIR) 10 MG Tab  Active   Olopatadine HCl 0.6 % Solution  Active   PANCREAZE 98702-15230 units Cap DR Particles  Active   Triamcinolone Acetonide (NASACORT AQ NA)  Active   vitamin D (CHOLECALCIFEROL) 1000 UNIT Tab  Active                ALLERGIES  Allergies   Allergen Reactions   • Beef Flavor Nausea     Beef broth causes Nausea and vomiting    • Compazine      Anxiety    • Dairy Food Allergy Nausea   • Eggs      Nausea, vomiting, chest tightness   • Gluten Meal    • Mustard Seed      Nausea and vomiting,   • Peanut (Diagnostic)      Nausea, vomiting, and chest tightness   • Shellfish Allergy      Nausea, vomiting, chest tightness   • Tree Nuts Food Allergy Anaphylaxis, Shortness of Breath, Itching, Nausea and Cough     Cashews and pistachio and coconut        FAMILY HISTORY  No pertinent family history    PHYSICAL EXAM   /83   Pulse 82   Temp 36.2 °C (97.1 °F) (Temporal)   Resp 16   Ht 1.676 m (5' 6\")   Wt 57.2 kg (126 lb 1.7 oz)   SpO2 98%   BMI 20.35 kg/m²  @COSMO[[id number]::@   Pulse ox interpretation: I interpret this pulse ox as normal.  VITALS - vital signs documented prior to this note have been reviewed and noted,  GENERAL - awake, alert, oriented, GCS 15, no apparent distress, non-toxic  appearing  HEENT - normocephalic, atraumatic, pupils equal, sclera anicteric, mucus  membranes moist  NECK - supple, no meningismus, full active range of motion, trachea midline  CARDIOVASCULAR - regular rate/rhythm, no murmurs/gallops/rubs  PULMONARY - no respiratory distress, speaking in full sentences, clear to  auscultation bilaterally, no wheezing/ronchi/rales, no accessory muscle use  GASTROINTESTINAL - bowel sounds present normal active negative Ortiz sign no McBurney's point tenderness no rebound guarding or peritonitis no CVA tenderness no palpable abdominal masses no overlying abrasions lesions or contusions  GENITOURINARY - Deferred  NEUROLOGIC - cranial nerves " II through XII are intact pupils are equally round reactive to light right upper and left upper extremity hand , flexion at elbow, extension at the elbow 5 out of 5,   right lower left lower extremity leg raise, plantar flexion, dorsiflexion 5 out of 5 bilaterally, sensation is grossly intact in all extremities patellar DTRs are 2+ bilaterally no truncal ataxia normal finger-to-nose no appreciable papilledema on funduscopic exam  MUSCULOSKELETAL - no obvious asymmetry or deformities present  EXTREMITIES - warm, well-perfused, no cyanosis or significant edema  DERMATOLOGIC - warm, dry, no rashes, no jaundice  PSYCHIATRIC - normal affect, normal insight, normal concentration        LABS      Labs Reviewed   CBC WITH DIFFERENTIAL   COMP METABOLIC PANEL   LIPASE   URINALYSIS         Pertinent Labs & Imaging studies reviewed. (See chart for details)    RADIOLOGY  No orders to display             ED COURSE/PROCEDURES      Reevaluation at 0 347 patient is resting comfortably in no acute distress abdominal exam demonstrates no rebound guarding or peritonitis patient has been tolerating p.o.  Repeat abdominal exam is benign.    Medications   lactated ringers (LR) bolus (has no administration in time range)   metoclopramide (REGLAN) injection 10 mg (has no administration in time range)               MEDICAL DECISION MAKING    Patient presented for evaluation of a possible first-time focal seizure, as well as nausea vomiting.  Differential initially included was not limited to peripheral seizure musculoskeletal spasm, electrolyte abnormality dehydration diabetic ketoacidosis gastritis enteritis viral borne illness foodborne illness among other considerations.  No abdominal tenderness reassuring physical exam overall I estimate there is low risk for acute appendicitis bowel obstruction acute cholecystitis incarcerated hernia small bowel obstruction volvulus as a cause of his vomiting shared decision making conversation was  had with the patient, and did feel febrile deferring CT imaging at this time.  Labs reveal no evidence of significant metabolic derangements.  Patient felt much better after symptomatic treatment.  Repeat abdominal exam is benign.  Has had no further episodes of vomiting has been observed for several hours with no additional seizure-like activity or additional complaints.He is instructed not to drive until he is cleared by neurology.  Does have an upcoming neurology appointment as he states he already had one scheduled to be evaluated for recurrent syncopal episodes.  Given that he has close follow-up his vomiting is improved and his labs are reassuring I believe he is stable for discharge at this time.    FINAL IMPRESSION  1.  Nausea vomiting  2.  Dehydration  3.  Possible seizure         Electronically signed by: Mike Armstrong D.O., 7/18/2022 2:06 AM      Dictation Disclaimer  Please note this report has been produced using speech recognition software and  may contain errors related to that system, including errors seen in grammar,  punctuation and spelling, as well as words and phrases that may be inappropriate.  If there are any questions or concerns, please feel free to contact the dictating  physician for clarification.

## 2022-07-18 NOTE — DISCHARGE INSTRUCTIONS
If you develop any uncontrolled vomiting right lower quadrant abdominal pain symptoms do not improve return for recheck.  Follow-up with neurology

## 2022-07-18 NOTE — ED NOTES
Pt to blue 17 in WC, changed into gown and connected to monitor. Family at bedside. Chart up for ERP.

## 2022-07-18 NOTE — ED TRIAGE NOTES
"Chief Complaint   Patient presents with   • N/V     Began 2100, vomiting bile -blood, hx of DM1, pancreatic enzyme disorder, and celiac, sugars have been between 100-250 at home tonight   • Seizure     At 1600 this evening had seizure episode per family member, possible tonic-clonic with postictal state, 911 called and [facility] on scene and pt declined transport to ER        WC to triage with father for above complaint.     ABD pain protocols ordered. Pt brought to [facility] office for blood draw. Given UA. Pt educated of triage process and informed to contact staff if situation changes.    /88   Pulse (!) 114   Temp 36.2 °C (97.1 °F) (Temporal)   Resp 18   Ht 1.676 m (5' 6\")   Wt 57.2 kg (126 lb 1.7 oz)   SpO2 100%   BMI 20.35 kg/m²      "

## 2022-07-19 ENCOUNTER — APPOINTMENT (OUTPATIENT)
Dept: RADIOLOGY | Facility: MEDICAL CENTER | Age: 22
End: 2022-07-19
Attending: STUDENT IN AN ORGANIZED HEALTH CARE EDUCATION/TRAINING PROGRAM
Payer: COMMERCIAL

## 2022-07-19 ENCOUNTER — HOSPITAL ENCOUNTER (EMERGENCY)
Facility: MEDICAL CENTER | Age: 22
End: 2022-07-20
Attending: STUDENT IN AN ORGANIZED HEALTH CARE EDUCATION/TRAINING PROGRAM
Payer: COMMERCIAL

## 2022-07-19 DIAGNOSIS — M43.6 NECK STIFFNESS: ICD-10-CM

## 2022-07-19 DIAGNOSIS — R55 NEAR SYNCOPE: ICD-10-CM

## 2022-07-19 DIAGNOSIS — R51.9 ACUTE NONINTRACTABLE HEADACHE, UNSPECIFIED HEADACHE TYPE: ICD-10-CM

## 2022-07-19 LAB
ALBUMIN SERPL BCP-MCNC: 4.6 G/DL (ref 3.2–4.9)
ALBUMIN/GLOB SERPL: 1.9 G/DL
ALP SERPL-CCNC: 80 U/L (ref 30–99)
ALT SERPL-CCNC: 15 U/L (ref 2–50)
ANION GAP SERPL CALC-SCNC: 15 MMOL/L (ref 7–16)
AST SERPL-CCNC: 24 U/L (ref 12–45)
BASE EXCESS BLDV CALC-SCNC: 2 MMOL/L
BASOPHILS # BLD AUTO: 1.1 % (ref 0–1.8)
BASOPHILS # BLD: 0.06 K/UL (ref 0–0.12)
BILIRUB SERPL-MCNC: 0.3 MG/DL (ref 0.1–1.5)
BODY TEMPERATURE: 36.7 CENTIGRADE
BUN SERPL-MCNC: 4 MG/DL (ref 8–22)
CALCIUM SERPL-MCNC: 9.2 MG/DL (ref 8.5–10.5)
CHLORIDE SERPL-SCNC: 94 MMOL/L (ref 96–112)
CO2 SERPL-SCNC: 22 MMOL/L (ref 20–33)
CREAT SERPL-MCNC: 0.72 MG/DL (ref 0.5–1.4)
CRP SERPL HS-MCNC: <0.3 MG/DL (ref 0–0.75)
EKG IMPRESSION: NORMAL
EOSINOPHIL # BLD AUTO: 0.25 K/UL (ref 0–0.51)
EOSINOPHIL NFR BLD: 4.6 % (ref 0–6.9)
ERYTHROCYTE [DISTWIDTH] IN BLOOD BY AUTOMATED COUNT: 38 FL (ref 35.9–50)
GFR SERPLBLD CREATININE-BSD FMLA CKD-EPI: 131 ML/MIN/1.73 M 2
GLOBULIN SER CALC-MCNC: 2.4 G/DL (ref 1.9–3.5)
GLUCOSE BLD STRIP.AUTO-MCNC: 138 MG/DL (ref 65–99)
GLUCOSE BLD STRIP.AUTO-MCNC: 163 MG/DL (ref 65–99)
GLUCOSE CSF-MCNC: 92 MG/DL (ref 40–80)
GLUCOSE SERPL-MCNC: 188 MG/DL (ref 65–99)
HCO3 BLDV-SCNC: 26 MMOL/L (ref 24–28)
HCT VFR BLD AUTO: 39.9 % (ref 42–52)
HGB BLD-MCNC: 14 G/DL (ref 14–18)
IMM GRANULOCYTES # BLD AUTO: 0.02 K/UL (ref 0–0.11)
IMM GRANULOCYTES NFR BLD AUTO: 0.4 % (ref 0–0.9)
LACTATE SERPL-SCNC: 2.2 MMOL/L (ref 0.5–2)
LYMPHOCYTES # BLD AUTO: 1.18 K/UL (ref 1–4.8)
LYMPHOCYTES NFR BLD: 21.8 % (ref 22–41)
MCH RBC QN AUTO: 30.4 PG (ref 27–33)
MCHC RBC AUTO-ENTMCNC: 35.1 G/DL (ref 33.7–35.3)
MCV RBC AUTO: 86.7 FL (ref 81.4–97.8)
MONOCYTES # BLD AUTO: 0.48 K/UL (ref 0–0.85)
MONOCYTES NFR BLD AUTO: 8.9 % (ref 0–13.4)
NEUTROPHILS # BLD AUTO: 3.42 K/UL (ref 1.82–7.42)
NEUTROPHILS NFR BLD: 63.2 % (ref 44–72)
NRBC # BLD AUTO: 0 K/UL
NRBC BLD-RTO: 0 /100 WBC
PCO2 BLDV: 41.1 MMHG (ref 41–51)
PCO2 TEMP ADJ BLDV: 40.6 MMHG (ref 41–51)
PH BLDV: 7.43 [PH] (ref 7.31–7.45)
PH TEMP ADJ BLDV: 7.43 [PH] (ref 7.31–7.45)
PLATELET # BLD AUTO: 243 K/UL (ref 164–446)
PMV BLD AUTO: 11.2 FL (ref 9–12.9)
PO2 BLDV: 16.6 MMHG (ref 25–40)
PO2 TEMP ADJ BLDV: 16.2 MMHG (ref 25–40)
POTASSIUM SERPL-SCNC: 3.9 MMOL/L (ref 3.6–5.5)
PROCALCITONIN SERPL-MCNC: 0.16 NG/ML
PROT CSF-MCNC: 72 MG/DL (ref 15–45)
PROT SERPL-MCNC: 7 G/DL (ref 6–8.2)
RBC # BLD AUTO: 4.6 M/UL (ref 4.7–6.1)
SAO2 % BLDV: 25.2 %
SODIUM SERPL-SCNC: 131 MMOL/L (ref 135–145)
TROPONIN T SERPL-MCNC: 9 NG/L (ref 6–19)
WBC # BLD AUTO: 5.4 K/UL (ref 4.8–10.8)

## 2022-07-19 PROCEDURE — 84484 ASSAY OF TROPONIN QUANT: CPT

## 2022-07-19 PROCEDURE — 700111 HCHG RX REV CODE 636 W/ 250 OVERRIDE (IP): Performed by: STUDENT IN AN ORGANIZED HEALTH CARE EDUCATION/TRAINING PROGRAM

## 2022-07-19 PROCEDURE — 87040 BLOOD CULTURE FOR BACTERIA: CPT | Mod: 91

## 2022-07-19 PROCEDURE — A9270 NON-COVERED ITEM OR SERVICE: HCPCS | Performed by: STUDENT IN AN ORGANIZED HEALTH CARE EDUCATION/TRAINING PROGRAM

## 2022-07-19 PROCEDURE — 99285 EMERGENCY DEPT VISIT HI MDM: CPT

## 2022-07-19 PROCEDURE — 87205 SMEAR GRAM STAIN: CPT

## 2022-07-19 PROCEDURE — 93005 ELECTROCARDIOGRAM TRACING: CPT | Performed by: STUDENT IN AN ORGANIZED HEALTH CARE EDUCATION/TRAINING PROGRAM

## 2022-07-19 PROCEDURE — 84157 ASSAY OF PROTEIN OTHER: CPT

## 2022-07-19 PROCEDURE — 96374 THER/PROPH/DIAG INJ IV PUSH: CPT

## 2022-07-19 PROCEDURE — 83605 ASSAY OF LACTIC ACID: CPT

## 2022-07-19 PROCEDURE — 700102 HCHG RX REV CODE 250 W/ 637 OVERRIDE(OP): Performed by: STUDENT IN AN ORGANIZED HEALTH CARE EDUCATION/TRAINING PROGRAM

## 2022-07-19 PROCEDURE — 85025 COMPLETE CBC W/AUTO DIFF WBC: CPT

## 2022-07-19 PROCEDURE — 700101 HCHG RX REV CODE 250: Performed by: STUDENT IN AN ORGANIZED HEALTH CARE EDUCATION/TRAINING PROGRAM

## 2022-07-19 PROCEDURE — 36415 COLL VENOUS BLD VENIPUNCTURE: CPT

## 2022-07-19 PROCEDURE — 93005 ELECTROCARDIOGRAM TRACING: CPT

## 2022-07-19 PROCEDURE — 82962 GLUCOSE BLOOD TEST: CPT | Mod: 91

## 2022-07-19 PROCEDURE — 87077 CULTURE AEROBIC IDENTIFY: CPT

## 2022-07-19 PROCEDURE — 700105 HCHG RX REV CODE 258: Performed by: STUDENT IN AN ORGANIZED HEALTH CARE EDUCATION/TRAINING PROGRAM

## 2022-07-19 PROCEDURE — 84145 PROCALCITONIN (PCT): CPT

## 2022-07-19 PROCEDURE — 96375 TX/PRO/DX INJ NEW DRUG ADDON: CPT

## 2022-07-19 PROCEDURE — 80053 COMPREHEN METABOLIC PANEL: CPT

## 2022-07-19 PROCEDURE — 89051 BODY FLUID CELL COUNT: CPT

## 2022-07-19 PROCEDURE — 87070 CULTURE OTHR SPECIMN AEROBIC: CPT

## 2022-07-19 PROCEDURE — 82945 GLUCOSE OTHER FLUID: CPT

## 2022-07-19 PROCEDURE — 82803 BLOOD GASES ANY COMBINATION: CPT

## 2022-07-19 PROCEDURE — 70450 CT HEAD/BRAIN W/O DYE: CPT

## 2022-07-19 PROCEDURE — 87483 CNS DNA AMP PROBE TYPE 12-25: CPT

## 2022-07-19 PROCEDURE — 62270 DX LMBR SPI PNXR: CPT

## 2022-07-19 PROCEDURE — 86140 C-REACTIVE PROTEIN: CPT

## 2022-07-19 RX ORDER — DIAZEPAM 2 MG/1
2 TABLET ORAL ONCE
Status: COMPLETED | OUTPATIENT
Start: 2022-07-19 | End: 2022-07-19

## 2022-07-19 RX ORDER — ACETAMINOPHEN 325 MG/1
650 TABLET ORAL ONCE
Status: COMPLETED | OUTPATIENT
Start: 2022-07-19 | End: 2022-07-19

## 2022-07-19 RX ORDER — LIDOCAINE HYDROCHLORIDE 20 MG/ML
5 INJECTION, SOLUTION INFILTRATION; PERINEURAL ONCE
Status: COMPLETED | OUTPATIENT
Start: 2022-07-19 | End: 2022-07-19

## 2022-07-19 RX ORDER — DIPHENHYDRAMINE HYDROCHLORIDE 50 MG/ML
25 INJECTION INTRAMUSCULAR; INTRAVENOUS ONCE
Status: COMPLETED | OUTPATIENT
Start: 2022-07-19 | End: 2022-07-19

## 2022-07-19 RX ORDER — SODIUM CHLORIDE 9 MG/ML
1000 INJECTION, SOLUTION INTRAVENOUS ONCE
Status: COMPLETED | OUTPATIENT
Start: 2022-07-19 | End: 2022-07-19

## 2022-07-19 RX ORDER — KETOROLAC TROMETHAMINE 30 MG/ML
30 INJECTION, SOLUTION INTRAMUSCULAR; INTRAVENOUS ONCE
Status: COMPLETED | OUTPATIENT
Start: 2022-07-19 | End: 2022-07-19

## 2022-07-19 RX ADMIN — LIDOCAINE HYDROCHLORIDE 5 ML: 20 INJECTION, SOLUTION EPIDURAL; INFILTRATION; INTRACAUDAL at 22:30

## 2022-07-19 RX ADMIN — DIPHENHYDRAMINE HYDROCHLORIDE 25 MG: 50 INJECTION INTRAMUSCULAR; INTRAVENOUS at 20:35

## 2022-07-19 RX ADMIN — SODIUM CHLORIDE 1000 ML: 9 INJECTION, SOLUTION INTRAVENOUS at 20:35

## 2022-07-19 RX ADMIN — KETOROLAC TROMETHAMINE 30 MG: 30 INJECTION, SOLUTION INTRAMUSCULAR; INTRAVENOUS at 20:35

## 2022-07-19 RX ADMIN — DIAZEPAM 2 MG: 2 TABLET ORAL at 20:08

## 2022-07-19 RX ADMIN — ACETAMINOPHEN 650 MG: 325 TABLET, FILM COATED ORAL at 23:13

## 2022-07-19 ASSESSMENT — LIFESTYLE VARIABLES
DO YOU DRINK ALCOHOL: NO
CONSUMPTION TOTAL: NEGATIVE
HAVE YOU EVER FELT YOU SHOULD CUT DOWN ON YOUR DRINKING: NO
HOW MANY TIMES IN THE PAST YEAR HAVE YOU HAD 5 OR MORE DRINKS IN A DAY: 0
EVER HAD A DRINK FIRST THING IN THE MORNING TO STEADY YOUR NERVES TO GET RID OF A HANGOVER: NO
AVERAGE NUMBER OF DAYS PER WEEK YOU HAVE A DRINK CONTAINING ALCOHOL: 0
DOES PATIENT WANT TO STOP DRINKING: NO
EVER FELT BAD OR GUILTY ABOUT YOUR DRINKING: NO
TOTAL SCORE: 0
ON A TYPICAL DAY WHEN YOU DRINK ALCOHOL HOW MANY DRINKS DO YOU HAVE: 0
HAVE PEOPLE ANNOYED YOU BY CRITICIZING YOUR DRINKING: NO

## 2022-07-19 ASSESSMENT — FIBROSIS 4 INDEX: FIB4 SCORE: 0.43

## 2022-07-19 NOTE — ED TRIAGE NOTES
"Martinez Smith  21 y.o.  male  Chief Complaint   Patient presents with   • Near Syncopal     Pt seen yesterday for dehydration & syncope. Today, c/o ongoing nausea, had a near syncopal episode where he \"collapsed\" a few hours ago (no LOC). Pt refusing to answer questions verbally (per mother - \"due to what's going on\") but is using gestures appropriately. A&O x4.  in triage. IIDDM.       Patient educated on triage process, to alert staff if any changes in condition.    "

## 2022-07-20 ENCOUNTER — TELEPHONE (OUTPATIENT)
Dept: CARDIOLOGY | Facility: MEDICAL CENTER | Age: 22
End: 2022-07-20
Payer: COMMERCIAL

## 2022-07-20 VITALS
OXYGEN SATURATION: 95 % | HEART RATE: 69 BPM | HEIGHT: 66 IN | BODY MASS INDEX: 20.44 KG/M2 | WEIGHT: 127.21 LBS | SYSTOLIC BLOOD PRESSURE: 110 MMHG | RESPIRATION RATE: 16 BRPM | TEMPERATURE: 98 F | DIASTOLIC BLOOD PRESSURE: 60 MMHG

## 2022-07-20 LAB
BURR CELLS/RBC NFR CSF MANUAL: 0 %
C GATTII+NEOFOR DNA CSF QL NAA+NON-PROBE: NOT DETECTED
CLARITY CSF: CLEAR
CMV DNA CSF QL NAA+NON-PROBE: NOT DETECTED
COLOR CSF: COLORLESS
COLOR SPUN CSF: COLORLESS
E COLI K1 DNA CSF QL NAA+NON-PROBE: NOT DETECTED
EV RNA CSF QL NAA+NON-PROBE: NOT DETECTED
GP B STREP DNA CSF QL NAA+NON-PROBE: NOT DETECTED
GRAM STN SPEC: NORMAL
HAEM INFLU DNA CSF QL NAA+NON-PROBE: NOT DETECTED
HHV6 DNA CSF QL NAA+NON-PROBE: NOT DETECTED
HSV1 DNA CSF QL NAA+NON-PROBE: NOT DETECTED
HSV2 DNA CSF QL NAA+NON-PROBE: NOT DETECTED
L MONOCYTOG DNA CSF QL NAA+NON-PROBE: NOT DETECTED
LACTATE SERPL-SCNC: 0.6 MMOL/L (ref 0.5–2)
LYMPHOCYTES NFR CSF: 50 %
MONONUC CELLS NFR CSF: 50 %
N MEN DNA CSF QL NAA+NON-PROBE: NOT DETECTED
PARECHOVIRUS A RNA CSF QL NAA+NON-PROBE: NOT DETECTED
RBC # CSF: 1 CELLS/UL
S PNEUM DNA CSF QL NAA+NON-PROBE: NOT DETECTED
SIGNIFICANT IND 70042: NORMAL
SITE SITE: NORMAL
SOURCE SOURCE: NORMAL
SPECIMEN VOL CSF: 4 ML
TUBE # CSF: 3
TUBE # CSF: 4
VZV DNA CSF QL NAA+NON-PROBE: NOT DETECTED
WBC # CSF: 0 CELLS/UL (ref 0–10)

## 2022-07-20 PROCEDURE — 83605 ASSAY OF LACTIC ACID: CPT

## 2022-07-20 NOTE — DISCHARGE INSTRUCTIONS
Stay hydrated drink plenty of water, please follow-up with your primary care doctor to discuss your anxiety and consider getting a therapist to help manage stress.    Return to the emergency department if you develop focal weakness or other concerns.

## 2022-07-20 NOTE — ED NOTES
ER Tech called this RN as pt c/o feeling presyncopal again, requesting BG check. . Pt alert & oriented.

## 2022-07-20 NOTE — TELEPHONE ENCOUNTER
Chart Prep    S/W patient in regards to requesting outside records for NP appt with Dr. Munoz. Patient has not been seen by a cardiologist in the past, no cardiac testing done outside of [facility] and labs are most recent in Epic.  Patient stated he is coming in due to him having multiple fainting episodes as of late.  Patient verbally confirmed time/date/location of appt.

## 2022-07-20 NOTE — ED PROVIDER NOTES
"ED Provider Note    CHIEF COMPLAINT  Chief Complaint   Patient presents with   • Near Syncopal     Pt seen yesterday for dehydration & syncope. Today, c/o ongoing nausea, had a near syncopal episode where he \"collapsed\" a few hours ago (no LOC). Pt refusing to answer questions verbally (per mother - \"due to what's going on\") but is using gestures appropriately. A&O x4.  in triage. IIDDM.       HPI  Martinez Smith is a 21 y.o. male PMH DM1, celiac disease who presents for repeat evaluation for near syncope.  Patient was seen here 2 nights ago after reported focal seizure that occurred at home.  Patient reports headache and neck pain and stiffness located in the posterior occiput that is new since he was seen priorly.  States the symptoms have been worsening.  They deny any fevers.  Reports nausea today.  Denies any head trauma.  Reports also a left-sided headache, gradual onset.  Denies any vision changes.  Patient's mother reports he has had intermittent \"muscle spasms\" since arriving here in the ED.  Patient is reluctant to answer questions himself, does so only with persuasion.    REVIEW OF SYSTEMS  See HPI for further details. All other systems are negative.     PAST MEDICAL HISTORY   has a past medical history of Celiac disease, Celiac disease, and Diabetes type I (HCC).    SOCIAL HISTORY  Social History     Tobacco Use   • Smoking status: Never Smoker   • Smokeless tobacco: Never Used   Vaping Use   • Vaping Use: Never used   Substance and Sexual Activity   • Alcohol use: No   • Drug use: No   • Sexual activity: Not on file       SURGICAL HISTORY   has a past surgical history that includes hypospadias repair.    CURRENT MEDICATIONS  Home Medications     Reviewed by Ariane Schmidt R.N. (Registered Nurse) on 07/19/22 at 1601  Med List Status: Not Addressed   Medication Last Dose Status   amitriptyline (ELAVIL) 25 MG Tab  Active   azelastine (ASTELIN) 137 MCG/SPRAY nasal spray  Active   CONTOUR " "NEXT TEST strip  Active   famotidine (PEPCID) 20 MG Tab  Active   fexofenadine (ALLEGRA) 60 MG Tab  Active   Glucagon, rDNA, (GLUCAGON EMERGENCY) 1 MG Kit  Active   HUMALOG 100 UNIT/ML  Active   insulin infusion pump Device  Active   montelukast (SINGULAIR) 10 MG Tab  Active   Olopatadine HCl 0.6 % Solution  Active   ondansetron (ZOFRAN ODT) 4 MG TABLET DISPERSIBLE  Active   PANCREAZE 76618-49707 units Cap DR Particles  Active   Triamcinolone Acetonide (NASACORT AQ NA)  Active   vitamin D (CHOLECALCIFEROL) 1000 UNIT Tab  Active                ALLERGIES  Allergies   Allergen Reactions   • Beef Flavor Nausea     Beef broth causes Nausea and vomiting    • Compazine      Anxiety    • Dairy Food Allergy Nausea   • Eggs      Nausea, vomiting, chest tightness   • Gluten Meal    • Mustard Seed      Nausea and vomiting,   • Peanut (Diagnostic)      Nausea, vomiting, and chest tightness   • Shellfish Allergy      Nausea, vomiting, chest tightness   • Tree Nuts Food Allergy Anaphylaxis, Shortness of Breath, Itching, Nausea and Cough     Cashews and pistachio and coconut        PHYSICAL EXAM  VITAL SIGNS: /60   Pulse 69   Temp 36.7 °C (98 °F)   Resp 16   Ht 1.676 m (5' 6\")   Wt 57.7 kg (127 lb 3.3 oz)   SpO2 95%   BMI 20.53 kg/m²     Pulse ox interpretation: I interpret this pulse ox as normal.  Constitutional: Alert in no apparent distress, appears uncomfortable  HENT: No signs of trauma, Bilateral external ears normal, Nose normal.   Eyes: Pupils are equal and reactive 4mm sandra, Conjunctiva normal, Non-icteric.   Neck: Restricted range of motion, no midline tenderness, meningitic  Lymphatic: No cervical lymphadenopathy noted.   Cardiovascular: Regular rate and rhythm, no murmurs.   Thorax & Lungs: Normal breath sounds, No respiratory distress, No wheezing, No chest tenderness.   Abdomen: Soft, No tenderness, No masses, No pulsatile masses. No peritoneal signs.  Skin: Warm, Dry, No erythema, No rash.  " Pale  Extremities: Intact distal pulses, No edema, No tenderness, No cyanosis.  Musculoskeletal: Good range of motion in all major joints. No major deformities noted.   Neurologic: Alert , Normal motor function, Normal sensory function, No focal deficits noted.   Psychiatric: Initially resistant to having conversations, restrained affect, at times anxious much more normal later in the visit      DIAGNOSTIC STUDIES / PROCEDURES    LABS  Results for orders placed or performed during the hospital encounter of 07/19/22   CBC WITH DIFFERENTIAL   Result Value Ref Range    WBC 5.4 4.8 - 10.8 K/uL    RBC 4.60 (L) 4.70 - 6.10 M/uL    Hemoglobin 14.0 14.0 - 18.0 g/dL    Hematocrit 39.9 (L) 42.0 - 52.0 %    MCV 86.7 81.4 - 97.8 fL    MCH 30.4 27.0 - 33.0 pg    MCHC 35.1 33.7 - 35.3 g/dL    RDW 38.0 35.9 - 50.0 fL    Platelet Count 243 164 - 446 K/uL    MPV 11.2 9.0 - 12.9 fL    Neutrophils-Polys 63.20 44.00 - 72.00 %    Lymphocytes 21.80 (L) 22.00 - 41.00 %    Monocytes 8.90 0.00 - 13.40 %    Eosinophils 4.60 0.00 - 6.90 %    Basophils 1.10 0.00 - 1.80 %    Immature Granulocytes 0.40 0.00 - 0.90 %    Nucleated RBC 0.00 /100 WBC    Neutrophils (Absolute) 3.42 1.82 - 7.42 K/uL    Lymphs (Absolute) 1.18 1.00 - 4.80 K/uL    Monos (Absolute) 0.48 0.00 - 0.85 K/uL    Eos (Absolute) 0.25 0.00 - 0.51 K/uL    Baso (Absolute) 0.06 0.00 - 0.12 K/uL    Immature Granulocytes (abs) 0.02 0.00 - 0.11 K/uL    NRBC (Absolute) 0.00 K/uL   COMP METABOLIC PANEL   Result Value Ref Range    Sodium 131 (L) 135 - 145 mmol/L    Potassium 3.9 3.6 - 5.5 mmol/L    Chloride 94 (L) 96 - 112 mmol/L    Co2 22 20 - 33 mmol/L    Anion Gap 15.0 7.0 - 16.0    Glucose 188 (H) 65 - 99 mg/dL    Bun 4 (L) 8 - 22 mg/dL    Creatinine 0.72 0.50 - 1.40 mg/dL    Calcium 9.2 8.5 - 10.5 mg/dL    AST(SGOT) 24 12 - 45 U/L    ALT(SGPT) 15 2 - 50 U/L    Alkaline Phosphatase 80 30 - 99 U/L    Total Bilirubin 0.3 0.1 - 1.5 mg/dL    Albumin 4.6 3.2 - 4.9 g/dL    Total Protein 7.0  6.0 - 8.2 g/dL    Globulin 2.4 1.9 - 3.5 g/dL    A-G Ratio 1.9 g/dL   VENOUS BLOOD GAS   Result Value Ref Range    Venous Bg Ph 7.43 7.31 - 7.45    Venous Bg Ph Temp Corrected 7.43 7.31 - 7.45    Venous Bg Pco2 41.1 41.0 - 51.0 mmHg    Venous Bg Pco2 Temp Corrected 40.6 (L) 41.0 - 51.0 mmHg    Venous Bg Po2 16.6 (L) 25.0 - 40.0 mmHg    Venous Bg Po2 Temp Corrected 16.2 (L) 25.0 - 40.0 mmHg    Venous Bg O2 Saturation 25.2 %    Venous Bg Hco3 26 24 - 28 mmol/L    Venous Bg Base Excess 2 mmol/L    Body Temp 36.7 Centigrade   TROPONIN   Result Value Ref Range    Troponin T 9 6 - 19 ng/L   CSF Culture    Specimen: Tap; CSF   Result Value Ref Range    Significant Indicator NEG     Source CSF     Site TAP     Culture Result -     Gram Stain Result Rare WBCs.  No organisms seen.      CSF Protein   Result Value Ref Range    Total Protein, CSF 72 (H) 15 - 45 mg/dL   CSF Glucose   Result Value Ref Range    Glucose CSF 92 (H) 40 - 80 mg/dL   CSF Cell Count   Result Value Ref Range    Number Of Tubes 4     Volume 4.0 mL    Color-Body Fluid Colorless     Character-Body Fluid Clear     Supernatant Appearance Colorless     Total RBC Count 1 cells/uL    Crenated RBC 0 %    Total WBC Count 0 0 - 10 cells/uL    Lymphs 50 %    Mononuclear Cells - CSF 50 %    CSF Tube Number 3    PROCALCITONIN   Result Value Ref Range    Procalcitonin 0.16 <0.25 ng/mL   CRP QUANTITIVE (NON-CARDIAC)   Result Value Ref Range    Stat C-Reactive Protein <0.30 0.00 - 0.75 mg/dL   LACTIC ACID   Result Value Ref Range    Lactic Acid 2.2 (H) 0.5 - 2.0 mmol/L   ESTIMATED GFR   Result Value Ref Range    GFR (CKD-EPI) 131 >60 mL/min/1.73 m 2   LACTIC ACID   Result Value Ref Range    Lactic Acid 0.6 0.5 - 2.0 mmol/L   GRAM STAIN    Specimen: CSF   Result Value Ref Range    Significant Indicator .     Source CSF     Site TAP     Gram Stain Result Rare WBCs.  No organisms seen.      EKG   Result Value Ref Range    Report       [facility] Emergency  Dept.    Test Date:  2022  Pt Name:    JUAN JOSE MCINTOSH           Department: ER  MRN:        [id number]                      Room:       [facility]  Gender:     Male                         Technician: [id number]  :        2000                   Requested By:ER TRIAGE PROTOCOL  Order #:    [id number]                    Reading MD: April Sánchez    Measurements  Intervals                                Axis  Rate:       99                           P:          43  SC:         152                          QRS:        86  QRSD:       80                           T:          55  QT:         328  QTc:        421    Interpretive Statements  Normal sinus rhythm rate of 99, normal axis, normal intervals, no ST  elevations,  depressions, T wave inversions, Brugada, long QT or WPW.  No changes from EKG  2022  Electronically Signed On 2022 19:47:02 PDT by April Sánchez     POCT glucose device results   Result Value Ref Range    POC Glucose, Blood 138 (H) 65 - 99 mg/dL   POCT glucose device results   Result Value Ref Range    POC Glucose, Blood 163 (H) 65 - 99 mg/dL         RADIOLOGY  CT-HEAD W/O   Final Result         1.  No acute intracranial abnormality.   2.  Bilateral maxillary and ethmoid sinusitis changes                 COURSE & MEDICAL DECISION MAKING  Pertinent Labs & Imaging studies reviewed. (See chart for details)    8:08 PM  At the end of history as I started discussing need for lumbar puncture and risk/benefits, patient with full body shaking, no loss of consciousness.  No rhythmic movement, does not lose consciousness.  Refuses to answer questions during this time.  He is responding visibly to the blood pressure cuff tightening and localizing to exam.  Not consistent with seizure.  Mother reports he has had panic attacks at home and they believe he has anxiety although he has not been diagnosed.    10:12 PM  Discussed lab results, CT results and LP with patient and mother.  Patient states his  symptoms are significantly improved, is no longer meningitic.  He consents to LP however.    10:46 PM  Lumbar Puncture Procedure    Indication: Suspected meningitis and to obtain spinal fluid for diagnostic testing    Consent: The patient was counseled regarding the procedure, it's indications, risks, potential complications and alternatives and any questions were answered. Consent was obtained.    Procedure: The patient was placed in the right lateral decubitus position and the appropriate landmarks were identified. The area was prepped and draped in the usual sterile fashion. Anesthesia was obtained using 4 cc of 2% Lidocaine without epinephrine. A spinal needle was inserted at the L4- L5 level with the stylet in place until spinal fluid was returned. Opening pressure was not measured. At this point 4.0 cc of clear cerebral spinal fluid was obtained and sent for appropriate testing. The stylet was then replaced and the needle was withdrawn. A sterile dressing was placed over the site and the patient was placed in the supine position.    The patient tolerated the procedure well.    Complications: None      21-year-old male with history of type 1 diabetes presenting with near syncopal event at home along with headache and neck stiffness.  Patient initially mildly tachycardic but afebrile.  On exam however he has significant neck stiffness concerning for meningitis.  EKG showed no evidence of dysrhythmia or ischemia.  Initial lactic acid was just slightly elevated however down trended after IV fluids.He had mild hyponatremia, but no other significant electrolyte abnormalities and there was no evidence of DKA.  He had normal renal function, normal LFTs.  He was not anemic.  His white blood cell count was normal and his procalcitonin was also reassuring.  CT head showed no evidence of mass or obvious bleed.  After fluids and migraine cocktail patient's symptoms were significantly improved.  I did discuss the patient  concern for possible meningitis given his initial exam, although this was less likely given his reassuring labs however we proceeded with LP to rule out bacterial meningitis.  There is also no evidence of xanthochromia to raise my suspicion for occult subarachnoid hemorrhage.  CSF with no evidence of bacterial meningitis.  Patient's symptoms remained improved.  Unclear cause of his symptoms initially, viral etiology is certainly possible.  He also did have a anxiety attack your during the initial discussion regarding the LP, given history of a possible seizure in the past, near syncope, some component of psychiatric related symptoms are certainly considered.  He was discharged home with return precautions.    The patient will not drink alcohol nor drive with prescribed medications. The patient will return for worsening symptoms and is stable at the time of discharge. The patient verbalizes understanding and will comply.    FINAL IMPRESSION  1. Near syncope     2. Neck stiffness     3. Acute nonintractable headache, unspecified headache type           Electronically signed by: April Sánchez M.D., 7/19/2022 7:46 PM

## 2022-07-22 NOTE — PROGRESS NOTES
Cardiology Initial Consultation Note    Date of note:    7/25/2022    Primary Care Provider: Gregory Grande M.D.  Referring Provider: Froilan Grande M.    Patient Name: Martinez Smith   YOB: 2000  MRN:              [id number]    Chief Complaint   Patient presents with   • Syncope     NP Dx: Syncope and collapse       History of Present Illness: Mr. Martinez Smith is a 21 y.o. male whose current medical problems include diabetes mellitus Type I, celiac disease, exocrine pancreatic insufficiency, dyslipidemia who is here for cardiac consultation for multiple episodes of syncope.    Patient states that he has had multiple episodes of passing out.  Has not injured himself.  He does have some warning signs feeling like his blood sugar is low.  It is not always with standing.  Once he was sitting in bed.  He is usually out for about 1 minute, he comes to and then might pass out again. Seen in ED 7/2/2022 for this and also 7/18/2022 felt maybe seizure.  Always with nausea and vomitting.   He did lose 30 lbs 2 years ago and BP now tends to run low with faster pulse.  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  Will be moving back in with his parents.    Cardiovascular Risk Factors:  1. Smoking status:   Tobacco Use: Low Risk    • Smoking Tobacco Use: Never Smoker   • Smokeless Tobacco Use: Never Used     2. Type II Diabetes Mellitus:    Lab Results   Component Value Date/Time    HBA1C 7.3 (A) 06/08/2022 01:11 PM    HBA1C 7.0 (A) 03/04/2022 04:34 PM     3. Hypertension: no  4. Dyslipidemia: yes  Cholesterol,Tot   Date Value Ref Range Status   02/18/2022 181 100 - 199 mg/dL Final   12/24/2019 177 100 - 199 mg/dL Final     LDL   Date Value Ref Range Status   08/20/2020 137 (H) 0 - 99 mg/dL Final     Comment:     **Effective August 31, 2020, [facility] is implementing an improved**  equation to calculate Low Density Lipoprotein Cholesterol (LDL-C)  concentrations, to be used in  all lipid panels that report calculated  LDL-C. This equation was developed through a collaboration with the  National Heart, Lung and Blood Institutes of Health (NIH).[1] The NIH  calculation overcomes the limitations of the existing Friedewald  LDL-C equation and performs equally well in both fasting and  non-fasting individuals.  1. En JOY, Josie KRAMER, Jia Q, et al. A new equation for  calculation of low-density lipoprotein cholesterol in patients with  normolipidemia and/or hypertriglyceridemia. [facility] [employer].  2020 Feb 26. doi:10.1001/jamacardio.2020.0013     12/24/2019 128 (H) <100 mg/dL Final     HDL   Date Value Ref Range Status   02/18/2022 41 >39 mg/dL Final   12/24/2019 28 (A) >=40 mg/dL Final     Triglycerides   Date Value Ref Range Status   02/18/2022 111 0 - 149 mg/dL Final   12/24/2019 103 0 - 149 mg/dL Final     5. Family history of early Coronary Artery Disease in a first degree relative (Male less than 55 years of age; Female less than 65 years of age): no  6. Obesity and/or Metabolic Syndrome: none  7. Sedentary lifestyle: yes    Past Medical History:   Diagnosis Date   • Celiac disease    • Celiac disease    • Diabetes type I (HCC)     insulin pump     Past Surgical History:   Procedure Laterality Date   • HYPOSPADIAS REPAIR       Current Outpatient Medications   Medication Sig Dispense Refill   • ondansetron (ZOFRAN ODT) 4 MG TABLET DISPERSIBLE Take 1 Tablet by mouth every 6 hours as needed for Nausea. 20 Tablet 0   • famotidine (PEPCID) 20 MG Tab Take 1 Tablet by mouth 2 times a day. 14 Tablet 0   • CONTOUR NEXT TEST strip USE TO TEST 10 TIMES A DAY FOR INSULIN PUMP ADJUSTMENT (LINKS TO INSULIN PUMP) 900 Strip 3   • azelastine (ASTELIN) 137 MCG/SPRAY nasal spray SPRAY 2 SPRAYS INTO EACH NOSTRIL TWICE A DAY     • fexofenadine (ALLEGRA) 60 MG Tab Take 60 mg by mouth 2 times a day.     • Triamcinolone Acetonide (NASACORT AQ NA) Administer 2 Sprays into affected nostril(S) every day.     • Glucagon,  rDNA, (GLUCAGON EMERGENCY) 1 MG Kit Inject 1 mg as directed as needed. For severe hypoglycemia 1 Kit 1   • HUMALOG 100 UNIT/ML Inject 50 units into pump reservoir daily  30 day supply 20 mL 11   • amitriptyline (ELAVIL) 25 MG Tab Take 25 mg by mouth at bedtime.     • PANCREAZE 47308-93942 units Cap DR Particles Take 1 Capsule by mouth 3 times a day before meals.     • montelukast (SINGULAIR) 10 MG Tab Take 10 mg by mouth every day.     • insulin infusion pump Device Inject  under the skin continuous. Patient's own SQ insulin pump    Type of Insulin: Humalog  Last change of tubin22 - Change tubing and site every 72 hours    Dosing:  Basal rate:  0000 - 0300 = .625 units  0300 - 1200 = .6 units  1200 - 0000= .575 units  Bolus ratio:   1 unit : 14 g carbohydrate      Sensitivity ratio:   1 units for every 50 over 130-150 mg/dL    Disconnect pump if patient becomes hypoglycemic and altered.     • vitamin D (CHOLECALCIFEROL) 1000 UNIT Tab Take 5,000 Units by mouth every day.     • Olopatadine HCl 0.6 % Solution USE 1-2 SPRAYS EACH NOSTRIL TWICE A DAY (Patient not taking: Reported on 2022)       No current facility-administered medications for this visit.         Allergies   Allergen Reactions   • Beef Flavor Nausea     Beef broth causes Nausea and vomiting    • Compazine      Anxiety    • Dairy Food Allergy Nausea   • Eggs      Nausea, vomiting, chest tightness   • Gluten Meal    • Mustard Seed      Nausea and vomiting,   • Peanut (Diagnostic)      Nausea, vomiting, and chest tightness   • Shellfish Allergy      Nausea, vomiting, chest tightness   • Tree Nuts Food Allergy Anaphylaxis, Shortness of Breath, Itching, Nausea and Cough     Cashews and pistachio and coconut          Family History   Problem Relation Age of Onset   • Arthritis Maternal Grandmother          Social History     Socioeconomic History   • Marital status: Single     Spouse name: Not on file   • Number of children: Not on file   • Years  "of education: Not on file   • Highest education level: Not on file   Occupational History   • Not on file   Tobacco Use   • Smoking status: Never Smoker   • Smokeless tobacco: Never Used   Vaping Use   • Vaping Use: Never used   Substance and Sexual Activity   • Alcohol use: No   • Drug use: No   • Sexual activity: Not on file   Other Topics Concern   • Behavioral problems Not Asked   • Interpersonal relationships Not Asked   • Sad or not enjoying activities Not Asked   • Suicidal thoughts Not Asked   • Poor school performance Not Asked   • Reading difficulties Not Asked   • Speech difficulties Not Asked   • Writing difficulties Not Asked   • Inadequate sleep Not Asked   • Excessive TV viewing Not Asked   • Excessive video game use Not Asked   • Inadequate exercise Not Asked   • Sports related Not Asked   • Poor diet Not Asked   • Family concerns for drug/alcohol abuse Not Asked   • Poor oral hygiene Not Asked   • Bike safety Not Asked   • Family concerns vehicle safety Not Asked   Social History Narrative   • Not on file     Social Determinants of Health     Financial Resource Strain: Not on file   Food Insecurity: Not on file   Transportation Needs: Not on file   Physical Activity: Not on file   Stress: Not on file   Social Connections: Not on file   Intimate Partner Violence: Not on file   Housing Stability: Not on file       Review of Systems   Constitutional: Positive for malaise/fatigue.   Cardiovascular: Negative for chest pain, claudication, dyspnea on exertion, leg swelling, near-syncope, palpitations and paroxysmal nocturnal dyspnea.   Musculoskeletal: Positive for back pain, muscle weakness and neck pain.   Gastrointestinal: Positive for nausea and vomiting.   Neurological: Positive for dizziness, paresthesias, tremors and weakness.         Physical Exam:  Ambulatory Vitals  /76 (BP Location: Left arm, Patient Position: Standing, BP Cuff Size: Adult)   Pulse 100   Resp 13   Ht 1.676 m (5' 6\")   " Wt 56.2 kg (124 lb)   SpO2 99%    Oxygen Therapy:  Pulse Oximetry: 99 %  BP Readings from Last 4 Encounters:   07/25/22 100/76   07/20/22 110/60   07/18/22 116/72   07/02/22 112/67       Weight/BMI: Body mass index is 20.01 kg/m².  Wt Readings from Last 4 Encounters:   07/25/22 56.2 kg (124 lb)   07/19/22 57.7 kg (127 lb 3.3 oz)   07/18/22 57.2 kg (126 lb 1.7 oz)   07/02/22 56.2 kg (124 lb)       Physical Exam  HENT:      Head: Normocephalic and atraumatic.      Mouth/Throat:      Mouth: Mucous membranes are moist.      Pharynx: Oropharynx is clear.   Eyes:      Extraocular Movements: Extraocular movements intact.      Conjunctiva/sclera: Conjunctivae normal.   Cardiovascular:      Rate and Rhythm: Normal rate and regular rhythm.      Pulses: Normal pulses.      Heart sounds: Normal heart sounds.   Pulmonary:      Effort: Pulmonary effort is normal.      Breath sounds: Normal breath sounds.   Abdominal:      General: Abdomen is flat. Bowel sounds are normal.      Palpations: Abdomen is soft.   Skin:     General: Skin is warm and dry.   Neurological:      Mental Status: He is alert and oriented to person, place, and time. Mental status is at baseline.   Psychiatric:         Mood and Affect: Mood normal.          Lab Data Review:  Lab Results   Component Value Date/Time    CHOLSTRLTOT 181 02/18/2022 04:48 AM    CHOLSTRLTOT 177 12/24/2019 07:29 AM     (H) 08/20/2020 05:40 AM     (H) 12/24/2019 07:29 AM    HDL 41 02/18/2022 04:48 AM    HDL 28 (A) 12/24/2019 07:29 AM    TRIGLYCERIDE 111 02/18/2022 04:48 AM    TRIGLYCERIDE 103 12/24/2019 07:29 AM       Lab Results   Component Value Date/Time    SODIUM 131 (L) 07/19/2022 08:19 PM    POTASSIUM 3.9 07/19/2022 08:19 PM    CHLORIDE 94 (L) 07/19/2022 08:19 PM    CO2 22 07/19/2022 08:19 PM    GLUCOSE 188 (H) 07/19/2022 08:19 PM    BUN 4 (L) 07/19/2022 08:19 PM    CREATININE 0.72 07/19/2022 08:19 PM    CREATININE 0.4 05/25/2007 04:40 AM    BUNCREATRAT 8 (L)  05/25/2022 04:35 AM     Lab Results   Component Value Date/Time    ALKPHOSPHAT 80 07/19/2022 08:19 PM    ASTSGOT 24 07/19/2022 08:19 PM    ALTSGPT 15 07/19/2022 08:19 PM    TBILIRUBIN 0.3 07/19/2022 08:19 PM      Lab Results   Component Value Date/Time    WBC 5.4 07/19/2022 08:19 PM     Lab Results   Component Value Date/Time    HBA1C 7.3 (A) 06/08/2022 01:11 PM    HBA1C 7.0 (A) 03/04/2022 04:34 PM         Cardiac Imaging and Procedures Review:    EKG dated 7/19/2022: My personal interpretation is sinus, 99 bpm, early repolarization rightward axis no significant changes from prior      Assessment & Plan     1. Syncope and collapse  Differential diagnosis from cardiac standpoint would be arrhythmias vs low blood pressure vs autonomic dysfunction.  Patient has had a 30 lbs weight loss and now BP typically runs low.  He is not orthostatic.  Discussed 30 day event monitor and echo to assess for any arrhythmias and structural abnormalities.  Followup after testing completed.  - Cardiac Event Monitor  - EC-ECHOCARDIOGRAM COMPLETE W/O CONT; Future    Shared Medical Decision Making:    All of patient's excellent questions were answered to the best of my knowledge and to patient's satisfaction.  It was a pleasure seeing Mr. Martinez Smith in my clinic today. Return in about 3 months (around 10/25/2022). Patient is aware to call the cardiology clinic with any questions or concerns.    I spent 45 minutes face-to-face in which greater than 50% of the visit was spent in discussing preventative measures, counseling/coordination of care of medication management options and concerns/potential risks/drug-drug interactions related to the medications.      Diane Munoz MD  [facility]  [address]  [place] [address]  Phone:  [phone]  Fax:  [phone]    Please note that this dictation was created using voice recognition software. I have made every reasonable attempt to  correct obvious errors, but it is possible there are errors of grammar and possibly content that I did not discover before finalizing the note.

## 2022-07-23 LAB
BACTERIA CSF CULT: NORMAL
GRAM STN SPEC: NORMAL
SIGNIFICANT IND 70042: NORMAL
SITE SITE: NORMAL
SOURCE SOURCE: NORMAL

## 2022-07-25 ENCOUNTER — OFFICE VISIT (OUTPATIENT)
Dept: CARDIOLOGY | Facility: MEDICAL CENTER | Age: 22
End: 2022-07-25
Payer: COMMERCIAL

## 2022-07-25 VITALS
BODY MASS INDEX: 19.93 KG/M2 | RESPIRATION RATE: 13 BRPM | SYSTOLIC BLOOD PRESSURE: 100 MMHG | HEIGHT: 66 IN | OXYGEN SATURATION: 99 % | WEIGHT: 124 LBS | HEART RATE: 100 BPM | DIASTOLIC BLOOD PRESSURE: 76 MMHG

## 2022-07-25 DIAGNOSIS — R55 SYNCOPE AND COLLAPSE: ICD-10-CM

## 2022-07-25 PROCEDURE — 99204 OFFICE O/P NEW MOD 45 MIN: CPT | Performed by: INTERNAL MEDICINE

## 2022-07-25 ASSESSMENT — ENCOUNTER SYMPTOMS
PALPITATIONS: 0
WEAKNESS: 1
DIZZINESS: 1
PND: 0
BACK PAIN: 1
NAUSEA: 1
VOMITING: 1
CLAUDICATION: 0
NEAR-SYNCOPE: 0
TREMORS: 1
NECK PAIN: 1
PARESTHESIAS: 1
DYSPNEA ON EXERTION: 0

## 2022-07-25 ASSESSMENT — FIBROSIS 4 INDEX: FIB4 SCORE: 0.59

## 2022-08-05 ENCOUNTER — NON-PROVIDER VISIT (OUTPATIENT)
Dept: CARDIOLOGY | Facility: MEDICAL CENTER | Age: 22
End: 2022-08-05
Payer: COMMERCIAL

## 2022-08-05 DIAGNOSIS — I47.29 NSVT (NONSUSTAINED VENTRICULAR TACHYCARDIA) (HCC): ICD-10-CM

## 2022-08-05 DIAGNOSIS — R55 SYNCOPE AND COLLAPSE: Chronic | ICD-10-CM

## 2022-08-05 DIAGNOSIS — I49.1 APC (ATRIAL PREMATURE CONTRACTIONS): ICD-10-CM

## 2022-08-05 DIAGNOSIS — I49.3 PVC'S (PREMATURE VENTRICULAR CONTRACTIONS): ICD-10-CM

## 2022-08-05 NOTE — PROGRESS NOTES
Patient enrolled in the 30 day [facility] heart monitoring program, per Diane Munoz M.D.  >In clinic hook up, monitor S/N [id number].  >Baseline Transmitted.  >Pending EOS.

## 2022-08-21 ENCOUNTER — HOSPITAL ENCOUNTER (EMERGENCY)
Facility: MEDICAL CENTER | Age: 22
End: 2022-08-21
Attending: EMERGENCY MEDICINE
Payer: COMMERCIAL

## 2022-08-21 VITALS
SYSTOLIC BLOOD PRESSURE: 121 MMHG | WEIGHT: 119.71 LBS | BODY MASS INDEX: 19.24 KG/M2 | OXYGEN SATURATION: 99 % | HEART RATE: 87 BPM | DIASTOLIC BLOOD PRESSURE: 78 MMHG | HEIGHT: 66 IN | RESPIRATION RATE: 18 BRPM | TEMPERATURE: 98.8 F

## 2022-08-21 DIAGNOSIS — R11.2 NON-INTRACTABLE VOMITING WITH NAUSEA, UNSPECIFIED VOMITING TYPE: ICD-10-CM

## 2022-08-21 LAB
ALBUMIN SERPL BCP-MCNC: 5.1 G/DL (ref 3.2–4.9)
ALBUMIN/GLOB SERPL: 1.9 G/DL
ALP SERPL-CCNC: 77 U/L (ref 30–99)
ALT SERPL-CCNC: 9 U/L (ref 2–50)
ANION GAP SERPL CALC-SCNC: 10 MMOL/L (ref 7–16)
AST SERPL-CCNC: 15 U/L (ref 12–45)
BASOPHILS # BLD AUTO: 1.2 % (ref 0–1.8)
BASOPHILS # BLD: 0.08 K/UL (ref 0–0.12)
BILIRUB SERPL-MCNC: 0.3 MG/DL (ref 0.1–1.5)
BUN SERPL-MCNC: 4 MG/DL (ref 8–22)
CALCIUM SERPL-MCNC: 9.5 MG/DL (ref 8.4–10.2)
CHLORIDE SERPL-SCNC: 97 MMOL/L (ref 96–112)
CO2 SERPL-SCNC: 27 MMOL/L (ref 20–33)
CREAT SERPL-MCNC: 0.61 MG/DL (ref 0.5–1.4)
EOSINOPHIL # BLD AUTO: 0.32 K/UL (ref 0–0.51)
EOSINOPHIL NFR BLD: 4.7 % (ref 0–6.9)
ERYTHROCYTE [DISTWIDTH] IN BLOOD BY AUTOMATED COUNT: 37.9 FL (ref 35.9–50)
GFR SERPLBLD CREATININE-BSD FMLA CKD-EPI: 138 ML/MIN/1.73 M 2
GLOBULIN SER CALC-MCNC: 2.7 G/DL (ref 1.9–3.5)
GLUCOSE SERPL-MCNC: 201 MG/DL (ref 65–99)
HCT VFR BLD AUTO: 41.2 % (ref 42–52)
HGB BLD-MCNC: 14.3 G/DL (ref 14–18)
IMM GRANULOCYTES # BLD AUTO: 0.02 K/UL (ref 0–0.11)
IMM GRANULOCYTES NFR BLD AUTO: 0.3 % (ref 0–0.9)
LACTATE BLD-SCNC: 1.4 MMOL/L (ref 0.5–2)
LIPASE SERPL-CCNC: 19 U/L (ref 7–58)
LYMPHOCYTES # BLD AUTO: 1.83 K/UL (ref 1–4.8)
LYMPHOCYTES NFR BLD: 26.8 % (ref 22–41)
MCH RBC QN AUTO: 30.2 PG (ref 27–33)
MCHC RBC AUTO-ENTMCNC: 34.7 G/DL (ref 33.7–35.3)
MCV RBC AUTO: 87.1 FL (ref 81.4–97.8)
MONOCYTES # BLD AUTO: 0.51 K/UL (ref 0–0.85)
MONOCYTES NFR BLD AUTO: 7.5 % (ref 0–13.4)
NEUTROPHILS # BLD AUTO: 4.06 K/UL (ref 1.82–7.42)
NEUTROPHILS NFR BLD: 59.5 % (ref 44–72)
NRBC # BLD AUTO: 0 K/UL
NRBC BLD-RTO: 0 /100 WBC
PLATELET # BLD AUTO: 284 K/UL (ref 164–446)
PMV BLD AUTO: 10.7 FL (ref 9–12.9)
POTASSIUM SERPL-SCNC: 4 MMOL/L (ref 3.6–5.5)
PROT SERPL-MCNC: 7.8 G/DL (ref 6–8.2)
RBC # BLD AUTO: 4.73 M/UL (ref 4.7–6.1)
SODIUM SERPL-SCNC: 134 MMOL/L (ref 135–145)
WBC # BLD AUTO: 6.8 K/UL (ref 4.8–10.8)

## 2022-08-21 PROCEDURE — 96374 THER/PROPH/DIAG INJ IV PUSH: CPT

## 2022-08-21 PROCEDURE — 83605 ASSAY OF LACTIC ACID: CPT

## 2022-08-21 PROCEDURE — 700105 HCHG RX REV CODE 258: Performed by: EMERGENCY MEDICINE

## 2022-08-21 PROCEDURE — 700111 HCHG RX REV CODE 636 W/ 250 OVERRIDE (IP): Performed by: EMERGENCY MEDICINE

## 2022-08-21 PROCEDURE — 80053 COMPREHEN METABOLIC PANEL: CPT

## 2022-08-21 PROCEDURE — 83690 ASSAY OF LIPASE: CPT

## 2022-08-21 PROCEDURE — 85025 COMPLETE CBC W/AUTO DIFF WBC: CPT

## 2022-08-21 PROCEDURE — 99284 EMERGENCY DEPT VISIT MOD MDM: CPT

## 2022-08-21 PROCEDURE — 36415 COLL VENOUS BLD VENIPUNCTURE: CPT

## 2022-08-21 RX ORDER — MIDAZOLAM HYDROCHLORIDE 1 MG/ML
3 INJECTION INTRAMUSCULAR; INTRAVENOUS ONCE
Status: COMPLETED | OUTPATIENT
Start: 2022-08-21 | End: 2022-08-21

## 2022-08-21 RX ORDER — DIAZEPAM 5 MG/ML
2.5 INJECTION, SOLUTION INTRAMUSCULAR; INTRAVENOUS ONCE
Status: DISCONTINUED | OUTPATIENT
Start: 2022-08-21 | End: 2022-08-21

## 2022-08-21 RX ORDER — SODIUM CHLORIDE, SODIUM LACTATE, POTASSIUM CHLORIDE, CALCIUM CHLORIDE 600; 310; 30; 20 MG/100ML; MG/100ML; MG/100ML; MG/100ML
1000 INJECTION, SOLUTION INTRAVENOUS ONCE
Status: COMPLETED | OUTPATIENT
Start: 2022-08-21 | End: 2022-08-21

## 2022-08-21 RX ADMIN — SODIUM CHLORIDE, POTASSIUM CHLORIDE, SODIUM LACTATE AND CALCIUM CHLORIDE 1000 ML: 600; 310; 30; 20 INJECTION, SOLUTION INTRAVENOUS at 20:22

## 2022-08-21 RX ADMIN — MIDAZOLAM 3 MG: 1 INJECTION INTRAMUSCULAR; INTRAVENOUS at 20:24

## 2022-08-21 ASSESSMENT — FIBROSIS 4 INDEX: FIB4 SCORE: 0.59

## 2022-08-22 NOTE — ED TRIAGE NOTES
Mild tachycardia Otherwise VSS  Pt reports hid glucose running around 150 over past few days  N/V worsening today

## 2022-08-22 NOTE — ED NOTES
Patient updated on current plan of care  Aware of need for urine  States he is unable to provide specimen at this time  Call light within reach  Vitals cycling on monitor  Denies any other needs at this time

## 2022-08-22 NOTE — ED PROVIDER NOTES
ED Provider Note    Scribed for Mikhail Maher M.D. by Arcadio Espinoza. 8/21/2022,  7:59 PM.    CHIEF COMPLAINT  Chief Complaint   Patient presents with    N/V     X 8 d  Emesis x 5 / 24 hrs  No recent diarrhea  Chills No fever    Abdominal Pain     Upper abd Throbbing pain       HPI  Martinez Smith is a 21 y.o. male who presents to the Emergency Department for evaluation of abdominal pain onset one week ago. Patient has a history of chronic abdominal pain and chronic vomiting. Patient reports that he has had nausea and vomiting for the past few weeks, but has gotten worse today, prompting him to present to the ED. He is experiencing associated general weakness, fatigue, vomiting, dizziness, and lightheadedness. He states that these symptoms are normal for him, but have been worse than usual. Patient's GI doctor is Dr. Dc, who currently has him taking pancreatic enzyme replacements and amitriptyline. He adds that he has not taken any medication for the past two days, but took some Zofran with little to no relief. He last saw GI in March 2022. Patient adds that he had a stomach bug in July, and those symptoms are consistent with his current ones. He adds that his allergies were set off prior to the onset of his symptoms, and believes they may be secondary to that. Patient has had these symptoms for a long time and states that he is here to get fluids and some nausea medication to relieve his symptoms. Patient has a history of a seizure in July 2022.    REVIEW OF SYSTEMS  See HPI for further details. All other systems are negative.     PAST MEDICAL HISTORY   has a past medical history of Celiac disease, Celiac disease, and Diabetes type I (HCC).    SOCIAL HISTORY  Social History     Tobacco Use    Smoking status: Never    Smokeless tobacco: Never   Vaping Use    Vaping Use: Never used   Substance and Sexual Activity    Alcohol use: No    Drug use: No    Sexual activity: Not on file     Social  "History     Substance and Sexual Activity   Drug Use No       SURGICAL HISTORY   has a past surgical history that includes hypospadias repair.    CURRENT MEDICATIONS  Home Medications    **Home medications have not yet been reviewed for this encounter**         ALLERGIES  Allergies   Allergen Reactions    Beef Flavor Nausea     Beef broth causes Nausea and vomiting     Compazine      Anxiety     Dairy Food Allergy Nausea    Eggs      Nausea, vomiting, chest tightness    Gluten Meal     Mustard Seed      Nausea and vomiting,    Peanut (Diagnostic)      Nausea, vomiting, and chest tightness    Shellfish Allergy      Nausea, vomiting, chest tightness    Tree Nuts Food Allergy Anaphylaxis, Shortness of Breath, Itching, Nausea and Cough     Cashews and pistachio and coconut        PHYSICAL EXAM  VITAL SIGNS: BP (!) 135/92   Pulse (!) 108   Temp 36.9 °C (98.4 °F) (Temporal)   Resp 14   Ht 1.676 m (5' 6\")   Wt 54.3 kg (119 lb 11.4 oz)   SpO2 97%   BMI 19.32 kg/m²   Pulse ox interpretation: I interpret this pulse ox as normal.  Constitutional: Alert in no apparent distress, pail, thin, pleasant and cooperative.   HENT: No signs of trauma, Bilateral external ears normal, Nose normal.   Eyes: Conjunctiva normal, Non-icteric.   Neck: Normal range of motion, Supple, No stridor.   Lymphatic: No lymphadenopathy noted.   Cardiovascular: Regular rate and rhythm, no murmurs.   Thorax & Lungs: Normal breath sounds, No respiratory distress, No wheezing, No chest tenderness.   Abdomen: Bowel sounds normal, Soft, No tenderness, No masses, No pulsatile masses. No peritoneal signs.  Skin: Warm, Dry, No erythema, No rash.   Back: No midline bony tenderness.   Extremities: Intact distal pulses, No edema, No cyanosis.  Musculoskeletal: Good range of motion in all major joints. No or major deformities noted.   Neurologic: Alert , Normal motor function, Normal sensory function, No focal deficits noted.   Psychiatric: Affect normal, " Judgment normal, Mood normal.     DIAGNOSTIC STUDIES / PROCEDURES    LABS  Labs Reviewed   CBC WITH DIFFERENTIAL - Abnormal; Notable for the following components:       Result Value    Hematocrit 41.2 (*)     All other components within normal limits   COMP METABOLIC PANEL - Abnormal; Notable for the following components:    Sodium 134 (*)     Glucose 201 (*)     Bun 4 (*)     Albumin 5.1 (*)     All other components within normal limits   LIPASE   LACTIC ACID   ESTIMATED GFR     All labs reviewed by me.    COURSE & MEDICAL DECISION MAKING  Nursing notes, VS, PMSFHx reviewed in chart.     7:59 PM Patient seen and examined at bedside. Differential diagnosis includes but is not limited to Chronic abdominal pain chronic nausea, vomiting, dehydration, acute kidney injury, electrolyte abnormalities, less likely intraabdominal infection. Ordered for Lactic acid, UA, Lipase, CMP, and CBC with differential to evaluate. Patient will be treated with LR bolus and Versed 3 mg injection for his symptoms.     9:35 PM - Patient was reevaluated at bedside. Discussed lab and radiology results with the patient and informed them of my plan for discharge.  Patient is comfortable with discharge at this time.     The patient will return for new or worsening symptoms and is stable at the time of discharge.    DISPOSITION:  Patient will be discharged home in stable condition.    FOLLOW UP:  Gregory Grande M.D.  [address] [facility] [id number] [phone]    Schedule an appointment as soon as possible for a visit       FINAL IMPRESSION  1. Non-intractable vomiting with nausea, unspecified vomiting type         I, Arcadio Espinoza ([place]riber[place]), am scribing for, and in the presence of, Mikhail Maher M.D..    Electronically signed by: Arcadio Espinoza ([place]), 8/21/2022    IMikhail M.D. personally performed the services described in this documentation, as scribed by Arcadio Espinoza in my presence, and it is both  accurate and complete.    The note accurately reflects work and decisions made by me.  Mikhail Maher M.D.  8/22/2022  6:01 PM

## 2022-08-22 NOTE — ED NOTES
FIRST CONTACT WITH PT, PT IS AAOX4, NAD.  RECEIVED REPORT FROM RN. PT IS IN NAD. PT IS BEING D/C. PT WAS GIVEN D/C INSTRUCTIONS AND HE STATED UNDERSTANDING. PT HAS NO N/V AT THIS TIME. PT PASS PO FLUIDS.

## 2022-08-22 NOTE — DISCHARGE INSTRUCTIONS
Your tests here were reassuring.  There is no sign of infection, significant electrolyte abnormality, dehydration, or any other dangerous concern.  Please stick to a bland diet, and continue your supportive care at home.  Please call to schedule follow-up with your primary care and GI doctors.

## 2022-08-31 ENCOUNTER — HOSPITAL ENCOUNTER (OUTPATIENT)
Dept: CARDIOLOGY | Facility: MEDICAL CENTER | Age: 22
End: 2022-08-31
Attending: INTERNAL MEDICINE
Payer: COMMERCIAL

## 2022-08-31 DIAGNOSIS — R55 SYNCOPE AND COLLAPSE: ICD-10-CM

## 2022-08-31 PROCEDURE — 93306 TTE W/DOPPLER COMPLETE: CPT

## 2022-09-01 LAB
LV EJECT FRACT  99904: 60
LV EJECT FRACT MOD 2C 99903: 69.1
LV EJECT FRACT MOD 4C 99902: 55.38
LV EJECT FRACT MOD BP 99901: 63.12

## 2022-09-01 PROCEDURE — 93306 TTE W/DOPPLER COMPLETE: CPT | Mod: 26 | Performed by: INTERNAL MEDICINE

## 2022-09-06 ENCOUNTER — TELEPHONE (OUTPATIENT)
Dept: CARDIOLOGY | Facility: MEDICAL CENTER | Age: 22
End: 2022-09-06
Payer: COMMERCIAL

## 2022-09-06 PROCEDURE — 93268 ECG RECORD/REVIEW: CPT | Performed by: INTERNAL MEDICINE

## 2022-09-09 ENCOUNTER — OFFICE VISIT (OUTPATIENT)
Dept: ENDOCRINOLOGY | Facility: MEDICAL CENTER | Age: 22
End: 2022-09-09
Attending: INTERNAL MEDICINE
Payer: COMMERCIAL

## 2022-09-09 ENCOUNTER — TELEPHONE (OUTPATIENT)
Dept: CARDIOLOGY | Facility: MEDICAL CENTER | Age: 22
End: 2022-09-09

## 2022-09-09 VITALS
HEART RATE: 96 BPM | DIASTOLIC BLOOD PRESSURE: 80 MMHG | WEIGHT: 122 LBS | SYSTOLIC BLOOD PRESSURE: 114 MMHG | OXYGEN SATURATION: 99 % | HEIGHT: 66 IN | BODY MASS INDEX: 19.61 KG/M2

## 2022-09-09 DIAGNOSIS — Z79.4 LONG-TERM INSULIN USE (HCC): ICD-10-CM

## 2022-09-09 DIAGNOSIS — E55.9 VITAMIN D DEFICIENCY: ICD-10-CM

## 2022-09-09 DIAGNOSIS — K90.0 CELIAC DISEASE: ICD-10-CM

## 2022-09-09 DIAGNOSIS — Z96.41 INSULIN PUMP IN PLACE: ICD-10-CM

## 2022-09-09 DIAGNOSIS — E10.649 TYPE 1 DIABETES MELLITUS WITH HYPOGLYCEMIA AND WITHOUT COMA (HCC): ICD-10-CM

## 2022-09-09 LAB
HBA1C MFR BLD: 6.9 % (ref 0–5.6)
INT CON NEG: ABNORMAL
INT CON POS: ABNORMAL

## 2022-09-09 PROCEDURE — 83036 HEMOGLOBIN GLYCOSYLATED A1C: CPT | Performed by: INTERNAL MEDICINE

## 2022-09-09 PROCEDURE — 99213 OFFICE O/P EST LOW 20 MIN: CPT | Performed by: INTERNAL MEDICINE

## 2022-09-09 PROCEDURE — 99214 OFFICE O/P EST MOD 30 MIN: CPT | Performed by: INTERNAL MEDICINE

## 2022-09-09 RX ORDER — OMEPRAZOLE 20 MG/1
20 CAPSULE, DELAYED RELEASE ORAL
COMMUNITY
Start: 2022-08-24 | End: 2022-10-27

## 2022-09-09 RX ORDER — IBUPROFEN 600 MG/1
1 TABLET ORAL PRN
Qty: 1 KIT | Refills: 1 | Status: SHIPPED | OUTPATIENT
Start: 2022-09-09 | End: 2023-12-11

## 2022-09-09 ASSESSMENT — FIBROSIS 4 INDEX: FIB4 SCORE: 0.4

## 2022-09-09 NOTE — PROGRESS NOTES
CHIEF COMPLAINT: Patient is here for follow up of Type 1 Diabetes Mellitus.    HPI:     Martinez Smith is a 21 y.o. male with Type 1 Diabetes Mellitus here for follow up.    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  He is testing BG 10x a day with a contour next BG meter.     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 He also diagnosed himself with food allergies.     He also has celiac disease, he is on a gluten free diet since age of 11         Basal rate  00:00 0.625  0300  0.600  1200 0.575  10pm 0.550     Carb ratio  00:00  14     ICF  1:50     Target  130     Active insulin time 3 hours     Average  (190, 140)  Time in range was not given  Basal 48%  Bolus 52%  TDD 28.7      He is on manual mode with no CGM ( cost)  He is testing BG 10x a day with a contour next BG meter.   He reports more stable BG  He got a stomach bug and was admitted to the hospital but he finally got better  He is watching his diet due to food allergies  He is trying to finish his masters at [employer]      His lipids are at goal and he does not need statin therapy because he is young  His LDL-C was 120 on Feb 2022      He doesn't have diabetic kidney disease  UACR was < 30 on 2/18/2022      He had an eye exam with Dr. Elena on May 2022    His TSH is normal at 29.9 on 2/18/2022    His vitamin D is 41.5 on  5/25/2022        BG Diary:   Pump was download and reviewed today  He uses contour next test strips which is the only meter that works with his pump  He tested sugars 8 times a day and is needing a prior authorization for his Contour next test strips    Weight has been stable    Diabetes Complications   Retinopathy: No known retinopathy.  Last eye exam: 5/2020 Dr. Noguera  We are requesting records of his eye exam from Dr. Noguera    Neuropathy: Denies paresthesias or numbness in hands or feet. Denies any foot wounds.  Exercise: Minimal.  Diet: Fair.  Patient's medications, allergies, and social histories were reviewed and updated as appropriate.    ROS:     CONS:     No fever, no chills   EYES:     No diplopia, no blurry vision   CV:           No chest pain, no palpitations   PULM:     No SOB, no cough, no hemoptysis.   GI:            No nausea, no vomiting, no diarrhea, no constipation   ENDO:     No polyuria, no polydipsia, no heat intolerance, no cold intolerance       Past Medical History:  Problem List:  2022-07: Syncope and collapse  2022-05: Influenza  2022-01: Type 1 diabetes mellitus with hypoglycemia and without coma   ([facility])  2021-08: Exocrine pancreatic insufficiency  2020-09: Chronic abdominal pain  2020-09: Vitamin D deficiency  2020-07: RUQ abdominal pain  2019-12: Diabetic gastroparesis (HCC)  2019-12: Long-term insulin use ([facility])  2019-12: Fitting and adjustment of insulin pump  2019-12: Intractable nausea and vomiting  2018-01: Hyperlipidemia  2018-01: Other mechanical complication of insulin pump  2017-06: Insulin pump status  2017-06: Type 1 diabetes mellitus with hyperglycemia ([facility])  2017-06: Celiac disease  2017-06: Weakness of right lower extremity  2017-06: Lipohyperplasia  2017-06: Dyslipidemia    Past Surgical History:  Past Surgical History:   Procedure Laterality Date    HYPOSPADIAS REPAIR          Allergies:  Beef flavor; Gluten meal; and Tree nuts food allergy     Social History:  Social  "History     Tobacco Use    Smoking status: Never    Smokeless tobacco: Never   Vaping Use    Vaping Use: Never used   Substance Use Topics    Alcohol use: No    Drug use: No        Family History:   family history includes Arthritis in his maternal grandmother.      PHYSICAL EXAM:   OBJECTIVE:  Vital signs: /80   Pulse 96   Ht 1.676 m (5' 6\")   Wt 55.3 kg (122 lb)   SpO2 99%   BMI 19.69 kg/m²   GENERAL: Well-developed, well-nourished in no apparent distress.   EYE:  No ocular asymmetry, PERRLA  HENT: Pink, moist mucous membranes.    NECK: No thyromegaly.   CARDIOVASCULAR: regular rate and rhythm w/ no murmurs  LUNGS: Symmetrical chest expansion with clear breath sounds  ABDOMEN: non obese abdomen with no visible organomegaly right upper quadrant pain tenderness noted with direct palpation  EXTREMITIES: No clubbing, cyanosis, or edema.   NEUROLOGICAL: No gross focal motor abnormalities   LYMPH: No cervical adenopathy seen.   SKIN: No rashes, lesions.         Labs:  Lab Results   Component Value Date/Time    HBA1C 6.9 (A) 09/09/2022 03:51 PM        Lab Results   Component Value Date/Time    WBC 6.8 08/21/2022 07:50 PM    RBC 4.73 08/21/2022 07:50 PM    HEMOGLOBIN 14.3 08/21/2022 07:50 PM    MCV 87.1 08/21/2022 07:50 PM    MCH 30.2 08/21/2022 07:50 PM    MCHC 34.7 08/21/2022 07:50 PM    RDW 37.9 08/21/2022 07:50 PM    MPV 10.7 08/21/2022 07:50 PM       Lab Results   Component Value Date/Time    SODIUM 134 (L) 08/21/2022 07:50 PM    POTASSIUM 4.0 08/21/2022 07:50 PM    CHLORIDE 97 08/21/2022 07:50 PM    CO2 27 08/21/2022 07:50 PM    ANION 10.0 08/21/2022 07:50 PM    GLUCOSE 201 (H) 08/21/2022 07:50 PM    BUN 4 (L) 08/21/2022 07:50 PM    CREATININE 0.61 08/21/2022 07:50 PM    CREATININE 0.4 05/25/2007 04:40 AM    CALCIUM 9.5 08/21/2022 07:50 PM    ASTSGOT 15 08/21/2022 07:50 PM    ALTSGPT 9 08/21/2022 07:50 PM    TBILIRUBIN 0.3 08/21/2022 07:50 PM    ALBUMIN 5.1 (H) 08/21/2022 07:50 PM    ALBUMIN 4.80 " 10/22/2016 09:46 AM    TOTPROTEIN 7.8 08/21/2022 07:50 PM    TOTPROTEIN 7.70 10/22/2016 09:46 AM    GLOBULIN 2.7 08/21/2022 07:50 PM    AGRATIO 1.9 08/21/2022 07:50 PM       Lab Results   Component Value Date/Time    CHOLSTRLTOT 177 12/24/2019 0729    TRIGLYCERIDE 103 12/24/2019 0729    HDL 28 (A) 12/24/2019 0729     (H) 12/24/2019 0729       Lab Results   Component Value Date/Time    MALBCRT see below 07/13/2019 08:00 AM    MICROALBUR <0.7 07/13/2019 08:00 AM    MICRALB <3.0 02/18/2022 04:48 AM        Lab Results   Component Value Date/Time    TSHULTRASEN 2.140 09/14/2019 0727     No results found for: FREEDIR  Lab Results   Component Value Date/Time    FREET3 3.53 09/14/2019 0727           ASSESSMENT/PLAN:     1. Type 1 diabetes mellitus with hyperglycemia (HCC)  Controlled  A1c is better at 6.9%  I am not adjusting his pump settings at this time  He is up to date with his labs  I recommend he get eye exam records  He wants to stay with [facility] and he wants to wait until Charissa is accepted again by [facility] but just in case I offered a referral to Dr. Lin Hodge      2. Vitamin D deficiency  Controlled  Continue vitamin D3 5000 units daily  Continue monitoring    3. Celiac disease  Stable   monitor  Antibodies       4. Insulin pump status  Insulin pump in place      5. Long-term insulin use (HCC)  Patient is on long-term insulin therapy due to type 1 diabetes      Return if symptoms worsen or fail to improve.      Thank you kindly for allowing me to participate in the diabetes care plan for this patient.    Gabo Benoit MD, FACE, ECNU  04/17/20    CC:   Gregory Grande M.D.

## 2022-09-09 NOTE — TELEPHONE ENCOUNTER
Called pt to inform him of cardiac monitor results per Dr. Lee's interpretation. Reviewed results and explained hat Dr. Munoz will discuss in more detail and can provide recommendations at upcoming fv scheduled 10/31.     Pt states he has had continued intermittent dizziness but is also dealing with low blood sugars and neck pain. No syncopal episodes. He is working with endocrinology and will be seeing neurology for further eval.

## 2022-09-09 NOTE — PROGRESS NOTES
RN-CDE Note    Subjective:   Endocrinology Clinic Progress Note  PCP: Froilan Grande M.D.    HPI:  Martinez Smith is a 21 y.o. old patient who is seen today by the Diabetes Nurse Specialist for review of Type 1 Diabetes on Medtronic 630 G pump.  Recent changes in health: He has had problems with nausea and vomiting due to a gastro virus in July and didn't feel better until he went on Zofran and acid reducer.  DM:   Last A1c:   Lab Results   Component Value Date/Time    HBA1C 6.9 (A) 09/09/2022 03:51 PM      Previous A1c was 7.3 on 6/8/22  A1C GOAL: < 7    Diabetes Medications:   Humalog in pump      Exercise: Walking  Diet: granola and strawberry bars throughout the day.  Patient's body mass index is 19.69 kg/m². Exercise and nutrition counseling were performed at this visit.    Glucose monitoring frequency: See pump download    Hypoglycemic episodes: yes - having lows in the 60's for the last 3 days due to not being able to eat much.  Last Retinal Exam:  Saw Dr. Elena at [facility] in May  2022  Daily Foot Exam: Yes   Foot Exam:  Monofilament: done  Monofilament testing with a 10 gram force: sensation: intact bilaterally  Visual Inspection: Feet without maceration, ulcers, or fissures.  Pedal pulses: intact bilaterally   Lab Results   Component Value Date/Time    MALBCRT see below 07/13/2019 08:00 AM    MICROALBUR <0.7 07/13/2019 08:00 AM    MICRALB <3.0 02/18/2022 04:48 AM        ACR Albumin/Creatinine Ratio goal <30     HTN:   Blood pressure goal <140/<80 .   Currently Rx ACE/ARB: No     Dyslipidemia:    Lab Results   Component Value Date/Time    CHOLSTRLTOT 181 02/18/2022 04:48 AM    CHOLSTRLTOT 177 12/24/2019 07:29 AM     (H) 08/20/2020 05:40 AM     (H) 12/24/2019 07:29 AM    HDL 41 02/18/2022 04:48 AM    HDL 28 (A) 12/24/2019 07:29 AM    TRIGLYCERIDE 111 02/18/2022 04:48 AM    TRIGLYCERIDE 103 12/24/2019 07:29 AM         Currently Rx Statin: No     He  reports  that he has never smoked. He has never used smokeless tobacco.      Plan:     Discussed and educated on:   - All medications, side effects and compliance (discussed carefully)  - Annual eye examinations at Ophthalmology  - Home glucose monitoring emphasized  - Weight control and daily exercise    Recommended medication changes: He was given a sample of Basaglar pen x 1 ( [id number] expir. 6/23) to use when off the pump.

## 2022-10-05 ENCOUNTER — OFFICE VISIT (OUTPATIENT)
Dept: URGENT CARE | Facility: PHYSICIAN GROUP | Age: 22
End: 2022-10-05
Payer: COMMERCIAL

## 2022-10-05 VITALS
BODY MASS INDEX: 19.61 KG/M2 | SYSTOLIC BLOOD PRESSURE: 102 MMHG | DIASTOLIC BLOOD PRESSURE: 64 MMHG | TEMPERATURE: 97.8 F | OXYGEN SATURATION: 99 % | RESPIRATION RATE: 12 BRPM | HEIGHT: 66 IN | HEART RATE: 112 BPM | WEIGHT: 122 LBS

## 2022-10-05 DIAGNOSIS — H69.93 DYSFUNCTION OF BOTH EUSTACHIAN TUBES: ICD-10-CM

## 2022-10-05 PROCEDURE — 99213 OFFICE O/P EST LOW 20 MIN: CPT | Performed by: STUDENT IN AN ORGANIZED HEALTH CARE EDUCATION/TRAINING PROGRAM

## 2022-10-05 ASSESSMENT — FIBROSIS 4 INDEX: FIB4 SCORE: 0.4

## 2022-10-05 NOTE — PROGRESS NOTES
Subjective:   CHIEF COMPLAINT  Chief Complaint   Patient presents with    Otalgia     Bilateral ear pain x 3 days.  Left is worse than right.       HPI  Martinez Smith is a 21 y.o. male who presents with a chief complaint of bilateral ear pain.  Symptoms started approximately 3 days ago.  Left is worse than right.  No specific triggers or aggravating factors.  No alleviating factors.  He has not tried taking medications.  No drainage.  No recent swimming.  Patient reports he has a history of allergies and has an upcoming allergy test.  Subsequently he recently discontinued Allegra, azelastine nasal spray, and Singulair.  Positive ROS for slight nasal congestion.  No sore throat or fevers.  No cough.    REVIEW OF SYSTEMS  General: no fever or chills  GI: no nausea or vomiting  See HPI for further details.    PAST MEDICAL HISTORY  Patient Active Problem List    Diagnosis Date Noted    Syncope and collapse 07/25/2022    Influenza 05/01/2022    Type 1 diabetes mellitus with hypoglycemia and without coma (HCC) 01/19/2022    Exocrine pancreatic insufficiency 08/06/2021    Chronic abdominal pain 09/11/2020    Vitamin D deficiency 09/11/2020    RUQ abdominal pain 07/31/2020    Long-term insulin use (HCC) 12/17/2019    Fitting and adjustment of insulin pump 12/17/2019    Intractable nausea and vomiting 12/17/2019    Hyperlipidemia 01/18/2018    Other mechanical complication of insulin pump 01/18/2018    Insulin pump status 06/27/2017    Type 1 diabetes mellitus with hyperglycemia (HCC) 06/15/2017    Celiac disease 06/15/2017    Weakness of right lower extremity 06/15/2017    Lipohyperplasia 06/15/2017    Dyslipidemia 06/15/2017       SURGICAL HISTORY   has a past surgical history that includes hypospadias repair.    ALLERGIES  Allergies   Allergen Reactions    Beef Flavor Nausea     Beef broth causes Nausea and vomiting     Compazine      Anxiety     Dairy Food Allergy Nausea    Eggs      Nausea, vomiting, chest  "tightness    Gluten Meal     Mustard Seed      Nausea and vomiting,    Peanut (Diagnostic)      Nausea, vomiting, and chest tightness    Reglan [Metoclopramide] Anxiety     Makes anxious    Shellfish Allergy      Nausea, vomiting, chest tightness    Tree Nuts Food Allergy Anaphylaxis, Shortness of Breath, Itching, Nausea and Cough     Cashews and pistachio and coconut        CURRENT MEDICATIONS  Home Medications       Reviewed by Parish Goldmsith D.O. (Physician) on 10/05/22 at 1617  Med List Status: <None>     Medication Last Dose Status   amitriptyline (ELAVIL) 25 MG Tab Taking Active   azelastine (ASTELIN) 137 MCG/SPRAY nasal spray Not Taking Active   CONTOUR NEXT TEST strip Taking Active   fexofenadine (ALLEGRA) 60 MG Tab Not Taking Active   Glucagon, rDNA, (GLUCAGON EMERGENCY) 1 MG Kit PRN Active   HUMALOG 100 UNIT/ML Taking Active   insulin infusion pump Device Taking Active   montelukast (SINGULAIR) 10 MG Tab Not Taking Active   omeprazole (PRILOSEC) 20 MG delayed-release capsule Not Taking Active   ondansetron (ZOFRAN ODT) 4 MG TABLET DISPERSIBLE PRN Active   PANCREAZE 13451-10895 units Cap DR Particles Taking Active   Triamcinolone Acetonide (NASACORT AQ NA) Not Taking Active   vitamin D (CHOLECALCIFEROL) 1000 UNIT Tab Taking Active                    SOCIAL HISTORY  Social History     Tobacco Use    Smoking status: Never    Smokeless tobacco: Never   Vaping Use    Vaping Use: Never used   Substance and Sexual Activity    Alcohol use: No    Drug use: No    Sexual activity: Not on file       FAMILY HISTORY  Family History   Problem Relation Age of Onset    Arthritis Maternal Grandmother           Objective:   PHYSICAL EXAM  VITAL SIGNS: /64   Pulse (!) 112   Temp 36.6 °C (97.8 °F) (Temporal)   Resp 12   Ht 1.676 m (5' 6\")   Wt 55.3 kg (122 lb)   SpO2 99%   BMI 19.69 kg/m²     Gen: no acute distress, normal voice  Skin: dry, intact, moist mucosal membranes  Eyes: No conjunctival injection " b/l  Neck: Normal range of motion. No meningeal signs.   ENT: Tms clear and intact bilaterally without bulging, erythema or effusion.  Lungs: CTAB w/ symmetric expansion  CV: RRR w/o murmurs or clicks  Psych: normal affect, normal judgement, alert, awake    Assessment/Plan:     1. Dysfunction of both eustachian tubes        Likely secondary to discontinuation of his allergy medications for upcoming allergy test.  I recommended performing NeilMed sinus rinses; handout provided.  Additionally can take Tylenol for symptomatic relief. Return to urgent care any new/worsening symptoms or further questions or concerns.  Patient understood everything discussed.  All questions were answered.      Differential diagnosis, natural history, supportive care, and indications for immediate follow-up discussed. All questions answered. Patient agrees with the plan of care.    Follow-up as needed if symptoms worsen or fail to improve to PCP, Urgent care or Emergency Room.    Please note that this dictation was created using voice recognition software. I have made a reasonable attempt to correct obvious errors, but I expect that there are errors of grammar and possibly content that I did not discover before finalizing the note.

## 2022-10-21 ENCOUNTER — TELEPHONE (OUTPATIENT)
Dept: NEUROLOGY | Facility: MEDICAL CENTER | Age: 22
End: 2022-10-21
Payer: COMMERCIAL

## 2022-10-21 NOTE — TELEPHONE ENCOUNTER
New Patient     [facility] Patient Demographics? Yes    Is visit type and length correct?  Yes    Is referral attached to visit? Yes    Were records received from referring provider? Yes    Patient was not contacted to have someone accompany them to visit?    Is this appointment scheduled as a Hospital Follow-Up?  No    Does the patient require any pre procedure or post procedure follow up? No    If any orders were placed at last visit or intended to be done for this visit do we have Results/Consult Notes? Yes  Labs - Labs were not ordered at last office visit.  Note: If patient appointment is for lab review and patient did not complete labs, check with provider if OK to reschedule patient until labs completed.  Imaging - Imaging was not ordered at last office visit.  Referrals - No referrals were ordered at last office visit.        10.  If patient appointment is for Botox - is order pended for provider? No

## 2022-10-27 ENCOUNTER — APPOINTMENT (OUTPATIENT)
Dept: NEUROLOGY | Facility: MEDICAL CENTER | Age: 22
End: 2022-10-27
Attending: PSYCHIATRY & NEUROLOGY
Payer: COMMERCIAL

## 2022-10-27 VITALS
DIASTOLIC BLOOD PRESSURE: 80 MMHG | BODY MASS INDEX: 20.09 KG/M2 | HEIGHT: 66 IN | RESPIRATION RATE: 13 BRPM | WEIGHT: 125 LBS | OXYGEN SATURATION: 94 % | HEART RATE: 118 BPM | SYSTOLIC BLOOD PRESSURE: 100 MMHG | TEMPERATURE: 98.5 F

## 2022-10-27 DIAGNOSIS — M62.838 MUSCLE SPASMS OF NECK: ICD-10-CM

## 2022-10-27 DIAGNOSIS — E10.65 TYPE 1 DIABETES MELLITUS WITH HYPERGLYCEMIA (HCC): ICD-10-CM

## 2022-10-27 DIAGNOSIS — R55 SYNCOPE AND COLLAPSE: Chronic | ICD-10-CM

## 2022-10-27 DIAGNOSIS — M62.838 MUSCLE SPASMS OF BOTH LOWER EXTREMITIES: ICD-10-CM

## 2022-10-27 PROCEDURE — 99205 OFFICE O/P NEW HI 60 MIN: CPT | Performed by: PSYCHIATRY & NEUROLOGY

## 2022-10-27 PROCEDURE — 99417 PROLNG OP E/M EACH 15 MIN: CPT | Performed by: PSYCHIATRY & NEUROLOGY

## 2022-10-27 PROCEDURE — 99212 OFFICE O/P EST SF 10 MIN: CPT | Performed by: PSYCHIATRY & NEUROLOGY

## 2022-10-27 RX ORDER — TIZANIDINE 4 MG/1
4 TABLET ORAL EVERY 6 HOURS
Qty: 120 TABLET | Refills: 0 | Status: SHIPPED | OUTPATIENT
Start: 2022-10-27 | End: 2022-11-30

## 2022-10-27 ASSESSMENT — FIBROSIS 4 INDEX: FIB4 SCORE: 0.4

## 2022-10-27 NOTE — ASSESSMENT & PLAN NOTE
When patient felt faint today he checked his blood sugar which was 140.  He did not feel that this was related to his diabetes in terms of his feeling faint.

## 2022-10-27 NOTE — ASSESSMENT & PLAN NOTE
Patient is felt muscle spasms in his right neck into his shoulder and occasionally into his arm for some time now.  Patient states that the episodes have become so severe that he has had near fainting.  He has noted he has had bumps appear in his muscles.  Because of his other medical problems he has been doing much stretching or exercise.

## 2022-10-27 NOTE — ASSESSMENT & PLAN NOTE
Pt states he has had several episodes of sharp radiating right arm pain to and from his shoulder and trapezius region that have resulted in episodes of feeling faint, near fainting and syncope with nausea vomiting and shaking.  Patient has had a normal event monitor.  He has checked his blood sugars during these events.  He had neuroimaging done some years ago of the brain cervical and thoracic spine which was unremarkable.

## 2022-10-27 NOTE — PROGRESS NOTES
Chief Complaint   Patient presents with    [facility]    Seizure     Patient c/o had a seizure in July and fainting a lot and neck and jolts in his right arm and into the shoulder        Problem List Items Addressed This Visit       Syncope and collapse (Chronic)     Pt states he has had several episodes of sharp radiating right arm pain to and from his shoulder and trapezius region that have resulted in episodes of feeling faint, near fainting and syncope with nausea vomiting and shaking.  Patient has had a normal event monitor.  He has checked his blood sugars during these events.  He had neuroimaging done some years ago of the brain cervical and thoracic spine which was unremarkable.         Type 1 diabetes mellitus with hyperglycemia (HCC)     When patient felt faint today he checked his blood sugar which was 140.  He did not feel that this was related to his diabetes in terms of his feeling faint.         Muscle spasms of neck     Patient is felt muscle spasms in his right neck into his shoulder and occasionally into his arm for some time now.  Patient states that the episodes have become so severe that he has had near fainting.  He has noted he has had bumps appear in his muscles.  Because of his other medical problems he has been doing much stretching or exercise.          Other Visit Diagnoses       Muscle spasms of both lower extremities        Relevant Medications    tizanidine (ZANAFLEX) 4 MG Tab            History of present illness:  Martinez Smith 21 y.o. male presents today for neurologic evaluation of his right arm pain, neck and shoulder spasms, fainting and questionable seizure episodes.    Past medical history:   Past Medical History:   Diagnosis Date    Celiac disease     Celiac disease     Diabetes type I (HCC)     insulin pump       Past surgical history:   Past Surgical History:   Procedure Laterality Date    HYPOSPADIAS REPAIR         Family history:   Family History   Problem  Relation Age of Onset    Arthritis Maternal Grandmother        Social history:   Social History     Socioeconomic History    Marital status: Single     Spouse name: Not on file    Number of children: Not on file    Years of education: Not on file    Highest education level: Not on file   Occupational History    Not on file   Tobacco Use    Smoking status: Never    Smokeless tobacco: Never   Vaping Use    Vaping Use: Never used   Substance and Sexual Activity    Alcohol use: No    Drug use: No    Sexual activity: Not on file   Other Topics Concern    Behavioral problems Not Asked    Interpersonal relationships Not Asked    Sad or not enjoying activities Not Asked    Suicidal thoughts Not Asked    Poor school performance Not Asked    Reading difficulties Not Asked    Speech difficulties Not Asked    Writing difficulties Not Asked    Inadequate sleep Not Asked    Excessive TV viewing Not Asked    Excessive video game use Not Asked    Inadequate exercise Not Asked    Sports related Not Asked    Poor diet Not Asked    Family concerns for drug/alcohol abuse Not Asked    Poor oral hygiene Not Asked    Bike safety Not Asked    Family concerns vehicle safety Not Asked   Social History Narrative    Not on file     Social Determinants of Health     Financial Resource Strain: Not on file   Food Insecurity: Not on file   Transportation Needs: Not on file   Physical Activity: Not on file   Stress: Not on file   Social Connections: Not on file   Intimate Partner Violence: Not on file   Housing Stability: Not on file       Current medications:   Current Outpatient Medications   Medication    tizanidine (ZANAFLEX) 4 MG Tab    Glucagon, rDNA, (GLUCAGON EMERGENCY) 1 MG Kit    ondansetron (ZOFRAN ODT) 4 MG TABLET DISPERSIBLE    CONTOUR NEXT TEST strip    azelastine (ASTELIN) 137 MCG/SPRAY nasal spray    fexofenadine (ALLEGRA) 60 MG Tab    Triamcinolone Acetonide (NASACORT AQ NA)    HUMALOG 100 UNIT/ML    amitriptyline (ELAVIL) 25 MG  Tab    PANCREAZE 24093-10273 units Cap DR Particles    insulin infusion pump Device     No current facility-administered medications for this visit.       Medication Allergy:  Allergies   Allergen Reactions    Beef Flavor Nausea     Beef broth causes Nausea and vomiting     Compazine Unspecified     Anxiety     Dairy Food Allergy Nausea    Eggs Unspecified     Nausea, vomiting, chest tightness    Gluten Meal Unspecified     Patient reports has celiac    Mustard Seed Unspecified     Nausea and vomiting,    Peanut (Diagnostic) Unspecified     Nausea, vomiting, and chest tightness    Reglan [Metoclopramide] Anxiety     Makes anxious    Shellfish Allergy Unspecified     Nausea, vomiting, chest tightness    Tree Nuts Food Allergy Anaphylaxis, Shortness of Breath, Itching, Nausea and Cough     Cashews and pistachio and coconut        Review of systems:   Constitutional: denies fever, night sweats, weight loss.   Eyes: denies acute vision change, eye pain or secretion.   Ears, Nose, Mouth, Throat: denies nasal secretion, nasal bleeding, difficulty swallowing, hearing loss, tinnitus, vertigo, ear pain, acute dental problems, oral ulcers or lesions.   Endocrine: denies recent weight changes, heat or cold intolerance, polyuria, polydypsia, polyphagia,abnormal hair growth.  Cardiovascular: denies new onset of chest pain, palpitations, syncope, or dyspnea of exertion.  Pulmonary: denies shortness of breath, new onset of cough, hemoptysis, wheezing, chest pain or flu-like symptoms.   GI: denies nausea, vomiting, diarrhea, GI bleeding, change in appetite, abdominal pain, and change in bowel habits.  : denies dysuria, urinary incontinence, hematuria.  Heme/oncology: denies history of easy bruising or bleeding. No history of cancer, DVTor PE.  Allergy/immunology: denies hives/urticaria, or itching.   Dermatologic: denies new rash, or new skin lesions.  Musculoskeletal:denies joint swelling or pain, muscle pain, neck and back  "pain. Neurologic: denies headaches, acute visual changes, facial droopiness, muscle weakness (focal or generalized), paresthesias, anesthesia, ataxia, change in speech or language, memory loss, abnormal movements, seizures, loss of consciousness, or episodes of confusion.   Psychiatric: denies symptoms of depression, anxiety, hallucinations, mood swings or changes, suicidal or homicidal thoughts.     Physical examination:   Vitals:    10/27/22 0814   BP: 100/80   BP Location: Right arm   Patient Position: Sitting   BP Cuff Size: Adult   Pulse: (!) 118   Resp: 13   Temp: 36.9 °C (98.5 °F)   TempSrc: Temporal   SpO2: 94%   Weight: 56.7 kg (125 lb)   Height: 1.676 m (5' 6\")     General: Patient in no acute distress, pleasant and cooperative.  HEENT: Normocephalic, no signs of acute trauma.   Neck: supple, no meningeal signs or carotid bruits. There is normal range of motion. No tenderness on exam.   Chest: clear to auscultation. No cough.   CV: RRR, no murmurs.   Skin: no signs of acute rashes or trauma.   Musculoskeletal: joints exhibit full range of motion, without any pain to palpation. There are signs of joint or muscle swelling. There is ntenderness to deep palpation of muscles in his shoulders and in his right periscapular region.   Psychiatric: No hallucinatory behavior. Denies symptoms of depression or suicidal ideation. Mood and affect appear normal on exam.     NEUROLOGICAL EXAM:   Mental status, orientation: Awake, alert and fully oriented.   Speech and language: speech is clear and fluent. The patient is able to name, repeat and comprehend.   Memory: There is intact recollection of recent and remote events.   Cranial nerve exam: Pupils are 3-4 mm bilaterally and equally reactive to light and accommodation. Visual fields are intact by confrontation. Fundoscopic exam was unremarkable. There is no nystagmus on primary or secondary gaze. Intact full EOM in all directions of gaze. Face appears symmetric. " Sensation in the face is intact to light touch. Uvula is midline. Palate elevates symmetrically. Tongue is midline and without any signs of tongue biting or fasciculations. Sternocleidomastoid muscles exhibit is normal strength bilaterally. Shoulder shrug is intact bilaterally.   Motor exam: Strength is 5/5 in all extremities. Tone is normal. No abnormal movements were seen on exam.   Sensory exam reveals normal sense of light touch, proprioception, vibration and pinprick in all extremities.   Deep tendon reflexes:  2+ throughout. Plantar responses are flexor. There is no clonus.   Coordination: shows a normal finger-nose-finger. Normal rapidly alternating movements.   Gait: The patient was able to get up from seated position on first attempt without requiring assistance. Found to be steady when walking. Movements were fluid with normal arm swing. The patient was able to turn without difficulties or tendency to fall. Romberg examination negative      ANCILLARY DATA REVIEWED:     Lab Data Review:  No results found for this or any previous visit (from the past 24 hour(s)).    Records reviewed: Reviewed previous ER visits and laboratory testing.      Imaging: I reviewed with the patient his previous brain MRI and cervical and thoracic spine MRIs.          ASSESSMENT AND PLAN:    1. Muscle spasms of shoulders  Patient has significant muscle spasms in his trapezius, rhomboids all scapular and shoulder muscles that are altering his physical alignment of his right shoulder and pulling on his spine.  Plan to start tizanidine at bedtime.  Encourage patient to do exercises for his shoulder and muscle stretching.  - tizanidine (ZANAFLEX) 4 MG Tab; Take 1 Tablet by mouth every 6 hours for 30 days.  Dispense: 120 Tablet; Refill: 0    2. Syncope and collapse  I suspect this patient is having vasovagal syncope from significant pain from nerve compression in his tight muscles in her shoulder and neck.  Today patient had one of his  episodes in the clinic which I witnessed during deep palpation of his right trapezius muscles.  Patient was laid back and he did not lose consciousness but felt nauseous with vomiting and lightheadedness.    3. Muscle spasms of neck  We discussed using heat and cold and exercises.  I have also referred patient for physical therapy with Lillian [facility]lvarez at [facility].    4. Type 1 diabetes mellitus with hyperglycemia (HCC)  Patient's episode of syncope which occurred in the office was not related to his diabetes but was likely a vasovagal attack related to a pain response.        FOLLOW-UP:   Return in about 3 months (around 1/27/2023).      My total time spent caring for the patient on the day of the encounter was 84 minutes.   This does not include time spent on separately billable procedures/tests.     EDUCATION AND COUNSELING:  -Discussed regular exercise program and prevention of cardiovascular disease, including stroke.   -Discussed healthy lifestyle, including: healthy diet (rich in fruits, vegetables, nuts and healthy oils); proper hydration, and adequate sleep hygiene (allowing 7-8 hrs of overnight sleep).        Melissa Bloch, MD  Clinical  of Neurology [employer] [employer].   Diplomate in Neurology.   Office: [phone]  Fax: [phone]

## 2022-10-31 ENCOUNTER — OFFICE VISIT (OUTPATIENT)
Dept: CARDIOLOGY | Facility: MEDICAL CENTER | Age: 22
End: 2022-10-31
Payer: COMMERCIAL

## 2022-10-31 VITALS
DIASTOLIC BLOOD PRESSURE: 60 MMHG | HEIGHT: 66 IN | OXYGEN SATURATION: 97 % | BODY MASS INDEX: 20.18 KG/M2 | HEART RATE: 100 BPM | SYSTOLIC BLOOD PRESSURE: 102 MMHG | RESPIRATION RATE: 14 BRPM

## 2022-10-31 DIAGNOSIS — Z87.898 HX OF SYNCOPE: ICD-10-CM

## 2022-10-31 PROCEDURE — 99213 OFFICE O/P EST LOW 20 MIN: CPT | Performed by: INTERNAL MEDICINE

## 2022-10-31 RX ORDER — MONTELUKAST SODIUM 10 MG/1
10 TABLET ORAL DAILY
COMMUNITY

## 2022-10-31 ASSESSMENT — ENCOUNTER SYMPTOMS
ORTHOPNEA: 0
NEAR-SYNCOPE: 0
DYSPNEA ON EXERTION: 0
SYNCOPE: 0
PALPITATIONS: 0
PND: 0
RESPIRATORY NEGATIVE: 1
HEMATOCHEZIA: 0

## 2022-10-31 NOTE — PROGRESS NOTES
Cardiology Follow Up Note    Date of note: 10/31/2022    Primary Care Provider: Froilan Grande M.D.    Patient Name: Martinez Smith  YOB: 2000  MRN:              [id number]    Reason for Followup: Follow-up event monitor results and echo for syncope    History of Present Illness: Mr. Martinez Smith is a 21 y.o. male whose current medical problems include type 1 diabetes mellitus, dyslipidemia, celiac disease, muscle spasm of the neck, history of syncope and collapse who is here for cardiac for follow up test results.    In the interim, patient has been seen by neurology, Dr. Bloch, who has diagnosed him with muscle spasm of the neck causing syncope as it was reproduced in the office.  He states since he has been taking the muscle relaxant, he is feeling much better.    Cardiovascular Risk Factors:  1. Smoking status:   Tobacco Use: Low Risk     Smoking Tobacco Use: Never    Smokeless Tobacco Use: Never     2. Type II Diabetes Mellitus:   Lab Results   Component Value Date/Time    HBA1C 6.9 (A) 09/09/2022 03:51 PM    HBA1C 7.3 (A) 06/08/2022 01:11 PM         Past Surgical History:   Procedure Laterality Date    HYPOSPADIAS REPAIR          Current Outpatient Medications   Medication Sig Dispense Refill    montelukast (SINGULAIR) 10 MG Tab Take 10 mg by mouth every day.      tizanidine (ZANAFLEX) 4 MG Tab Take 1 Tablet by mouth every 6 hours for 30 days. 120 Tablet 0    Glucagon, rDNA, (GLUCAGON EMERGENCY) 1 MG Kit Inject 1 mg as directed as needed (severe hypoglycemia). For severe hypoglycemia 1 Kit 1    ondansetron (ZOFRAN ODT) 4 MG TABLET DISPERSIBLE Take 1 Tablet by mouth every 6 hours as needed for Nausea. 20 Tablet 0    CONTOUR NEXT TEST strip USE TO TEST 10 TIMES A DAY FOR INSULIN PUMP ADJUSTMENT (LINKS TO INSULIN PUMP) 900 Strip 3    azelastine (ASTELIN) 137 MCG/SPRAY nasal spray       fexofenadine (ALLEGRA) 60 MG Tab Take 60 mg by mouth 2 times a day.      Triamcinolone  Acetonide (NASACORT AQ NA) Administer 2 Sprays into affected nostril(S) every day.      HUMALOG 100 UNIT/ML Inject 50 units into pump reservoir daily  30 day supply 20 mL 11    amitriptyline (ELAVIL) 25 MG Tab Take 25 mg by mouth at bedtime.      PANCREAZE 44126-57040 units Cap DR Particles Take 1 Capsule by mouth 3 times a day before meals.      insulin infusion pump Device Inject  under the skin continuous. Patient's own SQ insulin pump    Type of Insulin: Humalog  Last change of tubin22 - Change tubing and site every 72 hours    Dosing:  Basal rate:  0000 - 0300 = .625 units  0300 - 1200 = .6 units  1200 - 0000= .575 units  Bolus ratio:   1 unit : 14 g carbohydrate      Sensitivity ratio:   1 units for every 50 over 130-150 mg/dL    Disconnect pump if patient becomes hypoglycemic and altered.       No current facility-administered medications for this visit.       Allergies   Allergen Reactions    Beef Flavor Nausea     Beef broth causes Nausea and vomiting     Compazine Unspecified     Anxiety     Dairy Food Allergy Nausea    Eggs Unspecified     Nausea, vomiting, chest tightness    Gluten Meal Unspecified     Patient reports has celiac    Mustard Seed Unspecified     Nausea and vomiting,    Peanut (Diagnostic) Unspecified     Nausea, vomiting, and chest tightness    Reglan [Metoclopramide] Anxiety     Makes anxious    Shellfish Allergy Unspecified     Nausea, vomiting, chest tightness    Tree Nuts Food Allergy Anaphylaxis, Shortness of Breath, Itching, Nausea and Cough     Cashews and pistachio and coconut        Review of Systems   Cardiovascular:  Negative for chest pain, dyspnea on exertion, leg swelling, near-syncope, orthopnea, palpitations, paroxysmal nocturnal dyspnea and syncope.   Respiratory: Negative.     Gastrointestinal:  Negative for hematochezia and melena.     Physical Exam:  Ambulatory Vitals  /60 (BP Location: Left arm, Patient Position: Sitting, BP Cuff Size: Adult)   Pulse  "100   Resp 14   Ht 1.676 m (5' 6\")   SpO2 97%    Oxygen Therapy:  Pulse Oximetry: 97 %  BP Readings from Last 4 Encounters:   10/31/22 102/60   10/27/22 100/80   10/05/22 102/64   09/09/22 114/80       Weight/BMI:   Body mass index is 20.18 kg/m².  Wt Readings from Last 2 Encounters:   10/27/22 56.7 kg (125 lb)   10/05/22 55.3 kg (122 lb)       Physical Exam  Constitutional:       Appearance: Normal appearance.   Neck:      Vascular: No JVD.   Cardiovascular:      Rate and Rhythm: Normal rate and regular rhythm.      Pulses: Normal pulses and intact distal pulses.      Heart sounds: Normal heart sounds, S1 normal and S2 normal. No murmur heard.    No friction rub. No S3 or S4 sounds.   Pulmonary:      Effort: Pulmonary effort is normal. No respiratory distress.      Breath sounds: Normal breath sounds. No wheezing or rales.   Musculoskeletal:      Cervical back: Neck supple.   Skin:     General: Skin is warm and dry.   Neurological:      Mental Status: He is alert.        Lab Data Review:  Lab Results   Component Value Date/Time    SODIUM 134 (L) 08/21/2022 07:50 PM    POTASSIUM 4.0 08/21/2022 07:50 PM    CHLORIDE 97 08/21/2022 07:50 PM    CO2 27 08/21/2022 07:50 PM    GLUCOSE 201 (H) 08/21/2022 07:50 PM    BUN 4 (L) 08/21/2022 07:50 PM    CREATININE 0.61 08/21/2022 07:50 PM    CREATININE 0.4 05/25/2007 04:40 AM    BUNCREATRAT 8 (L) 05/25/2022 04:35 AM     Lab Results   Component Value Date/Time    ALKPHOSPHAT 77 08/21/2022 07:50 PM    ASTSGOT 15 08/21/2022 07:50 PM    ALTSGPT 9 08/21/2022 07:50 PM    TBILIRUBIN 0.3 08/21/2022 07:50 PM      Lab Results   Component Value Date/Time    WBC 6.8 08/21/2022 07:50 PM     Lab Results   Component Value Date/Time    TSHULTRASEN 2.140 09/14/2019 07:27 AM         Cardiac Imaging and Procedures Review:      Echo dated 9/1/2022: My personal interpretation is normal left and right ventricular systolic function with normal wall motion.  No significant valvular stenosis or " regurgitation.    Event monitor dated 9/6/2022 for 29 days:   My personal interpretation is no significant arrhythmias, predominant rhythm is sinus.  There was a 12 beat of ventricular tachycardia at 140 in the morning without reported symptoms.  Patient triggers of heart racing shortness of breath lightheadedness dizziness chest pain associated with sinus rhythm, range 80 to 153 bpm.    Assessment & Plan     1.  Syncope secondary to muscle spasm of the neck causing vasovagal syncope.  Went over results of the echo and event monitor with him with nothing worrisome.  No further cardiac work-up is needed.    Shared Medical Decision Making:  All of patient's questions were answered to the best of my knowledge and to patient's satisfaction. It was a pleasure seeing Mr. Martinez Smith in my clinic today. Return if symptoms worsen or fail to improve. Patient is aware to call the cardiology clinic with any questions or concerns.    Diane Munoz MD  [facility]  [address]  [place] [address]  Phone:  [phone]  Fax:  [phone]    Please note that this dictation was created using voice recognition software. I have made every reasonable attempt to correct obvious errors, but it is possible there are errors of grammar and possibly content that I did not discover before finalizing the note.

## 2022-12-30 ENCOUNTER — NON-PROVIDER VISIT (OUTPATIENT)
Dept: ENDOCRINOLOGY | Facility: MEDICAL CENTER | Age: 22
End: 2022-12-30
Attending: INTERNAL MEDICINE
Payer: COMMERCIAL

## 2022-12-30 VITALS
HEART RATE: 96 BPM | WEIGHT: 125 LBS | OXYGEN SATURATION: 99 % | BODY MASS INDEX: 20.09 KG/M2 | HEIGHT: 66 IN | DIASTOLIC BLOOD PRESSURE: 70 MMHG | SYSTOLIC BLOOD PRESSURE: 104 MMHG

## 2022-12-30 DIAGNOSIS — E10.649 TYPE 1 DIABETES MELLITUS WITH HYPOGLYCEMIA AND WITHOUT COMA (HCC): ICD-10-CM

## 2022-12-30 DIAGNOSIS — E10.65 TYPE 1 DIABETES MELLITUS WITH HYPERGLYCEMIA (HCC): ICD-10-CM

## 2022-12-30 LAB
HBA1C MFR BLD: 7.7 % (ref 0–5.6)
INT CON NEG: ABNORMAL
INT CON POS: ABNORMAL

## 2022-12-30 PROCEDURE — 99213 OFFICE O/P EST LOW 20 MIN: CPT | Performed by: INTERNAL MEDICINE

## 2022-12-30 PROCEDURE — 83036 HEMOGLOBIN GLYCOSYLATED A1C: CPT

## 2022-12-30 ASSESSMENT — FIBROSIS 4 INDEX: FIB4 SCORE: 0.42

## 2022-12-30 NOTE — PROGRESS NOTES
RN-CDE Note    Subjective:   Endocrinology Clinic Progress Note  PCP: Froilan Grande M.D.    HPI:  Martinez Smith is a 22 y.o. old patient who is seen today by the Diabetes Nurse Specialist for review of Type 1 Diabetes.  Recent changes in health: He recently graduated from college with his master's in accounting.  He states he has had a few failed pump sites and more stress with finals for graduation.  DM:   Last A1c:   Lab Results   Component Value Date/Time    HBA1C 7.7 (A) 12/30/2022 01:09 PM      Previous A1c was 6.9 on 9/9/22  A1C GOAL: < 7    Diabetes Medications:   Humalog in 630 G pump  00:00 0.625  0300  0.600  1200 0.575  10pm 0.550     Carb ratio  00:00  14     ICF  1:50 changed to 1:60     Target  130     Active insulin time 3 hours    Exercise: Stairs and walking  Diet: Carbohydrate counting and eating more sweets over the holidays  Patient's body mass index is 20.18 kg/m². Exercise and nutrition counseling were performed at this visit.    Glucose monitoring frequency: See pump download    Hypoglycemic episodes: yes - after highs  Last Retinal Exam:  June with Dr. Elena  Daily Foot Exam: Yes   Foot Exam:  Monofilament: done  Monofilament testing with a 10 gram force: sensation intact: intact bilaterally  Visual Inspection: Feet without maceration, ulcers, fissures.  Pedal pulses: intact bilaterally   Lab Results   Component Value Date/Time    MALBCRT see below 07/13/2019 08:00 AM    MICROALBUR <0.7 07/13/2019 08:00 AM    MICRALB <3.0 02/18/2022 04:48 AM        ACR Albumin/Creatinine Ratio goal <30     HTN:   Blood pressure goal <140/<80 .   Currently Rx ACE/ARB: No    Dyslipidemia:    Lab Results   Component Value Date/Time    CHOLSTRLTOT 181 02/18/2022 04:48 AM    CHOLSTRLTOT 177 12/24/2019 07:29 AM     (H) 08/20/2020 05:40 AM     (H) 12/24/2019 07:29 AM    HDL 41 02/18/2022 04:48 AM    HDL 28 (A) 12/24/2019 07:29 AM    TRIGLYCERIDE 111 02/18/2022 04:48 AM     TRIGLYCERIDE 103 12/24/2019 07:29 AM         Currently Rx Statin: No     He  reports that he has never smoked. He has never used smokeless tobacco.      Plan:     Discussed and educated on:   - All medications, side effects and compliance (discussed carefully)  - Annual eye examinations at Ophthalmology  - Home glucose monitoring emphasized  - Weight control and daily exercise    Recommended medication changes: His pump runs out of warranty in July and then he is interested in going on the Dexcom/Tandem.  We adjusted his sensitivity to 60.  He will be looking for a new job with good insurance.  Follow up in 3 months with Dr. Benoit.

## 2023-01-17 ENCOUNTER — TELEPHONE (OUTPATIENT)
Dept: NEUROLOGY | Facility: MEDICAL CENTER | Age: 23
End: 2023-01-17
Payer: COMMERCIAL

## 2023-01-17 NOTE — TELEPHONE ENCOUNTER

## 2023-01-24 ENCOUNTER — OFFICE VISIT (OUTPATIENT)
Dept: NEUROLOGY | Facility: MEDICAL CENTER | Age: 23
End: 2023-01-24
Attending: PSYCHIATRY & NEUROLOGY
Payer: COMMERCIAL

## 2023-01-24 VITALS
TEMPERATURE: 98.3 F | BODY MASS INDEX: 20.25 KG/M2 | WEIGHT: 125.44 LBS | HEART RATE: 106 BPM | DIASTOLIC BLOOD PRESSURE: 82 MMHG | SYSTOLIC BLOOD PRESSURE: 122 MMHG | OXYGEN SATURATION: 98 %

## 2023-01-24 DIAGNOSIS — M62.838 MUSCLE SPASMS OF NECK: ICD-10-CM

## 2023-01-24 DIAGNOSIS — G56.01 CARPAL TUNNEL SYNDROME OF RIGHT WRIST: ICD-10-CM

## 2023-01-24 PROCEDURE — 99212 OFFICE O/P EST SF 10 MIN: CPT | Performed by: PSYCHIATRY & NEUROLOGY

## 2023-01-24 PROCEDURE — 99215 OFFICE O/P EST HI 40 MIN: CPT | Performed by: PSYCHIATRY & NEUROLOGY

## 2023-01-24 RX ORDER — TIZANIDINE 4 MG/1
4 TABLET ORAL EVERY 6 HOURS PRN
COMMUNITY

## 2023-01-24 ASSESSMENT — FIBROSIS 4 INDEX: FIB4 SCORE: 0.42

## 2023-01-24 ASSESSMENT — PATIENT HEALTH QUESTIONNAIRE - PHQ9
5. POOR APPETITE OR OVEREATING: 0 - NOT AT ALL
SUM OF ALL RESPONSES TO PHQ QUESTIONS 1-9: 6
CLINICAL INTERPRETATION OF PHQ2 SCORE: 1

## 2023-01-24 NOTE — ASSESSMENT & PLAN NOTE
Pt states that he has taken the muscle relaxers 1-3 times a day.  It has helped but the cold make his muscles tighten up. He does use a heating pad sometimes.  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  He has not had any neck spasms severe enough that he collapsed.  He takes the tizanidine 1-3 times a day.  And does need refills.

## 2023-01-24 NOTE — PROGRESS NOTES
Chief Complaint   Patient presents with    Follow-Up     Epilepsy        Problem List Items Addressed This Visit       Muscle spasms of neck     Pt states that he has taken the muscle relaxers 1-3 times a day.  It has helped but the cold make his muscles tighten up. He does use a heating pad sometimes.  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  He has not had any neck spasms severe enough that he collapsed.  He takes the tizanidine 1-3 times a day.  And does need refills.         Carpal tunnel syndrome of right wrist     Pt has know CTS on the right side.         Relevant Medications    tizanidine (ZANAFLEX) 4 MG Tab       History of present illness:  Martinez Smith 22 y.o. male presents today for treatment of neurologic issues as part of his type 1 diabetes and musculoskeletal condition.  Patient has not had any more collapse but does have improved but persistent muscle spasms.    Past medical history:   Past Medical History:   Diagnosis Date    Celiac disease     Celiac disease     Diabetes type I (HCC)     insulin pump       Past surgical history:   Past Surgical History:   Procedure Laterality Date    HYPOSPADIAS REPAIR         Family history:   Family History   Problem Relation Age of Onset    Arthritis Maternal Grandmother        Social history:   Social History     Socioeconomic History    Marital status: Single     Spouse name: Not on file    Number of children: Not on file    Years of education: Not on file    Highest education level: Not on file   Occupational History    Not on file   Tobacco Use    Smoking status: Never    Smokeless tobacco: Never   Vaping Use    Vaping Use: Never used   Substance and Sexual Activity    Alcohol use: No    Drug use: No    Sexual activity: Not on file   Other Topics Concern    Behavioral problems Not Asked    Interpersonal relationships Not Asked    Sad or not enjoying activities Not Asked    Suicidal thoughts Not  Asked    Poor school performance Not Asked    Reading difficulties Not Asked    Speech difficulties Not Asked    Writing difficulties Not Asked    Inadequate sleep Not Asked    Excessive TV viewing Not Asked    Excessive video game use Not Asked    Inadequate exercise Not Asked    Sports related Not Asked    Poor diet Not Asked    Family concerns for drug/alcohol abuse Not Asked    Poor oral hygiene Not Asked    Bike safety Not Asked    Family concerns vehicle safety Not Asked   Social History Narrative    Not on file     Social Determinants of Health     Financial Resource Strain: Not on file   Food Insecurity: Not on file   Transportation Needs: Not on file   Physical Activity: Not on file   Stress: Not on file   Social Connections: Not on file   Intimate Partner Violence: Not on file   Housing Stability: Not on file       Current medications:   Current Outpatient Medications   Medication    tizanidine (ZANAFLEX) 4 MG Tab    HUMALOG 100 UNIT/ML    montelukast (SINGULAIR) 10 MG Tab    Glucagon, rDNA, (GLUCAGON EMERGENCY) 1 MG Kit    ondansetron (ZOFRAN ODT) 4 MG TABLET DISPERSIBLE    CONTOUR NEXT TEST strip    azelastine (ASTELIN) 137 MCG/SPRAY nasal spray    fexofenadine (ALLEGRA) 60 MG Tab    Triamcinolone Acetonide (NASACORT AQ NA)    amitriptyline (ELAVIL) 25 MG Tab    PANCREAZE 18652-23405 units Cap DR Particles    insulin infusion pump Device     No current facility-administered medications for this visit.       Medication Allergy:  Allergies   Allergen Reactions    Beef Flavor Nausea     Beef broth causes Nausea and vomiting     Compazine Unspecified     Anxiety     Dairy Food Allergy Nausea    Eggs Unspecified     Nausea, vomiting, chest tightness    Gluten Meal Unspecified     Patient reports has celiac    Mustard Seed Unspecified     Nausea and vomiting,    Peanut (Diagnostic) Unspecified     Nausea, vomiting, and chest tightness    Reglan [Metoclopramide] Anxiety     Makes anxious    Shellfish Allergy  Unspecified     Nausea, vomiting, chest tightness    Tree Nuts Food Allergy Anaphylaxis, Shortness of Breath, Itching, Nausea and Cough     Cashews and pistachio and coconut     Potato      White potato causes nausea vomiting       Review of systems:   Constitutional: denies fever, night sweats, weight loss.   Eyes: denies acute vision change, eye pain or secretion.   Ears, Nose, Mouth, Throat: denies nasal secretion, nasal bleeding, difficulty swallowing, hearing loss, tinnitus, vertigo, ear pain, acute dental problems, oral ulcers or lesions.   Endocrine: denies recent weight changes, heat or cold intolerance, polyuria, polydypsia, polyphagia,abnormal hair growth.  Cardiovascular: denies new onset of chest pain, palpitations, syncope, or dyspnea of exertion.  Pulmonary: denies shortness of breath, new onset of cough, hemoptysis, wheezing, chest pain or flu-like symptoms.   GI: denies nausea, vomiting, diarrhea, GI bleeding, change in appetite, abdominal pain, and change in bowel habits.  : denies dysuria, urinary incontinence, hematuria.  Heme/oncology: denies history of easy bruising or bleeding. No history of cancer, DVTor PE.  Allergy/immunology: denies hives/urticaria, or itching.   Dermatologic: denies new rash, or new skin lesions.  Musculoskeletal:denies joint swelling or pain, muscle pain, neck and back pain. Neurologic: denies headaches, acute visual changes, facial droopiness, muscle weakness (focal or generalized), paresthesias, anesthesia, ataxia, change in speech or language, memory loss, abnormal movements, seizures, loss of consciousness, or episodes of confusion.   Psychiatric: denies symptoms of depression, anxiety, hallucinations, mood swings or changes, suicidal or homicidal thoughts.     Physical examination:   Vitals:    01/24/23 1019   BP: 122/82   BP Location: Left arm   Patient Position: Sitting   BP Cuff Size: Adult   Pulse: (!) 106   Temp: 36.8 °C (98.3 °F)   TempSrc: Temporal   SpO2:  98%   Weight: 56.9 kg (125 lb 7.1 oz)     General: Patient in no acute distress, pleasant and cooperative.  HEENT: Normocephalic, no signs of acute trauma.   Neck: supple, no meningeal signs or carotid bruits. There is normal range of motion. No tenderness on exam.   Chest: clear to auscultation. No cough.   CV: RRR, no murmurs.   Skin: no signs of acute rashes or trauma.   Musculoskeletal: joints exhibit full range of motion, without any pain to palpation. There are no signs of joint or muscle swelling. There is no tenderness to deep palpation of muscles.   Psychiatric: No hallucinatory behavior. Denies symptoms of depression or suicidal ideation. Mood and affect appear normal on exam.     NEUROLOGICAL EXAM:   Mental status, orientation: Awake, alert and fully oriented.   Speech and language: speech is clear and fluent. The patient is able to name, repeat and comprehend.   Memory: There is intact recollection of recent and remote events.   Cranial nerve exam: Pupils are 3-4 mm bilaterally and equally reactive to light and accommodation. Visual fields are intact by confrontation. Fundoscopic exam was unremarkable. There is no nystagmus on primary or secondary gaze. Intact full EOM in all directions of gaze. Face appears symmetric. Sensation in the face is intact to light touch. Uvula is midline. Palate elevates symmetrically. Tongue is midline and without any signs of tongue biting or fasciculations. Sternocleidomastoid muscles exhibit is normal strength bilaterally. Shoulder shrug is intact bilaterally.   Motor exam: Strength is 5/5 in all extremities. Tone is normal. No abnormal movements were seen on exam.   Sensory exam reveals abnormal sense of light touch, proprioception, vibration and pinprick in the right hand and arm  Deep tendon reflexes:  2+ throughout. Plantar responses are flexor. There is no clonus.   Coordination: shows a normal finger-nose-finger. Normal rapidly alternating movements.   Gait: The  patient was able to get up from seated position on first attempt without requiring assistance. Found to be steady when walking. Movements were fluid with normal arm swing. The patient was able to turn without difficulties or tendency to fall. Romberg examination negative      ANCILLARY DATA REVIEWED:     Lab Data Review:  No results found for this or any previous visit (from the past 24 hour(s)).    Records reviewed: Reviewed records from his cardiologist which showed normal testing.  DIAGNOSTIC INTERPRETATION:    Extensive electrodiagnostic studies were performed to the lower extremities and corresponding paraspinal muscles. The studies were within normal limits.        CLINICAL DISCUSSION:    There is no electrodiagnostic suggestion of a widespread peripheral neuropathy (this test does not assess for small fiber neuropathy).    There is no electrodiagnostic suggestion of a right lumbosacral radiculopathy (L2-S2), however it cannot be fully ruled out on the basis of this test.    There is no electrodiagnostic suggestion of a lumbosacral plexopathy.      Results were discussed with patient and his mother at length.       Severo Espinoza MD  Diplomate in Neurology, Epilepsy and Electrodiagnostic Medicine.    Imaging: Had normal MRI of the brain in 2016 which I reviewed myself.  I also reviewed his lumbar scan as below.      HISTORY/REASON FOR EXAM:  Low back pain  No specific injury     TECHNIQUE/EXAM DESCRIPTION:  MRI of the lumbar spine without contrast.     The study was performed on a "[employer] Signa 1.5 Gabbie MRI scanner.  T1 sagittal, T2 fast spin-echo sagittal, and T2 axial images were obtained of the lumbar spine.     COMPARISON:  Lumbar spine MRI 10/3/2016     FINDINGS:  ABDOMEN and RETROPERITONEUM:  The visualized portions of the abdomen and retroperitoneum are normal in appearance.     The lumbar vertebral bodies are normal in height.     Alignment is normal.     Bone marrow:  There is no significant bone marrow  abnormality.     The conus medullaris is normal in appearance and terminates at the T12-L1 level.     T11-12: Within normal limits     T12 -- L1:  Within normal limits     L1 -- 2:  Within normal limits     L2 -- 3:  Within normal limits     L3 -- 4: Mild broad disc bulge. The central spinal canal and neural foramina are not significantly narrowed.     L4 -- 5:  Mild broad disc bulge. The central spinal canal and neural foramina are not significantly narrowed.     L5 -- S1: Transitional morphology and otherwise within normal limits     IMPRESSION:     1.  No spinal canal or foraminal narrowing     2.  Mild disc bulge at L3-4 and L4-5     3.  Transitional L5-S1 anatomy, anatomic variant    ASSESSMENT AND PLAN:    1. Muscle spasms of neck  French to refill his muscle relaxer tizanidine and he can increase the dose as necessary.  We talked about the importance of stretching and exercise including reviewing that [employer] desk fit routine.  Referred him to physical therapy with Lillian [facility]lvarez at [facility].    2. Carpal tunnel syndrome of right wrist  I referred patient to physical therapy and we talked about positional and splinting therapy.        FOLLOW-UP:   Return in about 6 months (around 7/24/2023).      My total time spent caring for the patient on the day of the encounter was 41 minutes.   This does not include time spent on separately billable procedures/tests.     EDUCATION AND COUNSELING:  -Discussed regular exercise program and prevention of cardiovascular disease, including stroke.   -Discussed healthy lifestyle, including: healthy diet (rich in fruits, vegetables, nuts and healthy oils); proper hydration, and adequate sleep hygiene (allowing 7-8 hrs of overnight sleep).        Melissa Bloch, MD  Clinical  of Neurology [facility].   Diplomate in Neurology.   Office: [phone]  Fax: [phone]

## 2023-01-28 DIAGNOSIS — E10.649 TYPE 1 DIABETES MELLITUS WITH HYPOGLYCEMIA AND WITHOUT COMA (HCC): ICD-10-CM

## 2023-01-30 RX ORDER — INSULIN LISPRO 100 [IU]/ML
INJECTION, SOLUTION INTRAVENOUS; SUBCUTANEOUS
Qty: 60 ML | Refills: 3 | Status: SHIPPED | OUTPATIENT
Start: 2023-01-30 | End: 2023-05-05

## 2023-02-14 ENCOUNTER — APPOINTMENT (OUTPATIENT)
Dept: RADIOLOGY | Facility: MEDICAL CENTER | Age: 23
End: 2023-02-14
Attending: EMERGENCY MEDICINE
Payer: COMMERCIAL

## 2023-02-14 ENCOUNTER — HOSPITAL ENCOUNTER (EMERGENCY)
Facility: MEDICAL CENTER | Age: 23
End: 2023-02-14
Attending: EMERGENCY MEDICINE
Payer: COMMERCIAL

## 2023-02-14 VITALS
RESPIRATION RATE: 21 BRPM | HEART RATE: 93 BPM | BODY MASS INDEX: 20.34 KG/M2 | TEMPERATURE: 97.4 F | WEIGHT: 126.54 LBS | DIASTOLIC BLOOD PRESSURE: 63 MMHG | SYSTOLIC BLOOD PRESSURE: 109 MMHG | HEIGHT: 66 IN | OXYGEN SATURATION: 99 %

## 2023-02-14 DIAGNOSIS — E86.0 DEHYDRATION: ICD-10-CM

## 2023-02-14 DIAGNOSIS — R11.2 NAUSEA AND VOMITING, UNSPECIFIED VOMITING TYPE: ICD-10-CM

## 2023-02-14 DIAGNOSIS — R10.84 GENERALIZED ABDOMINAL PAIN: ICD-10-CM

## 2023-02-14 LAB
ALBUMIN SERPL BCP-MCNC: 4.3 G/DL (ref 3.2–4.9)
ALBUMIN/GLOB SERPL: 1.7 G/DL
ALP SERPL-CCNC: 78 U/L (ref 30–99)
ALT SERPL-CCNC: 11 U/L (ref 2–50)
ANION GAP SERPL CALC-SCNC: 10 MMOL/L (ref 7–16)
AST SERPL-CCNC: 17 U/L (ref 12–45)
BASOPHILS # BLD AUTO: 1 % (ref 0–1.8)
BASOPHILS # BLD: 0.07 K/UL (ref 0–0.12)
BILIRUB SERPL-MCNC: 0.3 MG/DL (ref 0.1–1.5)
BUN SERPL-MCNC: 4 MG/DL (ref 8–22)
CALCIUM ALBUM COR SERPL-MCNC: 9.6 MG/DL (ref 8.5–10.5)
CALCIUM SERPL-MCNC: 9.8 MG/DL (ref 8.5–10.5)
CHLORIDE SERPL-SCNC: 98 MMOL/L (ref 96–112)
CO2 SERPL-SCNC: 27 MMOL/L (ref 20–33)
CREAT SERPL-MCNC: 0.69 MG/DL (ref 0.5–1.4)
EOSINOPHIL # BLD AUTO: 0.44 K/UL (ref 0–0.51)
EOSINOPHIL NFR BLD: 6.4 % (ref 0–6.9)
ERYTHROCYTE [DISTWIDTH] IN BLOOD BY AUTOMATED COUNT: 37.2 FL (ref 35.9–50)
GFR SERPLBLD CREATININE-BSD FMLA CKD-EPI: 132 ML/MIN/1.73 M 2
GLOBULIN SER CALC-MCNC: 2.5 G/DL (ref 1.9–3.5)
GLUCOSE BLD STRIP.AUTO-MCNC: 198 MG/DL (ref 65–99)
GLUCOSE SERPL-MCNC: 242 MG/DL (ref 65–99)
HCT VFR BLD AUTO: 38.7 % (ref 42–52)
HGB BLD-MCNC: 13.3 G/DL (ref 14–18)
IMM GRANULOCYTES # BLD AUTO: 0.02 K/UL (ref 0–0.11)
IMM GRANULOCYTES NFR BLD AUTO: 0.3 % (ref 0–0.9)
LIPASE SERPL-CCNC: 15 U/L (ref 11–82)
LYMPHOCYTES # BLD AUTO: 2.14 K/UL (ref 1–4.8)
LYMPHOCYTES NFR BLD: 30.9 % (ref 22–41)
MCH RBC QN AUTO: 29.5 PG (ref 27–33)
MCHC RBC AUTO-ENTMCNC: 34.4 G/DL (ref 33.7–35.3)
MCV RBC AUTO: 85.8 FL (ref 81.4–97.8)
MONOCYTES # BLD AUTO: 0.39 K/UL (ref 0–0.85)
MONOCYTES NFR BLD AUTO: 5.6 % (ref 0–13.4)
NEUTROPHILS # BLD AUTO: 3.86 K/UL (ref 1.82–7.42)
NEUTROPHILS NFR BLD: 55.8 % (ref 44–72)
NRBC # BLD AUTO: 0 K/UL
NRBC BLD-RTO: 0 /100 WBC
PLATELET # BLD AUTO: 284 K/UL (ref 164–446)
PMV BLD AUTO: 11.4 FL (ref 9–12.9)
POTASSIUM SERPL-SCNC: 3.3 MMOL/L (ref 3.6–5.5)
PROT SERPL-MCNC: 6.8 G/DL (ref 6–8.2)
RBC # BLD AUTO: 4.51 M/UL (ref 4.7–6.1)
SODIUM SERPL-SCNC: 135 MMOL/L (ref 135–145)
WBC # BLD AUTO: 6.9 K/UL (ref 4.8–10.8)

## 2023-02-14 PROCEDURE — 83690 ASSAY OF LIPASE: CPT

## 2023-02-14 PROCEDURE — 74018 RADEX ABDOMEN 1 VIEW: CPT

## 2023-02-14 PROCEDURE — 85025 COMPLETE CBC W/AUTO DIFF WBC: CPT

## 2023-02-14 PROCEDURE — 36415 COLL VENOUS BLD VENIPUNCTURE: CPT

## 2023-02-14 PROCEDURE — 700105 HCHG RX REV CODE 258: Performed by: EMERGENCY MEDICINE

## 2023-02-14 PROCEDURE — 96372 THER/PROPH/DIAG INJ SC/IM: CPT

## 2023-02-14 PROCEDURE — 96374 THER/PROPH/DIAG INJ IV PUSH: CPT

## 2023-02-14 PROCEDURE — 80053 COMPREHEN METABOLIC PANEL: CPT

## 2023-02-14 PROCEDURE — 74177 CT ABD & PELVIS W/CONTRAST: CPT

## 2023-02-14 PROCEDURE — 700111 HCHG RX REV CODE 636 W/ 250 OVERRIDE (IP): Performed by: EMERGENCY MEDICINE

## 2023-02-14 PROCEDURE — 99285 EMERGENCY DEPT VISIT HI MDM: CPT

## 2023-02-14 PROCEDURE — 700117 HCHG RX CONTRAST REV CODE 255: Performed by: EMERGENCY MEDICINE

## 2023-02-14 PROCEDURE — 82962 GLUCOSE BLOOD TEST: CPT

## 2023-02-14 RX ORDER — ONDANSETRON 2 MG/ML
4 INJECTION INTRAMUSCULAR; INTRAVENOUS ONCE
Status: COMPLETED | OUTPATIENT
Start: 2023-02-14 | End: 2023-02-14

## 2023-02-14 RX ORDER — DICYCLOMINE HYDROCHLORIDE 10 MG/ML
20 INJECTION INTRAMUSCULAR ONCE
Status: COMPLETED | OUTPATIENT
Start: 2023-02-14 | End: 2023-02-14

## 2023-02-14 RX ORDER — PROMETHAZINE HYDROCHLORIDE 25 MG/1
25 TABLET ORAL EVERY 6 HOURS PRN
Qty: 30 TABLET | Refills: 0 | Status: SHIPPED | OUTPATIENT
Start: 2023-02-14

## 2023-02-14 RX ORDER — SODIUM CHLORIDE, SODIUM LACTATE, POTASSIUM CHLORIDE, CALCIUM CHLORIDE 600; 310; 30; 20 MG/100ML; MG/100ML; MG/100ML; MG/100ML
1000 INJECTION, SOLUTION INTRAVENOUS ONCE
Status: COMPLETED | OUTPATIENT
Start: 2023-02-14 | End: 2023-02-14

## 2023-02-14 RX ADMIN — DICYCLOMINE HYDROCHLORIDE 20 MG: 20 INJECTION, SOLUTION INTRAMUSCULAR at 03:35

## 2023-02-14 RX ADMIN — SODIUM CHLORIDE, POTASSIUM CHLORIDE, SODIUM LACTATE AND CALCIUM CHLORIDE 1000 ML: 600; 310; 30; 20 INJECTION, SOLUTION INTRAVENOUS at 03:36

## 2023-02-14 RX ADMIN — SODIUM CHLORIDE, POTASSIUM CHLORIDE, SODIUM LACTATE AND CALCIUM CHLORIDE 1000 ML: 600; 310; 30; 20 INJECTION, SOLUTION INTRAVENOUS at 03:40

## 2023-02-14 RX ADMIN — IOHEXOL 90 ML: 350 INJECTION, SOLUTION INTRAVENOUS at 05:00

## 2023-02-14 RX ADMIN — ONDANSETRON 4 MG: 2 INJECTION INTRAMUSCULAR; INTRAVENOUS at 03:35

## 2023-02-14 ASSESSMENT — FIBROSIS 4 INDEX: FIB4 SCORE: 0.42

## 2023-02-14 NOTE — ED NOTES
Pt discharge home, discharge and follow up care instructions given, pt verbalized understanding, IV removed. Pt ambulated out of ED with Mother.

## 2023-02-14 NOTE — ED PROVIDER NOTES
ED Provider Note    CHIEF COMPLAINT  Chief Complaint   Patient presents with    Abdominal Pain     Starting 2330 last night, extreme nausea and gas. Central lower back pain and lower abdominal pain. Pt reports that he threw up a lot. Hasn't been feeling very well the last couple of days. Pt says he's been constipated as well since 1500 yesterday.     Low Blood Sugar     Pt was having a hard time getting his blood sugar up the last few days, 40's-70's with treatment. FSBG in triage was 198.        EXTERNAL RECORDS REVIEWED  Outpatient Notes patient neurology note regarding chronic muscle spasms of the neck, chronic carpal tunnel .  He is established with them because of this type 1 diabetes and musculoskeletal condition.  Currently taking tenacity and, Humalog, pancreas and has an infusion pump    HPI/ROS  LIMITATION TO HISTORY   Select: : None  OUTSIDE HISTORIAN(S):  Mother at bedside    Martinez Smith is a 22 y.o. male who presents to the emergency room for evaluation of worsening abdominal discomfort with a sensation of nauseousness and multiple episodes of nonbilious nonbloody emesis and some bloating sensations.  Patient states that since 11 PM last night he has been throwing up a lot but overall is not been feeling well over the last several days.  He has noted that he has had some fluctuations in his blood sugar readings vacillating between lows and highs he has been treating with his normal regiment and oral glucose supplements.  There has been a upper respiratory illness in the family that he has not had any significant fevers, chills nor is he had any productive cough, shortness of breath or chest discomfort.  He says he used to have episodes like this with food borne allergies has been several months since he had anything that was this severe.    PAST MEDICAL HISTORY   has a past medical history of Celiac disease, Celiac disease, and Diabetes type I (HCC).    SURGICAL HISTORY   has a past  "surgical history that includes hypospadias repair.    FAMILY HISTORY  Family History   Problem Relation Age of Onset    Arthritis Maternal Grandmother        SOCIAL HISTORY  Social History     Tobacco Use    Smoking status: Never    Smokeless tobacco: Never   Vaping Use    Vaping Use: Never used   Substance and Sexual Activity    Alcohol use: No    Drug use: No    Sexual activity: Not on file       CURRENT MEDICATIONS  Home Medications    **Home medications have not yet been reviewed for this encounter**         ALLERGIES  Allergies   Allergen Reactions    Beef Flavor Nausea     Beef broth causes Nausea and vomiting     Compazine Unspecified     Anxiety     Dairy Food Allergy Nausea    Eggs Unspecified     Nausea, vomiting, chest tightness    Gluten Meal Unspecified     Patient reports has celiac    Mustard Seed Unspecified     Nausea and vomiting,    Peanut (Diagnostic) Unspecified     Nausea, vomiting, and chest tightness    Reglan [Metoclopramide] Anxiety     Makes anxious    Shellfish Allergy Unspecified     Nausea, vomiting, chest tightness    Tree Nuts Food Allergy Anaphylaxis, Shortness of Breath, Itching, Nausea and Cough     Cashews and pistachio and coconut     Potato      White potato causes nausea vomiting       PHYSICAL EXAM  VITAL SIGNS: /74   Pulse 98   Temp 36.2 °C (97.2 °F) (Temporal)   Resp 17   Ht 1.676 m (5' 6\")   Wt 57.4 kg (126 lb 8.7 oz)   SpO2 100%   BMI 20.42 kg/m²    Genl: M sitting in rney comfortably, speaking clearly, appears in mild distress   Head: NC/AT   ENT: Mucous membranes very dry, posterior pharynx clear, uvula midline, nares patent bilaterally   Eyes: Normal sclera, pupils equal round reactive to light  Neck: Supple, FROM, no LAD appreciated   Pulmonary: Lungs are clear to auscultation bilaterally  Chest: No TTP  CV:  tachycardia, no murmur appreciated, pulses 2+ in both upper and lower extremities,  Abdomen: soft, nonspecific and diffuse abdominal discomfort " with inconsistent tenderness at McBurney's point and left lower quadrant however he has no pain with bilateral straight leg raise or along the flanks or at the CVA region. ND; no rebound/guarding, no masses palpated, no HSM   : normal external genitalia  Musculoskeletal: Pain free ROM of the neck. Moving upper and lower extremities and spontaneous in coordinated fashion  Neuro: A&Ox4 (person, place, time, situation), speech fluent, gait not assessed, no focal deficits appreciated, No cerebellar signs. Sensation is grossly intact in the distal upper and lower extremities.  5/5 strength in  and dorsiflexion/plantar flexion of the ankles  Psych: Patient has an appropriate affect and behavior  Skin: No rash or lesions.  + pallor, no jaundice.  No cyanosis.  Warm and dry.     DIAGNOSTIC STUDIES / PROCEDURES    LABS  Labs Reviewed   CBC WITH DIFFERENTIAL - Abnormal; Notable for the following components:       Result Value    RBC 4.51 (*)     Hemoglobin 13.3 (*)     Hematocrit 38.7 (*)     All other components within normal limits   COMP METABOLIC PANEL - Abnormal; Notable for the following components:    Potassium 3.3 (*)     Glucose 242 (*)     Bun 4 (*)     All other components within normal limits   LIPASE   ESTIMATED GFR   CORRECTED CALCIUM   URINALYSIS     RADIOLOGY  I have independently interpreted the diagnostic imaging associated with this visit and am waiting the final reading from the radiologist.   My preliminary interpretation is a follows:   Radiologist interpretation:   [id number]   Final Result         1.  Moderate stool in the colon suggests changes of constipation, otherwise nonspecific bowel gas pattern in the upper abdomen   2.  Linear metallic foreign body in the lateral abdominal wall soft tissues with surrounding density, present on prior study with surrounding density somewhat increased compared to prior study. Appearance suggests retained needle, could represent medical    device,  correlate with exam.      CT-ABDOMEN-PELVIS WITH    (Results Pending)     COURSE & MEDICAL DECISION MAKING    ED Observation Status? No; Patient does not meet criteria for ED Observation.     INITIAL ASSESSMENT, COURSE AND PLAN  Care Narrative:   Patient seen and evaluated for symptoms as described above.  He appears tachycardic with slight pale discoloration and has a long established history of diabetes and significant gastrointestinal upset with multiple food and environmental allergies.  He does not appear septic nor does he have concerning rash or focal chest or respiratory symptoms.  He has nonspecific diffuse abdominal discomfort and appears clinically dehydrated secondary to his recent vomiting.  Ready access was established, fluid boluses were given for acute replacement and nausea medications and intramuscular administration of Bentyl was also given.  He is afebrile, he has had sudden onset of this and I was primarily concerned about the possibility of gastroparesis, viral illness, volvulus and other acute intra-abdominal infectious disasters are substantially less likely based on the clinical exam findings and vital signs.    Lab work came back showing no leukocytosis or concerning differential and he has a slight anemia that is worse from prior with H&H of 13.3/38.7.  There is no obstructive hepatobiliary findings nor is there evidence of acute pancreatitis.  He has elevated glucose at 242 with normal anion gap and bicarb.    Abdominal x-ray shows diffuse stool burden and after the patient had received medications and went back to the bedside and he continues to have pronounced diffuse lower pelvic discomfort.  At this time is difficult to fully discern his likely etiology and he is too dehydrated to provide us with a urine sample.  CT abdomen pelvis is ordered to further differentiate any more emergent process while awaiting completion of fluid resuscitation.    Following administration of IV fluids and  symptomatic meds, he is reexamined and follow-up with results of the patient's CT scan for rule out any possible acute surgical emergency.    HYDRATION: Based on the patient's presentation of Acute Vomiting, Dehydration, and Tachycardia the patient was given IV fluids. IV Hydration was used because oral hydration was not adequate alone. Upon recheck following hydration, the patient was improved.        FINAL DIAGNOSIS  1. Generalized abdominal pain    2. Nausea and vomiting, unspecified vomiting type    3. Dehydration      Electronically signed by: Eleuterio Huitron M.D., 2/14/2023 2:55 AM

## 2023-02-14 NOTE — DISCHARGE SUMMARY
"  ED Observation Discharge Summary    Patient:Martinez Smith  Patient : 2000  Patient MRN: [id number]  Patient PCP: Froilan Grande M.D.    Admit Date: 2023  Discharge Date and Time: 23 5:06 AM  Discharge Diagnosis: Nausea and vomiting  Discharge Attending: Jeffery Hernández M.D.  Discharge Service: ED Observation    ED Course  Martinez is a 22 y.o. male who was evaluated at [facility] for nausea and vomiting.  Evaluated initially by Dr. Huitron.  I assumed care at 4 AM.  CT scan of the abdomen and pelvis was pending.  CT scan returned without any acute findings.  The appendix was not visualized however.  Patient does not have any fever.  He has no leukocytosis.  He is feeling better after treatment.  At this point the best course is watchful waiting with continued Matt[place] management.  Unfortunately he has had recurrent episodes of nausea and vomiting.  He has plenty of Zofran at home.  I will write a prescription for some Phenergan to take.  He is given precautions to return to the ER for fevers, right lower quadrant pain, uncontrolled symptoms or concern    Discharge Exam:  /74   Pulse 98   Temp 36.2 °C (97.2 °F) (Temporal)   Resp 17   Ht 1.676 m (5' 6\")   Wt 57.4 kg (126 lb 8.7 oz)   SpO2 100%   BMI 20.42 kg/m² .    Constitutional: Awake and alert mildly pale appearing  HENT:  Grossly normal  Eyes: Grossly normal  Neck: Normal range of motion  Cardiovascular: Normal heart rate   Thorax & Lungs: No respiratory distress  Abdomen: No focal tenderness  Skin:  No pathologic rash.   Extremities: Well perfused  Psychiatric: Affect normal    Labs  Results for orders placed or performed during the hospital encounter of 23   CBC WITH DIFFERENTIAL   Result Value Ref Range    WBC 6.9 4.8 - 10.8 K/uL    RBC 4.51 (L) 4.70 - 6.10 M/uL    Hemoglobin 13.3 (L) 14.0 - 18.0 g/dL    Hematocrit 38.7 (L) 42.0 - 52.0 %    MCV 85.8 81.4 - 97.8 fL    MCH 29.5 27.0 - 33.0 pg "    MCHC 34.4 33.7 - 35.3 g/dL    RDW 37.2 35.9 - 50.0 fL    Platelet Count 284 164 - 446 K/uL    MPV 11.4 9.0 - 12.9 fL    Neutrophils-Polys 55.80 44.00 - 72.00 %    Lymphocytes 30.90 22.00 - 41.00 %    Monocytes 5.60 0.00 - 13.40 %    Eosinophils 6.40 0.00 - 6.90 %    Basophils 1.00 0.00 - 1.80 %    Immature Granulocytes 0.30 0.00 - 0.90 %    Nucleated RBC 0.00 /100 WBC    Neutrophils (Absolute) 3.86 1.82 - 7.42 K/uL    Lymphs (Absolute) 2.14 1.00 - 4.80 K/uL    Monos (Absolute) 0.39 0.00 - 0.85 K/uL    Eos (Absolute) 0.44 0.00 - 0.51 K/uL    Baso (Absolute) 0.07 0.00 - 0.12 K/uL    Immature Granulocytes (abs) 0.02 0.00 - 0.11 K/uL    NRBC (Absolute) 0.00 K/uL   COMP METABOLIC PANEL   Result Value Ref Range    Sodium 135 135 - 145 mmol/L    Potassium 3.3 (L) 3.6 - 5.5 mmol/L    Chloride 98 96 - 112 mmol/L    Co2 27 20 - 33 mmol/L    Anion Gap 10.0 7.0 - 16.0    Glucose 242 (H) 65 - 99 mg/dL    Bun 4 (L) 8 - 22 mg/dL    Creatinine 0.69 0.50 - 1.40 mg/dL    Calcium 9.8 8.5 - 10.5 mg/dL    AST(SGOT) 17 12 - 45 U/L    ALT(SGPT) 11 2 - 50 U/L    Alkaline Phosphatase 78 30 - 99 U/L    Total Bilirubin 0.3 0.1 - 1.5 mg/dL    Albumin 4.3 3.2 - 4.9 g/dL    Total Protein 6.8 6.0 - 8.2 g/dL    Globulin 2.5 1.9 - 3.5 g/dL    A-G Ratio 1.7 g/dL   LIPASE   Result Value Ref Range    Lipase 15 11 - 82 U/L   ESTIMATED GFR   Result Value Ref Range    GFR (CKD-EPI) 132 >60 mL/min/1.73 m 2   CORRECTED CALCIUM   Result Value Ref Range    Correct Calcium 9.6 8.5 - 10.5 mg/dL       Radiology  CT-ABDOMEN-PELVIS WITH   Final Result         1.  No acute intra-abdominal abnormality identified.   2.  Nonvisualization of the appendix limits evaluation for appendicitis.      [id number] VIEW   Final Result         1.  Moderate stool in the colon suggests changes of constipation, otherwise nonspecific bowel gas pattern in the upper abdomen   2.  Linear metallic foreign body in the lateral abdominal wall soft tissues with surrounding density,  present on prior study with surrounding density somewhat increased compared to prior study. Appearance suggests retained needle, could represent medical    device, correlate with exam.          Medications:   New Prescriptions    PROMETHAZINE (PHENERGAN) 25 MG TAB    Take 1 Tablet by mouth every 6 hours as needed for Nausea/Vomiting.       Upon Reevaluation, the patient's condition has: Improved; and will be discharged.    Patient discharged from ED Observation status at 5:30 AM 2/14/2023     Total time spent on this ED Observation discharge encounter is 10 minutes    Electronically signed by: Jeffery Hernández M.D., 2/14/2023 5:06 AM

## 2023-02-14 NOTE — ED NOTES
Patient assisted from lobby to room in  accompanied by mother and then patient ambulated with steady gait from  in doorway to [facility].  Labs drawn and sent in triage.    Pending urine.  Chart up for ERP.

## 2023-02-14 NOTE — ED TRIAGE NOTES
Martinez Smith  22 y.o. male  Chief Complaint   Patient presents with    Abdominal Pain     Starting 2330 last night, extreme nausea and gas. Central lower back pain and lower abdominal pain. Pt reports that he threw up a lot. Hasn't been feeling very well the last couple of days. Pt says he's been constipated as well since 1500 yesterday.     Low Blood Sugar     Pt was having a hard time getting his blood sugar up the last few days, 40's-70's with treatment. FSBG in triage was 198.        Vitals:    02/14/23 0047   BP: (!) 120/99   Pulse: (!) 105   Resp: 19   Temp: 36.2 °C (97.2 °F)   SpO2: 100%       Triage process explained to patient, apologized for wait time, and returned to lobby.  Pt informed to notify staff of any change in condition.

## 2023-03-08 ENCOUNTER — OFFICE VISIT (OUTPATIENT)
Dept: URGENT CARE | Facility: PHYSICIAN GROUP | Age: 23
End: 2023-03-08
Payer: COMMERCIAL

## 2023-03-08 VITALS
TEMPERATURE: 98.6 F | HEART RATE: 111 BPM | SYSTOLIC BLOOD PRESSURE: 122 MMHG | DIASTOLIC BLOOD PRESSURE: 68 MMHG | OXYGEN SATURATION: 94 % | WEIGHT: 125 LBS | HEIGHT: 66 IN | RESPIRATION RATE: 18 BRPM | BODY MASS INDEX: 20.09 KG/M2

## 2023-03-08 DIAGNOSIS — J01.40 ACUTE PANSINUSITIS, RECURRENCE NOT SPECIFIED: ICD-10-CM

## 2023-03-08 PROCEDURE — RXMED WILLOW AMBULATORY MEDICATION CHARGE: Performed by: NURSE PRACTITIONER

## 2023-03-08 PROCEDURE — 99214 OFFICE O/P EST MOD 30 MIN: CPT | Performed by: NURSE PRACTITIONER

## 2023-03-08 RX ORDER — AMOXICILLIN AND CLAVULANATE POTASSIUM 875; 125 MG/1; MG/1
1 TABLET, FILM COATED ORAL 2 TIMES DAILY
Qty: 20 TABLET | Refills: 0 | Status: SHIPPED | OUTPATIENT
Start: 2023-03-08 | End: 2023-03-19

## 2023-03-08 ASSESSMENT — FIBROSIS 4 INDEX: FIB4 SCORE: 0.43

## 2023-03-08 NOTE — PROGRESS NOTES
"Martinez Smith is a 22 y.o. male who presents for Sinus Problem (Sinus pressure,ear pain,x6 days)      HPI  This is a new problem. Martinez Smith is a 22 y.o. patient who presents to urgent care with c/o: Sinus pain and pressure on the right side of his face. Getting rapidly worse.  the last few days blood sugars are ranging  \" normal  but today it has been up to 230. Hx environmental allergies. See's allergist.   No antibiotic use for several years. This feels like a sinus infection.   Treatments tried: increased fluids.   Denies fever, NVD, body aches, dizziness, eye drainage, HA.     No other aggravating or alleviating factors.       ROS See HPI    Allergies:       Allergies   Allergen Reactions    Beef Flavor Nausea     Beef broth causes Nausea and vomiting     Compazine Unspecified     Anxiety     Dairy Food Allergy Nausea    Eggs Unspecified     Nausea, vomiting, chest tightness    Gluten Meal Unspecified     Patient reports has celiac    Mustard Seed Unspecified     Nausea and vomiting,    Peanut (Diagnostic) Unspecified     Nausea, vomiting, and chest tightness    Reglan [Metoclopramide] Anxiety     Makes anxious    Shellfish Allergy Unspecified     Nausea, vomiting, chest tightness    Tree Nuts Food Allergy Anaphylaxis, Shortness of Breath, Itching, Nausea and Cough     Cashews and pistachio and coconut     Potato      White potato causes nausea vomiting       PMSFS Hx:  Past Medical History:   Diagnosis Date    Celiac disease     Celiac disease     Diabetes type I (HCC)     insulin pump     Past Surgical History:   Procedure Laterality Date    HYPOSPADIAS REPAIR       Family History   Problem Relation Age of Onset    Arthritis Maternal Grandmother      Social History     Tobacco Use    Smoking status: Never    Smokeless tobacco: Never   Substance Use Topics    Alcohol use: No       Problems:   Patient Active Problem List   Diagnosis    Type 1 diabetes mellitus with hyperglycemia " (HCC)    Celiac disease    Weakness of right lower extremity    Lipohyperplasia    Dyslipidemia    Long-term insulin use (HCC)    Fitting and adjustment of insulin pump    Intractable nausea and vomiting    Hyperlipidemia    Insulin pump status    Other mechanical complication of insulin pump    RUQ abdominal pain    Chronic abdominal pain    Vitamin D deficiency    Exocrine pancreatic insufficiency    Type 1 diabetes mellitus with hypoglycemia and without coma (HCC)    Influenza    Syncope and collapse    Muscle spasms of neck    Carpal tunnel syndrome of right wrist       Medications:   Current Outpatient Medications on File Prior to Visit   Medication Sig Dispense Refill    mupirocin (BACTROBAN) 2 % Ointment APPLY TO AFFECTED NOSTRIL TWICE A DAY FOR 7 DAYS.      promethazine (PHENERGAN) 25 MG Tab Take 1 Tablet by mouth every 6 hours as needed for Nausea/Vomiting. 30 Tablet 0    HUMALOG 100 UNIT/ML INJECT 50 UNITS INTO PUMP RESERVOIR DAILY 30 DAY SUPPLY 60 mL 3    tizanidine (ZANAFLEX) 4 MG Tab Take 4 mg by mouth every 6 hours as needed.      HUMALOG 100 UNIT/ML Inject 50 units into pump reservoir daily  30 day supply 20 mL 11    montelukast (SINGULAIR) 10 MG Tab Take 10 mg by mouth every day.      Glucagon, rDNA, (GLUCAGON EMERGENCY) 1 MG Kit Inject 1 mg as directed as needed (severe hypoglycemia). For severe hypoglycemia 1 Kit 1    ondansetron (ZOFRAN ODT) 4 MG TABLET DISPERSIBLE Take 1 Tablet by mouth every 6 hours as needed for Nausea. 20 Tablet 0    CONTOUR NEXT TEST strip USE TO TEST 10 TIMES A DAY FOR INSULIN PUMP ADJUSTMENT (LINKS TO INSULIN PUMP) 900 Strip 3    azelastine (ASTELIN) 137 MCG/SPRAY nasal spray       fexofenadine (ALLEGRA) 60 MG Tab Take 60 mg by mouth 2 times a day.      Triamcinolone Acetonide (NASACORT AQ NA) Administer 2 Sprays into affected nostril(S) every day.      amitriptyline (ELAVIL) 25 MG Tab Take 25 mg by mouth at bedtime.      PANCREAZE 42573-28247 units Cap DR Particles Take 1  "Capsule by mouth 3 times a day before meals.      insulin infusion pump Device Inject  under the skin continuous. Patient's own SQ insulin pump    Type of Insulin: Humalog  Last change of tubin22 - Change tubing and site every 72 hours    Dosing:  Basal rate:  0000 - 0300 = .625 units  0300 - 1200 = .6 units  1200 - 0000= .575 units  Bolus ratio:   1 unit : 14 g carbohydrate      Sensitivity ratio:   1 units for every 50 over 130-150 mg/dL    Disconnect pump if patient becomes hypoglycemic and altered.       No current facility-administered medications on file prior to visit.          Objective:     /68 (BP Location: Right arm, Patient Position: Sitting, BP Cuff Size: Adult)   Pulse (!) 111   Temp 37 °C (98.6 °F) (Temporal)   Resp 18   Ht 1.676 m (5' 6\")   Wt 56.7 kg (125 lb)   SpO2 94%   BMI 20.18 kg/m²     Physical Exam  Vitals and nursing note reviewed.   Constitutional:       Appearance: Normal appearance. He is normal weight.   HENT:      Head: Normocephalic.        Nose: Mucosal edema present.      Right Turbinates: Swollen.      Left Turbinates: Swollen.      Right Sinus: Maxillary sinus tenderness and frontal sinus tenderness present.   Cardiovascular:      Rate and Rhythm: Normal rate.      Pulses: Normal pulses.   Pulmonary:      Effort: Pulmonary effort is normal.   Skin:     Capillary Refill: Capillary refill takes less than 2 seconds.   Neurological:      Mental Status: He is alert.         Assessment /Associated Orders:      1. Acute pansinusitis, recurrence not specified  amoxicillin-clavulanate (AUGMENTIN) 875-125 MG Tab            Medical Decision Making:    Pt is clinically stable at today's acute urgent care visit.  No acute distress noted. Appropriate for outpatient care at this time.   Acute problem today with uncertain prognosis.   Educated in proper administration of  prescription medication(s) ordered today including safety, possible SE, risks, benefits, rationale and " alternatives to therapy.   Keep well hydrated  Warm compresses prn   Resume all prior  RX medications. Take as prescribed.      Discussed Dx, management options (risks,benefits, and alternatives to planned treatment), natural progression and supportive care.  Expressed understanding and the treatment plan was agreed upon.   Questions were encouraged and answered   Return to urgent care prn if new or worsening sx or if there is no improvement in condition prn.    Educated in Red flags and indications to immediately call 911 or present to the Emergency Department.       Time I spent evaluating Martinez Smith in urgent care today was 32  minutes. This time includes preparing for visit, reviewing any pertinent notes or test results, counseling/education, exam, obtaining HPI, interpretation of lab tests, medication management and documentation as indicated above.Time does not include separately billable procedures noted .       Please note that this dictation was created using voice recognition software. I have worked with consultants from the vendor as well as technical experts from [facility] to optimize the interface. I have made every reasonable attempt to correct obvious errors, but I expect that there are errors of grammar and possibly content that I did not discover before finalizing the note.  This note was electronically signed by provider

## 2023-03-09 ENCOUNTER — PHARMACY VISIT (OUTPATIENT)
Dept: PHARMACY | Facility: MEDICAL CENTER | Age: 23
End: 2023-03-09
Payer: COMMERCIAL

## 2023-05-01 ENCOUNTER — OFFICE VISIT (OUTPATIENT)
Dept: URGENT CARE | Facility: PHYSICIAN GROUP | Age: 23
End: 2023-05-01
Payer: COMMERCIAL

## 2023-05-01 VITALS
DIASTOLIC BLOOD PRESSURE: 70 MMHG | RESPIRATION RATE: 16 BRPM | BODY MASS INDEX: 20.09 KG/M2 | SYSTOLIC BLOOD PRESSURE: 102 MMHG | WEIGHT: 125 LBS | HEART RATE: 109 BPM | OXYGEN SATURATION: 95 % | TEMPERATURE: 98.7 F | HEIGHT: 66 IN

## 2023-05-01 DIAGNOSIS — R42 DIZZINESS: ICD-10-CM

## 2023-05-01 DIAGNOSIS — E10.9 TYPE 1 DIABETES MELLITUS WITHOUT COMPLICATION (HCC): ICD-10-CM

## 2023-05-01 LAB
APPEARANCE UR: CLEAR
BILIRUB UR STRIP-MCNC: NEGATIVE MG/DL
COLOR UR AUTO: YELLOW
GLUCOSE BLD-MCNC: 172 MG/DL (ref 65–99)
GLUCOSE UR STRIP.AUTO-MCNC: NEGATIVE MG/DL
HBA1C MFR BLD: 7.7 % (ref ?–5.8)
KETONES UR STRIP.AUTO-MCNC: NEGATIVE MG/DL
LEUKOCYTE ESTERASE UR QL STRIP.AUTO: NEGATIVE
NITRITE UR QL STRIP.AUTO: NEGATIVE
PH UR STRIP.AUTO: 7 [PH] (ref 5–8)
POCT INT CON NEG: NEGATIVE
POCT INT CON POS: POSITIVE
PROT UR QL STRIP: NEGATIVE MG/DL
RBC UR QL AUTO: NEGATIVE
SP GR UR STRIP.AUTO: 1.01
UROBILINOGEN UR STRIP-MCNC: 0.2 MG/DL

## 2023-05-01 PROCEDURE — 82962 GLUCOSE BLOOD TEST: CPT

## 2023-05-01 PROCEDURE — 99213 OFFICE O/P EST LOW 20 MIN: CPT

## 2023-05-01 PROCEDURE — 83036 HEMOGLOBIN GLYCOSYLATED A1C: CPT

## 2023-05-01 PROCEDURE — 81002 URINALYSIS NONAUTO W/O SCOPE: CPT

## 2023-05-01 RX ORDER — AMITRIPTYLINE HYDROCHLORIDE 50 MG/1
50 TABLET, FILM COATED ORAL
COMMUNITY
Start: 2023-03-24 | End: 2023-05-05

## 2023-05-01 RX ORDER — MECLIZINE HYDROCHLORIDE 25 MG/1
25 TABLET ORAL 3 TIMES DAILY PRN
Qty: 30 TABLET | Refills: 0 | Status: SHIPPED | OUTPATIENT
Start: 2023-05-01 | End: 2024-03-07

## 2023-05-01 ASSESSMENT — ENCOUNTER SYMPTOMS
DIZZINESS: 1
VOMITING: 0
CHILLS: 0
NAUSEA: 1
SHORTNESS OF BREATH: 0
FEVER: 0
MYALGIAS: 0

## 2023-05-01 ASSESSMENT — FIBROSIS 4 INDEX: FIB4 SCORE: 0.43

## 2023-05-01 NOTE — PROGRESS NOTES
Subjective:     CHIEF COMPLAINT  Chief Complaint   Patient presents with    Dizziness     Started this morning.        HPI  Martinez Smith is a very pleasant 22 y.o. male who presents with dizziness that started this morning.  He has been feeling nauseous this morning but has not vomited.  He reports that nausea is somewhat his baseline due to a stomach condition.  He has been feeling as if the room is spinning and reports he is still feeling that way, although it has improved slightly. He denies fevers, body aches, chills.  Patient has a history of type 1 diabetes that he was diagnosed with in 2008.  He reports his blood sugars at home today were 100-150.  He does not have a continuous glucose monitor. Is seeing endocrinologist Friday. No CP or SOB. Patient reports baseline tachycardia. He reports he has been drinking a good amount of water. He denies dark, tarry stools.     REVIEW OF SYSTEMS  Review of Systems   Constitutional:  Negative for chills, fever and malaise/fatigue.   Respiratory:  Negative for shortness of breath.    Cardiovascular:  Negative for chest pain.   Gastrointestinal:  Positive for nausea. Negative for vomiting.   Musculoskeletal:  Negative for myalgias.   Neurological:  Positive for dizziness.     PAST MEDICAL HISTORY  Patient Active Problem List    Diagnosis Date Noted    Carpal tunnel syndrome of right wrist 01/24/2023    Muscle spasms of neck 10/27/2022    Syncope and collapse 07/25/2022    Influenza 05/01/2022    Type 1 diabetes mellitus with hypoglycemia and without coma (HCC) 01/19/2022    Exocrine pancreatic insufficiency 08/06/2021    Chronic abdominal pain 09/11/2020    Vitamin D deficiency 09/11/2020    RUQ abdominal pain 07/31/2020    Long-term insulin use (HCC) 12/17/2019    Fitting and adjustment of insulin pump 12/17/2019    Intractable nausea and vomiting 12/17/2019    Hyperlipidemia 01/18/2018    Other mechanical complication of insulin pump 01/18/2018    Insulin  pump status 06/27/2017    Type 1 diabetes mellitus with hyperglycemia (HCC) 06/15/2017    Celiac disease 06/15/2017    Weakness of right lower extremity 06/15/2017    Lipohyperplasia 06/15/2017    Dyslipidemia 06/15/2017       SURGICAL HISTORY   has a past surgical history that includes hypospadias repair.    ALLERGIES  Allergies   Allergen Reactions    Beef Flavor Nausea     Beef broth causes Nausea and vomiting     Compazine Unspecified     Anxiety     Dairy Food Allergy Nausea    Eggs Unspecified     Nausea, vomiting, chest tightness    Gluten Meal Unspecified     Patient reports has celiac    Mustard Seed Unspecified     Nausea and vomiting,    Peanut (Diagnostic) Unspecified     Nausea, vomiting, and chest tightness    Reglan [Metoclopramide] Anxiety     Makes anxious    Shellfish Allergy Unspecified     Nausea, vomiting, chest tightness    Tree Nuts Food Allergy Anaphylaxis, Shortness of Breath, Itching, Nausea and Cough     Cashews and pistachio and coconut     Potato      White potato causes nausea vomiting       CURRENT MEDICATIONS  Home Medications       Reviewed by YVETTE Acosta-CGermán (Physician Assistant) on 05/01/23 at 1548  Med List Status: <None>     Medication Last Dose Status   amitriptyline (ELAVIL) 25 MG Tab Taking Active   amitriptyline (ELAVIL) 50 MG Tab Not Taking Active   azelastine (ASTELIN) 137 MCG/SPRAY nasal spray Not Taking Active   CONTOUR NEXT TEST strip Taking Active   fexofenadine (ALLEGRA) 60 MG Tab Taking Active   Glucagon, rDNA, (GLUCAGON EMERGENCY) 1 MG Kit Taking Active   HUMALOG 100 UNIT/ML Taking Active   HUMALOG 100 UNIT/ML Taking Active   insulin infusion pump Device Taking Active   montelukast (SINGULAIR) 10 MG Tab Taking Active   mupirocin (BACTROBAN) 2 % Ointment  Active   ondansetron (ZOFRAN ODT) 4 MG TABLET DISPERSIBLE PRN Active   PANCREAZE 29425-05967 units Cap DR Particles Taking Active   promethazine (PHENERGAN) 25 MG Tab PRN Active   tizanidine (ZANAFLEX) 4  "MG Tab PRN Active   Triamcinolone Acetonide (NASACORT AQ NA) Not Taking Active                    SOCIAL HISTORY  Social History     Tobacco Use    Smoking status: Never    Smokeless tobacco: Never   Vaping Use    Vaping Use: Never used   Substance and Sexual Activity    Alcohol use: No    Drug use: No    Sexual activity: Not on file       FAMILY HISTORY  Family History   Problem Relation Age of Onset    Arthritis Maternal Grandmother           Objective:     VITAL SIGNS: /70 (BP Location: Right arm, Patient Position: Sitting, BP Cuff Size: Adult)   Pulse (!) 109   Temp 37.1 °C (98.7 °F) (Temporal)   Resp 16   Ht 1.676 m (5' 6\")   Wt 56.7 kg (125 lb)   SpO2 95%   BMI 20.18 kg/m²     PHYSICAL EXAM  Physical Exam  Constitutional:       General: He is not in acute distress.     Appearance: He is ill-appearing. He is not toxic-appearing.   HENT:      Head: Normocephalic and atraumatic.      Right Ear: Tympanic membrane, ear canal and external ear normal.      Left Ear: Tympanic membrane, ear canal and external ear normal.      Nose: Nose normal.      Mouth/Throat:      Mouth: Mucous membranes are moist.      Pharynx: No oropharyngeal exudate or posterior oropharyngeal erythema.   Eyes:      Extraocular Movements: Extraocular movements intact.      Right eye: No nystagmus.      Left eye: No nystagmus.      Conjunctiva/sclera: Conjunctivae normal.      Pupils: Pupils are equal, round, and reactive to light.   Cardiovascular:      Rate and Rhythm: Regular rhythm. Tachycardia present.   Pulmonary:      Effort: Pulmonary effort is normal. No respiratory distress.      Breath sounds: Normal breath sounds. No wheezing, rhonchi or rales.   Skin:     General: Skin is warm and dry.      Capillary Refill: Capillary refill takes less than 2 seconds.      Coloration: Skin is pale.   Neurological:      General: No focal deficit present.      Mental Status: He is alert and oriented to person, place, and time. "   Psychiatric:         Mood and Affect: Mood normal.       Assessment/Plan:     1. Type 1 diabetes mellitus without complication (HCC)  - POCT glucose  - POCT  A1C  - POCT Urinalysis    2. Dizziness  - meclizine (ANTIVERT) 25 MG Tab; Take 1 Tablet by mouth 3 times a day as needed for Dizziness or Nausea/Vomiting.  Dispense: 30 Tablet; Refill: 0    Other orders  - amitriptyline (ELAVIL) 50 MG Tab; Take 50 mg by mouth at bedtime. (Patient not taking: Reported on 5/1/2023)    Results:  Glucose: 172  A1C: 7.7 Abnormal  UA: Normal. Negative ketones    MDM/Comments:  Patient has stable vital signs and is non-toxic appearing. Discussed supportive care with hydration, rest.  He reports he has baseline tachycardia.  Point-of-care glucose testing, A1c, urinalysis performed in clinic with overall unremarkable findings.  No ketones in urine.  A1c was elevated at 7.7.  Symptoms likely not related to DKA. Patient has a follow-up with endocrinology scheduled for Friday.  Upon review of prior labs, patient has been slightly anemic which may explain paleness.  Patient denies dark tarry stools.  He also reports he has not had any chest pain or shortness of breath.  Patient has nausea at baseline with pancreatic insufficiency.  At this time, I feel patient is safe to be treated as an outpatient.  Patient encouraged to follow-up with primary care for further investigation as needed.  Meclizine prescribed for dizziness.  Discussed appropriate hydration to help with dizziness.  Patient agreed to go to the ER or return to the urgent care if symptoms fail to improve or worsen in any way.    Differential diagnosis, natural history, supportive care, and indications for immediate follow-up discussed. All questions answered. Patient agrees with the plan of care.    Follow-up as needed if symptoms worsen or fail to improve to PCP, Urgent care or Emergency Room.    I have personally reviewed prior external notes and test results pertinent to  today's visit.  I have independently reviewed and interpreted all diagnostics ordered during this urgent care acute visit.   Discussed management options (risks,benefits, and alternatives to treatment). Pt expresses understanding and the treatment plan was agreed upon. Questions were encouraged and answered to pt's satisfaction.    Please note that this dictation was created using voice recognition software. I have made a reasonable attempt to correct obvious errors, but I expect that there are errors of grammar and possibly content that I did not discover before finalizing the note.

## 2023-05-05 ENCOUNTER — OFFICE VISIT (OUTPATIENT)
Dept: ENDOCRINOLOGY | Facility: MEDICAL CENTER | Age: 23
End: 2023-05-05
Attending: INTERNAL MEDICINE
Payer: COMMERCIAL

## 2023-05-05 VITALS
WEIGHT: 130 LBS | DIASTOLIC BLOOD PRESSURE: 70 MMHG | OXYGEN SATURATION: 99 % | SYSTOLIC BLOOD PRESSURE: 124 MMHG | HEIGHT: 66 IN | BODY MASS INDEX: 20.89 KG/M2 | HEART RATE: 84 BPM

## 2023-05-05 DIAGNOSIS — E10.649 TYPE 1 DIABETES MELLITUS WITH HYPOGLYCEMIA AND WITHOUT COMA (HCC): ICD-10-CM

## 2023-05-05 DIAGNOSIS — K90.0 CELIAC DISEASE: ICD-10-CM

## 2023-05-05 DIAGNOSIS — Z79.4 LONG-TERM INSULIN USE (HCC): ICD-10-CM

## 2023-05-05 DIAGNOSIS — E10.65 TYPE 1 DIABETES MELLITUS WITH HYPERGLYCEMIA (HCC): ICD-10-CM

## 2023-05-05 DIAGNOSIS — Z96.41 INSULIN PUMP IN PLACE: ICD-10-CM

## 2023-05-05 DIAGNOSIS — E55.9 VITAMIN D DEFICIENCY: ICD-10-CM

## 2023-05-05 PROCEDURE — 99214 OFFICE O/P EST MOD 30 MIN: CPT | Performed by: INTERNAL MEDICINE

## 2023-05-05 PROCEDURE — 99213 OFFICE O/P EST LOW 20 MIN: CPT | Performed by: INTERNAL MEDICINE

## 2023-05-05 ASSESSMENT — FIBROSIS 4 INDEX: FIB4 SCORE: 0.43

## 2023-05-05 NOTE — PROGRESS NOTES
CHIEF COMPLAINT: Patient is here for follow up of Type 1 Diabetes Mellitus.    HPI:     Martinez Smith is a 22 y.o. male with Type 1 Diabetes Mellitus here for follow up.    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 He also diagnosed himself with food allergies.     He also has celiac disease, he is on a gluten free diet since age of 11         Basal rate  00:00 0.625  0300  0.600--> 0.7  1200 0.575--> 0.5  10pm 0.550--> 0.5     Carb ratio  00:00  14     ICF  1:50     Target  130      He graduated and is now working as an  for a local company     Active insulin time 3 hours     Average  (190, 140)  Time in range was not given  Basal 46%  Bolus 54%  TDD 30      He is on manual mode  He doesn't have a CGM  He wants to switch to Tandem-Dexcom HCL once his medtrnoic pump is out of Warranty (July 2023)  He is testing BG 10x a day with a contour next BG meter.         His lipids are at goal and he does not need statin therapy because he is young  His LDL-C was 120 on Feb 2022      He doesn't have diabetic kidney disease  UACR was < 30 on 2/18/2022      He had an eye exam with Dr. Elena on May 2022  He has an appt with him on June 2022    His TSH is normal at 29.9 on  2/18/2022    His vitamin D is 41.5 on 5/25/2022        BG Diary:   Pump was download and reviewed today  He uses contour next test strips which is the only meter that works with his pump  He tested sugars 8 times a day and is needing a prior authorization for his Contour next test strips    Weight has been stable    Diabetes Complications   Retinopathy: No known retinopathy.  Last eye exam: 5/2022 with Dr. Elena    Neuropathy: Denies paresthesias or numbness in hands or feet. Denies any foot wounds.  Exercise: Minimal.  Diet: Fair.  Patient's medications, allergies, and social histories were reviewed and updated as appropriate.    ROS:     CONS:     No fever, no chills   EYES:     No diplopia, no blurry vision   CV:           No chest pain, no palpitations   PULM:     No SOB, no cough, no hemoptysis.   GI:            No nausea, no vomiting, no diarrhea, no constipation   ENDO:     No polyuria, no polydipsia, no heat intolerance, no cold intolerance       Past Medical History:  Problem List:  2023-01: Carpal tunnel syndrome of right wrist  2022-10: Muscle spasms of neck  2022-07: Syncope and collapse  2022-05: Influenza  2022-01: Type 1 diabetes mellitus with hypoglycemia and without coma   ([facility])  2021-08: Exocrine pancreatic insufficiency  2020-09: Chronic abdominal pain  2020-09: Vitamin D deficiency  2020-07: RUQ abdominal pain  2019-12: Diabetic gastroparesis (HCC)  2019-12: Long-term insulin use ([facility])  2019-12: Fitting and adjustment of insulin pump  2019-12: Intractable nausea and vomiting  2018-01: Hyperlipidemia  2018-01: Other mechanical complication of insulin pump  2017-06: Insulin pump status  2017-06: Type 1 diabetes mellitus with hyperglycemia ([facility])  2017-06: Celiac disease  2017-06: Weakness of right lower extremity  2017-06: Lipohyperplasia  2017-06: Dyslipidemia      Past Surgical History:  Past Surgical History:   Procedure Laterality Date    HYPOSPADIAS REPAIR          Allergies:  Beef flavor;  "Gluten meal; and Tree nuts food allergy     Social History:  Social History     Tobacco Use    Smoking status: Never    Smokeless tobacco: Never   Vaping Use    Vaping Use: Never used   Substance Use Topics    Alcohol use: No    Drug use: No        Family History:   family history includes Arthritis in his maternal grandmother.      PHYSICAL EXAM:   OBJECTIVE:  Vital signs: /70   Pulse 84   Ht 1.676 m (5' 6\")   Wt 59 kg (130 lb)   SpO2 99%   BMI 20.98 kg/m²   GENERAL: Well-developed, well-nourished in no apparent distress.   EYE:  No ocular asymmetry, PERRLA  HENT: Pink, moist mucous membranes.    NECK: No thyromegaly.   CARDIOVASCULAR: regular rate and rhythm w/ no murmurs  LUNGS: Symmetrical chest expansion with clear breath sounds  ABDOMEN: non obese abdomen with no visible organomegaly right upper quadrant pain tenderness noted with direct palpation  EXTREMITIES: No clubbing, cyanosis, or edema.   NEUROLOGICAL: No gross focal motor abnormalities   LYMPH: No cervical adenopathy seen.   SKIN: No rashes, lesions.         Labs:  Lab Results   Component Value Date/Time    HBA1C 7.7 (A) 05/01/2023 04:14 PM        Lab Results   Component Value Date/Time    WBC 6.9 02/14/2023 01:08 AM    RBC 4.51 (L) 02/14/2023 01:08 AM    HEMOGLOBIN 13.3 (L) 02/14/2023 01:08 AM    MCV 85.8 02/14/2023 01:08 AM    MCH 29.5 02/14/2023 01:08 AM    MCHC 34.4 02/14/2023 01:08 AM    RDW 37.2 02/14/2023 01:08 AM    MPV 11.4 02/14/2023 01:08 AM       Lab Results   Component Value Date/Time    SODIUM 135 02/14/2023 01:08 AM    POTASSIUM 3.3 (L) 02/14/2023 01:08 AM    CHLORIDE 98 02/14/2023 01:08 AM    CO2 27 02/14/2023 01:08 AM    ANION 10.0 02/14/2023 01:08 AM    GLUCOSE 242 (H) 02/14/2023 01:08 AM    BUN 4 (L) 02/14/2023 01:08 AM    CREATININE 0.69 02/14/2023 01:08 AM    CREATININE 0.4 05/25/2007 04:40 AM    CALCIUM 9.8 02/14/2023 01:08 AM    ASTSGOT 17 02/14/2023 01:08 AM    ALTSGPT 11 02/14/2023 01:08 AM    TBILIRUBIN 0.3 " 02/14/2023 01:08 AM    ALBUMIN 4.3 02/14/2023 01:08 AM    ALBUMIN 4.80 10/22/2016 09:46 AM    TOTPROTEIN 6.8 02/14/2023 01:08 AM    TOTPROTEIN 7.70 10/22/2016 09:46 AM    GLOBULIN 2.5 02/14/2023 01:08 AM    AGRATIO 1.7 02/14/2023 01:08 AM       Lab Results   Component Value Date/Time    CHOLSTRLTOT 177 12/24/2019 0729    TRIGLYCERIDE 103 12/24/2019 0729    HDL 28 (A) 12/24/2019 0729     (H) 12/24/2019 0729       Lab Results   Component Value Date/Time    MALBCRT see below 07/13/2019 08:00 AM    MICROALBUR <0.7 07/13/2019 08:00 AM    MICRALB <3.0 02/18/2022 04:48 AM        Lab Results   Component Value Date/Time    TSHULTRASEN 2.140 09/14/2019 0727     No results found for: FREEDIR  Lab Results   Component Value Date/Time    FREET3 3.53 09/14/2019 0727           ASSESSMENT/PLAN:     1. Type 1 diabetes mellitus with hyperglycemia (HCC)  Controlled  A1c is high at 7.7%  He has changes in his life (now working) and we need to adjust his basal rates.  0300  0.600--> 0.7  1200 0.575--> 0.5  10pm 0.550--> 0.5  He needs to follow up in 3-4 months with fasting labs prior     2. Vitamin D deficiency  Controlled  Continue vitamin D3 5000 units daily  Continue monitoring    3. Celiac disease  Stable   Continue monitoring      4. Insulin pump status  Insulin pump in place      5. Long-term insulin use (HCC)  Patient is on long-term insulin therapy due to type 1 diabetes      No follow-ups on file.      Thank you kindly for allowing me to participate in the diabetes care plan for this patient.    Gabo Benoit MD, FACE, N      CC:   Gregory Grande M.D.

## 2023-05-05 NOTE — PROGRESS NOTES
RN-CDE Note    Subjective:   Endocrinology Clinic Progress Note  PCP: Froilan Grande M.D.    HPI:  Martinez Smith is a 22 y.o. old patient who is seen today by the Diabetes Nurse Specialist for review of Type 1 Diabetes  Recent changes in health: Continues to have problems with abdomen pain and dizziness.  He has a new job with new hours and new insurance  DM:   Last A1c:   Lab Results   Component Value Date/Time    HBA1C 7.7 (A) 05/01/2023 04:14 PM      Previous A1c was 7.7 on 12/30/22  A1C GOAL: < 7    Diabetes Medications:   Humalog in Medtronic 630 G pump      Exercise: Walking  Diet: He has a lot of food allergies and has been introducing more vegetables and soy butters into his diet  Patient's body mass index is unknown because there is no height or weight on file. Exercise and nutrition counseling were performed at this visit.    Glucose monitoring frequency: See pump download    Hypoglycemic episodes: yes - before dinner  Last Retinal Exam:  Every June with [facility]  Daily Foot Exam: Yes   Foot Exam:  Monofilament: done  Monofilament testing with a 10 gram force: sensation intact: intact bilaterally  Visual Inspection: Feet without maceration, ulcers, fissures.  Pedal pulses: intact bilaterally   Lab Results   Component Value Date/Time    MALBCRT see below 07/13/2019 08:00 AM    MICROALBUR <0.7 07/13/2019 08:00 AM    MICRALB <3.0 02/18/2022 04:48 AM        ACR Albumin/Creatinine Ratio goal <30     HTN:   Blood pressure goal <130/<80 .   Currently Rx ACE/ARB: No     Dyslipidemia:    Lab Results   Component Value Date/Time    CHOLSTRLTOT 181 02/18/2022 04:48 AM    CHOLSTRLTOT 177 12/24/2019 07:29 AM     (H) 08/20/2020 05:40 AM     (H) 12/24/2019 07:29 AM    HDL 41 02/18/2022 04:48 AM    HDL 28 (A) 12/24/2019 07:29 AM    TRIGLYCERIDE 111 02/18/2022 04:48 AM    TRIGLYCERIDE 103 12/24/2019 07:29 AM         Currently Rx Statin: No     He  reports that he has never smoked. He has never  used smokeless tobacco.      Plan:     Discussed and educated on:   - All medications, side effects and compliance (discussed carefully)  - Annual eye examinations at Ophthalmology  - Home glucose monitoring emphasized  - Weight control and daily exercise    Recommended medication changes: Adjust basal rates as needed.

## 2023-05-22 ENCOUNTER — TELEPHONE (OUTPATIENT)
Dept: ENDOCRINOLOGY | Facility: MEDICAL CENTER | Age: 23
End: 2023-05-22
Payer: COMMERCIAL

## 2023-05-23 DIAGNOSIS — E10.649 TYPE 1 DIABETES MELLITUS WITH HYPOGLYCEMIA AND WITHOUT COMA (HCC): ICD-10-CM

## 2023-05-23 RX ORDER — INSULIN LISPRO 100 [IU]/ML
60 INJECTION, SOLUTION INTRAVENOUS; SUBCUTANEOUS DAILY
Qty: 60 ML | Refills: 3 | Status: SHIPPED | OUTPATIENT
Start: 2023-05-23 | End: 2023-05-23

## 2023-05-23 RX ORDER — INSULIN LISPRO 100 [IU]/ML
60 INJECTION, SOLUTION INTRAVENOUS; SUBCUTANEOUS DAILY
Qty: 60 ML | Refills: 3 | Status: SHIPPED | OUTPATIENT
Start: 2023-05-23 | End: 2024-02-05

## 2023-05-23 NOTE — TELEPHONE ENCOUNTER
VOICEMAIL  1. Caller Name: Martinez                      Call Back Number: [phone]    2. Message: Would like a new prescription for humalog with 3 month supply sent to [facility] in [place]. Previous script was sent for 90 days.    As well as a PA for test strips.    3. Patient approves office to leave a detailed voicemail/MyChart message: N\A        Checked cover my meds and his test strips PA approval is covered until 12/19/2023

## 2023-06-20 ENCOUNTER — HOSPITAL ENCOUNTER (OUTPATIENT)
Dept: LAB | Facility: MEDICAL CENTER | Age: 23
End: 2023-06-20
Attending: INTERNAL MEDICINE
Payer: COMMERCIAL

## 2023-06-20 ENCOUNTER — HOSPITAL ENCOUNTER (OUTPATIENT)
Dept: LAB | Facility: MEDICAL CENTER | Age: 23
End: 2023-06-20
Attending: STUDENT IN AN ORGANIZED HEALTH CARE EDUCATION/TRAINING PROGRAM
Payer: COMMERCIAL

## 2023-06-20 DIAGNOSIS — Z96.41 INSULIN PUMP IN PLACE: ICD-10-CM

## 2023-06-20 DIAGNOSIS — K90.0 CELIAC DISEASE: ICD-10-CM

## 2023-06-20 DIAGNOSIS — E10.649 TYPE 1 DIABETES MELLITUS WITH HYPOGLYCEMIA AND WITHOUT COMA (HCC): ICD-10-CM

## 2023-06-20 DIAGNOSIS — E55.9 VITAMIN D DEFICIENCY: ICD-10-CM

## 2023-06-20 DIAGNOSIS — Z79.4 LONG-TERM INSULIN USE (HCC): ICD-10-CM

## 2023-06-20 LAB
25(OH)D3 SERPL-MCNC: 23 NG/ML (ref 30–100)
ALBUMIN SERPL BCP-MCNC: 4.7 G/DL (ref 3.2–4.9)
ALBUMIN/GLOB SERPL: 1.7 G/DL
ALP SERPL-CCNC: 85 U/L (ref 30–99)
ALT SERPL-CCNC: 35 U/L (ref 2–50)
ANION GAP SERPL CALC-SCNC: 8 MMOL/L (ref 7–16)
AST SERPL-CCNC: 34 U/L (ref 12–45)
BASOPHILS # BLD AUTO: 0.9 % (ref 0–1.8)
BASOPHILS # BLD: 0.05 K/UL (ref 0–0.12)
BILIRUB SERPL-MCNC: 0.5 MG/DL (ref 0.1–1.5)
BUN SERPL-MCNC: 9 MG/DL (ref 8–22)
CALCIUM ALBUM COR SERPL-MCNC: 9 MG/DL (ref 8.5–10.5)
CALCIUM SERPL-MCNC: 9.6 MG/DL (ref 8.5–10.5)
CHLORIDE SERPL-SCNC: 97 MMOL/L (ref 96–112)
CHOLEST SERPL-MCNC: 196 MG/DL (ref 100–199)
CO2 SERPL-SCNC: 26 MMOL/L (ref 20–33)
CREAT SERPL-MCNC: 0.73 MG/DL (ref 0.5–1.4)
EOSINOPHIL # BLD AUTO: 0.35 K/UL (ref 0–0.51)
EOSINOPHIL NFR BLD: 6.5 % (ref 0–6.9)
ERYTHROCYTE [DISTWIDTH] IN BLOOD BY AUTOMATED COUNT: 40.2 FL (ref 35.9–50)
FERRITIN SERPL-MCNC: 186 NG/ML (ref 22–322)
FOLATE SERPL-MCNC: 13.8 NG/ML
GFR SERPLBLD CREATININE-BSD FMLA CKD-EPI: 130 ML/MIN/1.73 M 2
GLOBULIN SER CALC-MCNC: 2.7 G/DL (ref 1.9–3.5)
GLUCOSE SERPL-MCNC: 253 MG/DL (ref 65–99)
HCT VFR BLD AUTO: 44.4 % (ref 42–52)
HDLC SERPL-MCNC: 37 MG/DL
HGB BLD-MCNC: 14.7 G/DL (ref 14–18)
IMM GRANULOCYTES # BLD AUTO: 0.01 K/UL (ref 0–0.11)
IMM GRANULOCYTES NFR BLD AUTO: 0.2 % (ref 0–0.9)
IRON SATN MFR SERPL: 34 % (ref 15–55)
IRON SERPL-MCNC: 89 UG/DL (ref 50–180)
LDLC SERPL CALC-MCNC: 136 MG/DL
LYMPHOCYTES # BLD AUTO: 1.36 K/UL (ref 1–4.8)
LYMPHOCYTES NFR BLD: 25.2 % (ref 22–41)
MCH RBC QN AUTO: 29.9 PG (ref 27–33)
MCHC RBC AUTO-ENTMCNC: 33.1 G/DL (ref 32.3–36.5)
MCV RBC AUTO: 90.4 FL (ref 81.4–97.8)
MONOCYTES # BLD AUTO: 0.41 K/UL (ref 0–0.85)
MONOCYTES NFR BLD AUTO: 7.6 % (ref 0–13.4)
NEUTROPHILS # BLD AUTO: 3.21 K/UL (ref 1.82–7.42)
NEUTROPHILS NFR BLD: 59.6 % (ref 44–72)
NRBC # BLD AUTO: 0 K/UL
NRBC BLD-RTO: 0 /100 WBC (ref 0–0.2)
PLATELET # BLD AUTO: 266 K/UL (ref 164–446)
PMV BLD AUTO: 12.5 FL (ref 9–12.9)
POTASSIUM SERPL-SCNC: 4.1 MMOL/L (ref 3.6–5.5)
PROT SERPL-MCNC: 7.4 G/DL (ref 6–8.2)
RBC # BLD AUTO: 4.91 M/UL (ref 4.7–6.1)
SODIUM SERPL-SCNC: 131 MMOL/L (ref 135–145)
T4 FREE SERPL-MCNC: 1.28 NG/DL (ref 0.93–1.7)
TIBC SERPL-MCNC: 264 UG/DL (ref 250–450)
TRIGL SERPL-MCNC: 113 MG/DL (ref 0–149)
TSH SERPL DL<=0.005 MIU/L-ACNC: 3.41 UIU/ML (ref 0.38–5.33)
UIBC SERPL-MCNC: 175 UG/DL (ref 110–370)
VIT B12 SERPL-MCNC: 800 PG/ML (ref 211–911)
WBC # BLD AUTO: 5.4 K/UL (ref 4.8–10.8)

## 2023-06-20 PROCEDURE — 82784 ASSAY IGA/IGD/IGG/IGM EACH: CPT

## 2023-06-20 PROCEDURE — 84439 ASSAY OF FREE THYROXINE: CPT

## 2023-06-20 PROCEDURE — 82607 VITAMIN B-12: CPT

## 2023-06-20 PROCEDURE — 82570 ASSAY OF URINE CREATININE: CPT

## 2023-06-20 PROCEDURE — 82306 VITAMIN D 25 HYDROXY: CPT

## 2023-06-20 PROCEDURE — 83550 IRON BINDING TEST: CPT

## 2023-06-20 PROCEDURE — 83540 ASSAY OF IRON: CPT

## 2023-06-20 PROCEDURE — 86364 TISS TRNSGLTMNASE EA IG CLAS: CPT

## 2023-06-20 PROCEDURE — 82728 ASSAY OF FERRITIN: CPT

## 2023-06-20 PROCEDURE — 84443 ASSAY THYROID STIM HORMONE: CPT

## 2023-06-20 PROCEDURE — 85025 COMPLETE CBC W/AUTO DIFF WBC: CPT

## 2023-06-20 PROCEDURE — 82043 UR ALBUMIN QUANTITATIVE: CPT

## 2023-06-20 PROCEDURE — 36415 COLL VENOUS BLD VENIPUNCTURE: CPT

## 2023-06-20 PROCEDURE — 80053 COMPREHEN METABOLIC PANEL: CPT

## 2023-06-20 PROCEDURE — 80061 LIPID PANEL: CPT

## 2023-06-20 PROCEDURE — 82746 ASSAY OF FOLIC ACID SERUM: CPT

## 2023-06-21 LAB
CREAT UR-MCNC: 26.01 MG/DL
IGA SERPL-MCNC: 203 MG/DL (ref 68–408)
MICROALBUMIN UR-MCNC: <1.2 MG/DL
MICROALBUMIN/CREAT UR: NORMAL MG/G (ref 0–30)

## 2023-06-22 ENCOUNTER — HOSPITAL ENCOUNTER (OUTPATIENT)
Facility: MEDICAL CENTER | Age: 23
End: 2023-06-22
Attending: INTERNAL MEDICINE
Payer: COMMERCIAL

## 2023-06-22 LAB — TTG IGA SER IA-ACNC: 2 U/ML (ref 0–3)

## 2023-06-22 PROCEDURE — 84150 ASSAY OF PROSTAGLANDIN: CPT

## 2023-06-22 PROCEDURE — 82570 ASSAY OF URINE CREATININE: CPT

## 2023-06-22 PROCEDURE — 82542 COL CHROMOTOGRAPHY QUAL/QUAN: CPT

## 2023-06-22 PROCEDURE — 36415 COLL VENOUS BLD VENIPUNCTURE: CPT

## 2023-06-23 ENCOUNTER — OFFICE VISIT (OUTPATIENT)
Dept: URGENT CARE | Facility: PHYSICIAN GROUP | Age: 23
End: 2023-06-23
Payer: COMMERCIAL

## 2023-06-23 VITALS
OXYGEN SATURATION: 98 % | SYSTOLIC BLOOD PRESSURE: 116 MMHG | RESPIRATION RATE: 12 BRPM | BODY MASS INDEX: 20.89 KG/M2 | DIASTOLIC BLOOD PRESSURE: 80 MMHG | HEIGHT: 66 IN | WEIGHT: 130 LBS | TEMPERATURE: 98.1 F | HEART RATE: 95 BPM

## 2023-06-23 DIAGNOSIS — J32.9 SINUSITIS, UNSPECIFIED CHRONICITY, UNSPECIFIED LOCATION: ICD-10-CM

## 2023-06-23 DIAGNOSIS — J34.89 SINUS PRESSURE: ICD-10-CM

## 2023-06-23 PROCEDURE — 3074F SYST BP LT 130 MM HG: CPT | Performed by: FAMILY MEDICINE

## 2023-06-23 PROCEDURE — 99213 OFFICE O/P EST LOW 20 MIN: CPT | Performed by: FAMILY MEDICINE

## 2023-06-23 PROCEDURE — 3079F DIAST BP 80-89 MM HG: CPT | Performed by: FAMILY MEDICINE

## 2023-06-23 RX ORDER — AMITRIPTYLINE HYDROCHLORIDE 50 MG/1
1 TABLET, FILM COATED ORAL
COMMUNITY
Start: 2023-03-24 | End: 2023-07-18

## 2023-06-23 RX ORDER — AMOXICILLIN AND CLAVULANATE POTASSIUM 875; 125 MG/1; MG/1
1 TABLET, FILM COATED ORAL 2 TIMES DAILY
Qty: 14 TABLET | Refills: 0 | Status: SHIPPED | OUTPATIENT
Start: 2023-06-23 | End: 2023-06-30

## 2023-06-23 ASSESSMENT — ENCOUNTER SYMPTOMS
NAUSEA: 0
VOMITING: 0
MYALGIAS: 0
SORE THROAT: 0
CHILLS: 0
FEVER: 0
DIZZINESS: 0
SINUS PRESSURE: 1
SINUS PAIN: 1
SHORTNESS OF BREATH: 0

## 2023-06-23 ASSESSMENT — FIBROSIS 4 INDEX: FIB4 SCORE: 0.52

## 2023-06-23 NOTE — PATIENT INSTRUCTIONS
Sinusitis, Adult  Sinusitis is soreness and swelling (inflammation) of your sinuses. Sinuses are hollow spaces in the bones around your face. They are located:  Around your eyes.  In the middle of your forehead.  Behind your nose.  In your cheekbones.  Your sinuses and nasal passages are lined with a fluid called mucus. Mucus drains out of your sinuses. Swelling can trap mucus in your sinuses. This lets germs (bacteria, virus, or fungus) grow, which leads to infection. Most of the time, this condition is caused by a virus.  What are the causes?  This condition is caused by:  Allergies.  Asthma.  Germs.  Things that block your nose or sinuses.  Growths in the nose (nasal polyps).  Chemicals or irritants in the air.  Fungus (rare).  What increases the risk?  You are more likely to develop this condition if:  You have a weak body defense system (immune system).  You do a lot of swimming or diving.  You use nasal sprays too much.  You smoke.  What are the signs or symptoms?  The main symptoms of this condition are pain and a feeling of pressure around the sinuses. Other symptoms include:  Stuffy nose (congestion).  Runny nose (drainage).  Swelling and warmth in the sinuses.  Headache.  Toothache.  A cough that may get worse at night.  Mucus that collects in the throat or the back of the nose (postnasal drip).  Being unable to smell and taste.  Being very tired (fatigue).  A fever.  Sore throat.  Bad breath.  How is this diagnosed?  This condition is diagnosed based on:  Your symptoms.  Your medical history.  A physical exam.  Tests to find out if your condition is short-term (acute) or long-term (chronic). Your doctor may:  Check your nose for growths (polyps).  Check your sinuses using a tool that has a light (endoscope).  Check for allergies or germs.  Do imaging tests, such as an MRI or CT scan.  How is this treated?  Treatment for this condition depends on the cause and whether it is short-term or long-term.  If  caused by a virus, your symptoms should go away on their own within 10 days. You may be given medicines to relieve symptoms. They include:  Medicines that shrink swollen tissue in the nose.  Medicines that treat allergies (antihistamines).  A spray that treats swelling of the nostrils.   Rinses that help get rid of thick mucus in your nose (nasal saline washes).  If caused by bacteria, your doctor may wait to see if you will get better without treatment. You may be given antibiotic medicine if you have:  A very bad infection.  A weak body defense system.  If caused by growths in the nose, you may need to have surgery.  Follow these instructions at home:  Medicines  Take, use, or apply over-the-counter and prescription medicines only as told by your doctor. These may include nasal sprays.  If you were prescribed an antibiotic medicine, take it as told by your doctor. Do not stop taking the antibiotic even if you start to feel better.  Hydrate and humidify    Drink enough water to keep your pee (urine) pale yellow.  Use a cool mist humidifier to keep the humidity level in your home above 50%.  Breathe in steam for 10-15 minutes, 3-4 times a day, or as told by your doctor. You can do this in the bathroom while a hot shower is running.  Try not to spend time in cool or dry air.  Rest  Rest as much as you can.  Sleep with your head raised (elevated).  Make sure you get enough sleep each night.  General instructions    Put a warm, moist washcloth on your face 3-4 times a day, or as often as told by your doctor. This will help with discomfort.  Wash your hands often with soap and water. If there is no soap and water, use hand .  Do not smoke. Avoid being around people who are smoking (secondhand smoke).  Keep all follow-up visits as told by your doctor. This is important.  Contact a doctor if:  You have a fever.  Your symptoms get worse.  Your symptoms do not get better within 10 days.  Get help right away  if:  You have a very bad headache.  You cannot stop throwing up (vomiting).  You have very bad pain or swelling around your face or eyes.  You have trouble seeing.  You feel confused.  Your neck is stiff.  You have trouble breathing.  Summary  Sinusitis is swelling of your sinuses. Sinuses are hollow spaces in the bones around your face.  This condition is caused by tissues in your nose that become inflamed or swollen. This traps germs. These can lead to infection.  If you were prescribed an antibiotic medicine, take it as told by your doctor. Do not stop taking it even if you start to feel better.  Keep all follow-up visits as told by your doctor. This is important.  This information is not intended to replace advice given to you by your health care provider. Make sure you discuss any questions you have with your health care provider.  Document Released: 06/05/2009 Document Revised: 05/20/2019 Document Reviewed: 05/20/2019  [employer] Patient Education © 2020 [employer]

## 2023-06-23 NOTE — PROGRESS NOTES
Subjective:   Martinez Smith is a 22 y.o. male who presents for Headache (Started Wednesday sinus infection, headache )        Sinus Problem  Chronicity: Reports sinus pain pressure over the past 3 days, similar symptoms with previous sinus infections. The current episode started in the past 7 days. The problem has been gradually worsening since onset. There has been no fever. Associated symptoms include congestion and sinus pressure. Pertinent negatives include no chills, shortness of breath or sore throat. (States blood sugars have been at baseline) Treatments tried: rest, fluids. The treatment provided no relief.     PMH:  has a past medical history of Celiac disease, Celiac disease, and Diabetes type I (HCC).  MEDS:   Current Outpatient Medications:     amitriptyline (ELAVIL) 50 MG Tab, Take 1 Tablet by mouth at bedtime., Disp: , Rfl:     amoxicillin-clavulanate (AUGMENTIN) 875-125 MG Tab, Take 1 Tablet by mouth 2 times a day for 7 days., Disp: 14 Tablet, Rfl: 0    CONTOUR NEXT TEST strip, USE TO TEST 10 TIMES A DAY FOR INSULIN PUMP ADJUSTMENT (LINKS TO INSULIN PUMP), Disp: 300 Strip, Rfl: 11    INSULIN LISPRO 100 UNIT/ML INJ, INJECT 60 UNITS UNDER THE SKIN EVERY DAY. INSULIN PUMP, Disp: 60 mL, Rfl: 3    HUMALOG 100 UNIT/ML, Inject 50 units into pump reservoir daily  30 day supply, Disp: 20 mL, Rfl: 11    meclizine (ANTIVERT) 25 MG Tab, Take 1 Tablet by mouth 3 times a day as needed for Dizziness or Nausea/Vomiting., Disp: 30 Tablet, Rfl: 0    promethazine (PHENERGAN) 25 MG Tab, Take 1 Tablet by mouth every 6 hours as needed for Nausea/Vomiting., Disp: 30 Tablet, Rfl: 0    tizanidine (ZANAFLEX) 4 MG Tab, Take 4 mg by mouth every 6 hours as needed., Disp: , Rfl:     montelukast (SINGULAIR) 10 MG Tab, Take 10 mg by mouth every day., Disp: , Rfl:     Glucagon, rDNA, (GLUCAGON EMERGENCY) 1 MG Kit, Inject 1 mg as directed as needed (severe hypoglycemia). For severe hypoglycemia, Disp: 1 Kit, Rfl: 1     ondansetron (ZOFRAN ODT) 4 MG TABLET DISPERSIBLE, Take 1 Tablet by mouth every 6 hours as needed for Nausea., Disp: 20 Tablet, Rfl: 0    fexofenadine (ALLEGRA) 60 MG Tab, Take 60 mg by mouth 2 times a day., Disp: , Rfl:     amitriptyline (ELAVIL) 25 MG Tab, Take 25 mg by mouth at bedtime., Disp: , Rfl:     PANCREAZE 46680-82044 units Cap DR Particles, Take 1 Capsule by mouth 3 times a day before meals., Disp: , Rfl:     insulin infusion pump Device, Inject  under the skin continuous. Patient's own SQ insulin pump  Type of Insulin: Humalog Last change of tubin22 - Change tubing and site every 72 hours  Dosing: Basal rate: 0000 - 0300 = .625 units 0300 - 1200 = .6 units 1200 - 0000= .575 units Bolus ratio:  1 unit : 14 g carbohydrate    Sensitivity ratio:  1 units for every 50 over 130-150 mg/dL  Disconnect pump if patient becomes hypoglycemic and altered., Disp: , Rfl:     azelastine (ASTELIN) 137 MCG/SPRAY nasal spray, , Disp: , Rfl:   ALLERGIES:   Allergies   Allergen Reactions    Beef Flavor Nausea     Beef broth causes Nausea and vomiting     Compazine Unspecified     Anxiety     Dairy Food Allergy Nausea    Eggs Unspecified     Nausea, vomiting, chest tightness    Gluten Meal Unspecified     Patient reports has celiac    Mustard Seed Unspecified     Nausea and vomiting,    Peanut (Diagnostic) Unspecified     Nausea, vomiting, and chest tightness    Reglan [Metoclopramide] Anxiety     Makes anxious    Shellfish Allergy Unspecified     Nausea, vomiting, chest tightness    Tree Nuts Food Allergy Anaphylaxis, Shortness of Breath, Itching, Nausea and Cough     Cashews and pistachio and coconut     Potato      White potato causes nausea vomiting     SURGHX:   Past Surgical History:   Procedure Laterality Date    HYPOSPADIAS REPAIR       SOCHX:  reports that he has never smoked. He has never used smokeless tobacco. He reports that he does not drink alcohol and does not use drugs.  FH:   Family History  "  Problem Relation Age of Onset    Arthritis Maternal Grandmother      Review of Systems   Constitutional:  Negative for chills and fever.   HENT:  Positive for congestion, sinus pressure and sinus pain. Negative for sore throat.    Respiratory:  Negative for shortness of breath.    Gastrointestinal:  Negative for nausea and vomiting.   Genitourinary:  Negative for dysuria.   Musculoskeletal:  Negative for myalgias.   Skin:  Negative for rash.   Neurological:  Negative for dizziness.        Objective:   /80 (BP Location: Left arm, Patient Position: Sitting, BP Cuff Size: Adult)   Pulse 95   Temp 36.7 °C (98.1 °F) (Temporal)   Resp 12   Ht 1.676 m (5' 6\")   Wt 59 kg (130 lb)   SpO2 98%   BMI 20.98 kg/m²   Physical Exam  Vitals and nursing note reviewed.   Constitutional:       General: He is not in acute distress.     Appearance: He is well-developed.   HENT:      Head: Normocephalic and atraumatic.      Right Ear: Tympanic membrane and external ear normal.      Left Ear: Tympanic membrane and external ear normal.      Nose: Mucosal edema present.      Right Turbinates: Swollen.      Left Turbinates: Swollen.      Comments: Turbinates edematous, purulent exudate noted     Mouth/Throat:      Mouth: Mucous membranes are moist.      Pharynx: Posterior oropharyngeal erythema (posterior pharyngeal drainage and erythema) present.   Eyes:      Conjunctiva/sclera: Conjunctivae normal.   Cardiovascular:      Rate and Rhythm: Normal rate.   Pulmonary:      Effort: Pulmonary effort is normal. No respiratory distress.      Breath sounds: Normal breath sounds. No wheezing or rhonchi.   Abdominal:      General: There is no distension.   Musculoskeletal:         General: Normal range of motion.   Skin:     General: Skin is warm and dry.   Neurological:      General: No focal deficit present.      Mental Status: He is alert and oriented to person, place, and time. Mental status is at baseline.      Gait: Gait (gait at " baseline) normal.   Psychiatric:         Judgment: Judgment normal.           Assessment/Plan:   1. Sinusitis, unspecified chronicity, unspecified location  - amoxicillin-clavulanate (AUGMENTIN) 875-125 MG Tab; Take 1 Tablet by mouth 2 times a day for 7 days.  Dispense: 14 Tablet; Refill: 0    2. Sinus pressure  - amoxicillin-clavulanate (AUGMENTIN) 875-125 MG Tab; Take 1 Tablet by mouth 2 times a day for 7 days.  Dispense: 14 Tablet; Refill: 0    Other orders  - amitriptyline (ELAVIL) 50 MG Tab; Take 1 Tablet by mouth at bedtime.        Medical Decision Making/Course:  In the course of preparing for this visit with review of the pertinent past medical history, recent and past clinic visits, current medications, and performing chart, immunization, medical history and medication reconciliation, and in the further course of obtaining the current history pertinent to the clinic visit today, performing an exam and evaluation, ordering and independently evaluating labs, tests  , and/or procedures, prescribing any recommended new medications as noted above, providing any pertinent counseling and education and recommending further coordination of care, at least  16 minutes of total time were spent during this encounter.      Discussed close monitoring, return precautions, and supportive measures of maintaining adequate fluid hydration and caloric intake, relative rest and symptom management as needed for pain and/or fever.    Differential diagnosis, natural history, supportive care, and indications for immediate follow-up discussed.     Advised the patient to follow-up with the primary care physician for recheck, reevaluation, and consideration of further management.    Please note that this dictation was created using voice recognition software. I have worked with consultants from the vendor as well as technical experts from [employer] to optimize the interface. I have made every reasonable attempt to correct obvious  errors, but I expect that there are errors of grammar and possibly content that I did not discover before finalizing the note.

## 2023-06-27 LAB
COLLECT DURATION TIME UR: 24 H
CREAT UR-MCNC: 44 MG/DL
CREAT URINE MG DAY Q4367: 1320 MG/24H (ref 930–2955)
ME-HISTAMINE/CREAT 24H UR: 158 MCG/G CR (ref 30–200)
SPECIMEN VOL UR: 3000 ML

## 2023-06-29 LAB — TEST NAME 95000: NORMAL

## 2023-06-30 LAB — MISCELLANEOUS LAB RESULT MISCLAB: ABNORMAL

## 2023-07-11 ENCOUNTER — APPOINTMENT (OUTPATIENT)
Dept: ADMISSIONS | Facility: MEDICAL CENTER | Age: 23
End: 2023-07-11
Attending: OTOLARYNGOLOGY
Payer: COMMERCIAL

## 2023-07-12 ENCOUNTER — TELEPHONE (OUTPATIENT)
Dept: ENDOCRINOLOGY | Facility: MEDICAL CENTER | Age: 23
End: 2023-07-12
Payer: COMMERCIAL

## 2023-07-12 NOTE — TELEPHONE ENCOUNTER
Received request via: Patient    Was the patient seen in the last year in this department? Yes    Does the patient have an active prescription (recently filled or refills available) for medication(s) requested? No    Does the patient have [facility] and need 100 day supply (blood pressure, diabetes and cholesterol meds only)? Patient does not have SCP.      Needs new test strips... [facility] won't cover the Contour Next test strips. Possibly Freestyle test strips will be covered and needs a new glucose meter (freestyle).

## 2023-07-13 ENCOUNTER — TELEPHONE (OUTPATIENT)
Dept: NEUROLOGY | Facility: MEDICAL CENTER | Age: 23
End: 2023-07-13
Payer: COMMERCIAL

## 2023-07-13 DIAGNOSIS — E10.649 TYPE 1 DIABETES MELLITUS WITH HYPOGLYCEMIA AND WITHOUT COMA (HCC): ICD-10-CM

## 2023-07-13 RX ORDER — BLOOD-GLUCOSE METER
1 KIT MISCELLANEOUS CONTINUOUS
Qty: 1 EACH | Refills: 0 | Status: SHIPPED | OUTPATIENT
Start: 2023-07-13 | End: 2024-03-07

## 2023-07-13 NOTE — TELEPHONE ENCOUNTER

## 2023-07-18 ENCOUNTER — OFFICE VISIT (OUTPATIENT)
Dept: NEUROLOGY | Facility: MEDICAL CENTER | Age: 23
End: 2023-07-18
Attending: PSYCHIATRY & NEUROLOGY
Payer: COMMERCIAL

## 2023-07-18 VITALS
HEART RATE: 110 BPM | HEIGHT: 66 IN | TEMPERATURE: 98.5 F | OXYGEN SATURATION: 98 % | BODY MASS INDEX: 21.97 KG/M2 | SYSTOLIC BLOOD PRESSURE: 112 MMHG | DIASTOLIC BLOOD PRESSURE: 74 MMHG | WEIGHT: 136.69 LBS

## 2023-07-18 DIAGNOSIS — M62.838 MUSCLE SPASMS OF NECK: ICD-10-CM

## 2023-07-18 DIAGNOSIS — J34.9 SINUS DISEASE: ICD-10-CM

## 2023-07-18 PROCEDURE — 99214 OFFICE O/P EST MOD 30 MIN: CPT | Performed by: PSYCHIATRY & NEUROLOGY

## 2023-07-18 PROCEDURE — 3074F SYST BP LT 130 MM HG: CPT | Performed by: PSYCHIATRY & NEUROLOGY

## 2023-07-18 PROCEDURE — 3078F DIAST BP <80 MM HG: CPT | Performed by: PSYCHIATRY & NEUROLOGY

## 2023-07-18 PROCEDURE — 99212 OFFICE O/P EST SF 10 MIN: CPT | Performed by: PSYCHIATRY & NEUROLOGY

## 2023-07-18 RX ORDER — CROMOLYN SODIUM 100 MG/5ML
SOLUTION, CONCENTRATE ORAL
COMMUNITY
Start: 2023-07-12

## 2023-07-18 RX ORDER — BLOOD-GLUCOSE METER
KIT MISCELLANEOUS
COMMUNITY
Start: 2023-07-14 | End: 2023-07-20

## 2023-07-18 ASSESSMENT — FIBROSIS 4 INDEX: FIB4 SCORE: 0.52

## 2023-07-18 NOTE — ASSESSMENT & PLAN NOTE
Pt is walking for exercise for 10 to 20 minutes. Pt has a good pillow and a wedge pillow and that has helped.

## 2023-07-18 NOTE — ASSESSMENT & PLAN NOTE
Pt is going to have surgery to open up his sinuses at the end of august. Pt has some sinus pressure around his eyes and in his cheeks.

## 2023-07-18 NOTE — PROGRESS NOTES
Chief Complaint   Patient presents with    Follow-Up     Epilepsy       Problem List Items Addressed This Visit       Muscle spasms of neck     Pt is walking for exercise for 10 to 20 minutes. Pt has a good pillow and a wedge pillow and that has helped.         Sinus disease     Pt is going to have surgery to open up his sinuses at the end of august. Pt has some sinus pressure around his eyes and in his cheeks.            History of present illness:  Martinez Smith 22 y.o. male presents today for treatment of muscle spasms in his neck and shoulders. The patient also has recently diagnosed mast cell activation and he cannot take allergy shots anymore.      Past medical history:   Past Medical History:   Diagnosis Date    Celiac disease     Celiac disease     Diabetes type I (HCC)     insulin pump       Past surgical history:   Past Surgical History:   Procedure Laterality Date    HYPOSPADIAS REPAIR         Family history:   Family History   Problem Relation Age of Onset    Arthritis Maternal Grandmother        Social history:   Social History     Socioeconomic History    Marital status: Single     Spouse name: Not on file    Number of children: Not on file    Years of education: Not on file    Highest education level: Not on file   Occupational History    Not on file   Tobacco Use    Smoking status: Never    Smokeless tobacco: Never   Vaping Use    Vaping Use: Never used   Substance and Sexual Activity    Alcohol use: No    Drug use: No    Sexual activity: Not on file   Other Topics Concern    Behavioral problems Not Asked    Interpersonal relationships Not Asked    Sad or not enjoying activities Not Asked    Suicidal thoughts Not Asked    Poor school performance Not Asked    Reading difficulties Not Asked    Speech difficulties Not Asked    Writing difficulties Not Asked    Inadequate sleep Not Asked    Excessive TV viewing Not Asked    Excessive video game use Not Asked    Inadequate exercise Not Asked     Sports related Not Asked    Poor diet Not Asked    Family concerns for drug/alcohol abuse Not Asked    Poor oral hygiene Not Asked    Bike safety Not Asked    Family concerns vehicle safety Not Asked   Social History Narrative    Not on file     Social Determinants of Health     Financial Resource Strain: Not on file   Food Insecurity: Not on file   Transportation Needs: Not on file   Physical Activity: Not on file   Stress: Not on file   Social Connections: Not on file   Intimate Partner Violence: Not on file   Housing Stability: Not on file       Current medications:   Current Outpatient Medications   Medication    FREESTYLE LITE strip    Blood Glucose Monitoring Suppl (FREESTYLE LITE) Device    Blood Glucose Test Strips    INSULIN LISPRO 100 UNIT/ML INJ    HUMALOG 100 UNIT/ML    meclizine (ANTIVERT) 25 MG Tab    promethazine (PHENERGAN) 25 MG Tab    tizanidine (ZANAFLEX) 4 MG Tab    montelukast (SINGULAIR) 10 MG Tab    Glucagon, rDNA, (GLUCAGON EMERGENCY) 1 MG Kit    ondansetron (ZOFRAN ODT) 4 MG TABLET DISPERSIBLE    fexofenadine (ALLEGRA) 60 MG Tab    amitriptyline (ELAVIL) 25 MG Tab    PANCREAZE 43129-45878 units Cap DR Particles    insulin infusion pump Device    cromolyn (GASTROCROM) 100 MG/5ML solution    azelastine (ASTELIN) 137 MCG/SPRAY nasal spray     No current facility-administered medications for this visit.       Medication Allergy:  Allergies   Allergen Reactions    Beef Flavor Nausea     Beef broth causes Nausea and vomiting     Compazine Unspecified     Anxiety     Dairy Food Allergy Nausea    Eggs Unspecified     Nausea, vomiting, chest tightness    Gluten Meal Unspecified     Patient reports has celiac    Mustard Seed Unspecified     Nausea and vomiting,    Peanut (Diagnostic) Unspecified     Nausea, vomiting, and chest tightness    Reglan [Metoclopramide] Anxiety     Makes anxious    Shellfish Allergy Unspecified     Nausea, vomiting, chest tightness    Tree Nuts Food Allergy Anaphylaxis,  "Shortness of Breath, Itching, Nausea and Cough     Cashews and pistachio and coconut     Potato      White potato causes nausea vomiting       Review of systems:   Constitutional: denies fever, night sweats, weight loss.   Eyes: denies acute vision change, eye pain or secretion.   Ears, Nose, Mouth, Throat: denies nasal secretion, nasal bleeding, difficulty swallowing, hearing loss, tinnitus, vertigo, ear pain, acute dental problems, oral ulcers or lesions.   Endocrine: denies recent weight changes, heat or cold intolerance, polyuria, polydypsia, polyphagia,abnormal hair growth.  Cardiovascular: denies new onset of chest pain, palpitations, syncope, or dyspnea of exertion.  Pulmonary: denies shortness of breath, new onset of cough, hemoptysis, wheezing, chest pain or flu-like symptoms.   GI: denies nausea, vomiting, diarrhea, GI bleeding, change in appetite, abdominal pain, and change in bowel habits.  : denies dysuria, urinary incontinence, hematuria.  Heme/oncology: denies history of easy bruising or bleeding. No history of cancer, DVTor PE.  Allergy/immunology: denies hives/urticaria, or itching.   Dermatologic: denies new rash, or new skin lesions.  Musculoskeletal:denies joint swelling or pain, muscle pain, neck and back pain. Neurologic: denies headaches, acute visual changes, facial droopiness, muscle weakness (focal or generalized), paresthesias, anesthesia, ataxia, change in speech or language, memory loss, abnormal movements, seizures, loss of consciousness, or episodes of confusion.   Psychiatric: denies symptoms of depression, anxiety, hallucinations, mood swings or changes, suicidal or homicidal thoughts.     Physical examination:   Vitals:    07/18/23 1013   BP: 112/74   BP Location: Right arm   Patient Position: Sitting   BP Cuff Size: Adult   Pulse: (!) 110   Temp: 36.9 °C (98.5 °F)   TempSrc: Temporal   SpO2: 98%   Weight: 62 kg (136 lb 11 oz)   Height: 1.676 m (5' 6\")     General: Patient in no " acute distress, pleasant and cooperative.  HEENT: Normocephalic, no signs of acute trauma.   Neck: supple, no meningeal signs or carotid bruits. There is normal range of motion. No tenderness on exam.   Chest: clear to auscultation. No cough.   CV: RRR, no murmurs.   Skin: no signs of acute rashes or trauma.   Musculoskeletal: joints exhibit full range of motion, without any pain to palpation. There are no signs of joint or muscle swelling. There is no tenderness to deep palpation of muscles.   Psychiatric: No hallucinatory behavior. Denies symptoms of depression or suicidal ideation. Mood and affect appear normal on exam.     NEUROLOGICAL EXAM:   Mental status, orientation: Awake, alert and fully oriented.   Speech and language: speech is clear and fluent. The patient is able to name, repeat and comprehend.   Memory: There is intact recollection of recent and remote events.   Cranial nerve exam: Pupils are 3-4 mm bilaterally and equally reactive to light and accommodation. Visual fields are intact by confrontation. Fundoscopic exam was unremarkable. There is no nystagmus on primary or secondary gaze. Intact full EOM in all directions of gaze. Face appears symmetric. Sensation in the face is intact to light touch. Uvula is midline. Palate elevates symmetrically. Tongue is midline and without any signs of tongue biting or fasciculations. Sternocleidomastoid muscles exhibit is normal strength bilaterally. Shoulder shrug is intact bilaterally.   Motor exam: Strength is 5/5 in all extremities. Tone is normal. No abnormal movements were seen on exam.   Sensory exam reveals normal sense of light touch, proprioception, vibration and pinprick in all extremities.   Deep tendon reflexes:  2+ throughout. Plantar responses are flexor. There is no clonus.   Coordination: shows a normal finger-nose-finger. Normal rapidly alternating movements.   Gait: The patient was able to get up from seated position on first attempt without  requiring assistance. Found to be steady when walking. Movements were fluid with normal arm swing. The patient was able to turn without difficulties or tendency to fall. Romberg examination neg      ANCILLARY DATA REVIEWED:     Lab Data Review:  No results found for this or any previous visit (from the past 24 hour(s)).    Records reviewed:    Latest Reference Range & Units 06/20/23 09:01   WBC 4.8 - 10.8 K/uL 5.4   RBC 4.70 - 6.10 M/uL 4.91   Hemoglobin 14.0 - 18.0 g/dL 14.7   Hematocrit 42.0 - 52.0 % 44.4   MCV 81.4 - 97.8 fL 90.4   MCH 27.0 - 33.0 pg 29.9   MCHC 32.3 - 36.5 g/dL 33.1   RDW 35.9 - 50.0 fL 40.2   Platelet Count 164 - 446 K/uL 266   MPV 9.0 - 12.9 fL 12.5   Neutrophils-Polys 44.00 - 72.00 % 59.60   Neutrophils (Absolute) 1.82 - 7.42 K/uL 3.21   Lymphocytes 22.00 - 41.00 % 25.20   Lymphs (Absolute) 1.00 - 4.80 K/uL 1.36   Monocytes 0.00 - 13.40 % 7.60   Monos (Absolute) 0.00 - 0.85 K/uL 0.41   Eosinophils 0.00 - 6.90 % 6.50   Eos (Absolute) 0.00 - 0.51 K/uL 0.35   Basophils 0.00 - 1.80 % 0.90   Baso (Absolute) 0.00 - 0.12 K/uL 0.05   Immature Granulocytes 0.00 - 0.90 % 0.20   Immature Granulocytes (abs) 0.00 - 0.11 K/uL 0.01   Nucleated RBC 0.00 - 0.20 /100 WBC 0.00   NRBC (Absolute) K/uL 0.00      Latest Reference Range & Units 06/20/23 09:00   Sodium 135 - 145 mmol/L 131 (L)   Potassium 3.6 - 5.5 mmol/L 4.1   Chloride 96 - 112 mmol/L 97   Co2 20 - 33 mmol/L 26   Anion Gap 7.0 - 16.0  8.0   Glucose 65 - 99 mg/dL 253 (H)   Bun 8 - 22 mg/dL 9   Creatinine 0.50 - 1.40 mg/dL 0.73   GFR (CKD-EPI) >60 mL/min/1.73 m 2 130   Calcium 8.5 - 10.5 mg/dL 9.6   Correct Calcium 8.5 - 10.5 mg/dL 9.0   AST(SGOT) 12 - 45 U/L 34   ALT(SGPT) 2 - 50 U/L 35   Alkaline Phosphatase 30 - 99 U/L 85   Total Bilirubin 0.1 - 1.5 mg/dL 0.5   Albumin 3.2 - 4.9 g/dL 4.7   Total Protein 6.0 - 8.2 g/dL 7.4   Globulin 1.9 - 3.5 g/dL 2.7   A-G Ratio g/dL 1.7   Cholesterol,Tot 100 - 199 mg/dL 196   Triglycerides 0 - 149 mg/dL 113   HDL  >=40 mg/dL 37 !   LDL <100 mg/dL 136 (H)   (L): Data is abnormally low  (H): Data is abnormally high  !: Data is abnormal    Imaging:    Routine     Narrative & Impression     11/15/2016 1:08 PM     HISTORY/REASON FOR EXAM:  Right leg weakness.     TECHNIQUE/EXAM DESCRIPTION:   MRI of the brain without and with contrast.     T1 sagittal, T2 fast spin-echo axial, T1 coronal, FLAIR coronal, Diffusion weighted and Apparent Diffusion Coefficient (ADC map) axial images were obtained of the whole brain. T1 post-contrast axial and T1 post-contrast coronal images were obtained.   Additional FLAIR axial images were obtained.     The study was performed on a [employer] 1.5 Gabbie MRI scanner.     14 mL Omniscan gadolinium contrast was administered intravenously.     COMPARISON:  There are no previous brain scans for comparison     FINDINGS:  The calvariae are unremarkable.  There are no extra-axial fluid collections.     The ventricular system and basal cisterns are within normal limits.     There are no areas of abnormal signal in the brain substance.     There are no mass effects or shift of midline structures.  There are no hemorrhagic lesions.  The diffusion weighted axial images show no evidence of acute cerebral infarction.     The postcontrast images show no areas of abnormal parenchymal or meningeal enhancement.     The brainstem and posterior fossa structures are unremarkable.     Vascular flow voids in the vertebrobasilar and carotid arteries, [place], and dural venous sinuses are intact.     The paranasal sinuses in the field of view are unremarkable.     The mastoids are clear.     IMPRESSION:     MRI OF THE BRAIN WITHOUT AND WITH CONTRAST WITHIN NORMAL LIMITS.          ASSESSMENT AND PLAN:    1. Muscle spasms of neck   Pt has had improvement with recent exercises and better pillow.    2. Sinus disease  Pt to have sinus surgery in August to open up his sinuses.      FOLLOW-UP:   prn      My total time  spent caring for the patient on the day of the encounter was 36 minutes.   This does not include time spent on separately billable procedures/tests.     EDUCATION AND COUNSELING:  -Discussed regular exercise program and prevention of cardiovascular disease, including stroke.   -Discussed healthy lifestyle, including: healthy diet (rich in fruits, vegetables, nuts and healthy oils); proper hydration, and adequate sleep hygiene (allowing 7-8 hrs of overnight sleep).        Melissa Bloch, MD  Clinical  of Neurology [facility].   Diplomate in Neurology.   Office: [phone]  Fax: [phone]

## 2023-07-20 ENCOUNTER — TELEPHONE (OUTPATIENT)
Dept: ENDOCRINOLOGY | Facility: MEDICAL CENTER | Age: 23
End: 2023-07-20
Payer: COMMERCIAL

## 2023-07-20 DIAGNOSIS — E10.649 TYPE 1 DIABETES MELLITUS WITH HYPOGLYCEMIA AND WITHOUT COMA (HCC): ICD-10-CM

## 2023-07-20 RX ORDER — BLOOD-GLUCOSE METER
KIT MISCELLANEOUS
Qty: 300 STRIP | Refills: 6 | Status: SHIPPED | OUTPATIENT
Start: 2023-07-20 | End: 2024-03-07

## 2023-07-20 NOTE — TELEPHONE ENCOUNTER
Caller Name: Martinez  Call Back Number: [phone]    Patient is requesting a new prescription for his test strips. He would like a 300 test strip a month. He recently picked up test strips however it was only for 150 test strips and patient states he is testing 8-10 times a day. Please send to [facility] in [place]

## 2023-07-25 ENCOUNTER — TELEPHONE (OUTPATIENT)
Dept: ENDOCRINOLOGY | Facility: MEDICAL CENTER | Age: 23
End: 2023-07-25
Payer: COMMERCIAL

## 2023-07-25 NOTE — TELEPHONE ENCOUNTER
Patient wants to talk to about you about tandem pump package that we ordered through [employer] (alicia). States that he doesn't want to deal with a third party in regards to supplies. He would like to discuss other options. He's already spoke to both [employer] and [employer].

## 2023-07-27 ENCOUNTER — OFFICE VISIT (OUTPATIENT)
Dept: URGENT CARE | Facility: PHYSICIAN GROUP | Age: 23
End: 2023-07-27
Payer: COMMERCIAL

## 2023-07-27 VITALS
DIASTOLIC BLOOD PRESSURE: 82 MMHG | BODY MASS INDEX: 20.89 KG/M2 | OXYGEN SATURATION: 98 % | SYSTOLIC BLOOD PRESSURE: 110 MMHG | WEIGHT: 130 LBS | HEIGHT: 66 IN | RESPIRATION RATE: 12 BRPM | HEART RATE: 89 BPM | TEMPERATURE: 97.5 F

## 2023-07-27 DIAGNOSIS — J32.4 CHRONIC PANSINUSITIS: ICD-10-CM

## 2023-07-27 PROCEDURE — 99214 OFFICE O/P EST MOD 30 MIN: CPT | Performed by: PHYSICIAN ASSISTANT

## 2023-07-27 PROCEDURE — 3079F DIAST BP 80-89 MM HG: CPT | Performed by: PHYSICIAN ASSISTANT

## 2023-07-27 PROCEDURE — 3074F SYST BP LT 130 MM HG: CPT | Performed by: PHYSICIAN ASSISTANT

## 2023-07-27 RX ORDER — DOXYCYCLINE HYCLATE 100 MG
100 TABLET ORAL 2 TIMES DAILY
Qty: 14 TABLET | Refills: 0 | Status: SHIPPED | OUTPATIENT
Start: 2023-07-27 | End: 2023-08-03

## 2023-07-27 ASSESSMENT — ENCOUNTER SYMPTOMS
HEADACHES: 1
COUGH: 0
SINUS PRESSURE: 1
CARDIOVASCULAR NEGATIVE: 1
SWOLLEN GLANDS: 0
SORE THROAT: 0
RESPIRATORY NEGATIVE: 1
GASTROINTESTINAL NEGATIVE: 1
HOARSE VOICE: 0
CHILLS: 0
SINUS PAIN: 1
FEVER: 0
DIZZINESS: 0

## 2023-07-27 ASSESSMENT — FIBROSIS 4 INDEX: FIB4 SCORE: 0.52

## 2023-07-27 NOTE — LETTER
July 27, 2023         Patient: Martinez Smith   YOB: 2000   Date of Visit: 7/27/2023           To Whom it May Concern:    Martinez Smith was seen in my clinic on 7/27/2023. Please excuse any absences from work this week due to acute illness.        If you have any questions or concerns, please don't hesitate to call.        Sincerely,           Kevin Novak P.A.-C.  Electronically Signed

## 2023-07-27 NOTE — PROGRESS NOTES
Subjective     Martinez Smith is a very pleasant 22 y.o. male who presents with Sinus Problem (X5days Face pressure, congestion, eyes burning )            Chronic recurrent pansinusitis.  Patient has sinus surgery scheduled in 1 month    Sinus Problem  This is a recurrent (Chronic) problem. The current episode started in the past 7 days. The problem has been gradually worsening since onset. There has been no fever. The fever has been present for Less than 1 day. Associated symptoms include congestion, headaches and sinus pressure. Pertinent negatives include no chills, coughing, ear pain, hoarse voice, sneezing, sore throat or swollen glands. Past treatments include oral decongestants and saline nose sprays.       PMH:  has a past medical history of Celiac disease, Celiac disease, and Diabetes type I (HCC).  MEDS:   Current Outpatient Medications:     FREESTYLE LITE strip, Testing 8 times per day for insulin adjutment., Disp: 300 Strip, Rfl: 6    cromolyn (GASTROCROM) 100 MG/5ML solution, PLEASE SEE ATTACHED FOR DETAILED DIRECTIONS, Disp: , Rfl:     Blood Glucose Monitoring Suppl (FREESTYLE LITE) Device, 1 Each continuous., Disp: 1 Each, Rfl: 0    Blood Glucose Test Strips, Test strips order: Test strips for [employer] Freestyle Lite meter. Testing 5 times a day for insulin adjustment., Disp: 150 Strip, Rfl: 3    INSULIN LISPRO 100 UNIT/ML INJ, INJECT 60 UNITS UNDER THE SKIN EVERY DAY. INSULIN PUMP, Disp: 60 mL, Rfl: 3    HUMALOG 100 UNIT/ML, Inject 50 units into pump reservoir daily  30 day supply, Disp: 20 mL, Rfl: 11    meclizine (ANTIVERT) 25 MG Tab, Take 1 Tablet by mouth 3 times a day as needed for Dizziness or Nausea/Vomiting., Disp: 30 Tablet, Rfl: 0    promethazine (PHENERGAN) 25 MG Tab, Take 1 Tablet by mouth every 6 hours as needed for Nausea/Vomiting., Disp: 30 Tablet, Rfl: 0    tizanidine (ZANAFLEX) 4 MG Tab, Take 4 mg by mouth every 6 hours as needed., Disp: , Rfl:     montelukast (SINGULAIR) 10  MG Tab, Take 10 mg by mouth every day., Disp: , Rfl:     Glucagon, rDNA, (GLUCAGON EMERGENCY) 1 MG Kit, Inject 1 mg as directed as needed (severe hypoglycemia). For severe hypoglycemia, Disp: 1 Kit, Rfl: 1    ondansetron (ZOFRAN ODT) 4 MG TABLET DISPERSIBLE, Take 1 Tablet by mouth every 6 hours as needed for Nausea., Disp: 20 Tablet, Rfl: 0    fexofenadine (ALLEGRA) 60 MG Tab, Take 60 mg by mouth 2 times a day., Disp: , Rfl:     amitriptyline (ELAVIL) 25 MG Tab, Take 25 mg by mouth at bedtime., Disp: , Rfl:     PANCREAZE 89540-87816 units Cap DR Particles, Take 1 Capsule by mouth 3 times a day before meals., Disp: , Rfl:     insulin infusion pump Device, Inject  under the skin continuous. Patient's own SQ insulin pump  Type of Insulin: Humalog Last change of tubin22 - Change tubing and site every 72 hours  Dosing: Basal rate: 0000 - 0300 = .625 units 0300 - 1200 = .6 units 1200 - 0000= .575 units Bolus ratio:  1 unit : 14 g carbohydrate    Sensitivity ratio:  1 units for every 50 over 130-150 mg/dL  Disconnect pump if patient becomes hypoglycemic and altered., Disp: , Rfl:     azelastine (ASTELIN) 137 MCG/SPRAY nasal spray, , Disp: , Rfl:   ALLERGIES:   Allergies   Allergen Reactions    Beef Flavor Nausea     Beef broth causes Nausea and vomiting     Compazine Unspecified     Anxiety     Dairy Food Allergy Nausea    Eggs Unspecified     Nausea, vomiting, chest tightness    Gluten Meal Unspecified     Patient reports has celiac    Mustard Seed Unspecified     Nausea and vomiting,    Peanut (Diagnostic) Unspecified     Nausea, vomiting, and chest tightness    Reglan [Metoclopramide] Anxiety     Makes anxious    Shellfish Allergy Unspecified     Nausea, vomiting, chest tightness    Tree Nuts Food Allergy Anaphylaxis, Shortness of Breath, Itching, Nausea and Cough     Cashews and pistachio and coconut     Potato      White potato causes nausea vomiting     SURGHX:   Past Surgical History:   Procedure Laterality  "Date    HYPOSPADIAS REPAIR       SOCHX:  reports that he has never smoked. He has never used smokeless tobacco. He reports that he does not drink alcohol and does not use drugs.  FH: family history includes Arthritis in his maternal grandmother.      Review of Systems   Constitutional:  Negative for chills and fever.   HENT:  Positive for congestion, sinus pressure and sinus pain. Negative for ear pain, hoarse voice, sneezing and sore throat.    Respiratory: Negative.  Negative for cough.    Cardiovascular: Negative.    Gastrointestinal: Negative.    Neurological:  Positive for headaches. Negative for dizziness.       Medications, Allergies, and current problem list reviewed today in Epic           Objective     /82 (BP Location: Right arm, Patient Position: Sitting, BP Cuff Size: Adult)   Pulse 89   Temp 36.4 °C (97.5 °F) (Temporal)   Resp 12   Ht 1.676 m (5' 6\")   Wt 59 kg (130 lb)   SpO2 98%   BMI 20.98 kg/m²      Physical Exam  Vitals and nursing note reviewed.   Constitutional:       General: He is not in acute distress.     Appearance: Normal appearance. He is well-developed. He is not toxic-appearing or diaphoretic.   HENT:      Head: Normocephalic and atraumatic.      Right Ear: Hearing and tympanic membrane normal.      Left Ear: Hearing and tympanic membrane normal.      Nose: Mucosal edema, congestion and rhinorrhea present. Rhinorrhea is purulent.      Right Turbinates: Swollen.      Left Turbinates: Swollen.      Right Sinus: Maxillary sinus tenderness and frontal sinus tenderness present.      Left Sinus: Maxillary sinus tenderness and frontal sinus tenderness present.      Comments: Colored PND noted     Mouth/Throat:      Mouth: Mucous membranes are moist.      Dentition: Normal dentition. No dental caries.      Pharynx: Posterior oropharyngeal erythema present. No oropharyngeal exudate.   Eyes:      General: No scleral icterus.        Right eye: No discharge.         Left eye: No " discharge.      Conjunctiva/sclera: Conjunctivae normal.   Cardiovascular:      Rate and Rhythm: Normal rate and regular rhythm.      Heart sounds: Normal heart sounds.   Pulmonary:      Effort: Pulmonary effort is normal. No respiratory distress.      Breath sounds: Normal breath sounds. No wheezing, rhonchi or rales.   Musculoskeletal:      Cervical back: Normal range of motion and neck supple.   Lymphadenopathy:      Cervical: No cervical adenopathy.   Skin:     General: Skin is warm and dry.      Nails: There is no clubbing.   Neurological:      General: No focal deficit present.      Mental Status: He is alert and oriented to person, place, and time. Mental status is at baseline.      Cranial Nerves: Cranial nerves 2-12 are intact.      Coordination: Finger-Nose-Finger Test normal.   Psychiatric:         Mood and Affect: Mood normal.                             Assessment & Plan     Very pleasant 22-year-old male presenting with sinusitis type symptoms for the last week.  He has a history of chronic recurrent pansinusitis.  He is followed by ENT and has a sinus surgery scheduled in 1 month.  He denies fever chills or bodies.  No shortness of breath chest pain vomiting or diarrhea.  Facial pain, headache, sinus pressure with thick brown-colored PND.  Vitals normal.  Lungs are clear bilaterally showing no signs of lower respiratory involvement.  Note for work provided.    1. Chronic pansinusitis  doxycycline (VIBRAMYCIN) 100 MG Tab        Take full course of antibiotic  Tylenol and Motrin for fever and pain  OTC meds as discussed including oral decongestant if tolerated  Increase fluids and rest  Nasal spray, nasal rinse/wash, hollie pot      I personally reviewed prior external notes and test results pertinent to today's visit. Return to clinic or go to ED if symptoms worsen or persist. Red flag symptoms and indications for ED discussed at length. Patient/Parent/Guardian voices understanding. Follow-up with your  primary care provider in 3-5 days. All side effects and potential interactions of prescribed medication discussed including allergic response, GI upset, tendon injury, rash, sedation, OCP effectiveness, etc..    Please note that this dictation was created using voice recognition software. I have made every reasonable attempt to correct obvious errors, but I expect that there are errors of grammar and possibly content that I did not discover before finalizing the note.

## 2023-08-01 ENCOUNTER — PRE-ADMISSION TESTING (OUTPATIENT)
Dept: ADMISSIONS | Facility: MEDICAL CENTER | Age: 23
End: 2023-08-01
Attending: OTOLARYNGOLOGY
Payer: COMMERCIAL

## 2023-08-01 RX ORDER — IBUPROFEN, PHENYLEPHRINE HYDROCHLORIDE 200; 10 MG/1; MG/1
1 TABLET, COATED ORAL DAILY
COMMUNITY

## 2023-08-01 RX ORDER — DIPHENHYDRAMINE HCL 25 MG
25 TABLET ORAL EVERY 6 HOURS PRN
COMMUNITY

## 2023-08-21 ENCOUNTER — TELEPHONE (OUTPATIENT)
Dept: ENDOCRINOLOGY | Facility: MEDICAL CENTER | Age: 23
End: 2023-08-21
Payer: COMMERCIAL

## 2023-08-21 NOTE — TELEPHONE ENCOUNTER
Patient is calling, he is advised by pre-admit to call in regards his insulin prior to surgery, if he needs to make any adjustments. Surgery for: Septoplasty, Turbinoplasty, Endoscopic intranasal Maxillary antrostony. Scheduled for 8/29 at [facility].     Medical clearance not requested, he is just wondering what he needs to do in regards to his insulin.

## 2023-08-22 ENCOUNTER — PRE-ADMISSION TESTING (OUTPATIENT)
Dept: ADMISSIONS | Facility: MEDICAL CENTER | Age: 23
End: 2023-08-22
Attending: OTOLARYNGOLOGY
Payer: COMMERCIAL

## 2023-08-22 DIAGNOSIS — Z01.812 PRE-OPERATIVE LABORATORY EXAMINATION: ICD-10-CM

## 2023-08-22 LAB
ANION GAP SERPL CALC-SCNC: 8 MMOL/L (ref 7–16)
BUN SERPL-MCNC: 13 MG/DL (ref 8–22)
CALCIUM SERPL-MCNC: 9.4 MG/DL (ref 8.5–10.5)
CHLORIDE SERPL-SCNC: 99 MMOL/L (ref 96–112)
CO2 SERPL-SCNC: 27 MMOL/L (ref 20–33)
CREAT SERPL-MCNC: 0.81 MG/DL (ref 0.5–1.4)
GFR SERPLBLD CREATININE-BSD FMLA CKD-EPI: 126 ML/MIN/1.73 M 2
GLUCOSE SERPL-MCNC: 296 MG/DL (ref 65–99)
POTASSIUM SERPL-SCNC: 4.6 MMOL/L (ref 3.6–5.5)
SODIUM SERPL-SCNC: 134 MMOL/L (ref 135–145)

## 2023-08-22 PROCEDURE — 36415 COLL VENOUS BLD VENIPUNCTURE: CPT

## 2023-08-22 PROCEDURE — 80048 BASIC METABOLIC PNL TOTAL CA: CPT

## 2023-08-29 ENCOUNTER — ANESTHESIA (OUTPATIENT)
Dept: SURGERY | Facility: MEDICAL CENTER | Age: 23
End: 2023-08-29
Payer: COMMERCIAL

## 2023-08-29 ENCOUNTER — HOSPITAL ENCOUNTER (OUTPATIENT)
Facility: MEDICAL CENTER | Age: 23
End: 2023-08-29
Attending: OTOLARYNGOLOGY | Admitting: OTOLARYNGOLOGY
Payer: COMMERCIAL

## 2023-08-29 ENCOUNTER — ANESTHESIA EVENT (OUTPATIENT)
Dept: SURGERY | Facility: MEDICAL CENTER | Age: 23
End: 2023-08-29
Payer: COMMERCIAL

## 2023-08-29 VITALS
SYSTOLIC BLOOD PRESSURE: 134 MMHG | HEIGHT: 66 IN | WEIGHT: 139.11 LBS | BODY MASS INDEX: 22.36 KG/M2 | OXYGEN SATURATION: 98 % | DIASTOLIC BLOOD PRESSURE: 75 MMHG | TEMPERATURE: 97.9 F | HEART RATE: 118 BPM | RESPIRATION RATE: 18 BRPM

## 2023-08-29 DIAGNOSIS — G89.18 POSTOPERATIVE PAIN: ICD-10-CM

## 2023-08-29 LAB
GLUCOSE BLD STRIP.AUTO-MCNC: 218 MG/DL (ref 65–99)
GLUCOSE BLD STRIP.AUTO-MCNC: 297 MG/DL (ref 65–99)
GLUCOSE BLD STRIP.AUTO-MCNC: 303 MG/DL (ref 65–99)
GLUCOSE BLD STRIP.AUTO-MCNC: 314 MG/DL (ref 65–99)
PATHOLOGY CONSULT NOTE: NORMAL

## 2023-08-29 PROCEDURE — 160046 HCHG PACU - 1ST 60 MINS PHASE II: Performed by: OTOLARYNGOLOGY

## 2023-08-29 PROCEDURE — 82962 GLUCOSE BLOOD TEST: CPT

## 2023-08-29 PROCEDURE — A9270 NON-COVERED ITEM OR SERVICE: HCPCS | Performed by: OTOLARYNGOLOGY

## 2023-08-29 PROCEDURE — 700111 HCHG RX REV CODE 636 W/ 250 OVERRIDE (IP)

## 2023-08-29 PROCEDURE — 700101 HCHG RX REV CODE 250: Performed by: OTOLARYNGOLOGY

## 2023-08-29 PROCEDURE — 160009 HCHG ANES TIME/MIN: Performed by: OTOLARYNGOLOGY

## 2023-08-29 PROCEDURE — 160025 RECOVERY II MINUTES (STATS): Performed by: OTOLARYNGOLOGY

## 2023-08-29 PROCEDURE — 700101 HCHG RX REV CODE 250: Performed by: ANESTHESIOLOGY

## 2023-08-29 PROCEDURE — 700105 HCHG RX REV CODE 258: Performed by: OTOLARYNGOLOGY

## 2023-08-29 PROCEDURE — 160035 HCHG PACU - 1ST 60 MINS PHASE I: Performed by: OTOLARYNGOLOGY

## 2023-08-29 PROCEDURE — 700111 HCHG RX REV CODE 636 W/ 250 OVERRIDE (IP): Performed by: ANESTHESIOLOGY

## 2023-08-29 PROCEDURE — 700102 HCHG RX REV CODE 250 W/ 637 OVERRIDE(OP): Performed by: OTOLARYNGOLOGY

## 2023-08-29 PROCEDURE — 160036 HCHG PACU - EA ADDL 30 MINS PHASE I: Performed by: OTOLARYNGOLOGY

## 2023-08-29 PROCEDURE — C1894 INTRO/SHEATH, NON-LASER: HCPCS | Performed by: OTOLARYNGOLOGY

## 2023-08-29 PROCEDURE — 88305 TISSUE EXAM BY PATHOLOGIST: CPT | Mod: 59

## 2023-08-29 PROCEDURE — 160041 HCHG SURGERY MINUTES - EA ADDL 1 MIN LEVEL 4: Performed by: OTOLARYNGOLOGY

## 2023-08-29 PROCEDURE — 700102 HCHG RX REV CODE 250 W/ 637 OVERRIDE(OP): Performed by: ANESTHESIOLOGY

## 2023-08-29 PROCEDURE — 160029 HCHG SURGERY MINUTES - 1ST 30 MINS LEVEL 4: Performed by: OTOLARYNGOLOGY

## 2023-08-29 PROCEDURE — 700111 HCHG RX REV CODE 636 W/ 250 OVERRIDE (IP): Mod: JZ | Performed by: ANESTHESIOLOGY

## 2023-08-29 PROCEDURE — 160002 HCHG RECOVERY MINUTES (STAT): Performed by: OTOLARYNGOLOGY

## 2023-08-29 PROCEDURE — 160048 HCHG OR STATISTICAL LEVEL 1-5: Performed by: OTOLARYNGOLOGY

## 2023-08-29 PROCEDURE — 88311 DECALCIFY TISSUE: CPT

## 2023-08-29 DEVICE — IMPLANT MOMETASONE FUROATE MINI: Type: IMPLANTABLE DEVICE | Site: NOSE | Status: FUNCTIONAL

## 2023-08-29 RX ORDER — DIPHENHYDRAMINE HYDROCHLORIDE 50 MG/ML
12.5 INJECTION INTRAMUSCULAR; INTRAVENOUS
Status: DISCONTINUED | OUTPATIENT
Start: 2023-08-29 | End: 2023-08-29 | Stop reason: HOSPADM

## 2023-08-29 RX ORDER — ROCURONIUM BROMIDE 10 MG/ML
INJECTION, SOLUTION INTRAVENOUS PRN
Status: DISCONTINUED | OUTPATIENT
Start: 2023-08-29 | End: 2023-08-29 | Stop reason: SURG

## 2023-08-29 RX ORDER — MEPERIDINE HYDROCHLORIDE 25 MG/ML
12.5 INJECTION INTRAMUSCULAR; INTRAVENOUS; SUBCUTANEOUS
Status: DISCONTINUED | OUTPATIENT
Start: 2023-08-29 | End: 2023-08-29 | Stop reason: HOSPADM

## 2023-08-29 RX ORDER — ONDANSETRON 2 MG/ML
INJECTION INTRAMUSCULAR; INTRAVENOUS PRN
Status: DISCONTINUED | OUTPATIENT
Start: 2023-08-29 | End: 2023-08-29 | Stop reason: SURG

## 2023-08-29 RX ORDER — LIDOCAINE HYDROCHLORIDE AND EPINEPHRINE 10; 10 MG/ML; UG/ML
INJECTION, SOLUTION INFILTRATION; PERINEURAL
Status: DISCONTINUED | OUTPATIENT
Start: 2023-08-29 | End: 2023-08-29 | Stop reason: HOSPADM

## 2023-08-29 RX ORDER — SODIUM CHLORIDE, SODIUM LACTATE, POTASSIUM CHLORIDE, CALCIUM CHLORIDE 600; 310; 30; 20 MG/100ML; MG/100ML; MG/100ML; MG/100ML
INJECTION, SOLUTION INTRAVENOUS CONTINUOUS
Status: DISCONTINUED | OUTPATIENT
Start: 2023-08-29 | End: 2023-08-29 | Stop reason: HOSPADM

## 2023-08-29 RX ORDER — CEFAZOLIN SODIUM 1 G/3ML
INJECTION, POWDER, FOR SOLUTION INTRAMUSCULAR; INTRAVENOUS PRN
Status: DISCONTINUED | OUTPATIENT
Start: 2023-08-29 | End: 2023-08-29 | Stop reason: SURG

## 2023-08-29 RX ORDER — OXYCODONE HCL 5 MG/5 ML
5 SOLUTION, ORAL ORAL
Status: DISCONTINUED | OUTPATIENT
Start: 2023-08-29 | End: 2023-08-29 | Stop reason: HOSPADM

## 2023-08-29 RX ORDER — METOCLOPRAMIDE HYDROCHLORIDE 5 MG/ML
INJECTION INTRAMUSCULAR; INTRAVENOUS PRN
Status: DISCONTINUED | OUTPATIENT
Start: 2023-08-29 | End: 2023-08-29 | Stop reason: SURG

## 2023-08-29 RX ORDER — ETOMIDATE 2 MG/ML
INJECTION INTRAVENOUS PRN
Status: DISCONTINUED | OUTPATIENT
Start: 2023-08-29 | End: 2023-08-29 | Stop reason: SURG

## 2023-08-29 RX ORDER — HYDROMORPHONE HYDROCHLORIDE 1 MG/ML
0.4 INJECTION, SOLUTION INTRAMUSCULAR; INTRAVENOUS; SUBCUTANEOUS
Status: DISCONTINUED | OUTPATIENT
Start: 2023-08-29 | End: 2023-08-29 | Stop reason: HOSPADM

## 2023-08-29 RX ORDER — HYDROMORPHONE HYDROCHLORIDE 1 MG/ML
0.1 INJECTION, SOLUTION INTRAMUSCULAR; INTRAVENOUS; SUBCUTANEOUS
Status: DISCONTINUED | OUTPATIENT
Start: 2023-08-29 | End: 2023-08-29 | Stop reason: HOSPADM

## 2023-08-29 RX ORDER — EPINEPHRINE 1 MG/ML
INJECTION INTRAMUSCULAR; INTRAVENOUS; SUBCUTANEOUS
Status: DISCONTINUED
Start: 2023-08-29 | End: 2023-08-29 | Stop reason: HOSPADM

## 2023-08-29 RX ORDER — ACETAMINOPHEN 500 MG
1000 TABLET ORAL ONCE
Status: COMPLETED | OUTPATIENT
Start: 2023-08-29 | End: 2023-08-29

## 2023-08-29 RX ORDER — OXYCODONE HCL 5 MG/5 ML
10 SOLUTION, ORAL ORAL
Status: DISCONTINUED | OUTPATIENT
Start: 2023-08-29 | End: 2023-08-29 | Stop reason: HOSPADM

## 2023-08-29 RX ORDER — ONDANSETRON 2 MG/ML
4 INJECTION INTRAMUSCULAR; INTRAVENOUS
Status: COMPLETED | OUTPATIENT
Start: 2023-08-29 | End: 2023-08-29

## 2023-08-29 RX ORDER — EPINEPHRINE 1 MG/ML(1)
AMPUL (ML) INJECTION
Status: DISCONTINUED | OUTPATIENT
Start: 2023-08-29 | End: 2023-08-29 | Stop reason: HOSPADM

## 2023-08-29 RX ORDER — EPINEPHRINE 1 MG/ML(1)
AMPUL (ML) INJECTION
Status: DISCONTINUED
Start: 2023-08-29 | End: 2023-08-29 | Stop reason: HOSPADM

## 2023-08-29 RX ORDER — MIDAZOLAM HYDROCHLORIDE 1 MG/ML
1 INJECTION INTRAMUSCULAR; INTRAVENOUS
Status: DISCONTINUED | OUTPATIENT
Start: 2023-08-29 | End: 2023-08-29 | Stop reason: HOSPADM

## 2023-08-29 RX ORDER — HYDROCODONE BITARTRATE AND ACETAMINOPHEN 5; 325 MG/1; MG/1
1 TABLET ORAL EVERY 4 HOURS PRN
Qty: 12 TABLET | Refills: 0 | Status: SHIPPED | OUTPATIENT
Start: 2023-08-29 | End: 2023-09-01

## 2023-08-29 RX ORDER — INSULIN LISPRO 100 [IU]/ML
5 INJECTION, SOLUTION INTRAVENOUS; SUBCUTANEOUS
Status: DISCONTINUED | OUTPATIENT
Start: 2023-08-29 | End: 2023-08-29 | Stop reason: HOSPADM

## 2023-08-29 RX ORDER — HYDROMORPHONE HYDROCHLORIDE 1 MG/ML
0.2 INJECTION, SOLUTION INTRAMUSCULAR; INTRAVENOUS; SUBCUTANEOUS
Status: DISCONTINUED | OUTPATIENT
Start: 2023-08-29 | End: 2023-08-29 | Stop reason: HOSPADM

## 2023-08-29 RX ORDER — LIDOCAINE HYDROCHLORIDE 10 MG/ML
INJECTION, SOLUTION EPIDURAL; INFILTRATION; INTRACAUDAL; PERINEURAL
Status: DISCONTINUED
Start: 2023-08-29 | End: 2023-08-29 | Stop reason: HOSPADM

## 2023-08-29 RX ORDER — HALOPERIDOL 5 MG/ML
1 INJECTION INTRAMUSCULAR
Status: DISCONTINUED | OUTPATIENT
Start: 2023-08-29 | End: 2023-08-29 | Stop reason: HOSPADM

## 2023-08-29 RX ORDER — SODIUM CHLORIDE, SODIUM LACTATE, POTASSIUM CHLORIDE, CALCIUM CHLORIDE 600; 310; 30; 20 MG/100ML; MG/100ML; MG/100ML; MG/100ML
INJECTION, SOLUTION INTRAVENOUS CONTINUOUS
Status: ACTIVE | OUTPATIENT
Start: 2023-08-29 | End: 2023-08-29

## 2023-08-29 RX ADMIN — ROCURONIUM BROMIDE 50 MG: 50 INJECTION, SOLUTION INTRAVENOUS at 08:14

## 2023-08-29 RX ADMIN — ETOMIDATE 30 MG: 2 INJECTION, SOLUTION INTRAVENOUS at 08:14

## 2023-08-29 RX ADMIN — ROCURONIUM BROMIDE 20 MG: 50 INJECTION, SOLUTION INTRAVENOUS at 09:44

## 2023-08-29 RX ADMIN — METOCLOPRAMIDE 10 MG: 5 INJECTION, SOLUTION INTRAMUSCULAR; INTRAVENOUS at 10:33

## 2023-08-29 RX ADMIN — ACETAMINOPHEN 1000 MG: 500 TABLET, FILM COATED ORAL at 12:21

## 2023-08-29 RX ADMIN — ONDANSETRON 4 MG: 2 INJECTION INTRAMUSCULAR; INTRAVENOUS at 10:33

## 2023-08-29 RX ADMIN — FENTANYL CITRATE 50 MCG: 50 INJECTION, SOLUTION INTRAMUSCULAR; INTRAVENOUS at 10:28

## 2023-08-29 RX ADMIN — SODIUM CHLORIDE, POTASSIUM CHLORIDE, SODIUM LACTATE AND CALCIUM CHLORIDE: 600; 310; 30; 20 INJECTION, SOLUTION INTRAVENOUS at 08:09

## 2023-08-29 RX ADMIN — FENTANYL CITRATE 100 MCG: 50 INJECTION, SOLUTION INTRAMUSCULAR; INTRAVENOUS at 08:11

## 2023-08-29 RX ADMIN — CEFAZOLIN 2 G: 1 INJECTION, POWDER, FOR SOLUTION INTRAMUSCULAR; INTRAVENOUS at 08:23

## 2023-08-29 RX ADMIN — FENTANYL CITRATE 50 MCG: 50 INJECTION, SOLUTION INTRAMUSCULAR; INTRAVENOUS at 09:44

## 2023-08-29 RX ADMIN — ONDANSETRON 4 MG: 2 INJECTION INTRAMUSCULAR; INTRAVENOUS at 13:14

## 2023-08-29 RX ADMIN — SUGAMMADEX 100 MG: 100 INJECTION, SOLUTION INTRAVENOUS at 10:48

## 2023-08-29 ASSESSMENT — PAIN DESCRIPTION - PAIN TYPE
TYPE: SURGICAL PAIN

## 2023-08-29 ASSESSMENT — FIBROSIS 4 INDEX
FIB4 SCORE: 0.52
FIB4 SCORE: 0.52

## 2023-08-29 ASSESSMENT — PAIN SCALES - GENERAL: PAIN_LEVEL: 3

## 2023-08-29 NOTE — ANESTHESIA POSTPROCEDURE EVALUATION
Patient: Martinez Smith    Procedure Summary     Date: 08/29/23 Room / Location: [facility] ROOM 22 / SURGERY SAME DAY [facility]    Anesthesia Start: 0809 Anesthesia Stop: 1059    Procedures:       STEREOTACTIC COMPUTER-ASSISTED GUIDANCE, BILATERAL MAXILLARY ANTROSTOMY, BILATERAL TOTAL ETHMOIDECTOMY WITH SPHENOIDECTOMY, BILATERAL FRONTAL SINUSOTOMY, SEPTOPLASTY, BILATERAL INFERIOR TURBINATE REDUCTION (Bilateral: Nose)      SEPTOPLASTY, NOSE, WITH TURBINOPLASTY (Bilateral: Nose) Diagnosis: (CHRONIC MAXILLARY AND ETHMOIDAL SINUSITIS)    Surgeons: Renee Bunn M.D. Responsible Provider: Alin Edwards M.D.    Anesthesia Type: general ASA Status: 3          Final Anesthesia Type: general  Last vitals  BP   Blood Pressure: (!) 154/72    Temp   36.2 °C (97.2 °F)    Pulse   (!) 132   Resp   14    SpO2   100 %      Anesthesia Post Evaluation    Patient location during evaluation: PACU  Patient participation: complete - patient participated  Level of consciousness: awake and alert  Pain score: 3    Airway patency: patent  Anesthetic complications: no  Cardiovascular status: hemodynamically stable  Respiratory status: acceptable  Hydration status: euvolemic    PONV: none          No notable events documented.     Nurse Pain Score: 0 (NPRS)

## 2023-08-29 NOTE — ANESTHESIA PROCEDURE NOTES
Airway    Date/Time: 8/29/2023 8:15 AM    Performed by: Alin Edwards M.D.  Authorized by: Alin Edwards M.D.    Location:  OR  Urgency:  Elective  Difficult Airway: No    Indications for Airway Management:  Anesthesia      Spontaneous Ventilation: absent    Sedation Level:  Deep  Preoxygenated: Yes    Patient Position:  Sniffing  Final Airway Type:  Endotracheal airway  Final Endotracheal Airway:  ETT  Cuffed: Yes    Technique Used for Successful ETT Placement:  Direct laryngoscopy    Insertion Site:  Oral  Blade Type:  Jose  Laryngoscope Blade/Videolaryngoscope Blade Size:  3  ETT Size (mm):  8.0  Measured from:  Lips  ETT to Lips (cm):  23  Placement Verified by: auscultation and capnometry    Cormack-Lehane Classification:  Grade IIb - view of arytenoids or posterior of glottis only  Number of Attempts at Approach:  1  Number of Other Approaches Attempted:  0

## 2023-08-29 NOTE — ANESTHESIA PREPROCEDURE EVALUATION
Case: [id number] Date/Time: 08/29/23 0745    Procedures:       STEREOTACTIC COMPUTER-ASSISTED GUIDANCE, BILATERAL MAXILLARY ANTROSTOMY, BILATERAL TOTAL ETHMOIDECTOMY WITH SPHENOIDECTOMY, BILATERAL FRONTAL SINUSOTOMY, SEPTOPLASTY, BILATERAL INFERIOR TURBINATE REDUCTION      SEPTOPLASTY, NOSE, WITH TURBINOPLASTY    Pre-op diagnosis: CHRONIC MAXILLARY AND ETHMOIDAL SINUSITIS    Location: [facility] ROOM 22 / SURGERY SAME DAY [facility]    Surgeons: Renee Bunn M.D.          Relevant Problems   ENDO   (positive) Type 1 diabetes mellitus with hyperglycemia (HCC)   (positive) Type 1 diabetes mellitus with hypoglycemia and without coma (HCC)       Physical Exam    Airway   Mallampati: II  TM distance: >3 FB  Neck ROM: full       Cardiovascular - normal exam  Rhythm: regular  Rate: normal  (-) murmur     Dental - normal exam           Pulmonary - normal exam  Breath sounds clear to auscultation     Abdominal    Neurological - normal exam                 Anesthesia Plan    ASA 3 (chart)       Plan - general       Airway plan will be ETT          Induction: intravenous    Postoperative Plan: Postoperative administration of opioids is intended.    Pertinent diagnostic labs and testing reviewed    Informed Consent:    Anesthetic plan and risks discussed with patient.    Use of blood products discussed with: patient whom consented to blood products.

## 2023-08-29 NOTE — ANESTHESIA TIME REPORT
Anesthesia Start and Stop Event Times     Date Time Event    8/29/2023 0724 Ready for Procedure     0809 Anesthesia Start     1059 Anesthesia Stop        Responsible Staff  08/29/23    Name Role Begin End    Alin Edwards M.D. Anesth 0809 1059        Overtime Reason:  no overtime (within assigned shift)    Comments:

## 2023-08-29 NOTE — OP REPORT
DATE OF SERVICE:  08/29/23     PREOPERATIVE DIAGNOSES:  1. Chronic maxillary sinusitis  2. Chronic ethmoid sinusitis  3. Chronic sphenoid sinusitis  4. Chronic frontal sinusitis  5. Nasal septal deviation  6. Inferior turbinate hypertrophy     POSTOPERATIVE DIAGNOSES.  1. Chronic maxillary sinusitis  2. Chronic ethmoid sinusitis  3. Chronic sphenoid sinusitis  4. Chronic frontal sinusitis  5. Nasal septal deviation  6. Inferior turbinate hypertrophy  7. Nasal polyposis     PROCEDURES:  1.  Bilateral maxillary antrostomy.  2.  Bilateral sphenoethmoidectomy.  3. Bilateral frontal sinusotomy  4. Septoplasty   5. Bilateral Inferior turbinate submucous resection with outfracture  6.  Utilization of image guidance, extradural.     SURGEON:  Renee Bunn MD     ANESTHESIA:  General.     ANESTHESIOLOGIST:  Alin Edwards M.D.     ESTIMATED BLOOD LOSS:  100 mL.     FINDINGS:      Severe septal deviation to the left with large spur  Nasal polyposis with moderate to severe edema throughout  Severe edema in left greater than right frontal recess  Turbinate hypertrophy right greater than left     COMPLICATIONS:  None.     SPECIMENS:  Right and left sinus contents to pathology.     INDICATIONS FOR PROCEDURE:  The patient is a 22-year-old male with a   longstanding history of nasal obstruction, pressure, drainage and chronic sinus infections despite adequate medical therapy.  As such, the above   stated procedures were offered and he desired strongly to proceed.  These were explained in detail.  Risks were discussed   including but not limited to pain, bleeding, infection, scar formation, damage   to the orbit or skull base, change in sense of smell, recurrence of symptoms and   need for further procedures.  Informed surgical consent was obtained.     DESCRIPTION OF PROCEDURE:  The patient was met in the preoperative holding   area and again the consent and history were reviewed.  He was brought back to   the operating room  in good condition.  After appropriate anesthetic markers   were placed, a surgical time-out was taken and general endotracheal anesthesia   was induced.  Bed was then turned to 180 degrees.  The nasal cavity was   examined with the findings as noted above.  Lidocaine 1% with epinephrine was   infiltrated into the septum for local anesthesia and topical   epinephrine-soaked cottonoids were placed into the nose for decongestion.  The patient was then prepped and   draped in the usual sterile fashion.  The image guidance navigation system was   calibrated and noted to be functioning properly.  I first began by making a hemitransfixion incision along the caudal edge of the   septum on the left hand side.  The subperichondrial plane was identified and   this plane was elevated widely initially on the left hand side. A linear tear was noted in the flap along the large septal bony spur. A   transcartilaginous incision was then made, taking care to leave at least 1.5   to 2 cm caudal strut.  Dissection was then continued on the contralateral side   to elevate the mucoperichondrial flap on this side as well.  The right flap was noted to be intact.  The deviated portions of cartilage and bone were then removed thus creating a widely patent airway.      I then proceeded with the sinus dissection. The left middle turbinate was gently medialized and the uncinate was teased forward and completely removed. The natural ostium was incorporated into a large antrostomy. A 30 degree scope was used to ensure adequate dissection. There were bernadette polyps in the middle meatus and polyp tissue in the maxillary sinus itself that were all removed. Angled scopes and instruments were used as needed. The ethmoids were then dissected.   The ethmoid bulla was taken down followed by partitions of the anterior ethmoids and then the basal lamella was traversed. Partitions were removed using a combination of cutting instruments and the microdebrider.  The inferior third of the superior turbinate was then resected and the natural ostium of the sphenoid sinus was identified and enlarged. The skull base and orbit were identified in the sphenoid and posterior ethmoid cells and with the assistance of image guidance, dissection continued from posterior to anterior removing all ethmoid partitions. A 30 degree endoscope was utilized to dissect along the skull base anteriorly and in the frontal recess. The frontal sinus ostia was then identified and it's location was confirmed using image guidance. There was significant edema in this area and the natural ostia was opened as widely as possible. A 70 degree endoscope was used to improve visualization of the frontal sinus. The mucosa was severely edematous and polypoid in this area extending all the way into the frontal sinus. A propel mini stent was placed into the sinus given the edema and to narrow opening. All bleeding was controlled using epinephrine soaked cottonoids and noted to be adequate.     I then proceeded to the right side and dissection was completed in the same fashion. The middle turbinate was medialized.  The uncinate was   completely removed.  The 30-degree endoscope was utilized to ensure that the   natural ostium was incorporated into the large antrostomy. The anterior and posterior ethmoids were entered and dissected. The inferior third of the superior turbinate was removed and the natural ostium of the sphenoid sinus was opened widely. The skull base and orbit were identified posteriorly and, respecting these landmarks, all ethmoid partitions were removed working from posterior to anterior. The image guidance navigation was utilized to confirm landmarks, particularly the anterior ethmoid artery, along the skull base, and in the frontal sinus. The frontal sinus was then opened widely. The recess was again noted to be quite edematous. A propel mini stent was placed into the recess. All bleeding was  controlled using epinephrine soaked cottonoids and noted to be adequate.      I then proceeded with bilateral inferior turbinate submucous resection.  A #15 blade was used to make an incision in the anterior head of   the inferior turbinate bilaterally and a caudal elevator was used to elevate   the tissue in a subperichondrial plane.  The microdebrider was then used to   ablate bone and soft tissue, and a freer elevator was used to outfracture the   turbinates. The posterior mulberry tips were gently cauterized using the suction bovie electrocautery. Again, this created a widely patent airway.  I then turned my   attention towards closure.  The hemitransfixion incision was closed using a   5-0 fast absorbing gut and a standard quilting stitch was placed using a 4-0   Pain gut on a beth needle.  All bleeding was controlled using epinephrine-soaked cottonoids and was noted to be adequate at the conclusion of the case.  The procedure was then terminated.  Care of the patient was turned back over to anesthesia for emergence and extubation.  He was taken to the PACU in stable condition.  All instrument counts were correct x2 at the completion of the case.        ____________________________________     Renee Bunn MD

## 2023-08-29 NOTE — DISCHARGE INSTRUCTIONS
HOME CARE INSTRUCTIONS    ACTIVITY: Rest and take it easy for the first 24 hours.  A responsible adult is recommended to remain with you during that time.  It is normal to feel sleepy.  We encourage you to not do anything that requires balance, judgment or coordination.    FOR 24 HOURS DO NOT:  Drive, operate machinery or run household appliances.  Drink beer or alcoholic beverages.  Make important decisions or sign legal documents.    SPECIAL INSTRUCTIONS: See handout    DIET: To avoid nausea, slowly advance diet as tolerated, avoiding spicy or greasy foods for the first day.  Add more substantial food to your diet according to your physician's instructions. INCREASE FLUIDS AND FIBER TO AVOID CONSTIPATION.    MEDICATIONS: Resume taking daily medication.  Take prescribed pain medication with food.  If no medication is prescribed, you may take non-aspirin pain medication if needed.  PAIN MEDICATION CAN BE VERY CONSTIPATING.  Take a stool softener or laxative such as senokot, pericolace, or milk of magnesia if needed.    Prescription given for: Norco    Last pain medication given: Tylenol given at 12:20 PM    A follow-up appointment should be arranged with your doctor; call to schedule.    You should CALL YOUR PHYSICIAN if you develop:  Fever greater than 101 degrees F.  Pain not relieved by medication, or persistent nausea or vomiting.  Excessive bleeding (blood soaking through dressing) or unexpected drainage from the wound.  Extreme redness or swelling around the incision site, drainage of pus or foul smelling drainage.  Inability to urinate or empty your bladder within 8 hours.  Problems with breathing or chest pain.    You should call 911 if you develop problems with breathing or chest pain.  If you are unable to contact your doctor or surgical center, you should go to the nearest emergency room or urgent care center.  Physician's telephone #: [phone]    MILD FLU-LIKE SYMPTOMS ARE NORMAL.  YOU MAY EXPERIENCE  GENERALIZED MUSCLE ACHES, THROAT IRRITATION, HEADACHE AND/OR SOME NAUSEA.    If any questions arise, call your doctor.  If your doctor is not available, please feel free to call the Surgical Center at [phone].  The Center is open Monday through Friday from 7AM to 7PM.      A registered nurse may call you a few days after your surgery to see how you are doing after your procedure.    You may also receive a survey in the mail within the next two weeks and we ask that you take a few moments to complete the survey and return it to us.  Our goal is to provide you with very good care and we value your comments.     Depression / Suicide Risk    As you are discharged from this [facility] facility, it is important to learn how to keep safe from harming yourself.    Recognize the warning signs:  Abrupt changes in personality, positive or negative- including increase in energy   Giving away possessions  Change in eating patterns- significant weight changes-  positive or negative  Change in sleeping patterns- unable to sleep or sleeping all the time   Unwillingness or inability to communicate  Depression  Unusual sadness, discouragement and loneliness  Talk of wanting to die  Neglect of personal appearance   Rebelliousness- reckless behavior  Withdrawal from people/activities they love  Confusion- inability to concentrate     If you or a loved one observes any of these behaviors or has concerns about self-harm, here's what you can do:  Talk about it- your feelings and reasons for harming yourself  Remove any means that you might use to hurt yourself (examples: pills, rope, extension cords, firearm)  Get professional help from the community (Mental Health, Substance Abuse, psychological counseling)  Do not be alone:Call your Safe Contact- someone whom you trust who will be there for you.  Call your local CRISIS HOTLINE 112-9021 or 962-879-9690  Call your local Children's Mobile Crisis Response Team [facility]  (970) 162-6843 or www.[employer]  Call the toll free National Suicide Prevention Hotlines   National Suicide Prevention Lifeline 867-980-SBAM (8570)  [facility] Line Network 800-SUICIDE (869-1774)    I acknowledge receipt and understanding of these Home Care instructions.

## 2023-08-29 NOTE — OR NURSING
1056 received patient from OR. Report from Anesthesiologist and OR RN. Patient on 6L at 100 % O2. VSS. Monitor connected. Oral airway in place. S/P Nasal surgery, bilateral cottonoid in place. Per MD Edwards check FSBG at 1130. MD aware of patients heart rate.     1105 Oral airway removed.     1114 FSBG  314, patient complains of no pain or nausea at this time.     1131 Mother notified about patients status and plan of care.     1133 MD Edwards at bedside. Per MD recheck Blood sugar when patient is more awake.     1200 Patient voided via urinal 400 ml.     1207 FSBG 297 Patient and MD Edwards aware.     1216 MD Bunn at bedside. Orders for Tylenol received.     1221 Tylenol given for pain. Packing pulled, nasal sling applied.     1250 Report given to Mable JAVIER.    1252 Patient escorted out to phase II via christos by RN. Belongings with patient.

## 2023-08-29 NOTE — OR NURSING
1255 Report received from Shania JAVIER. Pt arrived to [address] and ambulated to recliner with assistance.     1259 Discharge instructions reviewed with family at bedside, verbalize understanding and all questions answered. Patient changed into own clothing with standby assist.     1310 Pt had small episode of emesis; nausea resolved with zofran.     1318 IV and ID bands removed.     1319 Escorted to lobby via wheelchair, accompanied by CNA. All personal belongings and discharge instructions with patient.

## 2023-09-25 ENCOUNTER — OFFICE VISIT (OUTPATIENT)
Dept: URGENT CARE | Facility: PHYSICIAN GROUP | Age: 23
End: 2023-09-25
Payer: COMMERCIAL

## 2023-09-25 VITALS
BODY MASS INDEX: 21.6 KG/M2 | HEART RATE: 104 BPM | OXYGEN SATURATION: 95 % | RESPIRATION RATE: 12 BRPM | TEMPERATURE: 98.8 F | HEIGHT: 66 IN | WEIGHT: 134.4 LBS | SYSTOLIC BLOOD PRESSURE: 122 MMHG | DIASTOLIC BLOOD PRESSURE: 82 MMHG

## 2023-09-25 DIAGNOSIS — J02.9 SORE THROAT: ICD-10-CM

## 2023-09-25 DIAGNOSIS — H57.13 EYE DISCOMFORT, BILATERAL: ICD-10-CM

## 2023-09-25 DIAGNOSIS — R53.83 OTHER FATIGUE: ICD-10-CM

## 2023-09-25 DIAGNOSIS — E10.65 TYPE 1 DIABETES MELLITUS WITH HYPERGLYCEMIA (HCC): ICD-10-CM

## 2023-09-25 LAB
FLUAV RNA SPEC QL NAA+PROBE: NEGATIVE
FLUBV RNA SPEC QL NAA+PROBE: NEGATIVE
RSV RNA SPEC QL NAA+PROBE: NEGATIVE
S PYO DNA SPEC NAA+PROBE: NOT DETECTED
SARS-COV-2 RNA RESP QL NAA+PROBE: NEGATIVE

## 2023-09-25 PROCEDURE — 87651 STREP A DNA AMP PROBE: CPT | Performed by: NURSE PRACTITIONER

## 2023-09-25 PROCEDURE — 99214 OFFICE O/P EST MOD 30 MIN: CPT | Performed by: NURSE PRACTITIONER

## 2023-09-25 PROCEDURE — 3079F DIAST BP 80-89 MM HG: CPT | Performed by: NURSE PRACTITIONER

## 2023-09-25 PROCEDURE — 3074F SYST BP LT 130 MM HG: CPT | Performed by: NURSE PRACTITIONER

## 2023-09-25 PROCEDURE — 0241U POCT CEPHEID COV-2, FLU A/B, RSV - PCR: CPT | Performed by: NURSE PRACTITIONER

## 2023-09-25 RX ORDER — BLOOD-GLUCOSE METER
KIT MISCELLANEOUS
COMMUNITY
Start: 2023-07-13 | End: 2024-03-07

## 2023-09-25 RX ORDER — BUDESONIDE 0.5 MG/2ML
INHALANT ORAL
COMMUNITY
Start: 2023-09-06

## 2023-09-25 RX ORDER — AMITRIPTYLINE HYDROCHLORIDE 50 MG/1
25 TABLET, FILM COATED ORAL
COMMUNITY
Start: 2023-08-15

## 2023-09-25 ASSESSMENT — FIBROSIS 4 INDEX: FIB4 SCORE: 0.52

## 2023-09-25 NOTE — PROGRESS NOTES
Martinez Smith is a 22 y.o. male who presents for Headache (Fatigue, bilateral eye burning, dizziness, onset yesterday afternoon )      HPI  This is a new problem. Martinez Smith is a 22 y.o. patient who presents to urgent care with c/o: 1 day of headache, fatigue, intermittent dizziness, sore throat, chills.  Generally does not feel good.  His blood sugars have been a little higher than normal but less than 200.  He has a insulin pump that he just changed sites on.  He said is not normal for him to have his sugars rise a little bit.  Is currently 135.  He takes off his medications as they are prescribed to him.  He denies nausea, vomiting, abdominal pain.  He has been around people who have been sick recently.  He is unsure what they have been sick with.  He has a history of a chronic sinusitis.  He just had sinus surgery.  He saw his ENT last week who told him that his sinuses look good now.  No other aggravating leaving factors.        ROS See HPI    Allergies:       Allergies   Allergen Reactions    Dairy Food Allergy Shortness of Breath    Eggs Unspecified     Nausea, vomiting, chest tightness, SOB    Mustard Seed Unspecified     Nausea and vomiting, SOB    Peanut (Diagnostic) Unspecified     SOB,Nausea, vomiting, and chest tightness    Shellfish Allergy Unspecified     SOB, Nausea, vomiting, chest tightness    Tree Nuts Food Allergy Anaphylaxis, Shortness of Breath, Itching, Nausea and Cough     Cashews and pistachio and coconut     Compazine Unspecified     Anxiety     Gluten Meal Unspecified     Patient reports has celiac    Reglan [Metoclopramide] Anxiety     Makes anxious    Other Food Shortness of Breath     Strawberries, blueberries, raspberries are causing SOB and nausea.    Potato      White potato causes nausea vomiting, SOB       PMSFS Hx:  Past Medical History:   Diagnosis Date    Celiac disease     Celiac disease     Dental disorder     Gingivitus    Diabetes type I (HCC)      insulin pump    Environmental and seasonal allergies     Psychiatric problem     anxiety     Past Surgical History:   Procedure Laterality Date    ENDOSCOPY, PARANASAL SINUS, WITH TOTAL ETHMOIDECTOMY, SN Bilateral 8/29/2023    Procedure: STEREOTACTIC COMPUTER-ASSISTED GUIDANCE, BILATERAL MAXILLARY ANTROSTOMY, BILATERAL TOTAL ETHMOIDECTOMY WITH SPHENOIDECTOMY, BILATERAL FRONTAL SINUSOTOMY, SEPTOPLASTY, BILATERAL INFERIOR TURBINATE REDUCTION;  Surgeon: Renee Bunn M.D.;  Location: SURGERY SAME DAY [facility];  Service: Ent    SEPTOTURBINOPLASTY Bilateral 8/29/2023    Procedure: SEPTOPLASTY, NOSE, WITH TURBINOPLASTY;  Surgeon: Renee Bunn M.D.;  Location: SURGERY SAME DAY [facility];  Service: Ent    HYPOSPADIAS REPAIR       Family History   Problem Relation Age of Onset    Hypertension Mother     Hyperlipidemia Mother     Hyperlipidemia Father     Arthritis Maternal Grandmother      Social History     Tobacco Use    Smoking status: Never    Smokeless tobacco: Never   Substance Use Topics    Alcohol use: No       Problems:   Patient Active Problem List   Diagnosis    Type 1 diabetes mellitus with hyperglycemia (HCC)    Celiac disease    Weakness of right lower extremity    Lipohyperplasia    Dyslipidemia    Long-term insulin use (HCC)    Fitting and adjustment of insulin pump    Intractable nausea and vomiting    Hyperlipidemia    Insulin pump status    Other mechanical complication of insulin pump    RUQ abdominal pain    Chronic abdominal pain    Vitamin D deficiency    Exocrine pancreatic insufficiency    Type 1 diabetes mellitus with hypoglycemia and without coma (HCC)    Influenza    Syncope and collapse    Muscle spasms of neck    Carpal tunnel syndrome of right wrist    Sinus disease       Medications:   Current Outpatient Medications on File Prior to Visit   Medication Sig Dispense Refill    amitriptyline (ELAVIL) 50 MG Tab Take 50 mg by mouth at bedtime.      budesonide (PULMICORT) 0.5 MG/2ML Suspension  USE 1 AMPULE 1-2 TIMES DAILY AS INSTRUCTED      diphenhydrAMINE (BENADRYL) 25 MG Tab Take 25 mg by mouth every 6 hours as needed for Sleep.      Phenylephrine-Ibuprofen (ADVIL SINUS CONGESTION & PAIN)  MG Tab Take 1 Tablet by mouth every day.      FREESTYLE LITE strip Testing 8 times per day for insulin adjutment. 300 Strip 6    cromolyn (GASTROCROM) 100 MG/5ML solution PLEASE SEE ATTACHED FOR DETAILED DIRECTIONS      Blood Glucose Monitoring Suppl (FREESTYLE LITE) Device 1 Each continuous. 1 Each 0    Blood Glucose Test Strips Test strips order: Test strips for Abbott Freestyle Lite meter. Testing 5 times a day for insulin adjustment. 150 Strip 3    INSULIN LISPRO 100 UNIT/ML INJ INJECT 60 UNITS UNDER THE SKIN EVERY DAY. INSULIN PUMP 60 mL 3    HUMALOG 100 UNIT/ML Inject 50 units into pump reservoir daily  30 day supply 20 mL 11    meclizine (ANTIVERT) 25 MG Tab Take 1 Tablet by mouth 3 times a day as needed for Dizziness or Nausea/Vomiting. 30 Tablet 0    promethazine (PHENERGAN) 25 MG Tab Take 1 Tablet by mouth every 6 hours as needed for Nausea/Vomiting. 30 Tablet 0    tizanidine (ZANAFLEX) 4 MG Tab Take 4 mg by mouth every 6 hours as needed.      montelukast (SINGULAIR) 10 MG Tab Take 10 mg by mouth every day.      Glucagon, rDNA, (GLUCAGON EMERGENCY) 1 MG Kit Inject 1 mg as directed as needed (severe hypoglycemia). For severe hypoglycemia 1 Kit 1    ondansetron (ZOFRAN ODT) 4 MG TABLET DISPERSIBLE Take 1 Tablet by mouth every 6 hours as needed for Nausea. 20 Tablet 0    fexofenadine (ALLEGRA) 60 MG Tab Take 60 mg by mouth 2 times a day.      amitriptyline (ELAVIL) 25 MG Tab Take 25 mg by mouth at bedtime.      PANCREAZE 77834-04234 units Cap DR Particles Take 1 Capsule by mouth 3 times a day before meals.      insulin infusion pump Device Inject  under the skin continuous. Patient's own SQ insulin pump    Type of Insulin: Humalog  Last change of tubin22 - Change tubing and site every 72  "hours    Dosing:  Basal rate:  0000 - 0300 = .625 units  0300 - 1200 = .6 units  1200 - 0000= .575 units  Bolus ratio:   1 unit : 14 g carbohydrate      Sensitivity ratio:   1 units for every 50 over 130-150 mg/dL    Disconnect pump if patient becomes hypoglycemic and altered.      Blood Glucose Monitoring Suppl (FREESTYLE FREEDOM LITE) w/Device Kit 1 EACH CONTINUOUS.       No current facility-administered medications on file prior to visit.          Objective:     /82 (BP Location: Right arm, Patient Position: Sitting, BP Cuff Size: Adult)   Pulse (!) 104   Temp 37.1 °C (98.8 °F) (Temporal)   Resp 12   Ht 1.676 m (5' 6\")   Wt 61 kg (134 lb 6.4 oz)   SpO2 95%   BMI 21.69 kg/m²     Physical Exam  Vitals and nursing note reviewed.   Constitutional:       Appearance: Normal appearance. He is normal weight.   HENT:      Mouth/Throat:      Pharynx: Posterior oropharyngeal erythema present.   Cardiovascular:      Rate and Rhythm: Normal rate and regular rhythm.      Pulses: Normal pulses.      Heart sounds: Normal heart sounds.   Pulmonary:      Effort: Pulmonary effort is normal.      Breath sounds: Normal breath sounds.   Musculoskeletal:      Cervical back: Normal range of motion.   Skin:     General: Skin is warm.      Capillary Refill: Capillary refill takes less than 2 seconds.   Neurological:      Mental Status: He is alert and oriented to person, place, and time.   Psychiatric:         Mood and Affect: Mood normal.         Behavior: Behavior normal.         Thought Content: Thought content normal.       Results for orders placed or performed in visit on 09/25/23   POCT CEPHEID COV-2, FLU A/B, RSV - PCR   Result Value Ref Range    SARS-CoV-2 by PCR Negative Negative, Invalid    Influenza virus A RNA Negative Negative, Invalid    Influenza virus B, PCR Negative Negative, Invalid    RSV, PCR Negative Negative, Invalid   POCT GROUP A STREP, PCR   Result Value Ref Range    POC Group A Strep, PCR Not " Detected Not Detected, Invalid           Assessment /Associated Orders:      1. Sore throat  POCT CEPHEID COV-2, FLU A/B, RSV - PCR    POCT GROUP A STREP, PCR      2. Other fatigue  POCT GROUP A STREP, PCR      3. Type 1 diabetes mellitus with hyperglycemia (HCC)            Medical Decision Making:      Pt is clinically stable at today's acute urgent care visit.  No acute distress noted.  VSS. Appropriate for outpatient care at this time.   Acute problem today with uncertain prognosis.   Neg viral panel and GAS today   Salt water gargles BID and prn. Suggested 1/4 to 1/2 teaspoon (1.5 to 3.0 g) of salt per one cup (8 ounces or 250 mL) of warm water.   OTC throat analgesic spray or lozenge of choice prn throat pain. Dosage and directions per   OTC  analgesic of choice (acetaminophen or NSAID) prn pain. Follow manufactures dosing and safety precautions.   Stay well hydrated.   Educated in infection control practices.   FU endocrinology as scheduled this week.     Discussed Dx, management options (risks,benefits, and alternatives to planned treatment), natural progression and supportive care.  Expressed understanding and the treatment plan was agreed upon.   Questions were encouraged and answered   Return to urgent care prn if new or worsening sx or if there is no improvement in condition prn.    Educated in Red flags and indications to immediately call 911 or present to the Emergency Department.       Time I spent evaluating Martinez Smith in urgent care today was 32  minutes. This time includes preparing for visit, reviewing any pertinent notes or test results, counseling/education, exam, obtaining HPI, interpretation of lab tests, medication management and documentation as indicated above.Time does not include separately billable procedures noted .       Please note that this dictation was created using voice recognition software. I have worked with consultants from the vendor as well as  technical experts from [facility] to optimize the interface. I have made every reasonable attempt to correct obvious errors, but I expect that there are errors of grammar and possibly content that I did not discover before finalizing the note.  This note was electronically signed by provider

## 2023-09-28 ENCOUNTER — OFFICE VISIT (OUTPATIENT)
Dept: ENDOCRINOLOGY | Facility: MEDICAL CENTER | Age: 23
End: 2023-09-28
Attending: INTERNAL MEDICINE
Payer: COMMERCIAL

## 2023-09-28 VITALS
OXYGEN SATURATION: 94 % | WEIGHT: 137.7 LBS | DIASTOLIC BLOOD PRESSURE: 74 MMHG | HEART RATE: 116 BPM | BODY MASS INDEX: 22.13 KG/M2 | SYSTOLIC BLOOD PRESSURE: 114 MMHG | HEIGHT: 66 IN

## 2023-09-28 DIAGNOSIS — Z79.4 LONG-TERM INSULIN USE (HCC): ICD-10-CM

## 2023-09-28 DIAGNOSIS — K90.0 CELIAC DISEASE: ICD-10-CM

## 2023-09-28 DIAGNOSIS — Z96.41 INSULIN PUMP IN PLACE: ICD-10-CM

## 2023-09-28 DIAGNOSIS — E55.9 VITAMIN D DEFICIENCY: ICD-10-CM

## 2023-09-28 DIAGNOSIS — E10.65 TYPE 1 DIABETES MELLITUS WITH HYPERGLYCEMIA (HCC): ICD-10-CM

## 2023-09-28 PROCEDURE — 3074F SYST BP LT 130 MM HG: CPT | Performed by: INTERNAL MEDICINE

## 2023-09-28 PROCEDURE — 99213 OFFICE O/P EST LOW 20 MIN: CPT | Performed by: INTERNAL MEDICINE

## 2023-09-28 PROCEDURE — 3078F DIAST BP <80 MM HG: CPT | Performed by: INTERNAL MEDICINE

## 2023-09-28 PROCEDURE — 99214 OFFICE O/P EST MOD 30 MIN: CPT | Performed by: INTERNAL MEDICINE

## 2023-09-28 ASSESSMENT — FIBROSIS 4 INDEX: FIB4 SCORE: 0.52

## 2023-09-28 NOTE — LETTER
[facility]  Endocrinology Dept.   Fax: [phone]   Authorization for Release/Disclosure of   Protected Health Information   Name: JUAN JOSE SMITH : 2000 SSN: [id number]   Address: [address] [place] [address] Phone:    [phone] (home) [phone] (work)   I authorize the entity listed below to release/disclose the PHI below to:   [facility]/Gabo Benoit M.D.   Provider or Entity Name:    Dr. Elena   Address   City, State, Zip   Phone:      Fax:     Reason for request: continuity of care   Information to be released:    Retinal exam   [  ] Check here and initial the line next to each item to release ALL health information INCLUDING  _____ Care and treatment for drug and / or alcohol abuse  _____ HIV testing, infection status, or AIDS  _____ Genetic Testing    DATES OF SERVICE OR TIME PERIOD TO BE DISCLOSED: _____________  I understand and acknowledge that:  * This Authorization may be revoked at any time by you in writing, except if your health information has already been used or disclosed.  * Your health information that will be used or disclosed as a result of you signing this authorization could be re-disclosed by the recipient. If this occurs, your re-disclosed health information may no longer be protected by State or Federal laws.  * You may refuse to sign this Authorization. Your refusal will not affect your ability to obtain treatment.  * This Authorization becomes effective upon signing and will  on (date) __________.      If no date is indicated, this Authorization will  one (1) year from the signature date.    Name: Juan Jose Smith  Signature: continued [facility]ica care Date:   2023     PLEASE FAX REQUESTED RECORDS BACK TO: [phone]

## 2023-09-28 NOTE — PROGRESS NOTES
CHIEF COMPLAINT: Patient is here for follow up of Type 1 Diabetes Mellitus.    HPI:     Martinez Smith is a 22 y.o. male with Type 1 Diabetes Mellitus here for follow up.    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   He also diagnosed himself with food allergies.     He also has celiac disease, he is on a gluten free diet since age of 11         Basal rate  00:00 0.625  0300  0.7  1200 0.5  10pm 0.5     Carb ratio  00:00  14     ICF  1:50     Target  130      He graduated and is now working as an  for a local company     Active insulin time 3 hours  Average  (130 190, 140)  Time in range was not given  Basal 35%  Bolus 65%  TDD 40      He is on manual mode  He doesn't have a CGM  He can now switch to Tandem-Dexcom HCL as his Medtronic out of warranty  He is testing BG 10x a day with a contour next BG meter.     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 Elena on 6/12023  He doesn't have diabetic retinopathy    His TSH is normal at 3.4 on 6/2023    His vitamin D is 23 on 6/2023        BG Diary:   Pump was download and reviewed today  He uses contour next test strips which is the only meter that works with his pump  He tested sugars 8 times a day and is needing a prior authorization for his Contour next test strips    Weight has been stable    Diabetes Complications   Retinopathy: No known retinopathy.  Last eye exam: 6/2023 with Dr. Elena    Neuropathy: Denies paresthesias or numbness in hands or feet. Denies any foot wounds.  Exercise: Minimal.  Diet: Fair.  Patient's medications, allergies, and social histories were reviewed and updated as appropriate.    ROS:     CONS:     No fever, no chills   EYES:     No diplopia, no blurry vision   CV:           No chest pain, no palpitations   PULM:     No SOB, no cough, no hemoptysis.   GI:            No nausea, no vomiting, no diarrhea, no constipation   ENDO:     No polyuria, no polydipsia, no heat intolerance, no cold intolerance       Past Medical History:  Problem List:  2023-07: Sinus disease  2023-01: Carpal tunnel syndrome of right wrist  2022-10: Muscle spasms of neck  2022-07: Syncope and collapse  2022-05: Influenza  2022-01: Type 1 diabetes mellitus with hypoglycemia and without coma   ([facility])  2021-08: Exocrine pancreatic insufficiency  2020-09: Chronic abdominal pain  2020-09: Vitamin D deficiency  2020-07: RUQ abdominal pain  2019-12: Diabetic gastroparesis ([facility])  2019-12: Long-term insulin use ([facility])  2019-12: Fitting and adjustment of insulin pump  2019-12: Intractable nausea and vomiting  2018-01: Hyperlipidemia  2018-01: Other mechanical complication of insulin pump  2017-06: Insulin pump status  2017-06: Type 1 diabetes mellitus with hyperglycemia ([facility])  2017-06: Celiac disease  2017-06: Weakness of right  "lower extremity  2017-06: Lipohyperplasia  2017-06: Dyslipidemia      Past Surgical History:  Past Surgical History:   Procedure Laterality Date    ENDOSCOPY, PARANASAL SINUS, WITH TOTAL ETHMOIDECTOMY, SN Bilateral 8/29/2023    Procedure: STEREOTACTIC COMPUTER-ASSISTED GUIDANCE, BILATERAL MAXILLARY ANTROSTOMY, BILATERAL TOTAL ETHMOIDECTOMY WITH SPHENOIDECTOMY, BILATERAL FRONTAL SINUSOTOMY, SEPTOPLASTY, BILATERAL INFERIOR TURBINATE REDUCTION;  Surgeon: Renee Bunn M.D.;  Location: SURGERY SAME DAY [facility];  Service: Ent    SEPTOTURBINOPLASTY Bilateral 8/29/2023    Procedure: SEPTOPLASTY, NOSE, WITH TURBINOPLASTY;  Surgeon: Renee Bunn M.D.;  Location: SURGERY SAME DAY [facility];  Service: Ent    HYPOSPADIAS REPAIR          Allergies:  Beef flavor; Gluten meal; and Tree nuts food allergy     Social History:  Social History     Tobacco Use    Smoking status: Never    Smokeless tobacco: Never   Vaping Use    Vaping Use: Never used   Substance Use Topics    Alcohol use: No    Drug use: No        Family History:   family history includes Arthritis in his maternal grandmother; Hyperlipidemia in his father and mother; Hypertension in his mother.      PHYSICAL EXAM:   OBJECTIVE:  Vital signs: /74 (BP Location: Left arm, Patient Position: Sitting, BP Cuff Size: Adult)   Pulse (!) 116   Ht 1.676 m (5' 6\")   Wt 62.5 kg (137 lb 11.2 oz)   SpO2 94%   BMI 22.23 kg/m²   GENERAL: Well-developed, well-nourished in no apparent distress.   EYE:  No ocular asymmetry, PERRLA  HENT: Pink, moist mucous membranes.    NECK: No thyromegaly.   CARDIOVASCULAR: regular rate and rhythm w/ no murmurs  LUNGS: Symmetrical chest expansion with clear breath sounds  ABDOMEN: non obese abdomen with no visible organomegaly right upper quadrant pain tenderness noted with direct palpation  EXTREMITIES: No clubbing, cyanosis, or edema.   NEUROLOGICAL: No gross focal motor abnormalities   LYMPH: No cervical adenopathy seen.   SKIN: No " rashes, lesions.         Labs:  Lab Results   Component Value Date/Time    HBA1C 7.7 (A) 05/01/2023 04:14 PM        Lab Results   Component Value Date/Time    WBC 5.4 06/20/2023 09:01 AM    RBC 4.91 06/20/2023 09:01 AM    HEMOGLOBIN 14.7 06/20/2023 09:01 AM    MCV 90.4 06/20/2023 09:01 AM    MCH 29.9 06/20/2023 09:01 AM    MCHC 33.1 06/20/2023 09:01 AM    RDW 40.2 06/20/2023 09:01 AM    MPV 12.5 06/20/2023 09:01 AM       Lab Results   Component Value Date/Time    SODIUM 134 (L) 08/22/2023 08:37 AM    POTASSIUM 4.6 08/22/2023 08:37 AM    CHLORIDE 99 08/22/2023 08:37 AM    CO2 27 08/22/2023 08:37 AM    ANION 8.0 08/22/2023 08:37 AM    GLUCOSE 296 (H) 08/22/2023 08:37 AM    BUN 13 08/22/2023 08:37 AM    CREATININE 0.81 08/22/2023 08:37 AM    CREATININE 0.4 05/25/2007 04:40 AM    CALCIUM 9.4 08/22/2023 08:37 AM    ASTSGOT 34 06/20/2023 09:00 AM    ALTSGPT 35 06/20/2023 09:00 AM    TBILIRUBIN 0.5 06/20/2023 09:00 AM    ALBUMIN 4.7 06/20/2023 09:00 AM    ALBUMIN 4.80 10/22/2016 09:46 AM    TOTPROTEIN 7.4 06/20/2023 09:00 AM    TOTPROTEIN 7.70 10/22/2016 09:46 AM    GLOBULIN 2.7 06/20/2023 09:00 AM    AGRATIO 1.7 06/20/2023 09:00 AM       Lab Results   Component Value Date/Time    CHOLSTRLTOT 177 12/24/2019 0729    TRIGLYCERIDE 103 12/24/2019 0729    HDL 28 (A) 12/24/2019 0729     (H) 12/24/2019 0729       Lab Results   Component Value Date/Time    MALBCRT see below 06/20/2023 08:59 AM    MICROALBUR <1.2 06/20/2023 08:59 AM    MICRALB <3.0 02/18/2022 04:48 AM        Lab Results   Component Value Date/Time    TSHULTRASEN 2.140 09/14/2019 0727     No results found for: FREEDIR  Lab Results   Component Value Date/Time    FREET3 3.53 09/14/2019 0727           ASSESSMENT/PLAN:     1. Type 1 diabetes mellitus with hyperglycemia (HCC)  Controlled  A1c is high at 8.0%  Adjusted basal rates   0300  0.700------> 0.8  Will order Tandem and Dexcom  RTC in 3 mos    2. Vitamin D deficiency  Uncontrolled  Continue vitamin D3 5000  units daily  Continue monitoring    3. Celiac disease  Stable   Continue monitoring    4. Insulin pump status  Insulin pump in place      5. Long-term insulin use (HCC)  Patient is on long-term insulin therapy due to type 1 diabetes      Return in about 3 months (around 12/28/2023).      Thank you kindly for allowing me to participate in the diabetes care plan for this patient.    Gabo Benoit MD, FACE, [facility]      CC:   Gregory Grande M.D.

## 2023-10-16 ENCOUNTER — OFFICE VISIT (OUTPATIENT)
Dept: URGENT CARE | Facility: PHYSICIAN GROUP | Age: 23
End: 2023-10-16
Payer: COMMERCIAL

## 2023-10-16 VITALS
BODY MASS INDEX: 22.53 KG/M2 | WEIGHT: 140.2 LBS | OXYGEN SATURATION: 96 % | HEIGHT: 66 IN | RESPIRATION RATE: 12 BRPM | HEART RATE: 108 BPM | SYSTOLIC BLOOD PRESSURE: 124 MMHG | DIASTOLIC BLOOD PRESSURE: 70 MMHG | TEMPERATURE: 100.2 F

## 2023-10-16 DIAGNOSIS — U07.1 COVID-19: ICD-10-CM

## 2023-10-16 DIAGNOSIS — R05.1 ACUTE COUGH: ICD-10-CM

## 2023-10-16 DIAGNOSIS — J02.9 PHARYNGITIS, UNSPECIFIED ETIOLOGY: ICD-10-CM

## 2023-10-16 LAB
FLUAV RNA SPEC QL NAA+PROBE: NEGATIVE
FLUBV RNA SPEC QL NAA+PROBE: NEGATIVE
RSV RNA SPEC QL NAA+PROBE: NEGATIVE
S PYO DNA SPEC NAA+PROBE: NOT DETECTED
SARS-COV-2 RNA RESP QL NAA+PROBE: POSITIVE

## 2023-10-16 PROCEDURE — 0241U POCT CEPHEID COV-2, FLU A/B, RSV - PCR: CPT

## 2023-10-16 PROCEDURE — 99214 OFFICE O/P EST MOD 30 MIN: CPT

## 2023-10-16 PROCEDURE — 3078F DIAST BP <80 MM HG: CPT

## 2023-10-16 PROCEDURE — 87651 STREP A DNA AMP PROBE: CPT

## 2023-10-16 PROCEDURE — 3074F SYST BP LT 130 MM HG: CPT

## 2023-10-16 ASSESSMENT — ENCOUNTER SYMPTOMS
SHORTNESS OF BREATH: 0
DIARRHEA: 0
COUGH: 1
ABDOMINAL PAIN: 0
CHILLS: 0
HEADACHES: 1
SORE THROAT: 1
FEVER: 1
SINUS PAIN: 0
SPUTUM PRODUCTION: 0
NAUSEA: 1
VOMITING: 0
WHEEZING: 0
HEMOPTYSIS: 0

## 2023-10-16 ASSESSMENT — FIBROSIS 4 INDEX: FIB4 SCORE: 0.52

## 2023-10-16 NOTE — PROGRESS NOTES
Subjective:   Martinez Smith is a very pleasant 22 y.o. male who presents for:    Chief Complaint   Patient presents with    Fever     Sore throat, body aches, sneezing, nasal congestion, chills, x2 days        HPI:    Fever   This is a new problem. Episode onset: two days prior. The maximum temperature noted was 103 to 103.9 F. Associated symptoms include congestion, coughing, ear pain, headaches, muscle aches, nausea and a sore throat. Pertinent negatives include no abdominal pain, chest pain, diarrhea, rash, sleepiness, urinary pain, vomiting or wheezing. Associated symptoms comments: Pain with swallowing . Treatments tried: treatments include Advil cold and flu, Tylenol, ibuprofen.   Risk factors: sick contacts      His BS are intermittently elevated since starting to feel ill. His sugars have been running in the high 300s in the AM and 150s in the afternoon/evening. He has been adjusting his insulin levels PRN to help keep him in a normal range.     His current sugar is 299 as monitored by his insulin pump.     He recently had sinus surgery two months ago. Denies sinus pain/pressure. Reports that his symptoms are not consistent with bacterial sinusitis.     ROS:    Review of Systems   Constitutional:  Positive for fever and malaise/fatigue. Negative for chills.   HENT:  Positive for congestion, ear pain and sore throat. Negative for ear discharge, nosebleeds and sinus pain.    Respiratory:  Positive for cough. Negative for hemoptysis, sputum production, shortness of breath and wheezing.    Cardiovascular:  Negative for chest pain.   Gastrointestinal:  Positive for nausea. Negative for abdominal pain, diarrhea and vomiting.   Genitourinary:  Negative for dysuria.   Skin:  Negative for rash.   Neurological:  Positive for headaches.   All other systems reviewed and are negative.      Medications:      Current Outpatient Medications   Medication Sig    molnupiravir 200 MG capsule Take 4 Capsules by mouth  2 times a day for 5 days.    amitriptyline (ELAVIL) 50 MG Tab Take 25 mg by mouth at bedtime.    budesonide (PULMICORT) 0.5 MG/2ML Suspension USE 1 AMPULE 1-2 TIMES DAILY AS INSTRUCTED    Blood Glucose Monitoring Suppl (FREESTYLE FREEDOM LITE) w/Device Kit 1 EACH CONTINUOUS.    diphenhydrAMINE (BENADRYL) 25 MG Tab Take 25 mg by mouth every 6 hours as needed for Sleep.    Phenylephrine-Ibuprofen (ADVIL SINUS CONGESTION & PAIN)  MG Tab Take 1 Tablet by mouth every day.    FREESTYLE LITE strip Testing 8 times per day for insulin adjutment.    cromolyn (GASTROCROM) 100 MG/5ML solution PLEASE SEE ATTACHED FOR DETAILED DIRECTIONS    Blood Glucose Monitoring Suppl (FREESTYLE LITE) Device 1 Each continuous.    Blood Glucose Test Strips Test strips order: Test strips for Abbott Freestyle Lite meter. Testing 5 times a day for insulin adjustment.    INSULIN LISPRO 100 UNIT/ML INJ INJECT 60 UNITS UNDER THE SKIN EVERY DAY. INSULIN PUMP    HUMALOG 100 UNIT/ML Inject 50 units into pump reservoir daily  30 day supply    meclizine (ANTIVERT) 25 MG Tab Take 1 Tablet by mouth 3 times a day as needed for Dizziness or Nausea/Vomiting.    promethazine (PHENERGAN) 25 MG Tab Take 1 Tablet by mouth every 6 hours as needed for Nausea/Vomiting.    tizanidine (ZANAFLEX) 4 MG Tab Take 4 mg by mouth every 6 hours as needed.    montelukast (SINGULAIR) 10 MG Tab Take 10 mg by mouth every day.    Glucagon, rDNA, (GLUCAGON EMERGENCY) 1 MG Kit Inject 1 mg as directed as needed (severe hypoglycemia). For severe hypoglycemia    ondansetron (ZOFRAN ODT) 4 MG TABLET DISPERSIBLE Take 1 Tablet by mouth every 6 hours as needed for Nausea.    fexofenadine (ALLEGRA) 60 MG Tab Take 60 mg by mouth 2 times a day.    PANCREAZE 35804-68574 units Cap DR Particles Take 1 Capsule by mouth 3 times a day before meals.    insulin infusion pump Device Inject  under the skin continuous. Patient's own SQ insulin pump    Type of Insulin: Humalog  Last change of  tubin22 - Change tubing and site every 72 hours    Dosing:  Basal rate:  0000 - 0300 = .625 units  0300 - 1200 = .6 units  1200 - 0000= .575 units  Bolus ratio:   1 unit : 14 g carbohydrate      Sensitivity ratio:   1 units for every 50 over 130-150 mg/dL    Disconnect pump if patient becomes hypoglycemic and altered.       Allergies:     Allergies   Allergen Reactions    Dairy Food Allergy Shortness of Breath    Eggs Unspecified     Nausea, vomiting, chest tightness, SOB    Mustard Seed Unspecified     Nausea and vomiting, SOB    Peanut (Diagnostic) Unspecified     SOB,Nausea, vomiting, and chest tightness    Shellfish Allergy Unspecified     SOB, Nausea, vomiting, chest tightness    Tree Nuts Food Allergy Anaphylaxis, Shortness of Breath, Itching, Nausea and Cough     Cashews and pistachio and coconut     Compazine Unspecified     Anxiety     Gluten Meal Unspecified     Patient reports has celiac    Reglan [Metoclopramide] Anxiety     Makes anxious    Other Food Shortness of Breath     Strawberries, blueberries, raspberries are causing SOB and nausea.    Potato      White potato causes nausea vomiting, SOB       Problem List:     Patient Active Problem List   Diagnosis    Type 1 diabetes mellitus with hyperglycemia (HCC)    Celiac disease    Weakness of right lower extremity    Lipohyperplasia    Dyslipidemia    Long-term insulin use (HCC)    Fitting and adjustment of insulin pump    Intractable nausea and vomiting    Hyperlipidemia    Insulin pump status    Other mechanical complication of insulin pump    RUQ abdominal pain    Chronic abdominal pain    Vitamin D deficiency    Exocrine pancreatic insufficiency    Type 1 diabetes mellitus with hypoglycemia and without coma (HCC)    Influenza    Syncope and collapse    Muscle spasms of neck    Carpal tunnel syndrome of right wrist    Sinus disease       Surgical History:    Past Surgical History:   Procedure Laterality Date    ENDOSCOPY, PARANASAL SINUS,  WITH TOTAL ETHMOIDECTOMY, SN Bilateral 8/29/2023    Procedure: STEREOTACTIC COMPUTER-ASSISTED GUIDANCE, BILATERAL MAXILLARY ANTROSTOMY, BILATERAL TOTAL ETHMOIDECTOMY WITH SPHENOIDECTOMY, BILATERAL FRONTAL SINUSOTOMY, SEPTOPLASTY, BILATERAL INFERIOR TURBINATE REDUCTION;  Surgeon: Renee Bunn M.D.;  Location: SURGERY SAME DAY [facility] [place];  Service: Ent    SEPTOTURBINOPLASTY Bilateral 8/29/2023    Procedure: SEPTOPLASTY, NOSE, WITH TURBINOPLASTY;  Surgeon: Renee Bunn M.D.;  Location: SURGERY SAME DAY [facility] [place];  Service: Ent    HYPOSPADIAS REPAIR         Past Social Hx:     Social History     Socioeconomic History    Marital status: Single   Tobacco Use    Smoking status: Never    Smokeless tobacco: Never   Vaping Use    Vaping Use: Never used   Substance and Sexual Activity    Alcohol use: No    Drug use: No        Past Family Hx:      Family History   Problem Relation Age of Onset    Hypertension Mother     Hyperlipidemia Mother     Hyperlipidemia Father     Arthritis Maternal Grandmother        Problem list, medications, and allergies reviewed by myself today in Epic.     Objective:     Vitals:    10/16/23 1100   BP: 124/70   Pulse: (!) 108   Resp: 12   Temp: 37.9 °C (100.2 °F)   SpO2: 96%       Physical Exam  Vitals reviewed.   Constitutional:       General: He is not in acute distress.     Appearance: Normal appearance. He is not ill-appearing, toxic-appearing or diaphoretic.   HENT:      Head: Normocephalic and atraumatic.      Right Ear: Tympanic membrane, ear canal and external ear normal.      Left Ear: Tympanic membrane, ear canal and external ear normal.      Nose: Congestion present. No rhinorrhea.      Right Sinus: No maxillary sinus tenderness or frontal sinus tenderness.      Left Sinus: No maxillary sinus tenderness or frontal sinus tenderness.      Mouth/Throat:      Lips: Pink.      Mouth: Mucous membranes are moist.      Tongue: No lesions. Tongue does not deviate from midline.      Palate: No  mass and lesions.      Pharynx: Oropharynx is clear. Uvula midline. Posterior oropharyngeal erythema present. No pharyngeal swelling, oropharyngeal exudate or uvula swelling.      Tonsils: No tonsillar exudate or tonsillar abscesses.   Eyes:      Extraocular Movements: Extraocular movements intact.      Conjunctiva/sclera: Conjunctivae normal.      Pupils: Pupils are equal, round, and reactive to light.   Cardiovascular:      Rate and Rhythm: Normal rate and regular rhythm.      Pulses: Normal pulses.      Heart sounds: Normal heart sounds.   Pulmonary:      Effort: Pulmonary effort is normal. No respiratory distress.      Breath sounds: Normal breath sounds. No stridor. No wheezing, rhonchi or rales.   Abdominal:      General: Abdomen is flat.      Palpations: Abdomen is soft.   Musculoskeletal:         General: Normal range of motion.      Cervical back: Normal range of motion and neck supple. No rigidity or tenderness.   Lymphadenopathy:      Cervical: No cervical adenopathy.   Skin:     General: Skin is warm and dry.   Neurological:      General: No focal deficit present.      Mental Status: He is alert and oriented to person, place, and time. Mental status is at baseline.   Psychiatric:         Mood and Affect: Mood normal.         Behavior: Behavior normal.         Thought Content: Thought content normal.         Judgment: Judgment normal.             Results from POCT:   Results for orders placed or performed in visit on 10/16/23   POCT CEPHEID COV-2, FLU A/B, RSV - PCR   Result Value Ref Range    SARS-CoV-2 by PCR Positive (A) Negative, Invalid    Influenza virus A RNA Negative Negative, Invalid    Influenza virus B, PCR Negative Negative, Invalid    RSV, PCR Negative Negative, Invalid   POCT GROUP A STREP, PCR   Result Value Ref Range    POC Group A Strep, PCR Not Detected Not Detected, Invalid       Assessment/Plan:     Diagnosis and associated orders:     1. COVID-19  - POCT CEPHEID COV-2, FLU A/B, RSV -  PCR  - molnupiravir 200 MG capsule; Take 4 Capsules by mouth 2 times a day for 5 days.  Dispense: 40 Capsule; Refill: 0    2. Pharyngitis, unspecified etiology  - POCT GROUP A STREP, PCR    3. Acute cough          Comments/MDM:     Prescription for molnupiravir sent to patient's preferred pharmacy for the treatment of COVID-19  I discussed self isolation and provided printed instructions. Educated patient to follow all CDC guidelines regarding infection prevention.  She will need to quarantine for 5 days after onset of symptoms and wear tight fitting mask from day 6 through 10.  Work note provided  No evidence of lower respiratory involvement on exam today      Recommend symptomatic care:  OTC second generation antihistamine daily (cetirizine, desloratadine, fexofenadine, levocetirizine, and loratadine) daily IN COMBINATION WITH:  OTC decongestant (Sudafed - Pseudoephedrine) unless contraindication in place, such as hypertension, CAD, narrow-angle glaucoma. Use with caution if the patient has a history of cardiac dysrhythmias, hyperthyroidism, DM, prostatic hypertrophy, and glaucoma should use with caution.  Intranasal fluticasone (Flonase) daily  Nasal saline rinses 2-3 times a day   May use short term nasal sprays, such as oxymetazoline (Afrin) to help relieve nasal discomfort, congestion, and/or pressure. Decongestant sprays should not be used longer than three consecutive days.   Nasal rinsing with saline nasal spray or salt water (e.g., neti pot) can help relieve nasal dryness.  Breathe Right nasal strips at night for nasal congestion  Ponaris nasal emmollient for nasal congestion, dryness, and inflammation (do not use with iodine sensitivity)  Cool mist humidification, chest rubs, warm tea with honey, increased fluid intake to thin secretions  Tylenol or ibuprofen as needed for fever control, body aches, and headaches.    If sore throat is present:   Warm salt water gargles, over-the-counter throat sprays,  rest, hydration with frozen (eg, ice or popsicles) or warmed liquids, herbal tea containing licorice root, elm inner bark, marshmallow root, and licorice root aqueous dry extract, Cepacol lozenges, soft diet, honey, vitamin C, zinc lozenges, and elderberry supplements.    Return to clinic or go to the ED if symptoms worsen or fail to improve, or if the patient should develop worsening/increasing sinus pain/pressure, congestion, ear pain, sore throat, headache, cough, shortness of breath, wheezing, chest pain, fever/chills, and/or any concerning symptoms. Patient is in agreement with treatment plan.            All questions answered. Patient verbalized understanding and is in agreement with this plan of care.     If symptoms are worsening or not improving in 3-5 days, follow-up with PCP or return to UC. Differential diagnosis, natural history, and supportive care discussed. AVS handout given and reviewed with patient. Patient educated on red flags and when to seek treatment back in ED or UC.     I personally reviewed prior external notes and test results pertinent to today's visit.  I have independently reviewed and interpreted all diagnostics ordered during this urgent care visit.     This dictation has been created using voice recognition software. The accuracy of the dictation is limited by the abilities of the software. I expect there may be some errors of grammar and possibly content. I made every attempt to manually correct the errors within my dictation. However, errors related to voice recognition software may still exist and should be interpreted within the appropriate context.    This note was electronically signed by URMILA Sullivan

## 2023-10-16 NOTE — LETTER
October 16, 2023    To Whom It May Concern:         This is confirmation that Martinez Smith attended his scheduled appointment with URMILA Mi on 10/16/23. Please excuse him from work on 10/17 - 10/18 due to an acute illness.         If you have any questions please do not hesitate to call me at the phone number listed below.    Sincerely,          SARA MiRGermánN.  [phone]

## 2023-10-16 NOTE — PATIENT INSTRUCTIONS
"As we discussed your clinical condition would benefit from over-the-counter remedies:    FLONASE (once per day)  You may consider intranasal steroids such as fluticasone (brand name Flonase), (other options include Nasonex, Rhinocort, Nasacort) daily; continue for at least 2-3 weeks. Any generic should work as well but may cause slightly more nasal irritation. Please take according to package directions.  This steroid will help reduce inflammation of your sinuses.  This is the number one medication to help with seasonal allergies and treat nasal inflammation.  Cost: around $8 at [facility] for the generic fluticasone [facility] product (as of 5/20).    ANTIHISTAMINES  You may take a non-sedating antihistamine like Zyrtec (cetirizine) , Allegra (fexofenadine), Xyzal (levocetirizine), or Claritin (loratadine).  This will help \"dry you out\" and reduce the amount of nasal inflammation.  Follow package directions paying attention to whether they are once or twice daily.  Note: if there is a \"D\" such shayla Allegra-D it also contains a decongestant such as sudafed.  Some patients benefit from alternating these medications every few weeks but literature does not support this.   Cost: around $11 at [employer] for the generic cetirizine [facility] branded product (as of 5/20)    SUDAFED (low dose to see if tolerated)  You may consider over-the-counter Sudafed (pseudoephedrine) to act as a decongestant to improve your breathing through your nose.  Please do not use this medication if you already have known high blood pressure.  Please take according to package directions and note that this has a stimulating effect and should not be taken late in the day.  There is a low dose 12 hour formulation and a higher dose 24 hour formulation.  Start with a low dose to make sure you tolerate the medication.  Do not take late in the day as it may interfere with sleep.   Cost: Around $6 for the generic [facility] branded product at a 10mg dose (as of " "2/2023).      BENZOCAINE DROPS  These contain the active ingredient benzocaine which is an anesthetic agent.  It helps relieve the symptoms of sore throat and may diminish your cough reflex.  Take according to package directions.  The brand name CEPACOL but the generic will suffice.  Please note that taking too frequently may cause harm - pay attention to package directions.  Cost: around $4 at [employer] for Chloroseptic drops (15 count) (as of 2/2023).      SALINE IRRIGATION/SPRAY  You may consider using a saline nasal irrigation (such as a \"Neti pot\" or similar device using sterile water, NOT tap water) or OTC saline nasal spray      NSAIDs  You may take Ibuprofen (brand name Motrin or Advil) may be taken 800 mg up to three times per day taken with food (do not exceed 2400 mg per day).  If you prefer you may consider Naproxen (brand name Naprosyn or Aleve) around 500 mg twice per day with food (do not exceed 1200 mg per day). Please do not take if you have known stomach ulcers or known kidney disease.     TYLENOL  You may take Tylenol according to package directions (1000 mg every 8 hours not to exceed 3000 mg per day).  Please do not consume with any alcohol products, or if you have known or suspected liver disease. These will help reduce inflammation, help with pain control, and can reduce fevers.     "

## 2023-12-11 ENCOUNTER — NON-PROVIDER VISIT (OUTPATIENT)
Dept: ENDOCRINOLOGY | Facility: MEDICAL CENTER | Age: 23
End: 2023-12-11
Attending: INTERNAL MEDICINE
Payer: COMMERCIAL

## 2023-12-11 VITALS
BODY MASS INDEX: 22.18 KG/M2 | HEIGHT: 66 IN | HEART RATE: 88 BPM | OXYGEN SATURATION: 94 % | WEIGHT: 138 LBS | SYSTOLIC BLOOD PRESSURE: 118 MMHG | DIASTOLIC BLOOD PRESSURE: 80 MMHG

## 2023-12-11 DIAGNOSIS — E10.649 TYPE 1 DIABETES MELLITUS WITH HYPOGLYCEMIA AND WITHOUT COMA (HCC): ICD-10-CM

## 2023-12-11 LAB
HBA1C MFR BLD: 8.2 % (ref ?–5.8)
POCT INT CON NEG: NEGATIVE
POCT INT CON POS: POSITIVE

## 2023-12-11 PROCEDURE — 99213 OFFICE O/P EST LOW 20 MIN: CPT | Performed by: INTERNAL MEDICINE

## 2023-12-11 PROCEDURE — 83036 HEMOGLOBIN GLYCOSYLATED A1C: CPT

## 2023-12-11 RX ORDER — IBUPROFEN 600 MG/1
1 TABLET ORAL PRN
Qty: 1 KIT | Refills: 1 | Status: SHIPPED | OUTPATIENT
Start: 2023-12-11

## 2023-12-11 ASSESSMENT — FIBROSIS 4 INDEX: FIB4 SCORE: 0.52

## 2023-12-11 NOTE — PROGRESS NOTES
RN-CDE Note    Subjective:   Endocrinology Clinic Progress Note  PCP: Froilan Grande M.D.    HPI:  Martinez Smith is a 22 y.o. old patient who is seen today by the Diabetes Nurse Specialist for review of Type 1 Diabetes  Recent changes in health: He has changed from the Medtronic pump to Tandem before Thanksgiving.  DM:   Last A1c:   Lab Results   Component Value Date/Time    HBA1C 8.2 (A) 12/11/2023 03:09 PM      Previous A1c was 7.7 on 5/1/23  A1C GOAL: < 7    Diabetes Medications:   Humalog in Tandem pump  Basal rate:  MN .625  3 am  .800  12 pm  .500    Correction Factor   60  changed to 50    Carb Ratio  14   Target  110    Exercise: Walking  Diet: Lots of food allergies and follows a special diet.  Patient's body mass index is 22.27 kg/m². Exercise and nutrition counseling were performed at this visit.    Glucose monitoring frequency: See pump down load    Hypoglycemic episodes: no  Last Retinal Exam: on file and up-to-date  Daily Foot Exam: Yes   Foot Exam:  Monofilament: done  Lab Results   Component Value Date/Time    MALBCRT see below 06/20/2023 08:59 AM    MICROALBUR <1.2 06/20/2023 08:59 AM    MICRALB <3.0 02/18/2022 04:48 AM        ACR Albumin/Creatinine Ratio goal <30     HTN:   Blood pressure goal <130/<80 .   Currently Rx ACE/ARB: No     Dyslipidemia:    Lab Results   Component Value Date/Time    CHOLSTRLTOT 196 06/20/2023 09:00 AM     (H) 06/20/2023 09:00 AM    HDL 37 (A) 06/20/2023 09:00 AM    TRIGLYCERIDE 113 06/20/2023 09:00 AM         Currently Rx Statin: No     He  reports that he has never smoked. He has never used smokeless tobacco.      Plan:     Discussed and educated on:   - All medications, side effects and compliance (discussed carefully)  - Annual eye examinations at Ophthalmology  - Home glucose monitoring emphasized  - Weight control and daily exercise    Recommended medication changes: His blood sugars have been doing better the last couple weeks.  We will  change his correction factor from 60 to 50.  He will let us know if he has any problems or questions.  Follow up with Dr. Benoit in 3 months.

## 2024-01-08 DIAGNOSIS — E10.649 TYPE 1 DIABETES MELLITUS WITH HYPOGLYCEMIA AND WITHOUT COMA (HCC): ICD-10-CM

## 2024-01-08 RX ORDER — LANCETS 30 GAUGE
EACH MISCELLANEOUS
Qty: 150 EACH | Refills: 11 | Status: SHIPPED | OUTPATIENT
Start: 2024-01-08

## 2024-01-10 ENCOUNTER — OFFICE VISIT (OUTPATIENT)
Dept: URGENT CARE | Facility: PHYSICIAN GROUP | Age: 24
End: 2024-01-10
Payer: COMMERCIAL

## 2024-01-10 VITALS
OXYGEN SATURATION: 98 % | DIASTOLIC BLOOD PRESSURE: 64 MMHG | TEMPERATURE: 98.2 F | RESPIRATION RATE: 16 BRPM | HEIGHT: 66 IN | HEART RATE: 101 BPM | BODY MASS INDEX: 22.5 KG/M2 | WEIGHT: 140 LBS | SYSTOLIC BLOOD PRESSURE: 116 MMHG

## 2024-01-10 DIAGNOSIS — B34.9 VIRAL SYNDROME: ICD-10-CM

## 2024-01-10 LAB
FLUAV RNA SPEC QL NAA+PROBE: NEGATIVE
FLUBV RNA SPEC QL NAA+PROBE: NEGATIVE
RSV RNA SPEC QL NAA+PROBE: NEGATIVE
SARS-COV-2 RNA RESP QL NAA+PROBE: NEGATIVE

## 2024-01-10 PROCEDURE — 0241U POCT CEPHEID COV-2, FLU A/B, RSV - PCR: CPT

## 2024-01-10 PROCEDURE — 3078F DIAST BP <80 MM HG: CPT

## 2024-01-10 PROCEDURE — 3074F SYST BP LT 130 MM HG: CPT

## 2024-01-10 PROCEDURE — 99213 OFFICE O/P EST LOW 20 MIN: CPT

## 2024-01-10 RX ORDER — EPINEPHRINE 0.3 MG/.3ML
INJECTION SUBCUTANEOUS
COMMUNITY
Start: 2024-01-09

## 2024-01-10 ASSESSMENT — ENCOUNTER SYMPTOMS
VOMITING: 0
FEVER: 1
NAUSEA: 1
COUGH: 1
SORE THROAT: 1

## 2024-01-10 ASSESSMENT — FIBROSIS 4 INDEX: FIB4 SCORE: 0.54

## 2024-01-10 NOTE — PROGRESS NOTES
Subjective:     CHIEF COMPLAINT  Chief Complaint   Patient presents with    Cough     Fatigue x 9days        HPI  Martinez Smith is a very pleasant 23 y.o. male who presents with a headache, sore throat, fatigue, and chest discomfort.  He reports he was initially sick 9 days ago with similar symptoms, but then his symptoms resolved for 6 days.  His symptoms returned yesterday evening.  He has had a temperature ranging from 100-101 F.  He feels that his sore throat is primarily due to postnasal drip.  He has had some mild nausea, but reports he does have that at baseline.  He has a history of type 1 diabetes with a glucose of 202 on his CGM.  He reports that he just ate lunch prior to his appointment.    REVIEW OF SYSTEMS  Review of Systems   Constitutional:  Positive for fever and malaise/fatigue.   HENT:  Positive for sore throat.    Respiratory:  Positive for cough (Mild).    Gastrointestinal:  Positive for nausea. Negative for vomiting.       PAST MEDICAL HISTORY  Patient Active Problem List    Diagnosis Date Noted    Sinus disease 07/18/2023    Carpal tunnel syndrome of right wrist 01/24/2023    Muscle spasms of neck 10/27/2022    Syncope and collapse 07/25/2022    Influenza 05/01/2022    Type 1 diabetes mellitus with hypoglycemia and without coma (HCC) 01/19/2022    Exocrine pancreatic insufficiency 08/06/2021    Chronic abdominal pain 09/11/2020    Vitamin D deficiency 09/11/2020    RUQ abdominal pain 07/31/2020    Long-term insulin use (HCC) 12/17/2019    Fitting and adjustment of insulin pump 12/17/2019    Intractable nausea and vomiting 12/17/2019    Hyperlipidemia 01/18/2018    Other mechanical complication of insulin pump 01/18/2018    Insulin pump status 06/27/2017    Type 1 diabetes mellitus with hyperglycemia (HCC) 06/15/2017    Celiac disease 06/15/2017    Weakness of right lower extremity 06/15/2017    Lipohyperplasia 06/15/2017    Dyslipidemia 06/15/2017       SURGICAL HISTORY   has a past  surgical history that includes hypospadias repair; endoscopy, paranasal sinus, with total ethmoidectomy, sn (Bilateral, 8/29/2023); and septoturbinoplasty (Bilateral, 8/29/2023).    ALLERGIES  Allergies   Allergen Reactions    Dairy Food Allergy Nausea and Shortness of Breath    Eggs Unspecified     Nausea, vomiting, chest tightness      Nausea, vomiting, chest tightness, SOB    Mustard Seed Unspecified     Nausea and vomiting, SOB    Peanut (Diagnostic) Unspecified     SOB,Nausea, vomiting, and chest tightness    Shellfish Allergy Unspecified     SOB, Nausea, vomiting, chest tightness    Tree Nuts Food Allergy Anaphylaxis, Shortness of Breath, Itching, Nausea and Cough     Cashews and pistachio and coconut     Compazine Unspecified     Anxiety     Gluten Meal Unspecified     Patient reports has celiac    Reglan [Metoclopramide] Anxiety     Makes anxious    Other Food Shortness of Breath     Strawberries, blueberries, raspberries are causing SOB and nausea.    Potato      White potato causes nausea vomiting, SOB       CURRENT MEDICATIONS  Home Medications       Reviewed by Ashley Jennings P.A.-C. (Physician Assistant) on 01/10/24 at 1208  Med List Status: <None>     Medication Last Dose Status   amitriptyline (ELAVIL) 50 MG Tab Taking Active   Blood Glucose Monitoring Suppl (FREESTYLE FREEDOM LITE) w/Device Kit Taking Active   Blood Glucose Monitoring Suppl (FREESTYLE LITE) Device Taking Active   Blood Glucose Monitoring Suppl Device Taking Active   Blood Glucose Test Strips Taking Active   Blood Glucose Test Strips Taking Active   budesonide (PULMICORT) 0.5 MG/2ML Suspension Taking Active   cromolyn (GASTROCROM) 100 MG/5ML solution Taking Active   diphenhydrAMINE (BENADRYL) 25 MG Tab Not Taking Active   EPINEPHrine (EPIPEN) 0.3 MG/0.3ML Solution Auto-injector solution for injection Taking Active   fexofenadine (ALLEGRA) 60 MG Tab Taking Active   FREESTYLE LITE strip Taking Active   Glucagon, rDNA, (GLUCAGON  "EMERGENCY) 1 MG Kit Taking Active   HUMALOG 100 UNIT/ML Taking Active   insulin infusion pump Device Taking Active   INSULIN LISPRO 100 UNIT/ML INJ Taking Active   Lancets Taking Active   meclizine (ANTIVERT) 25 MG Tab Taking Active   montelukast (SINGULAIR) 10 MG Tab Taking Active   ondansetron (ZOFRAN ODT) 4 MG TABLET DISPERSIBLE Not Taking Active   PANCREAZE 34520-05699 units Cap DR Particles Taking Active   Phenylephrine-Ibuprofen (ADVIL SINUS CONGESTION & PAIN)  MG Tab Not Taking Active   promethazine (PHENERGAN) 25 MG Tab Taking Active   tizanidine (ZANAFLEX) 4 MG Tab Taking Active                    SOCIAL HISTORY  Social History     Tobacco Use    Smoking status: Never    Smokeless tobacco: Never   Vaping Use    Vaping Use: Never used   Substance and Sexual Activity    Alcohol use: No    Drug use: No    Sexual activity: Not on file       FAMILY HISTORY  Family History   Problem Relation Age of Onset    Hypertension Mother     Hyperlipidemia Mother     Hyperlipidemia Father     Arthritis Maternal Grandmother           Objective:     VITAL SIGNS: /64 (BP Location: Right arm, Patient Position: Sitting, BP Cuff Size: Adult)   Pulse (!) 101   Temp 36.8 °C (98.2 °F) (Temporal)   Resp 16   Ht 1.676 m (5' 6\")   Wt 63.5 kg (140 lb)   SpO2 98%   BMI 22.60 kg/m²     PHYSICAL EXAM  Physical Exam  Vitals reviewed.   Constitutional:       General: He is not in acute distress.     Appearance: Normal appearance. He is normal weight. He is not ill-appearing, toxic-appearing or diaphoretic.   HENT:      Head: Normocephalic and atraumatic.      Nose: Nose normal.      Mouth/Throat:      Mouth: Mucous membranes are moist.      Pharynx: Posterior oropharyngeal erythema present. No oropharyngeal exudate.      Comments: Mucus present on posterior oropharynx  Eyes:      Conjunctiva/sclera: Conjunctivae normal.   Cardiovascular:      Rate and Rhythm: Regular rhythm. Tachycardia present.      Heart sounds: Normal " heart sounds.   Pulmonary:      Effort: Pulmonary effort is normal. No respiratory distress.      Breath sounds: Normal breath sounds. No stridor. No wheezing, rhonchi or rales.   Skin:     General: Skin is warm and dry.      Coloration: Skin is not jaundiced or pale.   Neurological:      General: No focal deficit present.      Mental Status: He is alert.   Psychiatric:         Mood and Affect: Mood normal.       Assessment/Plan:     1. Viral syndrome  - POCT CEPHEID COV-2, FLU A/B, RSV - PCR    Other orders  - EPINEPHrine (EPIPEN) 0.3 MG/0.3ML Solution Auto-injector solution for injection  -Tylenol OTC as needed for discomfort  -Rest and hydrate  -Return to clinic if symptoms worsen or fail to resolve    MDM/Comments:  Patient is displaying systemic symptoms including tachycardia on physical examination. These symptoms are likely contributed to viral symptoms.   Patient has a history of type 1 diabetes. I have prepared for this visit by personally reviewing the patient's prevous medical records, vitals, and labs including: most recent A1c of 8.2. Patient has stable vital signs and is non-toxic appearing. Discussed supportive care with hydration, rest, Tylenol as needed.  Viral Cepheid testing performed in office with negative results.  Patient offered strep Cepheid testing, but politely declined at this time.  Patient demonstrated understanding of treatment plan at this time and will RTC if symptoms worsen or fail to resolve.       Differential diagnosis, natural history, supportive care, and indications for immediate follow-up discussed. All questions answered. Patient agrees with the plan of care.    Follow-up as needed if symptoms worsen or fail to improve to PCP, Urgent care or Emergency Room.    I have personally reviewed prior external notes and test results pertinent to today's visit.  I have independently reviewed and interpreted all diagnostics ordered during this urgent care acute visit.   Discussed  management options (risks,benefits, and alternatives to treatment). Pt expresses understanding and the treatment plan was agreed upon. Questions were encouraged and answered to pt's satisfaction.    Please note that this dictation was created using voice recognition software. I have made a reasonable attempt to correct obvious errors, but I expect that there are errors of grammar and possibly content that I did not discover before finalizing the note.

## 2024-01-17 ENCOUNTER — OFFICE VISIT (OUTPATIENT)
Dept: URGENT CARE | Facility: PHYSICIAN GROUP | Age: 24
End: 2024-01-17
Payer: COMMERCIAL

## 2024-01-17 VITALS
WEIGHT: 135 LBS | HEART RATE: 106 BPM | RESPIRATION RATE: 16 BRPM | SYSTOLIC BLOOD PRESSURE: 104 MMHG | BODY MASS INDEX: 21.69 KG/M2 | OXYGEN SATURATION: 95 % | DIASTOLIC BLOOD PRESSURE: 70 MMHG | HEIGHT: 66 IN | TEMPERATURE: 97.3 F

## 2024-01-17 DIAGNOSIS — E10.65 TYPE 1 DIABETES MELLITUS WITH HYPERGLYCEMIA (HCC): ICD-10-CM

## 2024-01-17 DIAGNOSIS — R11.2 NAUSEA AND VOMITING, UNSPECIFIED VOMITING TYPE: Primary | ICD-10-CM

## 2024-01-17 DIAGNOSIS — Z79.4 LONG-TERM INSULIN USE (HCC): ICD-10-CM

## 2024-01-17 PROCEDURE — 99214 OFFICE O/P EST MOD 30 MIN: CPT | Performed by: NURSE PRACTITIONER

## 2024-01-17 PROCEDURE — 3078F DIAST BP <80 MM HG: CPT | Performed by: NURSE PRACTITIONER

## 2024-01-17 PROCEDURE — 3074F SYST BP LT 130 MM HG: CPT | Performed by: NURSE PRACTITIONER

## 2024-01-17 PROCEDURE — 0241U POCT CEPHEID COV-2, FLU A/B, RSV - PCR: CPT | Performed by: NURSE PRACTITIONER

## 2024-01-17 ASSESSMENT — FIBROSIS 4 INDEX: FIB4 SCORE: 0.54

## 2024-01-17 NOTE — PROGRESS NOTES
Martinez Smith is a 23 y.o. male who presents for Emesis (Fever, x1 day )      HPI  This is a new problem. Martinez Smith is a 23 y.o. patient who presents to urgent care with c/o: nausea, vomiting for 1 day. Woke up from sleeping nauseated and then vomited. In the past 24 hours vomited small amounts several times. Took zofran which helped reduce nausea. Mild abd discomfort from throwing up.   Blood sugars have been within range. Has insulin pump.   Seen in urgent care last week and all tests were negative.   Denies fever, chills, sob, cough, congestion, bodyaches.   No other aggravating or alleviating factors.       ROS See HPI    Allergies:       Allergies   Allergen Reactions    Dairy Food Allergy Nausea and Shortness of Breath    Eggs Unspecified     Nausea, vomiting, chest tightness      Nausea, vomiting, chest tightness, SOB    Mustard Seed Unspecified     Nausea and vomiting, SOB    Peanut (Diagnostic) Unspecified     SOB,Nausea, vomiting, and chest tightness    Shellfish Allergy Unspecified     SOB, Nausea, vomiting, chest tightness    Tree Nuts Food Allergy Anaphylaxis, Shortness of Breath, Itching, Nausea and Cough     Cashews and pistachio and coconut     Compazine Unspecified     Anxiety     Gluten Meal Unspecified     Patient reports has celiac    Reglan [Metoclopramide] Anxiety     Makes anxious    Other Food Shortness of Breath     Strawberries, blueberries, raspberries are causing SOB and nausea.    Potato      White potato causes nausea vomiting, SOB       PMSFS Hx:  Past Medical History:   Diagnosis Date    Celiac disease     Celiac disease     Dental disorder     Gingivitus    Diabetes type I (HCC)     insulin pump    Environmental and seasonal allergies     Psychiatric problem     anxiety     Past Surgical History:   Procedure Laterality Date    ENDOSCOPY, PARANASAL SINUS, WITH TOTAL ETHMOIDECTOMY, SN Bilateral 8/29/2023    Procedure: STEREOTACTIC COMPUTER-ASSISTED GUIDANCE,  BILATERAL MAXILLARY ANTROSTOMY, BILATERAL TOTAL ETHMOIDECTOMY WITH SPHENOIDECTOMY, BILATERAL FRONTAL SINUSOTOMY, SEPTOPLASTY, BILATERAL INFERIOR TURBINATE REDUCTION;  Surgeon: Renee Bunn M.D.;  Location: SURGERY SAME DAY [facility];  Service: Ent    SEPTOTURBINOPLASTY Bilateral 8/29/2023    Procedure: SEPTOPLASTY, NOSE, WITH TURBINOPLASTY;  Surgeon: Renee Bunn M.D.;  Location: SURGERY SAME DAY [facility];  Service: Ent    HYPOSPADIAS REPAIR       Family History   Problem Relation Age of Onset    Hypertension Mother     Hyperlipidemia Mother     Hyperlipidemia Father     Arthritis Maternal Grandmother      Social History     Tobacco Use    Smoking status: Never    Smokeless tobacco: Never   Substance Use Topics    Alcohol use: No       Problems:   Patient Active Problem List   Diagnosis    Type 1 diabetes mellitus with hyperglycemia (HCC)    Celiac disease    Weakness of right lower extremity    Lipohyperplasia    Dyslipidemia    Long-term insulin use (HCC)    Fitting and adjustment of insulin pump    Intractable nausea and vomiting    Hyperlipidemia    Insulin pump status    Other mechanical complication of insulin pump    RUQ abdominal pain    Chronic abdominal pain    Vitamin D deficiency    Exocrine pancreatic insufficiency    Type 1 diabetes mellitus with hypoglycemia and without coma (HCC)    Influenza    Syncope and collapse    Muscle spasms of neck    Carpal tunnel syndrome of right wrist    Sinus disease       Medications:   Current Outpatient Medications on File Prior to Visit   Medication Sig Dispense Refill    EPINEPHrine (EPIPEN) 0.3 MG/0.3ML Solution Auto-injector solution for injection       Blood Glucose Monitoring Suppl Device Meter: Dispense Device of [employer]. Sig. Use as directed for blood sugar monitoring. #1. NR. 1 Each 0    Blood Glucose Test Strips Test strips order: Test strips for [employer] Contour Next meter. Sig: use 5 times daily for insulin adjustment 150 Strip 11    Lancets  Lancets order: Lancets for [employer] Contour Next meter. Sig: use 5 times daily 150 Each 11    Glucagon, rDNA, (GLUCAGON EMERGENCY) 1 MG Kit Inject 1 mg as directed as needed (severe hypoglycemia). For severe hypoglycemia 1 Kit 1    amitriptyline (ELAVIL) 50 MG Tab Take 25 mg by mouth at bedtime.      budesonide (PULMICORT) 0.5 MG/2ML Suspension USE 1 AMPULE 1-2 TIMES DAILY AS INSTRUCTED      Blood Glucose Monitoring Suppl (FREESTYLE FREEDOM LITE) w/Device Kit 1 EACH CONTINUOUS.      diphenhydrAMINE (BENADRYL) 25 MG Tab Take 25 mg by mouth every 6 hours as needed for Sleep.      Phenylephrine-Ibuprofen (ADVIL SINUS CONGESTION & PAIN)  MG Tab Take 1 Tablet by mouth every day.      FREESTYLE LITE strip Testing 8 times per day for insulin adjutment. 300 Strip 6    cromolyn (GASTROCROM) 100 MG/5ML solution PLEASE SEE ATTACHED FOR DETAILED DIRECTIONS      Blood Glucose Monitoring Suppl (FREESTYLE LITE) Device 1 Each continuous. 1 Each 0    Blood Glucose Test Strips Test strips order: Test strips for [employer] Freestyle Lite meter. Testing 5 times a day for insulin adjustment. 150 Strip 3    INSULIN LISPRO 100 UNIT/ML INJ INJECT 60 UNITS UNDER THE SKIN EVERY DAY. INSULIN PUMP 60 mL 3    HUMALOG 100 UNIT/ML Inject 50 units into pump reservoir daily  30 day supply 20 mL 11    meclizine (ANTIVERT) 25 MG Tab Take 1 Tablet by mouth 3 times a day as needed for Dizziness or Nausea/Vomiting. 30 Tablet 0    promethazine (PHENERGAN) 25 MG Tab Take 1 Tablet by mouth every 6 hours as needed for Nausea/Vomiting. 30 Tablet 0    tizanidine (ZANAFLEX) 4 MG Tab Take 4 mg by mouth every 6 hours as needed.      montelukast (SINGULAIR) 10 MG Tab Take 10 mg by mouth every day.      ondansetron (ZOFRAN ODT) 4 MG TABLET DISPERSIBLE Take 1 Tablet by mouth every 6 hours as needed for Nausea. 20 Tablet 0    fexofenadine (ALLEGRA) 60 MG Tab Take 60 mg by mouth 2 times a day.      PANCREAZE 68624-89121 units Cap DR Particles Take 1 Capsule by mouth 3  "times a day before meals.      insulin infusion pump Device Inject  under the skin continuous. Patient's own SQ insulin pump    Type of Insulin: Humalog  Last change of tubin22 - Change tubing and site every 72 hours    Dosing:  Basal rate:  0000 - 0300 = .625 units  0300 - 1200 = .6 units  1200 - 0000= .575 units  Bolus ratio:   1 unit : 14 g carbohydrate      Sensitivity ratio:   1 units for every 50 over 130-150 mg/dL    Disconnect pump if patient becomes hypoglycemic and altered.       No current facility-administered medications on file prior to visit.        Objective:     /70 (BP Location: Right arm, Patient Position: Sitting, BP Cuff Size: Adult)   Pulse (!) 106   Temp 36.3 °C (97.3 °F) (Temporal)   Resp 16   Ht 1.676 m (5' 6\")   Wt 61.2 kg (135 lb)   SpO2 95%   BMI 21.79 kg/m²     Physical Exam  Vitals and nursing note reviewed.   Constitutional:       General: He is not in acute distress.     Appearance: Normal appearance. He is well-developed. He is not toxic-appearing.   Cardiovascular:      Rate and Rhythm: Normal rate and regular rhythm.      Pulses: Normal pulses.      Heart sounds: Normal heart sounds.   Pulmonary:      Effort: Pulmonary effort is normal.      Breath sounds: Normal breath sounds.   Musculoskeletal:         General: Normal range of motion.      Cervical back: Neck supple.   Skin:     General: Skin is warm and dry.      Capillary Refill: Capillary refill takes less than 2 seconds.   Neurological:      General: No focal deficit present.      Mental Status: He is alert and oriented to person, place, and time.      Deep Tendon Reflexes: Reflexes are normal and symmetric.   Psychiatric:         Mood and Affect: Mood normal.         Behavior: Behavior normal. Behavior is cooperative.       Results for orders placed or performed in visit on 24   POCT CEPHEID COV-2, FLU A/B, RSV - PCR   Result Value Ref Range    SARS-CoV-2 by PCR Negative Negative, Invalid    " "Influenza virus A RNA Negative Negative, Invalid    Influenza virus B, PCR Negative Negative, Invalid    RSV, PCR Negative Negative, Invalid         Assessment /Associated Orders:      1. Nausea and vomiting, unspecified vomiting type  POCT CEPHEID COV-2, FLU A/B, RSV - PCR      2. Long-term insulin use (HCC)        3. Type 1 diabetes mellitus with hyperglycemia (HCC)              Medical Decision Making:    Martinez  is a very pleasant 23 y.o. male who is clinically stable at today's acute urgent care visit.  No acute distress noted.  VSS. Appropriate for outpatient care at this time.   Acute problem today with uncertain prognosis. Neg hx of DKA. Symptoms are mild. He was concerned about influenza. Zofran reduces his nausea - has RX from GI secondary to hx of \" a lot of GI problems\".   Resume all prior  RX medications. Take as prescribed.   Most likely a viral illness.     Keep well hydrated  Discussed Dx, management options (risks,benefits, and alternatives to planned treatment), natural progression and supportive care.  Expressed understanding and the treatment plan was agreed upon.   Questions were encouraged and answered   Return to urgent care prn if new or worsening sx or if there is no improvement in condition prn.    Educated in Red flags and indications to immediately call 911 or present to the Emergency Department.       Time I spent evaluating Martinez Smith in urgent care today was 31  minutes. This time includes preparing for visit, reviewing any pertinent notes or test results, counseling/education, exam, obtaining HPI, interpretation of lab tests, medication management and documentation as indicated above.Time does not include separately billable procedures noted .       Please note that this dictation was created using voice recognition software. I have worked with consultants from the vendor as well as technical experts from [employer] to optimize the interface. I have made every " reasonable attempt to correct obvious errors, but I expect that there are errors of grammar and possibly content that I did not discover before finalizing the note.  This note was electronically signed by provider

## 2024-02-03 DIAGNOSIS — E10.649 TYPE 1 DIABETES MELLITUS WITH HYPOGLYCEMIA AND WITHOUT COMA (HCC): ICD-10-CM

## 2024-02-05 RX ORDER — INSULIN ASPART INJECTION 100 [IU]/ML
15 INJECTION, SOLUTION SUBCUTANEOUS 3 TIMES DAILY
Qty: 15 ML | Refills: 5 | Status: SHIPPED | OUTPATIENT
Start: 2024-02-05 | End: 2024-02-13

## 2024-03-07 ENCOUNTER — OFFICE VISIT (OUTPATIENT)
Dept: ENDOCRINOLOGY | Facility: MEDICAL CENTER | Age: 24
End: 2024-03-07
Attending: INTERNAL MEDICINE
Payer: COMMERCIAL

## 2024-03-07 VITALS
WEIGHT: 143 LBS | HEART RATE: 88 BPM | HEIGHT: 66 IN | DIASTOLIC BLOOD PRESSURE: 66 MMHG | OXYGEN SATURATION: 98 % | BODY MASS INDEX: 22.98 KG/M2 | SYSTOLIC BLOOD PRESSURE: 112 MMHG

## 2024-03-07 DIAGNOSIS — K86.81 EXOCRINE PANCREATIC INSUFFICIENCY: ICD-10-CM

## 2024-03-07 DIAGNOSIS — Z96.41 INSULIN PUMP IN PLACE: ICD-10-CM

## 2024-03-07 DIAGNOSIS — E55.9 VITAMIN D DEFICIENCY: ICD-10-CM

## 2024-03-07 DIAGNOSIS — K90.0 CELIAC DISEASE: ICD-10-CM

## 2024-03-07 DIAGNOSIS — Z79.4 LONG-TERM INSULIN USE (HCC): ICD-10-CM

## 2024-03-07 DIAGNOSIS — E10.649 TYPE 1 DIABETES MELLITUS WITH HYPOGLYCEMIA AND WITHOUT COMA (HCC): ICD-10-CM

## 2024-03-07 LAB
HBA1C MFR BLD: 8 % (ref ?–5.8)
POCT INT CON NEG: NEGATIVE
POCT INT CON POS: POSITIVE

## 2024-03-07 PROCEDURE — 95251 CONT GLUC MNTR ANALYSIS I&R: CPT | Performed by: INTERNAL MEDICINE

## 2024-03-07 PROCEDURE — 83036 HEMOGLOBIN GLYCOSYLATED A1C: CPT | Performed by: INTERNAL MEDICINE

## 2024-03-07 PROCEDURE — 99214 OFFICE O/P EST MOD 30 MIN: CPT | Performed by: INTERNAL MEDICINE

## 2024-03-07 PROCEDURE — 3074F SYST BP LT 130 MM HG: CPT | Performed by: INTERNAL MEDICINE

## 2024-03-07 PROCEDURE — 99213 OFFICE O/P EST LOW 20 MIN: CPT | Performed by: INTERNAL MEDICINE

## 2024-03-07 PROCEDURE — 3078F DIAST BP <80 MM HG: CPT | Performed by: INTERNAL MEDICINE

## 2024-03-07 ASSESSMENT — FIBROSIS 4 INDEX: FIB4 SCORE: 0.54

## 2024-03-07 NOTE — PROGRESS NOTES
CHIEF COMPLAINT: Patient is here for follow up of Type 1 Diabetes Mellitus.    HPI:     Martinez Smith is a 23 y.o. male with Type 1 Diabetes Mellitus here for follow up.      Labs from 3/24/2024 show A1c is 8.0%  Labs from 9/28/2023 show a1c is 8.0%  Labs from 5/1/2023 show a1c is 7.7%  Labs from 9/9/2022 show a1c was 6.9%  Labs from 6/8/2022 show a1c was 7.3%  Labs from 3/4/2022 show a1c was 7.0%  Labs from 11/9/2021 show a1c was 7.3%  Labs from  4/12021 show a1c was 6.8%  Labs from 12/3/2020 show a1c was 7.3%  Labs from 7/31/2020 show a1c was 7.9%  Labs from 5/30/2020 show a1c was 7.7%  Labs from 12/13/2020 show a1c was 8.8%    He was previously seen by the PA  He was on a medtronic 630g pump    He has a history of chronic abdominal pain and nausea and vomiting with a negative extensive work-up for pancreatitis, gastritis and ultimately was discovered that he has pancreatic enzyme deficiency.   He also diagnosed himself with food allergies.     He also has celiac disease, he is on a gluten free diet since age of 11   He also has mastocytosis   He has tried and failed Novolog (Hives)      He is on a Tandem X2 pump and Dexcom G6 CGM Hybrid closed loop system  His pump is infusing Humalog    Basal rate  00:00 0.625  0300  0.80  1200 0.5       Carb ratio  00:00  14--> 12     ICF  1:50     Target  110      He now working as an  for a local company     Active insulin time 3 hours  Average  (200 130 190, 140)  Time in range  was 71%  Basal 41%  Bolus 59%  TDD 40                  He is on manual mode as he can't afford his Dexcom supplies due to a high deductible   He wants to swithc to Dexcom G7 next month        He had ESS for sinus polyps  He is now on Cromolyn for mastocytosis        His lipids are at goal and he does not need statin therapy because he is young  His LDL-C was 136 on 6/2023      He doesn't have diabetic kidney disease  UACR was < 30 on 6/2023      He had an eye exam with   Kassy on 6/12023  He doesn't have diabetic retinopathy    His TSH is normal at 3.4 on 6/2023    His vitamin D is 23 on 6/2023        BG Diary:   Pump was download and reviewed today      Weight has been stable    Diabetes Complications   Retinopathy: No known retinopathy.  Last eye exam: 6/2023 with Dr. Elena    Neuropathy: Denies paresthesias or numbness in hands or feet. Denies any foot wounds.  Exercise: Minimal.  Diet: Fair.  Patient's medications, allergies, and social histories were reviewed and updated as appropriate.    ROS:     CONS:     No fever, no chills   EYES:     No diplopia, no blurry vision   CV:           No chest pain, no palpitations   PULM:     No SOB, no cough, no hemoptysis.   GI:            No nausea, no vomiting, no diarrhea, no constipation   ENDO:     No polyuria, no polydipsia, no heat intolerance, no cold intolerance       Past Medical History:  Problem List:  2023-07: Sinus disease  2023-01: Carpal tunnel syndrome of right wrist  2022-10: Muscle spasms of neck  2022-07: Syncope and collapse  2022-05: Influenza  2022-01: Type 1 diabetes mellitus with hypoglycemia and without coma   ([facility])  2021-08: Exocrine pancreatic insufficiency  2020-09: Chronic abdominal pain  2020-09: Vitamin D deficiency  2020-07: RUQ abdominal pain  2019-12: Diabetic gastroparesis (HCC)  2019-12: Long-term insulin use (HCC)  2019-12: Fitting and adjustment of insulin pump  2019-12: Intractable nausea and vomiting  2018-01: Hyperlipidemia  2018-01: Other mechanical complication of insulin pump  2017-06: Insulin pump status  2017-06: Type 1 diabetes mellitus with hyperglycemia (HCC)  2017-06: Celiac disease  2017-06: Weakness of right lower extremity  2017-06: Lipohyperplasia  2017-06: Dyslipidemia      Past Surgical History:  Past Surgical History:   Procedure Laterality Date    ENDOSCOPY, PARANASAL SINUS, WITH TOTAL ETHMOIDECTOMY, SN Bilateral 8/29/2023    Procedure: STEREOTACTIC COMPUTER-ASSISTED GUIDANCE,  "BILATERAL MAXILLARY ANTROSTOMY, BILATERAL TOTAL ETHMOIDECTOMY WITH SPHENOIDECTOMY, BILATERAL FRONTAL SINUSOTOMY, SEPTOPLASTY, BILATERAL INFERIOR TURBINATE REDUCTION;  Surgeon: Renee Bunn M.D.;  Location: SURGERY SAME DAY [facility];  Service: Ent    SEPTOTURBINOPLASTY Bilateral 8/29/2023    Procedure: SEPTOPLASTY, NOSE, WITH TURBINOPLASTY;  Surgeon: Renee Bunn M.D.;  Location: SURGERY SAME DAY [facility];  Service: Ent    HYPOSPADIAS REPAIR          Allergies:  Beef flavor; Gluten meal; and Tree nuts food allergy     Social History:  Social History     Tobacco Use    Smoking status: Never    Smokeless tobacco: Never   Vaping Use    Vaping Use: Never used   Substance Use Topics    Alcohol use: No    Drug use: No        Family History:   family history includes Arthritis in his maternal grandmother; Hyperlipidemia in his father and mother; Hypertension in his mother.      PHYSICAL EXAM:   OBJECTIVE:  Vital signs: /66   Pulse 88   Ht 1.676 m (5' 6\")   Wt 64.9 kg (143 lb)   SpO2 98%   BMI 23.08 kg/m²   GENERAL: Well-developed, well-nourished in no apparent distress.   EYE:  No ocular asymmetry, PERRLA  HENT: Pink, moist mucous membranes.    NECK: No thyromegaly.   CARDIOVASCULAR: regular rate and rhythm w/ no murmurs  LUNGS: Symmetrical chest expansion with clear breath sounds  ABDOMEN: non obese abdomen with no visible organomegaly right upper quadrant pain tenderness noted with direct palpation  EXTREMITIES: No clubbing, cyanosis, or edema.   NEUROLOGICAL: No gross focal motor abnormalities   LYMPH: No cervical adenopathy seen.   SKIN: No rashes, lesions.         Labs:  Lab Results   Component Value Date/Time    HBA1C 8.0 (A) 03/07/2024 01:12 PM        Lab Results   Component Value Date/Time    WBC 5.4 06/20/2023 09:01 AM    RBC 4.91 06/20/2023 09:01 AM    HEMOGLOBIN 14.7 06/20/2023 09:01 AM    MCV 90.4 06/20/2023 09:01 AM    MCH 29.9 06/20/2023 09:01 AM    MCHC 33.1 06/20/2023 09:01 AM    RDW 40.2 " 06/20/2023 09:01 AM    MPV 12.5 06/20/2023 09:01 AM       Lab Results   Component Value Date/Time    SODIUM 134 (L) 08/22/2023 08:37 AM    POTASSIUM 4.6 08/22/2023 08:37 AM    CHLORIDE 99 08/22/2023 08:37 AM    CO2 27 08/22/2023 08:37 AM    ANION 8.0 08/22/2023 08:37 AM    GLUCOSE 296 (H) 08/22/2023 08:37 AM    BUN 13 08/22/2023 08:37 AM    CREATININE 0.81 08/22/2023 08:37 AM    CREATININE 0.4 05/25/2007 04:40 AM    CALCIUM 9.4 08/22/2023 08:37 AM    ASTSGOT 34 06/20/2023 09:00 AM    ALTSGPT 35 06/20/2023 09:00 AM    TBILIRUBIN 0.5 06/20/2023 09:00 AM    ALBUMIN 4.7 06/20/2023 09:00 AM    ALBUMIN 4.80 10/22/2016 09:46 AM    TOTPROTEIN 7.4 06/20/2023 09:00 AM    TOTPROTEIN 7.70 10/22/2016 09:46 AM    GLOBULIN 2.7 06/20/2023 09:00 AM    AGRATIO 1.7 06/20/2023 09:00 AM       Lab Results   Component Value Date/Time    CHOLSTRLTOT 177 12/24/2019 0729    TRIGLYCERIDE 103 12/24/2019 0729    HDL 28 (A) 12/24/2019 0729     (H) 12/24/2019 0729       Lab Results   Component Value Date/Time    MALBCRT see below 06/20/2023 08:59 AM    MICROALBUR <1.2 06/20/2023 08:59 AM    MICRALB <3.0 02/18/2022 04:48 AM        Lab Results   Component Value Date/Time    TSHULTRASEN 2.140 09/14/2019 0727     No results found for: FREEDIR  Lab Results   Component Value Date/Time    FREET3 3.53 09/14/2019 0727           ASSESSMENT/PLAN:     1. Type 1 diabetes mellitus with hyperglycemia (HCC)  Controlled  A1c is high at 8.0%  Changed Carb ratio to 12 for bfast and lunch   We will order dexcom G7 on April after he meets deductible he will message  RTC in 3 mos w/ Yadi  I want him to get complete labs prior to next visit     2. Vitamin D deficiency  Uncontrolled  Continue vitamin D3 5000 units daily  Continue monitoring    3. Celiac disease  Stable   Continue monitoring    4. Insulin pump status  Insulin pump in place      5. Long-term insulin use (HCC)  Patient is on long-term insulin therapy due to type 1 diabetes      Return in about 3  months (around 6/7/2024).      Thank you kindly for allowing me to participate in the diabetes care plan for this patient.    Gabo Benoit MD, FACE, [facility]      CC:   Gregory Grande M.D.

## 2024-04-20 ENCOUNTER — HOSPITAL ENCOUNTER (OUTPATIENT)
Dept: LAB | Facility: MEDICAL CENTER | Age: 24
End: 2024-04-20
Attending: INTERNAL MEDICINE
Payer: COMMERCIAL

## 2024-04-20 DIAGNOSIS — E55.9 VITAMIN D DEFICIENCY: ICD-10-CM

## 2024-04-20 DIAGNOSIS — K90.0 CELIAC DISEASE: ICD-10-CM

## 2024-04-20 DIAGNOSIS — Z96.41 INSULIN PUMP IN PLACE: ICD-10-CM

## 2024-04-20 DIAGNOSIS — Z79.4 LONG-TERM INSULIN USE (HCC): ICD-10-CM

## 2024-04-20 DIAGNOSIS — K86.81 EXOCRINE PANCREATIC INSUFFICIENCY: ICD-10-CM

## 2024-04-20 DIAGNOSIS — E10.649 TYPE 1 DIABETES MELLITUS WITH HYPOGLYCEMIA AND WITHOUT COMA (HCC): ICD-10-CM

## 2024-04-20 LAB
25(OH)D3 SERPL-MCNC: 20 NG/ML (ref 30–100)
ALBUMIN SERPL BCP-MCNC: 4.6 G/DL (ref 3.2–4.9)
ALBUMIN/GLOB SERPL: 1.6 G/DL
ALP SERPL-CCNC: 109 U/L (ref 30–99)
ALT SERPL-CCNC: 18 U/L (ref 2–50)
ANION GAP SERPL CALC-SCNC: 11 MMOL/L (ref 7–16)
AST SERPL-CCNC: 19 U/L (ref 12–45)
BILIRUB SERPL-MCNC: 0.9 MG/DL (ref 0.1–1.5)
BUN SERPL-MCNC: 9 MG/DL (ref 8–22)
CALCIUM ALBUM COR SERPL-MCNC: 8.9 MG/DL (ref 8.5–10.5)
CALCIUM SERPL-MCNC: 9.4 MG/DL (ref 8.5–10.5)
CHLORIDE SERPL-SCNC: 100 MMOL/L (ref 96–112)
CHOLEST SERPL-MCNC: 181 MG/DL (ref 100–199)
CO2 SERPL-SCNC: 28 MMOL/L (ref 20–33)
CREAT SERPL-MCNC: 0.79 MG/DL (ref 0.5–1.4)
CREAT UR-MCNC: 316.44 MG/DL
GFR SERPLBLD CREATININE-BSD FMLA CKD-EPI: 126 ML/MIN/1.73 M 2
GLOBULIN SER CALC-MCNC: 2.8 G/DL (ref 1.9–3.5)
GLUCOSE SERPL-MCNC: 129 MG/DL (ref 65–99)
HDLC SERPL-MCNC: 38 MG/DL
LDLC SERPL CALC-MCNC: 121 MG/DL
MICROALBUMIN UR-MCNC: <1.2 MG/DL
MICROALBUMIN/CREAT UR: NORMAL MG/G (ref 0–30)
POTASSIUM SERPL-SCNC: 4 MMOL/L (ref 3.6–5.5)
PROT SERPL-MCNC: 7.4 G/DL (ref 6–8.2)
SODIUM SERPL-SCNC: 139 MMOL/L (ref 135–145)
T4 FREE SERPL-MCNC: 1.5 NG/DL (ref 0.93–1.7)
TRIGL SERPL-MCNC: 110 MG/DL (ref 0–149)
TSH SERPL DL<=0.005 MIU/L-ACNC: 2.91 UIU/ML (ref 0.38–5.33)

## 2024-04-20 PROCEDURE — 82043 UR ALBUMIN QUANTITATIVE: CPT

## 2024-04-20 PROCEDURE — 84439 ASSAY OF FREE THYROXINE: CPT

## 2024-04-20 PROCEDURE — 82306 VITAMIN D 25 HYDROXY: CPT

## 2024-04-20 PROCEDURE — 84443 ASSAY THYROID STIM HORMONE: CPT

## 2024-04-20 PROCEDURE — 82570 ASSAY OF URINE CREATININE: CPT

## 2024-04-20 PROCEDURE — 80061 LIPID PANEL: CPT

## 2024-04-20 PROCEDURE — 80053 COMPREHEN METABOLIC PANEL: CPT

## 2024-04-20 PROCEDURE — 36415 COLL VENOUS BLD VENIPUNCTURE: CPT

## 2024-06-05 ENCOUNTER — NON-PROVIDER VISIT (OUTPATIENT)
Dept: ENDOCRINOLOGY | Facility: MEDICAL CENTER | Age: 24
End: 2024-06-05
Attending: INTERNAL MEDICINE
Payer: COMMERCIAL

## 2024-06-05 VITALS
BODY MASS INDEX: 23.59 KG/M2 | DIASTOLIC BLOOD PRESSURE: 68 MMHG | RESPIRATION RATE: 17 BRPM | HEIGHT: 66 IN | WEIGHT: 146.8 LBS | HEART RATE: 125 BPM | OXYGEN SATURATION: 98 % | SYSTOLIC BLOOD PRESSURE: 122 MMHG

## 2024-06-05 DIAGNOSIS — E10.649 TYPE 1 DIABETES MELLITUS WITH HYPOGLYCEMIA AND WITHOUT COMA (HCC): ICD-10-CM

## 2024-06-05 LAB
HBA1C MFR BLD: 7.5 % (ref ?–5.8)
POCT INT CON NEG: NEGATIVE
POCT INT CON POS: POSITIVE

## 2024-06-05 PROCEDURE — 83036 HEMOGLOBIN GLYCOSYLATED A1C: CPT

## 2024-06-05 RX ORDER — PANCRELIPASE LIPASE, PANCRELIPASE PROTEASE, PANCRELIPASE AMYLASE 20000; 63000; 84000 [USP'U]/1; [USP'U]/1; [USP'U]/1
CAPSULE, DELAYED RELEASE ORAL
COMMUNITY
Start: 2024-05-09

## 2024-06-05 ASSESSMENT — FIBROSIS 4 INDEX: FIB4 SCORE: 0.42

## 2024-06-05 NOTE — PROGRESS NOTES
"RN-CDE Note    Subjective:   Endocrinology Clinic Progress Note  PCP: Froilan Grande M.D.    HPI:  Martinez Smith is a 23 y.o. old patient who is seen today by the Diabetes Nurse Specialist for review of his uncontrolled type 1 diabetes on insulin pump.    Recent changes in health: states he has had a couple of lows   DM:   Last A1c:   Lab Results   Component Value Date/Time    HBA1C 7.5 (A) 06/05/2024 11:31 AM      Previous A1c was 8 on 3/7/24  A1C GOAL: < 7    Diabetes Medications:   Humalog via pump    Change 12 am cho ratio to 12  Exercise: sporadic irregular exercise, <half hour walking weekly  Diet: \"healthy\" diet  in general  Patient's body mass index is 23.69 kg/m². Exercise and nutrition counseling were performed at this visit.    Glucose monitoring frequency: using Dexcom CGM with pump              Hypoglycemic episodes: review of CGM data shows blood sugars less than 70 1% of the time in the past 4 weeks.   Last Retinal Exam: on file and up-to-date  Daily Foot Exam: Yes   Foot Exam:  Monofilament: current.   Lab Results   Component Value Date/Time    MALBCRT see below 04/20/2024 07:49 AM    MICROALBUR <1.2 04/20/2024 07:49 AM    MICRALB <3.0 02/18/2022 04:48 AM        ACR Albumin/Creatinine Ratio goal <30     HTN:   Blood pressure goal <130/<80 .   Currently Rx ACE/ARB: Not Indicated     Dyslipidemia:    Lab Results   Component Value Date/Time    CHOLSTRLTOT 181 04/20/2024 07:49 AM     (H) 04/20/2024 07:49 AM    HDL 38 (A) 04/20/2024 07:49 AM    TRIGLYCERIDE 110 04/20/2024 07:49 AM         Currently Rx Statin: Not Indicated     He  reports that he has never smoked. He has never used smokeless tobacco.      Plan:     Discussed and educated on:   - All medications, side effects and compliance (discussed carefully)  - Annual eye examinations at Ophthalmology  - HbA1C: target  - Home glucose monitoring emphasized  - Weight control and daily exercise    Recommended medication changes: see " changes to cho ratio above

## 2024-06-12 ENCOUNTER — OFFICE VISIT (OUTPATIENT)
Dept: URGENT CARE | Facility: PHYSICIAN GROUP | Age: 24
End: 2024-06-12
Payer: COMMERCIAL

## 2024-06-12 VITALS
WEIGHT: 146 LBS | BODY MASS INDEX: 23.46 KG/M2 | SYSTOLIC BLOOD PRESSURE: 102 MMHG | RESPIRATION RATE: 14 BRPM | TEMPERATURE: 98.6 F | HEART RATE: 100 BPM | DIASTOLIC BLOOD PRESSURE: 60 MMHG | OXYGEN SATURATION: 95 % | HEIGHT: 66 IN

## 2024-06-12 DIAGNOSIS — R68.89 FLU-LIKE SYMPTOMS: ICD-10-CM

## 2024-06-12 DIAGNOSIS — R14.0 ABDOMINAL BLOATING: ICD-10-CM

## 2024-06-12 PROCEDURE — 0241U POCT CEPHEID COV-2, FLU A/B, RSV - PCR: CPT | Performed by: NURSE PRACTITIONER

## 2024-06-12 PROCEDURE — 99213 OFFICE O/P EST LOW 20 MIN: CPT | Performed by: NURSE PRACTITIONER

## 2024-06-12 PROCEDURE — 3078F DIAST BP <80 MM HG: CPT | Performed by: NURSE PRACTITIONER

## 2024-06-12 PROCEDURE — 3074F SYST BP LT 130 MM HG: CPT | Performed by: NURSE PRACTITIONER

## 2024-06-12 RX ORDER — SIMETHICONE 125 MG
125 TABLET,CHEWABLE ORAL EVERY 6 HOURS PRN
Qty: 120 TABLET | Refills: 0 | Status: SHIPPED | OUTPATIENT
Start: 2024-06-12

## 2024-06-12 ASSESSMENT — FIBROSIS 4 INDEX: FIB4 SCORE: 0.42

## 2024-06-12 NOTE — LETTER
June 12, 2024       Patient: Martinez Smith   YOB: 2000   Date of Visit: 6/12/2024         To Whom It May Concern:    In my medical opinion, I recommend that Martinez Smith be excused from work from 6/11/2024 through 6/13/2024 due to illness.     If you have any questions or concerns, please don't hesitate to call [phone]          Sincerely,          BONITA Cenetno.P.R.N.  Electronically Signed

## 2024-06-12 NOTE — PROGRESS NOTES
Verbal consent was acquired by the patient to use [employer]OCK[employer] ambient listening note generation during this visit.    Subjective:     HPI:   History of Present Illness  The patient is a 23-year-old male who presents for evaluation of multiple medical concerns.    The patient began experiencing gastrointestinal discomfort on Monday night, which is not uncommon for him due to his concurrent use of pancreatic enzymes and other medications. The discomfort persisted into the following Tuesday, accompanied by a headache, chills, and intermittent low-grade fever. His temperature was recorded as 100.3 degrees Fahrenheit prior to his departure, but has since decreased to 98.6 degrees Fahrenheit. He reports feeling unwell and suspects a possible cold. His blood glucose levels have been well-regulated, ranging between 70 and 180 over the past 24 hours, with occasional hypoglycemic episodes. He denies any known exposure to sick individuals and denies any cough. He experiences intermittent nausea, which induces abdominal pain accompanied by flatulence. The pain subsides when he remains stationary, but intensifies upon movement and is accompanied by nausea. The pain is localized in the mid-abdomen. He has not taken any Zofran due to constipation caused by gas and use of Zofran. He has experienced near-vomiting, but has not actually vomited. His symptoms have worsened since yesterday, which he attributes to the smoke in his room and allergies. He denies any sick contacts, sore throat, or cough, but reports nasal congestion and severe allergies. He also reports a headache and a burning sensation around his eyes. He experienced increased flatulence on Sunday, but felt well on Monday. He denies any unusual diarrhea but reports increased gas pain, even with consumption of gassy food. He has a history of celiac disease.  He denies constipation, states he has been regular the past few days.  He reports some dietary indiscretion.  "      Objective:     Exam:  /60 (BP Location: Left arm, Patient Position: Sitting, BP Cuff Size: Adult)   Pulse 100   Temp 37 °C (98.6 °F) (Temporal)   Resp 14   Ht 1.676 m (5' 6\")   Wt 66.2 kg (146 lb)   SpO2 95%   BMI 23.57 kg/m²  Body mass index is 23.57 kg/m².    Physical Exam  Vitals and nursing note reviewed.   Constitutional:       General: He is not in acute distress.     Appearance: Normal appearance. He is normal weight. He is ill-appearing.   HENT:      Head: Normocephalic and atraumatic.      Right Ear: Tympanic membrane, ear canal and external ear normal.      Left Ear: Tympanic membrane, ear canal and external ear normal.      Nose: Congestion and rhinorrhea present.      Mouth/Throat:      Mouth: Mucous membranes are moist.      Pharynx: No oropharyngeal exudate.   Cardiovascular:      Rate and Rhythm: Normal rate and regular rhythm.      Pulses: Normal pulses.      Heart sounds: Normal heart sounds.   Pulmonary:      Effort: Pulmonary effort is normal.      Breath sounds: Normal breath sounds. No wheezing, rhonchi or rales.   Abdominal:      General: Abdomen is flat.      Palpations: Abdomen is soft.      Tenderness: There is abdominal tenderness in the epigastric area. There is no right CVA tenderness, left CVA tenderness, guarding or rebound. Negative signs include Ortiz's sign and Rovsing's sign.   Musculoskeletal:      Cervical back: Normal range of motion and neck supple. No tenderness.   Lymphadenopathy:      Cervical: No cervical adenopathy.   Neurological:      Mental Status: He is alert.         Results for orders placed or performed in visit on 06/12/24   POCT CoV-2, Flu A/B, RSV by PCR   Result Value Ref Range    SARS-CoV-2 by PCR Negative Negative, Invalid    Influenza virus A RNA Negative Negative, Invalid    Influenza virus B, PCR Negative Negative, Invalid    RSV, PCR Negative Negative, Invalid       Assessment & Plan:     1. Flu-like symptoms  POCT CoV-2, Flu A/B, RSV by " PCR      2. Abdominal bloating  simethicone (MYLICON) 125 MG chewable tablet          Assessment & Plan  1. Potential viral infection.  2. Gas/bloating  The patient's symptoms are likely attributable to a viral infection, potentially COVID-19 or influenza. His glycemic control is WNL. A COVID-19 and influenza test will be conducted today, and the patient will be informed of the results. Symptomatic treatment will be with rest and hydration, with Zofran to be administered as needed. OTC Tylenol and/or Motrin for the fever as needed.  A prescription for simethicone has been issued to help with the gas pain.  A work note has been provided until 06/13/2024. The patient is advised to return or visit the emergency room if symptoms worsen.            [email or web address] [email or web address].      Please note that this dictation was created using voice recognition software. I have made every reasonable attempt to correct obvious errors, but I expect that there are errors of grammar and possibly content that I did not discover before finalizing the note.

## 2024-08-13 ENCOUNTER — PATIENT MESSAGE (OUTPATIENT)
Dept: HEALTH INFORMATION MANAGEMENT | Facility: OTHER | Age: 24
End: 2024-08-13

## 2024-09-03 NOTE — PROGRESS NOTES
"RN-CDE Note    Subjective:   Endocrinology Clinic Progress Note  PCP: Froilan Grande M.D.    HPI:  Martinez Smith is a 23 y.o. old patient who is seen today by the Diabetes Nurse Specialist for review of his type 1 diabetes on insulin pump   Recent changes in health:  denies any changes  DM:   Last A1c:   Lab Results   Component Value Date/Time    HBA1C 6.7 (A) 09/04/2024 09:31 AM      Previous A1c was 7.5 on 6/5/2024  A1C GOAL: < 7    Diabetes Medications:   Humalog via Tandem insulin pump      Exercise: sporadic irregular exercise, <half hour walking weekly  Diet: \"healthy\" diet  in general  Patient's body mass index is 23.24 kg/m². Exercise and nutrition counseling were performed at this visit.    Glucose monitoring frequency:  using Dexcom CGm               Hypoglycemic episodes: cgm data shows blood sugars less than 70 mg/dl <2% of the time in the past 28 days.   Last Retinal Exam: on file and up-to-date  Daily Foot Exam: Yes   Foot Exam:  Monofilament: current.   Lab Results   Component Value Date/Time    MALBCRT see below 04/20/2024 07:49 AM    MICROALBUR <1.2 04/20/2024 07:49 AM    MICRALB <3.0 02/18/2022 04:48 AM        ACR Albumin/Creatinine Ratio goal <30     HTN:   Blood pressure goal <130/<80 .   Currently Rx ACE/ARB: Not Indicated     Dyslipidemia:    Lab Results   Component Value Date/Time    CHOLSTRLTOT 181 04/20/2024 07:49 AM     (H) 04/20/2024 07:49 AM    HDL 38 (A) 04/20/2024 07:49 AM    TRIGLYCERIDE 110 04/20/2024 07:49 AM         Currently Rx Statin: Not Indicated     He  reports that he has never smoked. He has never used smokeless tobacco.      Plan:     Discussed and educated on:   - All medications, side effects and compliance (discussed carefully)  - Annual eye examinations at Ophthalmology  - HbA1C: target  - Home glucose monitoring emphasized  - Weight control and daily exercise    Recommended medication changes: no changes    "

## 2024-09-04 ENCOUNTER — NON-PROVIDER VISIT (OUTPATIENT)
Dept: ENDOCRINOLOGY | Facility: MEDICAL CENTER | Age: 24
End: 2024-09-04
Attending: INTERNAL MEDICINE
Payer: COMMERCIAL

## 2024-09-04 VITALS
WEIGHT: 144 LBS | BODY MASS INDEX: 23.14 KG/M2 | DIASTOLIC BLOOD PRESSURE: 76 MMHG | SYSTOLIC BLOOD PRESSURE: 114 MMHG | HEIGHT: 66 IN

## 2024-09-04 DIAGNOSIS — E10.649 TYPE 1 DIABETES MELLITUS WITH HYPOGLYCEMIA AND WITHOUT COMA (HCC): ICD-10-CM

## 2024-09-04 LAB
HBA1C MFR BLD: 6.7 % (ref ?–5.8)
POCT INT CON NEG: NEGATIVE
POCT INT CON POS: POSITIVE

## 2024-09-04 PROCEDURE — 95249 CONT GLUC MNTR PT PROV EQP: CPT | Performed by: INTERNAL MEDICINE

## 2024-09-04 PROCEDURE — 83036 HEMOGLOBIN GLYCOSYLATED A1C: CPT

## 2024-09-04 PROCEDURE — 99214 OFFICE O/P EST MOD 30 MIN: CPT | Performed by: INTERNAL MEDICINE

## 2024-09-04 RX ORDER — INSULIN GLARGINE 100 [IU]/ML
45 INJECTION, SOLUTION SUBCUTANEOUS
Qty: 15 ML | Refills: 2 | Status: SHIPPED | OUTPATIENT
Start: 2024-09-04

## 2024-09-04 RX ORDER — INSULIN LISPRO 100 [IU]/ML
INJECTION, SOLUTION INTRAVENOUS; SUBCUTANEOUS
Qty: 90 ML | Refills: 3 | Status: SHIPPED | OUTPATIENT
Start: 2024-09-04

## 2024-09-04 ASSESSMENT — FIBROSIS 4 INDEX: FIB4 SCORE: 0.42

## 2024-09-05 ENCOUNTER — PATIENT MESSAGE (OUTPATIENT)
Dept: ENDOCRINOLOGY | Facility: MEDICAL CENTER | Age: 24
End: 2024-09-05
Payer: COMMERCIAL

## 2024-10-02 DIAGNOSIS — E10.649 TYPE 1 DIABETES MELLITUS WITH HYPOGLYCEMIA AND WITHOUT COMA (HCC): ICD-10-CM

## 2024-10-02 RX ORDER — INSULIN GLARGINE 100 [IU]/ML
45 INJECTION, SOLUTION SUBCUTANEOUS
Qty: 10 ML | Refills: 1 | Status: SHIPPED | OUTPATIENT
Start: 2024-10-02

## 2024-10-15 ENCOUNTER — TELEPHONE (OUTPATIENT)
Dept: HEALTH INFORMATION MANAGEMENT | Facility: OTHER | Age: 24
End: 2024-10-15
Payer: COMMERCIAL

## 2024-10-16 ENCOUNTER — OFFICE VISIT (OUTPATIENT)
Dept: URGENT CARE | Facility: PHYSICIAN GROUP | Age: 24
End: 2024-10-16
Payer: COMMERCIAL

## 2024-10-16 VITALS
OXYGEN SATURATION: 94 % | SYSTOLIC BLOOD PRESSURE: 106 MMHG | WEIGHT: 146.6 LBS | RESPIRATION RATE: 18 BRPM | HEART RATE: 109 BPM | BODY MASS INDEX: 23.56 KG/M2 | HEIGHT: 66 IN | DIASTOLIC BLOOD PRESSURE: 80 MMHG | TEMPERATURE: 98.1 F

## 2024-10-16 DIAGNOSIS — Z86.39 HISTORY OF TYPE 1 DIABETES MELLITUS: ICD-10-CM

## 2024-10-16 DIAGNOSIS — R11.2 NAUSEA AND VOMITING, UNSPECIFIED VOMITING TYPE: ICD-10-CM

## 2024-10-16 PROCEDURE — 3079F DIAST BP 80-89 MM HG: CPT | Performed by: PHYSICIAN ASSISTANT

## 2024-10-16 PROCEDURE — 3074F SYST BP LT 130 MM HG: CPT | Performed by: PHYSICIAN ASSISTANT

## 2024-10-16 PROCEDURE — 99213 OFFICE O/P EST LOW 20 MIN: CPT | Performed by: PHYSICIAN ASSISTANT

## 2024-10-16 ASSESSMENT — ENCOUNTER SYMPTOMS
MYALGIAS: 0
SHORTNESS OF BREATH: 0
HEADACHES: 1
CHILLS: 1
EYE DISCHARGE: 0
ABDOMINAL PAIN: 1
NAUSEA: 1
FEVER: 0
COUGH: 0
CHANGE IN BOWEL HABIT: 0
DIARRHEA: 0
VOMITING: 1
EYE REDNESS: 0

## 2024-10-16 ASSESSMENT — FIBROSIS 4 INDEX: FIB4 SCORE: 0.42

## 2024-10-18 ENCOUNTER — OFFICE VISIT (OUTPATIENT)
Dept: URGENT CARE | Facility: PHYSICIAN GROUP | Age: 24
End: 2024-10-18
Payer: COMMERCIAL

## 2024-10-18 VITALS
DIASTOLIC BLOOD PRESSURE: 68 MMHG | OXYGEN SATURATION: 96 % | BODY MASS INDEX: 23.46 KG/M2 | RESPIRATION RATE: 18 BRPM | HEART RATE: 90 BPM | WEIGHT: 146 LBS | TEMPERATURE: 98.6 F | SYSTOLIC BLOOD PRESSURE: 110 MMHG | HEIGHT: 66 IN

## 2024-10-18 DIAGNOSIS — R11.2 NAUSEA AND VOMITING, UNSPECIFIED VOMITING TYPE: ICD-10-CM

## 2024-10-18 DIAGNOSIS — R10.13 INTERMITTENT EPIGASTRIC ABDOMINAL PAIN: ICD-10-CM

## 2024-10-18 PROCEDURE — 3078F DIAST BP <80 MM HG: CPT | Performed by: STUDENT IN AN ORGANIZED HEALTH CARE EDUCATION/TRAINING PROGRAM

## 2024-10-18 PROCEDURE — 0241U POCT CEPHEID COV-2, FLU A/B, RSV - PCR: CPT | Performed by: STUDENT IN AN ORGANIZED HEALTH CARE EDUCATION/TRAINING PROGRAM

## 2024-10-18 PROCEDURE — 87651 STREP A DNA AMP PROBE: CPT | Performed by: STUDENT IN AN ORGANIZED HEALTH CARE EDUCATION/TRAINING PROGRAM

## 2024-10-18 PROCEDURE — 99214 OFFICE O/P EST MOD 30 MIN: CPT | Performed by: STUDENT IN AN ORGANIZED HEALTH CARE EDUCATION/TRAINING PROGRAM

## 2024-10-18 PROCEDURE — 3074F SYST BP LT 130 MM HG: CPT | Performed by: STUDENT IN AN ORGANIZED HEALTH CARE EDUCATION/TRAINING PROGRAM

## 2024-10-18 ASSESSMENT — FIBROSIS 4 INDEX: FIB4 SCORE: 0.42

## 2024-10-19 ENCOUNTER — HOSPITAL ENCOUNTER (OUTPATIENT)
Dept: LAB | Facility: MEDICAL CENTER | Age: 24
End: 2024-10-19
Attending: STUDENT IN AN ORGANIZED HEALTH CARE EDUCATION/TRAINING PROGRAM
Payer: COMMERCIAL

## 2024-10-19 DIAGNOSIS — R11.2 NAUSEA AND VOMITING, UNSPECIFIED VOMITING TYPE: ICD-10-CM

## 2024-10-19 LAB
ALBUMIN SERPL BCP-MCNC: 4.6 G/DL (ref 3.2–4.9)
ALBUMIN/GLOB SERPL: 1.6 G/DL
ALP SERPL-CCNC: 106 U/L (ref 30–99)
ALT SERPL-CCNC: 27 U/L (ref 2–50)
ANION GAP SERPL CALC-SCNC: 14 MMOL/L (ref 7–16)
AST SERPL-CCNC: 26 U/L (ref 12–45)
BASOPHILS # BLD AUTO: 1 % (ref 0–1.8)
BASOPHILS # BLD: 0.05 K/UL (ref 0–0.12)
BILIRUB SERPL-MCNC: 0.9 MG/DL (ref 0.1–1.5)
BUN SERPL-MCNC: 9 MG/DL (ref 8–22)
CALCIUM ALBUM COR SERPL-MCNC: 9.7 MG/DL (ref 8.5–10.5)
CALCIUM SERPL-MCNC: 10.2 MG/DL (ref 8.5–10.5)
CHLORIDE SERPL-SCNC: 99 MMOL/L (ref 96–112)
CO2 SERPL-SCNC: 25 MMOL/L (ref 20–33)
CREAT SERPL-MCNC: 0.8 MG/DL (ref 0.5–1.4)
EOSINOPHIL # BLD AUTO: 0.25 K/UL (ref 0–0.51)
EOSINOPHIL NFR BLD: 4.8 % (ref 0–6.9)
ERYTHROCYTE [DISTWIDTH] IN BLOOD BY AUTOMATED COUNT: 39.7 FL (ref 35.9–50)
GFR SERPLBLD CREATININE-BSD FMLA CKD-EPI: 125 ML/MIN/1.73 M 2
GLOBULIN SER CALC-MCNC: 2.9 G/DL (ref 1.9–3.5)
GLUCOSE SERPL-MCNC: 106 MG/DL (ref 65–99)
HCT VFR BLD AUTO: 44.5 % (ref 42–52)
HGB BLD-MCNC: 15.4 G/DL (ref 14–18)
IMM GRANULOCYTES # BLD AUTO: 0.01 K/UL (ref 0–0.11)
IMM GRANULOCYTES NFR BLD AUTO: 0.2 % (ref 0–0.9)
LIPASE SERPL-CCNC: 14 U/L (ref 11–82)
LYMPHOCYTES # BLD AUTO: 1.25 K/UL (ref 1–4.8)
LYMPHOCYTES NFR BLD: 23.9 % (ref 22–41)
MCH RBC QN AUTO: 31 PG (ref 27–33)
MCHC RBC AUTO-ENTMCNC: 34.6 G/DL (ref 32.3–36.5)
MCV RBC AUTO: 89.5 FL (ref 81.4–97.8)
MONOCYTES # BLD AUTO: 0.36 K/UL (ref 0–0.85)
MONOCYTES NFR BLD AUTO: 6.9 % (ref 0–13.4)
NEUTROPHILS # BLD AUTO: 3.3 K/UL (ref 1.82–7.42)
NEUTROPHILS NFR BLD: 63.2 % (ref 44–72)
NRBC # BLD AUTO: 0 K/UL
NRBC BLD-RTO: 0 /100 WBC (ref 0–0.2)
PLATELET # BLD AUTO: 312 K/UL (ref 164–446)
PMV BLD AUTO: 11.7 FL (ref 9–12.9)
POTASSIUM SERPL-SCNC: 4.4 MMOL/L (ref 3.6–5.5)
PROT SERPL-MCNC: 7.5 G/DL (ref 6–8.2)
RBC # BLD AUTO: 4.97 M/UL (ref 4.7–6.1)
SODIUM SERPL-SCNC: 138 MMOL/L (ref 135–145)
WBC # BLD AUTO: 5.2 K/UL (ref 4.8–10.8)

## 2024-10-19 PROCEDURE — 83690 ASSAY OF LIPASE: CPT

## 2024-10-19 PROCEDURE — 85025 COMPLETE CBC W/AUTO DIFF WBC: CPT

## 2024-10-19 PROCEDURE — 80053 COMPREHEN METABOLIC PANEL: CPT

## 2024-10-19 PROCEDURE — 36415 COLL VENOUS BLD VENIPUNCTURE: CPT

## 2024-11-06 DIAGNOSIS — E10.649 TYPE 1 DIABETES MELLITUS WITH HYPOGLYCEMIA AND WITHOUT COMA (HCC): ICD-10-CM

## 2024-11-07 RX ORDER — INSULIN GLARGINE 100 [IU]/ML
45 INJECTION, SOLUTION SUBCUTANEOUS
Qty: 15 ML | Refills: 1 | Status: SHIPPED | OUTPATIENT
Start: 2024-11-07

## 2024-12-18 NOTE — PROGRESS NOTES
RN-CDE Note    Subjective:   Endocrinology Clinic Progress Note  PCP: Froilan Grande M.D.    HPI:  Martinez Smith is a 24 y.o. old patient who is seen today by the Diabetes Nurse Specialist for review of his controlled type 1 diabetes using insulin pump therapy.    Recent changes in health:  denies any changes.   DM:   Last A1c:   Lab Results   Component Value Date/Time    HBA1C 6.5 (A) 12/19/2024 07:58 AM      Previous A1c was 6.7 on 9/4/2024  A1C GOAL: < 7    Diabetes Medications:   Humalog via pump         Patient's body mass index is 23.37 kg/m². Exercise and nutrition counseling were performed at this visit.    Glucose monitoring frequency:  using Dexcom CGM with pump    Hypoglycemic episodes: no     Last Retinal Exam: on file and up-to-date     Foot Exam:  Monofilament: current.   Lab Results   Component Value Date/Time    MALBCRT see below 04/20/2024 07:49 AM    MICROALBUR <1.2 04/20/2024 07:49 AM    MICRALB <3.0 02/18/2022 04:48 AM        ACR Albumin/Creatinine Ratio goal <30     HTN:   Blood pressure goal <130/<80   Currently Rx ACE/ARB:  no    Dyslipidemia:    Lab Results   Component Value Date/Time    CHOLSTRLTOT 181 04/20/2024 07:49 AM     (H) 04/20/2024 07:49 AM    HDL 38 (A) 04/20/2024 07:49 AM    TRIGLYCERIDE 110 04/20/2024 07:49 AM         Currently Rx Statin:  no    He  reports that he has never smoked. He has never used smokeless tobacco.    Plan:     Discussed and educated on:   - All medications, side effects and compliance (discussed carefully)  - HbA1C: target  - Home glucose monitoring emphasized  - Weight control and daily exercise    Recommended medication changes: none

## 2024-12-19 ENCOUNTER — OFFICE VISIT (OUTPATIENT)
Dept: ENDOCRINOLOGY | Facility: MEDICAL CENTER | Age: 24
End: 2024-12-19
Attending: INTERNAL MEDICINE
Payer: COMMERCIAL

## 2024-12-19 VITALS
RESPIRATION RATE: 17 BRPM | HEART RATE: 104 BPM | SYSTOLIC BLOOD PRESSURE: 98 MMHG | BODY MASS INDEX: 23.27 KG/M2 | HEIGHT: 66 IN | DIASTOLIC BLOOD PRESSURE: 60 MMHG | WEIGHT: 144.8 LBS | OXYGEN SATURATION: 97 %

## 2024-12-19 DIAGNOSIS — Z79.4 LONG-TERM INSULIN USE (HCC): ICD-10-CM

## 2024-12-19 DIAGNOSIS — K90.0 CELIAC DISEASE: ICD-10-CM

## 2024-12-19 DIAGNOSIS — Z96.41 INSULIN PUMP IN PLACE: ICD-10-CM

## 2024-12-19 DIAGNOSIS — E10.649 TYPE 1 DIABETES MELLITUS WITH HYPOGLYCEMIA AND WITHOUT COMA (HCC): ICD-10-CM

## 2024-12-19 DIAGNOSIS — E55.9 VITAMIN D DEFICIENCY: ICD-10-CM

## 2024-12-19 LAB
HBA1C MFR BLD: 6.5 % (ref ?–5.8)
POCT INT CON NEG: NEGATIVE
POCT INT CON POS: POSITIVE

## 2024-12-19 PROCEDURE — 99214 OFFICE O/P EST MOD 30 MIN: CPT | Performed by: INTERNAL MEDICINE

## 2024-12-19 PROCEDURE — 83036 HEMOGLOBIN GLYCOSYLATED A1C: CPT | Performed by: INTERNAL MEDICINE

## 2024-12-19 PROCEDURE — 95249 CONT GLUC MNTR PT PROV EQP: CPT | Performed by: INTERNAL MEDICINE

## 2024-12-19 PROCEDURE — 3078F DIAST BP <80 MM HG: CPT | Performed by: INTERNAL MEDICINE

## 2024-12-19 PROCEDURE — 95251 CONT GLUC MNTR ANALYSIS I&R: CPT | Performed by: INTERNAL MEDICINE

## 2024-12-19 PROCEDURE — 3074F SYST BP LT 130 MM HG: CPT | Performed by: INTERNAL MEDICINE

## 2024-12-19 ASSESSMENT — FIBROSIS 4 INDEX: FIB4 SCORE: 0.42

## 2024-12-19 NOTE — PROGRESS NOTES
CHIEF COMPLAINT: Patient is here for follow up of Type 1 Diabetes Mellitus.    HPI:     Martinez Smith is a 24 y.o. male with Type 1 Diabetes Mellitus here for follow up.      Labs from 12/19/2024 show A1c is 6.7%  Labs from 9/4/2024 show A1c is 6.7%  Labs from 6/5/2024 show A1c is 7.5%  Labs from 3/24/2024 show A1c is 8.0%  Labs from 9/28/2023 show a1c is 8.0%  Labs from 5/1/2023 show a1c is 7.7%  Labs from 9/9/2022 show a1c was 6.9%  Labs from 6/8/2022 show a1c was 7.3%  Labs from 3/4/2022 show a1c was 7.0%  Labs from 11/9/2021 show a1c was 7.3%  Labs from  4/12021 show a1c was 6.8%  Labs from 12/3/2020 show a1c was 7.3%  Labs from 7/31/2020 show a1c was 7.9%  Labs from 5/30/2020 show a1c was 7.7%  Labs from 12/13/2020 show a1c was 8.8%    He was previously seen by the PA  He was on a medtronic 630g pump    He has a history of chronic abdominal pain and nausea and vomiting with a negative extensive work-up for pancreatitis, gastritis and ultimately was discovered that he has pancreatic enzyme deficiency.   He also diagnosed himself with food allergies.     He also has celiac disease, he is on a gluten free diet since age of 11   He also has mastocytosis and was on Cromolyn  He has tried and failed Novolog (Hives)      He is on a Tandem X2 pump and Dexcom G6 CGM Hybrid closed loop system  His pump is infusing Humalog    Basal rate  00:00 0.625  0300  0.80  1200 0.5       Carb ratio  00:00  [place]  300 am 11  12 Noon 12     ICF  1:50     [employer]  110      He now working as an  for a local company    He reports BG are better since we changed the carb ratio last visit  A1c is down  He has a high deductible plan so pump supplies are expensive in the beginning of the year  He might need samples of Dexcom G7 in the future       Download of his CGM shows                        His lipids are at goal and he does not need statin therapy because he is young   Latest Reference Range & Units 04/20/24 07:49    Cholesterol,Tot 100 - 199 mg/dL 181   Triglycerides 0 - 149 mg/dL 110   HDL >=40 mg/dL 38 !   LDL <100 mg/dL 121 (H)         He doesn't have diabetic kidney disease   Latest Reference Range & Units 04/20/24 07:49   Micro Alb Creat Ratio 0 - 30 mg/g see below   Creatinine, Urine mg/dL 316.44   Microalbumin, Urine Random mg/dL <1.2         He had an eye exam with Dr. Elena on 6/12024  He doesn't have diabetic retinopathy    His TSH is normal at 2.9 on 4/2024    His vitamin D is 20 on 4/2024        BG Diary:   Pump was download and reviewed today      Weight has been stable    Diabetes Complications   Retinopathy: No known retinopathy.  Last eye exam: 6/2024 with Dr. Elena  Neuropathy: Denies paresthesias or numbness in hands or feet. Denies any foot wounds.  Exercise: Minimal.  Diet: Fair.  Patient's medications, allergies, and social histories were reviewed and updated as appropriate.    ROS:     CONS:     No fever, no chills   EYES:     No diplopia, no blurry vision   CV:           No chest pain, no palpitations   PULM:     No SOB, no cough, no hemoptysis.   GI:            No nausea, no vomiting, no diarrhea, no constipation   ENDO:     No polyuria, no polydipsia, no heat intolerance, no cold intolerance       Past Medical History:  Problem List:  2023-07: Sinus disease  2023-01: Carpal tunnel syndrome of right wrist  2022-10: Muscle spasms of neck  2022-07: Syncope and collapse  2022-05: Influenza  2022-01: Type 1 diabetes mellitus with hypoglycemia and without coma   ([facility])  2021-08: Exocrine pancreatic insufficiency  2020-09: Chronic abdominal pain  2020-09: Vitamin D deficiency  2020-07: RUQ abdominal pain  2019-12: Diabetic gastroparesis ([facility])  2019-12: Long-term insulin use ([facility])  2019-12: Fitting and adjustment of insulin pump  2019-12: Intractable nausea and vomiting  2018-01: Hyperlipidemia  2018-01: Other mechanical complication of insulin pump  2017-06: Insulin pump status  2017-06: Type 1 diabetes  "mellitus with hyperglycemia (HCC)  2017-06: Celiac disease  2017-06: Weakness of right lower extremity  2017-06: Lipohyperplasia  2017-06: Dyslipidemia      Past Surgical History:  Past Surgical History:   Procedure Laterality Date    ENDOSCOPY, PARANASAL SINUS, WITH TOTAL ETHMOIDECTOMY, SN Bilateral 8/29/2023    Procedure: STEREOTACTIC COMPUTER-ASSISTED GUIDANCE, BILATERAL MAXILLARY ANTROSTOMY, BILATERAL TOTAL ETHMOIDECTOMY WITH SPHENOIDECTOMY, BILATERAL FRONTAL SINUSOTOMY, SEPTOPLASTY, BILATERAL INFERIOR TURBINATE REDUCTION;  Surgeon: Renee Bunn M.D.;  Location: SURGERY SAME DAY [facility];  Service: Ent    SEPTOPLASTY, NOSE, WITH TURBINOPLASTY Bilateral 8/29/2023    Procedure: SEPTOPLASTY, NOSE, WITH TURBINOPLASTY;  Surgeon: Renee Bunn M.D.;  Location: SURGERY SAME DAY [facility];  Service: Ent    HYPOSPADIAS REPAIR          Allergies:  Beef flavor; Gluten meal; and Tree nuts food allergy     Social History:  Social History     Tobacco Use    Smoking status: Never    Smokeless tobacco: Never   Vaping Use    Vaping status: Never Used   Substance Use Topics    Alcohol use: No    Drug use: No        Family History:   family history includes Arthritis in his maternal grandmother; Hyperlipidemia in his father and mother; Hypertension in his mother.      PHYSICAL EXAM:   OBJECTIVE:  Vital signs: BP 98/60 (BP Location: Left arm, Patient Position: Sitting)   Pulse (!) 104   Resp 17   Ht 1.676 m (5' 6\")   Wt 65.7 kg (144 lb 12.8 oz)   SpO2 97%   BMI 23.37 kg/m²   GENERAL: Well-developed, well-nourished in no apparent distress.   EYE:  No ocular asymmetry, PERRLA  HENT: Pink, moist mucous membranes.    NECK: No thyromegaly.   CARDIOVASCULAR: regular rate and rhythm w/ no murmurs  LUNGS: Symmetrical chest expansion with clear breath sounds  ABDOMEN: non obese abdomen with no visible organomegaly right upper quadrant pain tenderness noted with direct palpation  EXTREMITIES: No clubbing, cyanosis, or edema. "   NEUROLOGICAL: No gross focal motor abnormalities   LYMPH: No cervical adenopathy seen.   SKIN: No rashes, lesions.         Labs:  Lab Results   Component Value Date/Time    HBA1C 6.5 (A) 12/19/2024 07:58 AM        Lab Results   Component Value Date/Time    WBC 5.2 10/19/2024 07:37 AM    RBC 4.97 10/19/2024 07:37 AM    HEMOGLOBIN 15.4 10/19/2024 07:37 AM    MCV 89.5 10/19/2024 07:37 AM    MCH 31.0 10/19/2024 07:37 AM    MCHC 34.6 10/19/2024 07:37 AM    RDW 39.7 10/19/2024 07:37 AM    MPV 11.7 10/19/2024 07:37 AM       Lab Results   Component Value Date/Time    SODIUM 138 10/19/2024 07:37 AM    POTASSIUM 4.4 10/19/2024 07:37 AM    CHLORIDE 99 10/19/2024 07:37 AM    CO2 25 10/19/2024 07:37 AM    ANION 14.0 10/19/2024 07:37 AM    GLUCOSE 106 (H) 10/19/2024 07:37 AM    BUN 9 10/19/2024 07:37 AM    CREATININE 0.80 10/19/2024 07:37 AM    CREATININE 0.4 05/25/2007 04:40 AM    CALCIUM 10.2 10/19/2024 07:37 AM    ASTSGOT 26 10/19/2024 07:37 AM    ALTSGPT 27 10/19/2024 07:37 AM    TBILIRUBIN 0.9 10/19/2024 07:37 AM    ALBUMIN 4.6 10/19/2024 07:37 AM    ALBUMIN 4.80 10/22/2016 09:46 AM    TOTPROTEIN 7.5 10/19/2024 07:37 AM    TOTPROTEIN 7.70 10/22/2016 09:46 AM    GLOBULIN 2.9 10/19/2024 07:37 AM    AGRATIO 1.6 10/19/2024 07:37 AM       Lab Results   Component Value Date/Time    CHOLSTRLTOT 177 12/24/2019 0729    TRIGLYCERIDE 103 12/24/2019 0729    HDL 28 (A) 12/24/2019 0729     (H) 12/24/2019 0729       Lab Results   Component Value Date/Time    MALBCRT see below 04/20/2024 07:49 AM    MICROALBUR <1.2 04/20/2024 07:49 AM    MICRALB <3.0 02/18/2022 04:48 AM        Lab Results   Component Value Date/Time    TSHULTRASEN 2.140 09/14/2019 0727     No results found for: FREEDIR  Lab Results   Component Value Date/Time    FREET3 3.53 09/14/2019 0727           ASSESSMENT/PLAN:     1. Type 1 diabetes mellitus with hyperglycemia (HCC)  Controlled  A1c is 6.5%  Pump and CGM was downloaded and reviewed  He has some hyperglycemia  after dinnertime  Added 6pm basal rate at 0.6u/hr  RTC in 3 mos w/ Yadi  I want him to get complete labs prior to next visit     2. Vitamin D deficiency  Uncontrolled  Start vitamin D3 5000 units daily  Continue monitoring    3. Celiac disease  Stable   Continue monitoring    4. Insulin pump status  Insulin pump in place      5. Long-term insulin use (HCC)  Patient is on long-term insulin therapy due to type 1 diabetes      Return in about 3 months (around 3/19/2025).      Thank you kindly for allowing me to participate in the diabetes care plan for this patient.    Gabo Benoit MD, FACE, NU      CC:   Gregory Grande M.D.

## 2025-01-22 ENCOUNTER — OFFICE VISIT (OUTPATIENT)
Dept: URGENT CARE | Facility: PHYSICIAN GROUP | Age: 25
End: 2025-01-22
Payer: COMMERCIAL

## 2025-01-22 VITALS
HEIGHT: 67 IN | HEART RATE: 100 BPM | BODY MASS INDEX: 22.27 KG/M2 | SYSTOLIC BLOOD PRESSURE: 102 MMHG | DIASTOLIC BLOOD PRESSURE: 62 MMHG | TEMPERATURE: 97.9 F | WEIGHT: 141.9 LBS | RESPIRATION RATE: 16 BRPM | OXYGEN SATURATION: 97 %

## 2025-01-22 DIAGNOSIS — B34.9 VIRAL SYNDROME: ICD-10-CM

## 2025-01-22 PROCEDURE — 99213 OFFICE O/P EST LOW 20 MIN: CPT | Performed by: NURSE PRACTITIONER

## 2025-01-22 PROCEDURE — 3074F SYST BP LT 130 MM HG: CPT | Performed by: NURSE PRACTITIONER

## 2025-01-22 PROCEDURE — 3078F DIAST BP <80 MM HG: CPT | Performed by: NURSE PRACTITIONER

## 2025-01-22 ASSESSMENT — FIBROSIS 4 INDEX: FIB4 SCORE: 0.42

## 2025-01-22 NOTE — PROGRESS NOTES
Chief Complaint   Patient presents with    Fever     X1day, nausea,           History of Present Illness  The patient is a 24-year-old male with a history of recurrent nausea, celiac disease, and type 1 diabetes.    He reports experiencing nausea, a slight fever ranging from 100 to 101 degrees Fahrenheit, and body aches that have improved today. He also mentions feeling fatigued and having a severe headache. He does not have any respiratory symptoms such as sore throat or cough. Despite the absence of vomiting, he has experienced dry heaves. He does not have diarrhea but notes that his urine has been darker than usual, which he attributes to inadequate fluid intake. He does not have any urinary symptoms such as dysuria, frequency, or urgency, and reports urinating only twice daily. He has been taking Zofran for his nausea, but it appears to exacerbate the symptom. He describes a general sense of discomfort and abdominal pain, which was particularly severe during his shower today. His last bowel movement was yesterday morning, and he typically has one bowel movement per day. He recalls having diarrhea during his last illness about 1.5 weeks ago, but he recovered from that episode last week. His current symptoms began on Monday. He is currently on pancreatic enzymes and tried new food over the weekend, which he suspects may be contributing to his symptoms. He describes his abdominal pain as a warm sensation, which he believes may be related to his nausea and fever. He has been sweating profusely since arriving at the clinic, which he believes may indicate that his fever has broken. He has been taking ibuprofen and Tylenol for his fever, but it has remained around 100 to 101 degrees Fahrenheit. He has not taken any medication today, and his fever has remained the same until now. He does not require a work note as he can work from home if necessary. He reports that his father has been experiencing similar symptoms,  leading him to suspect a stomach flu or bug. He has been avoiding Zofran due to its side effects. He has not had a sinus infection or bacterial infection since his sinus surgery. He wonders if exhaustion could be causing some of his symptoms.    His blood glucose levels have been unstable, with readings as high as 400 and as low as 70 within a 24-hour period. He reports that his blood sugar was elevated overnight, but upon waking, it was around 80. After eating, his blood sugar spiked again, which he attributes to his recent illness. He has been monitoring his blood sugar levels closely and notes that they have been relatively stable today.    ALLERGIES  The patient is allergic to COMPAZINE, which gives him anxiety.    MEDICATIONS  Current: Zofran, ibuprofen, Tylenol         ROS:    No severe shortness of breath   No Cardiac like chest pain, as discussed   As otherwise stated in HPI    Medical/SX/ Social History:  Reviewed per chart    Pertinent Medications:    Current Outpatient Medications on File Prior to Visit   Medication Sig Dispense Refill    LANTUS SOLOSTAR 100 UNIT/ML Solution Pen-injector injection INJECT 45 UNITS UNDER THE SKIN 1 TIME A DAY AS NEEDED (OFF OF INSULIN PUMP). 15 mL 1    insulin lispro (HUMALOG) 100 UNIT/ML INJ Using up to 100 units as directed in insulin pump 90 mL 3    ZENPEP 65708-31533 units Cap DR Particles TAKE 2 CAPS WITH EACH MEAL AND 1 CAP WITH A SNACK, MAX 8 CAPS A DAY      EPINEPHrine (EPIPEN) 0.3 MG/0.3ML Solution Auto-injector solution for injection       Blood Glucose Test Strips Test strips order: Test strips for Dada Contour Next meter. Sig: use 5 times daily for insulin adjustment 150 Strip 11    Lancets Lancets order: Lancets for Dada Contour Next meter. Sig: use 5 times daily 150 Each 11    Glucagon, rDNA, (GLUCAGON EMERGENCY) 1 MG Kit Inject 1 mg as directed as needed (severe hypoglycemia). For severe hypoglycemia 1 Kit 1    amitriptyline (ELAVIL) 50 MG Tab Take 25 mg by  mouth at bedtime.      promethazine (PHENERGAN) 25 MG Tab Take 1 Tablet by mouth every 6 hours as needed for Nausea/Vomiting. 30 Tablet 0    tizanidine (ZANAFLEX) 4 MG Tab Take 4 mg by mouth every 6 hours as needed.      ondansetron (ZOFRAN ODT) 4 MG TABLET DISPERSIBLE Take 1 Tablet by mouth every 6 hours as needed for Nausea. 20 Tablet 0    fexofenadine (ALLEGRA) 60 MG Tab Take 60 mg by mouth 2 times a day.      insulin infusion pump Device Inject  under the skin continuous. Patient's own SQ insulin pump    Type of Insulin: Humalog  Last change of tubin22 - Change tubing and site every 72 hours    Dosing:  Basal rate:  0000 - 0300 = .625 units  0300 - 1200 = .6 units  1200 - 0000= .575 units  Bolus ratio:   1 unit : 14 g carbohydrate      Sensitivity ratio:   1 units for every 50 over 130-150 mg/dL    Disconnect pump if patient becomes hypoglycemic and altered.      simethicone (MYLICON) 125 MG chewable tablet Chew 1 Tablet every 6 hours as needed for Flatulence. (Patient not taking: Reported on 2025) 120 Tablet 0    diphenhydrAMINE (BENADRYL) 25 MG Tab Take 25 mg by mouth every 6 hours as needed for Sleep.      cromolyn (GASTROCROM) 100 MG/5ML solution PLEASE SEE ATTACHED FOR DETAILED DIRECTIONS      montelukast (SINGULAIR) 10 MG Tab Take 10 mg by mouth every day.       No current facility-administered medications on file prior to visit.        Allergies:    Dairy food allergy, Eggs, Mustard seed, Peanut (diagnostic), Shellfish allergy, Tree nuts food allergy, Compazine, Gluten meal, Reglan [metoclopramide], and Potato     Problem list, medications, and allergies reviewed by myself today in Epic     Physical Exam:    Vitals:    25 1428   BP: 102/62   Pulse: (!) 117   Resp: 16   Temp: 36.6 °C (97.9 °F)   SpO2: 91%             Physical Exam  Constitutional:       General: He is awake.      Appearance: Normal appearance. He is not ill-appearing, toxic-appearing or diaphoretic.   HENT:      Head:  Normocephalic and atraumatic.      Right Ear: Tympanic membrane, ear canal and external ear normal.      Left Ear: Tympanic membrane, ear canal and external ear normal.   Eyes:      General: Lids are normal. Gaze aligned appropriately. No allergic shiner or scleral icterus.     Extraocular Movements: Extraocular movements intact.      Conjunctiva/sclera: Conjunctivae normal.      Pupils: Pupils are equal, round, and reactive to light.   Cardiovascular:      Rate and Rhythm: Normal rate and regular rhythm.      Pulses:           Radial pulses are 2+ on the right side and 2+ on the left side.      Heart sounds: Normal heart sounds.   Pulmonary:      Effort: Pulmonary effort is normal.      Breath sounds: Normal breath sounds and air entry. No decreased breath sounds, wheezing, rhonchi or rales.   Abdominal:      General: Abdomen is flat.      Palpations: Abdomen is soft.      Tenderness: There is no abdominal tenderness. There is no right CVA tenderness, left CVA tenderness, guarding or rebound.   Musculoskeletal:      Right lower leg: No edema.      Left lower leg: No edema.   Lymphadenopathy:      Cervical: No cervical adenopathy.   Skin:     General: Skin is warm.      Capillary Refill: Capillary refill takes less than 2 seconds.      Coloration: Skin is not cyanotic or pale.   Neurological:      Mental Status: He is alert and oriented to person, place, and time.      Gait: Gait is intact.   Psychiatric:         Behavior: Behavior normal. Behavior is cooperative.          Medical Decision making and plan :  I personally reviewed prior external notes and test results pertinent to today's visit. Pt is clinically stable at today's acute urgent care visit.  Patient appears nontoxic with no acute distress noted. Appropriate for outpatient care at this time.    Pleasant 24 y.o. male presented clinic with:     Assessment & Plan  1. Nausea.  His symptoms could potentially be attributed to a viral infection as a  differential.  Especially considering that his father is having similar symptoms.  His oxygen saturation is within normal limits, and his lung sounds are clear. His symptoms do not align with those typically associated with a urinary tract infection (UTI) or an acute abdomen, which would necessitate surgical intervention. The likelihood of influenza or COVID-19 is low, but testing can be conducted if desired. Stress could be influencing his blood glucose levels, and the production of ketones due to high and low blood sugar levels could be contributing to his abdominal pain. He is advised to rest, hydrate, and monitor his blood glucose levels to ensure they remain within a safe range. He should abstain from work until he has been fever-free for at least 24 hours. He is cautioned against overuse of Zofran due to its potential to cause constipation. At this juncture, an emergency room visit is not deemed necessary, nor is a lab workup. He is encouraged to continue using Zofran sparingly, increase his fluid intake, and rest. If his condition deteriorates, he should return for further evaluation.    2. Type 1 Diabetes Mellitus.  He reports fluctuating blood sugar levels, with recent highs up to 400 and lows around 70. He is advised to continue monitoring his blood sugar levels closely. If there is any suspicion of diabetic ketoacidosis (DKA), he should seek immediate medical attention at the emergency room.      Shared decision-making was utilized with patient for treatment plan. Medication discussed included indication for use and the potential benefits and side effects. Education was provided regarding the aforementioned assessments.  Differential Diagnosis, natural history, and supportive care discussed. All of the patient's questions were answered to their satisfaction at the time of discharge. Patient was encouraged to monitor symptoms closely. Those signs and symptoms which would warrant concern and mandate seeking  a higher level of service through the emergency department discussed at length.  Patient stated agreement and understanding of this plan of care.    Disposition:  Home in stable condition     Voice Recognition Disclaimer:  Portions of this document were created using voice recognition software and [place] technology provided by [employer].  Patient was informed of jessicame technology being used.  The software does have a chance of producing errors of grammar and possibly content. I have made every reasonable attempt to correct obvious errors, but there could be errors of grammar and possibly content that I did not discover before finalizing the documentation.    Wandy Rushing, [email or web address].

## 2025-02-15 ENCOUNTER — HOSPITAL ENCOUNTER (OUTPATIENT)
Dept: LAB | Facility: MEDICAL CENTER | Age: 25
End: 2025-02-15
Attending: INTERNAL MEDICINE
Payer: COMMERCIAL

## 2025-02-15 DIAGNOSIS — K90.0 CELIAC DISEASE: ICD-10-CM

## 2025-02-15 DIAGNOSIS — E55.9 VITAMIN D DEFICIENCY: ICD-10-CM

## 2025-02-15 DIAGNOSIS — Z96.41 INSULIN PUMP IN PLACE: ICD-10-CM

## 2025-02-15 DIAGNOSIS — E10.649 TYPE 1 DIABETES MELLITUS WITH HYPOGLYCEMIA AND WITHOUT COMA (HCC): ICD-10-CM

## 2025-02-15 DIAGNOSIS — Z79.4 LONG-TERM INSULIN USE (HCC): ICD-10-CM

## 2025-02-15 LAB
25(OH)D3 SERPL-MCNC: 14 NG/ML (ref 30–100)
ALBUMIN SERPL BCP-MCNC: 4.4 G/DL (ref 3.2–4.9)
ALBUMIN/GLOB SERPL: 1.5 G/DL
ALP SERPL-CCNC: 94 U/L (ref 30–99)
ALT SERPL-CCNC: 27 U/L (ref 2–50)
ANION GAP SERPL CALC-SCNC: 10 MMOL/L (ref 7–16)
AST SERPL-CCNC: 27 U/L (ref 12–45)
BILIRUB SERPL-MCNC: 0.8 MG/DL (ref 0.1–1.5)
BUN SERPL-MCNC: 6 MG/DL (ref 8–22)
CALCIUM ALBUM COR SERPL-MCNC: 9.3 MG/DL (ref 8.5–10.5)
CALCIUM SERPL-MCNC: 9.6 MG/DL (ref 8.5–10.5)
CHLORIDE SERPL-SCNC: 104 MMOL/L (ref 96–112)
CHOLEST SERPL-MCNC: 194 MG/DL (ref 100–199)
CO2 SERPL-SCNC: 26 MMOL/L (ref 20–33)
CREAT SERPL-MCNC: 0.87 MG/DL (ref 0.5–1.4)
CREAT UR-MCNC: 137 MG/DL
GFR SERPLBLD CREATININE-BSD FMLA CKD-EPI: 122 ML/MIN/1.73 M 2
GLOBULIN SER CALC-MCNC: 3 G/DL (ref 1.9–3.5)
GLUCOSE SERPL-MCNC: 100 MG/DL (ref 65–99)
HDLC SERPL-MCNC: 42 MG/DL
LDLC SERPL CALC-MCNC: 138 MG/DL
MICROALBUMIN UR-MCNC: <1.2 MG/DL
MICROALBUMIN/CREAT UR: NORMAL MG/G (ref 0–30)
POTASSIUM SERPL-SCNC: 3.9 MMOL/L (ref 3.6–5.5)
PROT SERPL-MCNC: 7.4 G/DL (ref 6–8.2)
SODIUM SERPL-SCNC: 140 MMOL/L (ref 135–145)
T4 FREE SERPL-MCNC: 1.28 NG/DL (ref 0.93–1.7)
TRIGL SERPL-MCNC: 70 MG/DL (ref 0–149)
TSH SERPL-ACNC: 2.19 UIU/ML (ref 0.35–5.5)

## 2025-02-15 PROCEDURE — 86364 TISS TRNSGLTMNASE EA IG CLAS: CPT

## 2025-02-15 PROCEDURE — 82306 VITAMIN D 25 HYDROXY: CPT

## 2025-02-15 PROCEDURE — 84443 ASSAY THYROID STIM HORMONE: CPT

## 2025-02-15 PROCEDURE — 36415 COLL VENOUS BLD VENIPUNCTURE: CPT

## 2025-02-15 PROCEDURE — 82043 UR ALBUMIN QUANTITATIVE: CPT

## 2025-02-15 PROCEDURE — 82570 ASSAY OF URINE CREATININE: CPT

## 2025-02-15 PROCEDURE — 80061 LIPID PANEL: CPT

## 2025-02-15 PROCEDURE — 84439 ASSAY OF FREE THYROXINE: CPT

## 2025-02-15 PROCEDURE — 80053 COMPREHEN METABOLIC PANEL: CPT

## 2025-02-17 LAB — TTG IGA SER IA-ACNC: 4.33 FLU (ref 0–4.99)

## 2025-03-20 ENCOUNTER — OFFICE VISIT (OUTPATIENT)
Dept: ENDOCRINOLOGY | Facility: MEDICAL CENTER | Age: 25
End: 2025-03-20
Attending: INTERNAL MEDICINE
Payer: COMMERCIAL

## 2025-03-20 VITALS
BODY MASS INDEX: 23.29 KG/M2 | HEART RATE: 77 BPM | OXYGEN SATURATION: 96 % | WEIGHT: 148.4 LBS | SYSTOLIC BLOOD PRESSURE: 118 MMHG | HEIGHT: 67 IN | DIASTOLIC BLOOD PRESSURE: 76 MMHG

## 2025-03-20 DIAGNOSIS — Z79.4 LONG-TERM INSULIN USE (HCC): ICD-10-CM

## 2025-03-20 DIAGNOSIS — E10.649 TYPE 1 DIABETES MELLITUS WITH HYPOGLYCEMIA AND WITHOUT COMA (HCC): ICD-10-CM

## 2025-03-20 DIAGNOSIS — K90.0 CELIAC DISEASE: ICD-10-CM

## 2025-03-20 DIAGNOSIS — K86.81 EXOCRINE PANCREATIC INSUFFICIENCY: ICD-10-CM

## 2025-03-20 DIAGNOSIS — E55.9 VITAMIN D DEFICIENCY: ICD-10-CM

## 2025-03-20 DIAGNOSIS — Z96.41 INSULIN PUMP IN PLACE: ICD-10-CM

## 2025-03-20 LAB
HBA1C MFR BLD: 6.7 % (ref ?–5.8)
POCT INT CON NEG: NEGATIVE
POCT INT CON POS: POSITIVE

## 2025-03-20 PROCEDURE — 3078F DIAST BP <80 MM HG: CPT | Performed by: INTERNAL MEDICINE

## 2025-03-20 PROCEDURE — 3074F SYST BP LT 130 MM HG: CPT | Performed by: INTERNAL MEDICINE

## 2025-03-20 PROCEDURE — 95249 CONT GLUC MNTR PT PROV EQP: CPT | Performed by: INTERNAL MEDICINE

## 2025-03-20 PROCEDURE — 99214 OFFICE O/P EST MOD 30 MIN: CPT | Performed by: INTERNAL MEDICINE

## 2025-03-20 PROCEDURE — 99212 OFFICE O/P EST SF 10 MIN: CPT | Performed by: INTERNAL MEDICINE

## 2025-03-20 PROCEDURE — 83036 HEMOGLOBIN GLYCOSYLATED A1C: CPT | Performed by: INTERNAL MEDICINE

## 2025-03-20 PROCEDURE — 95251 CONT GLUC MNTR ANALYSIS I&R: CPT | Performed by: INTERNAL MEDICINE

## 2025-03-20 RX ORDER — ERGOCALCIFEROL 1.25 MG/1
1 CAPSULE ORAL
Qty: 13 CAPSULE | Refills: 3 | Status: SHIPPED | OUTPATIENT
Start: 2025-03-20

## 2025-03-20 ASSESSMENT — FIBROSIS 4 INDEX: FIB4 SCORE: 0.43

## 2025-03-20 NOTE — PROGRESS NOTES
"RN-CDE Note    Subjective:   Endocrinology Clinic Progress Note  PCP: Froilan Grande M.D.    HPI:  Martinez Smith is a 24 y.o. old patient who is seen today by the Diabetes Nurse Specialist for review of his controlled type 1 diabetes on insulin pump therapy.    Recent changes in health: denies any changes.   DM:   Last A1c:   Lab Results   Component Value Date/Time    HBA1C 6.7 (A) 03/20/2025 09:44 AM      Previous A1c was 6.5 on 12/19/2024  A1C GOAL: < 7    Diabetes Medications:   Humalog via Tandem Pump.   Changes to pump: add basal section starting at 7 am with basal rate of 0.9       Exercise: states getting a little more activity, going to driving range on weekends and hitting balls.   Diet: \"healthy\" diet  in general  Patient's body mass index is 23.24 kg/m². Exercise and nutrition counseling were performed at this visit.    Glucose monitoring frequency: using Dexcom CGM         Hypoglycemic episodes: rare     Last Retinal Exam: on file and up-to-date    Lab Results   Component Value Date/Time    MALBCRT see below 02/15/2025 07:36 AM    MICROALBUR <1.2 02/15/2025 07:36 AM    MICRALB <3.0 02/18/2022 04:48 AM        ACR Albumin/Creatinine Ratio goal <30     HTN:   Blood pressure goal <130/<80   Currently Rx ACE/ARB:  no    Dyslipidemia:    Lab Results   Component Value Date/Time    CHOLSTRLTOT 194 02/15/2025 07:37 AM     (H) 02/15/2025 07:37 AM    HDL 42 02/15/2025 07:37 AM    TRIGLYCERIDE 70 02/15/2025 07:37 AM         Currently Rx Statin:  no    He  reports that he has never smoked. He has never used smokeless tobacco.    Plan:     Discussed and educated on:   - All medications, side effects and compliance (discussed carefully)  - Annual eye examinations at Ophthalmology  - HbA1C: target  - Home glucose monitoring emphasized  - Weight control and daily exercise    Recommended medication changes: see changes above.  Start Vitamin D 95027 iu per week.     "

## 2025-03-20 NOTE — PROGRESS NOTES
CHIEF COMPLAINT: Patient is here for follow up of Type 1 Diabetes Mellitus.    HPI:     Martinez Smith is a 24 y.o. male with Type 1 Diabetes Mellitus here for follow up.    Labs from 3/20/2025 show A1c is 6.7%  Labs from 12/19/2024 show A1c is 6.5%  Labs from 9/4/2024 show A1c is 6.7%  Labs from 6/5/2024 show A1c is 7.5%  Labs from 3/24/2024 show A1c is 8.0%  Labs from 9/28/2023 show a1c is 8.0%  Labs from 5/1/2023 show a1c is 7.7%  Labs from 9/9/2022 show a1c was 6.9%  Labs from 6/8/2022 show a1c was 7.3%  Labs from 3/4/2022 show a1c was 7.0%  Labs from 11/9/2021 show a1c was 7.3%  Labs from  4/12021 show a1c was 6.8%  Labs from 12/3/2020 show a1c was 7.3%  Labs from 7/31/2020 show a1c was 7.9%  Labs from 5/30/2020 show a1c was 7.7%  Labs from 12/13/2020 show a1c was 8.8%    He was previously seen by the PA  He was on a medtronic 630g pump    He has a history of chronic abdominal pain and nausea and vomiting with a negative extensive work-up for pancreatitis, gastritis and ultimately was discovered that he has pancreatic enzyme deficiency.   He also diagnosed himself with food allergies.     He also has celiac disease, he is on a gluten free diet since age of 11   He also has mastocytosis and was on Cromolyn  He has tried and failed Novolog (Hives)      He is on a Tandem X2 pump and Dexcom G6 CGM Hybrid closed loop system  His pump is infusing Humalog    Basal rate  00:00 0.625  0300  0.80  -----------------> new segment 700 am 0.9  1200 0.5  600 pm 0.600       Carb ratio  00:00  14  300 am 11  12 Noon 12     ICF  1:50     [employer]  110      He now working as an  for a local company    He reports BG are better since we changed the carb ratio last visit  A1c is down  He has a high deductible plan so pump supplies are expensive in the beginning of the year  He might need samples of Dexcom G7 in the future       Download of his CGM shows                        His lipids are at goal and he does not  need statin therapy because he is young   Latest Reference Range & Units 02/15/25 07:37   Cholesterol,Tot 100 - 199 mg/dL 194   Triglycerides 0 - 149 mg/dL 70   HDL >=40 mg/dL 42   LDL <100 mg/dL 138 (H)         He doesn't have diabetic kidney disease   Latest Reference Range & Units 02/15/25 07:36   Micro Alb Creat Ratio 0 - 30 mg/g see below   Creatinine, Urine mg/dL 137.00   Microalbumin, Urine Random mg/dL <1.2         He had an eye exam with Dr. Elena on 6/12024  He doesn't have diabetic retinopathy    His TSH is normal at 2.1 on 2/2025    His vitamin D is 14 on 2/2025        BG Diary:   Pump was download and reviewed today      Weight has been stable    Diabetes Complications   Retinopathy: No known retinopathy.  Last eye exam: 6/2024 with Dr. Elena  Neuropathy: Denies paresthesias or numbness in hands or feet. Denies any foot wounds.  Exercise: Minimal.  Diet: Fair.  Patient's medications, allergies, and social histories were reviewed and updated as appropriate.    ROS:     CONS:     No fever, no chills   EYES:     No diplopia, no blurry vision   CV:           No chest pain, no palpitations   PULM:     No SOB, no cough, no hemoptysis.   GI:            No nausea, no vomiting, no diarrhea, no constipation   ENDO:     No polyuria, no polydipsia, no heat intolerance, no cold intolerance       Past Medical History:  Problem List:  2023-07: Sinus disease  2023-01: Carpal tunnel syndrome of right wrist  2022-10: Muscle spasms of neck  2022-07: Syncope and collapse  2022-05: Influenza  2022-01: Type 1 diabetes mellitus with hypoglycemia and without coma   ([facility])  2021-08: Exocrine pancreatic insufficiency  2020-09: Chronic abdominal pain  2020-09: Vitamin D deficiency  2020-07: RUQ abdominal pain  2019-12: Diabetic gastroparesis ([facility])  2019-12: Long-term insulin use ([facility])  2019-12: Fitting and adjustment of insulin pump  2019-12: Intractable nausea and vomiting  2018-01: Hyperlipidemia  2018-01: Other mechanical  "complication of insulin pump  2017-06: Insulin pump status  2017-06: Type 1 diabetes mellitus with hyperglycemia (HCC)  2017-06: Celiac disease  2017-06: Weakness of right lower extremity  2017-06: Lipohyperplasia  2017-06: Dyslipidemia      Past Surgical History:  Past Surgical History:   Procedure Laterality Date    ENDOSCOPY, PARANASAL SINUS, WITH TOTAL ETHMOIDECTOMY, SN Bilateral 8/29/2023    Procedure: STEREOTACTIC COMPUTER-ASSISTED GUIDANCE, BILATERAL MAXILLARY ANTROSTOMY, BILATERAL TOTAL ETHMOIDECTOMY WITH SPHENOIDECTOMY, BILATERAL FRONTAL SINUSOTOMY, SEPTOPLASTY, BILATERAL INFERIOR TURBINATE REDUCTION;  Surgeon: Renee Bunn M.D.;  Location: SURGERY SAME DAY [facility];  Service: Ent    SEPTOPLASTY, NOSE, WITH TURBINOPLASTY Bilateral 8/29/2023    Procedure: SEPTOPLASTY, NOSE, WITH TURBINOPLASTY;  Surgeon: Renee Bunn M.D.;  Location: SURGERY SAME DAY [facility];  Service: Ent    HYPOSPADIAS REPAIR          Allergies:  Beef flavor; Gluten meal; and Tree nuts food allergy     Social History:  Social History     Tobacco Use    Smoking status: Never    Smokeless tobacco: Never   Vaping Use    Vaping status: Never Used   Substance Use Topics    Alcohol use: No    Drug use: No        Family History:   family history includes Arthritis in his maternal grandmother; Hyperlipidemia in his father and mother; Hypertension in his mother.      PHYSICAL EXAM:   OBJECTIVE:  Vital signs: /76   Pulse 77   Ht 1.702 m (5' 7\")   Wt 67.3 kg (148 lb 6.4 oz)   SpO2 96%   BMI 23.24 kg/m²   GENERAL: Well-developed, well-nourished in no apparent distress.   EYE:  No ocular asymmetry, PERRLA  HENT: Pink, moist mucous membranes.    NECK: No thyromegaly.   CARDIOVASCULAR: regular rate and rhythm w/ no murmurs  LUNGS: Symmetrical chest expansion with clear breath sounds  ABDOMEN: non obese abdomen with no visible organomegaly right upper quadrant pain tenderness noted with direct palpation  EXTREMITIES: No clubbing, " cyanosis, or edema.   NEUROLOGICAL: No gross focal motor abnormalities   LYMPH: No cervical adenopathy seen.   SKIN: No rashes, lesions.         Labs:  Lab Results   Component Value Date/Time    HBA1C 6.7 (A) 03/20/2025 09:44 AM        Lab Results   Component Value Date/Time    WBC 5.2 10/19/2024 07:37 AM    RBC 4.97 10/19/2024 07:37 AM    HEMOGLOBIN 15.4 10/19/2024 07:37 AM    MCV 89.5 10/19/2024 07:37 AM    MCH 31.0 10/19/2024 07:37 AM    MCHC 34.6 10/19/2024 07:37 AM    RDW 39.7 10/19/2024 07:37 AM    MPV 11.7 10/19/2024 07:37 AM       Lab Results   Component Value Date/Time    SODIUM 140 02/15/2025 07:37 AM    POTASSIUM 3.9 02/15/2025 07:37 AM    CHLORIDE 104 02/15/2025 07:37 AM    CO2 26 02/15/2025 07:37 AM    ANION 10.0 02/15/2025 07:37 AM    GLUCOSE 100 (H) 02/15/2025 07:37 AM    BUN 6 (L) 02/15/2025 07:37 AM    CREATININE 0.87 02/15/2025 07:37 AM    CREATININE 0.4 05/25/2007 04:40 AM    CALCIUM 9.6 02/15/2025 07:37 AM    ASTSGOT 27 02/15/2025 07:37 AM    ALTSGPT 27 02/15/2025 07:37 AM    TBILIRUBIN 0.8 02/15/2025 07:37 AM    ALBUMIN 4.4 02/15/2025 07:37 AM    ALBUMIN 4.80 10/22/2016 09:46 AM    TOTPROTEIN 7.4 02/15/2025 07:37 AM    TOTPROTEIN 7.70 10/22/2016 09:46 AM    GLOBULIN 3.0 02/15/2025 07:37 AM    AGRATIO 1.5 02/15/2025 07:37 AM       Lab Results   Component Value Date/Time    CHOLSTRLTOT 177 12/24/2019 0729    TRIGLYCERIDE 103 12/24/2019 0729    HDL 28 (A) 12/24/2019 0729     (H) 12/24/2019 0729       Lab Results   Component Value Date/Time    MALBCRT see below 02/15/2025 07:36 AM    MICROALBUR <1.2 02/15/2025 07:36 AM    MICRALB <3.0 02/18/2022 04:48 AM        Lab Results   Component Value Date/Time    TSHULTRASEN 2.140 09/14/2019 0727     No results found for: FREEDIR  Lab Results   Component Value Date/Time    FREET3 3.53 09/14/2019 0727           ASSESSMENT/PLAN:     1. Type 1 diabetes mellitus with hyperglycemia (HCC)  Controlled  A1c is 6.7%  Pump and CGM was downloaded and reviewed  He  has some hyperglycemia after breakfast  We adjusted the basal rate at 7 AM to 0.9 units/h  RTC in 6 mos w/ Yadi      2. Vitamin D deficiency  Uncontrolled  Start vitamin D3 5000 units daily  Continue monitoring    3. Celiac disease  Stable   Continue monitoring    4. Insulin pump status  Insulin pump in place    5. Long-term insulin use (HCC)  Patient is on long-term insulin therapy due to type 1 diabetes    6. Exocrine pancreatic insufficiency  Continue Zenpep and  continue follow-up with gastroenterology      Return in about 6 months (around 9/20/2025).      Thank you kindly for allowing me to participate in the diabetes care plan for this patient.    Gabo Benoit MD, FACE, NU      CC:   Gregory Grande M.D.

## 2025-03-21 NOTE — Clinical Note
REFERRAL APPROVAL NOTICE         Sent on March 21, 2025                   Martinez Smith  [address]  [place] [address]                   Dear Mr. Smith,    After a careful review of the medical information and benefit coverage, [facility] has processed your referral. See below for additional details.    If applicable, you must be actively enrolled with your insurance for coverage of the authorized service. If you have any questions regarding your coverage, please contact your insurance directly.    REFERRAL INFORMATION   Referral #:  [id number]  Referred-To Department    Referred-By Provider:  Blessing Benoit M.D.   Endocrinology [facility]      [address]  [address]  [place] [id number] [phone] [address]  [place] [id number] [phone]    Referral Start Date:  03/20/2025  Referral End Date:   03/20/2026           SCHEDULING  If you do not already have an appointment, please call [phone] to make an appointment.   MORE INFORMATION  As a reminder, [facility] ownership has changed, meaning this location is now owned and operated by [facility]. As such, we want to clarify that our patients should expect to receive two separate bills for the services received at [facility] - one representing the [facility] facility fees as the owner of the establishment, and the other to represent the physician's services and subsequent fees. You can speak with your insurance carrier for a pricing estimate by calling the customer service number on the back of your card and ask about charges for a hospital outpatient visit.  If you do not already have a [employer] account, sign up at: [email or web address]  You can access your medical information, make appointments, see lab results, billing information, and more.  If you have questions regarding this referral, please contact  the [facility] Referrals department at:              [phone]. Monday - Friday 7:30AM - 5:00PM.      Sincerely,  [facility]

## 2025-06-25 ENCOUNTER — NON-PROVIDER VISIT (OUTPATIENT)
Dept: ENDOCRINOLOGY | Facility: MEDICAL CENTER | Age: 25
End: 2025-06-25
Attending: INTERNAL MEDICINE
Payer: COMMERCIAL

## 2025-06-25 VITALS
HEIGHT: 66 IN | BODY MASS INDEX: 24.51 KG/M2 | DIASTOLIC BLOOD PRESSURE: 64 MMHG | SYSTOLIC BLOOD PRESSURE: 98 MMHG | HEART RATE: 103 BPM | OXYGEN SATURATION: 99 % | WEIGHT: 152.5 LBS

## 2025-06-25 DIAGNOSIS — E10.649 TYPE 1 DIABETES MELLITUS WITH HYPOGLYCEMIA AND WITHOUT COMA (HCC): Primary | ICD-10-CM

## 2025-06-25 DIAGNOSIS — E55.9 VITAMIN D DEFICIENCY: ICD-10-CM

## 2025-06-25 LAB
HBA1C MFR BLD: 6.5 % (ref ?–5.8)
POCT INT CON NEG: NEGATIVE
POCT INT CON POS: POSITIVE

## 2025-06-25 PROCEDURE — 99213 OFFICE O/P EST LOW 20 MIN: CPT | Performed by: INTERNAL MEDICINE

## 2025-06-25 PROCEDURE — 95250 CONT GLUC MNTR PHYS/QHP EQP: CPT | Performed by: INTERNAL MEDICINE

## 2025-06-25 PROCEDURE — 83036 HEMOGLOBIN GLYCOSYLATED A1C: CPT

## 2025-06-25 RX ORDER — IBUPROFEN 600 MG/1
1 TABLET ORAL PRN
Qty: 1 KIT | Refills: 1 | Status: SHIPPED | OUTPATIENT
Start: 2025-06-25

## 2025-06-25 ASSESSMENT — FIBROSIS 4 INDEX: FIB4 SCORE: 0.43

## 2025-06-25 NOTE — LETTER
Samanta Shoes  Froilan Grande M.D.  [address] [facility] [place] [address]  Fax: [phone]   Authorization for Release/Disclosure of   Protected Health Information   Name: JUAN JOSE SMITH : 2000 SSN: [id number]   Address: [facility] [address] [place] [address] Phone:    [phone] (work)   I authorize the entity listed below to release/disclose the PHI below to:   [facility]/Froilan Grande M.D. and Yadi Smith R.N.   Provider or Entity Name:  [facility]   Address   City, State, Zip   Phone:      Fax:     Reason for request: continuity of care   Information to be released:    Retinal exam.  States he has appointment on 2025   [  ] Check here and initial the line next to each item to release ALL health information INCLUDING  _____ Care and treatment for drug and / or alcohol abuse  _____ HIV testing, infection status, or AIDS  _____ Genetic Testing    DATES OF SERVICE OR TIME PERIOD TO BE DISCLOSED: _____________  I understand and acknowledge that:  * This Authorization may be revoked at any time by you in writing, except if your health information has already been used or disclosed.  * Your health information that will be used or disclosed as a result of you signing this authorization could be re-disclosed by the recipient. If this occurs, your re-disclosed health information may no longer be protected by State or Federal laws.  * You may refuse to sign this Authorization. Your refusal will not affect your ability to obtain treatment.  * This Authorization becomes effective upon signing and will  on (date) __________.      If no date is indicated, this Authorization will  one (1) year from the signature date.    Name: Juan Jose Smith  Signature:  continued medical care Date:   2025     PLEASE FAX REQUESTED RECORDS BACK TO: [phone]

## 2025-06-25 NOTE — PROGRESS NOTES
"RN-CDE Note    Subjective:   Endocrinology Clinic Progress Note  PCP: Froilan Grande M.D.    HPI:  Martinez Smith is a 24 y.o. old patient who is seen today by the Diabetes Nurse Specialist for review of his controlled type 1 diabetes on insulin pump therapy.    Recent changes in health: denies any changes  DM:   Last A1c:   Lab Results   Component Value Date/Time    HBA1C 6.5 (A) 06/25/2025 07:44 AM      Previous A1c was 6.7 on 3/20/2025  A1C GOAL: < 7    Diabetes Medications:   Humalog via Tandem Pum,p         Exercise: sporadic irregular exercise, <half hour walking weekly  Diet: \"healthy\" diet  in general  Patient's body mass index is 24.61 kg/m². Exercise and nutrition counseling were performed at this visit.    Glucose monitoring frequency: Using Dexcom with pump        Hypoglycemic episodes: no     Last Retinal Exam: has appointment scheduled for 6/26/2025  Foot Exam:  Monofilament: current  Lab Results   Component Value Date/Time    MALBCRT see below 02/15/2025 07:36 AM    MICROALBUR <1.2 02/15/2025 07:36 AM    MICRALB <3.0 02/18/2022 04:48 AM        ACR Albumin/Creatinine Ratio goal <30     HTN:   Blood pressure goal <130/<80   Currently Rx ACE/ARB:  no    Dyslipidemia:    Lab Results   Component Value Date/Time    CHOLSTRLTOT 194 02/15/2025 07:37 AM     (H) 02/15/2025 07:37 AM    HDL 42 02/15/2025 07:37 AM    TRIGLYCERIDE 70 02/15/2025 07:37 AM         Currently Rx Statin:  no    He  reports that he has never smoked. He has never used smokeless tobacco.    Plan:     Discussed and educated on:   - All medications, side effects and compliance (discussed carefully)  - Annual eye examinations at Ophthalmology  - HbA1C: target  - Home glucose monitoring emphasized  - Weight control and daily exercise    Recommended medication changes: no changes     "

## 2025-07-15 ENCOUNTER — OFFICE VISIT (OUTPATIENT)
Dept: URGENT CARE | Facility: PHYSICIAN GROUP | Age: 25
End: 2025-07-15
Payer: COMMERCIAL

## 2025-07-15 ENCOUNTER — RESULTS FOLLOW-UP (OUTPATIENT)
Dept: URGENT CARE | Facility: PHYSICIAN GROUP | Age: 25
End: 2025-07-15

## 2025-07-15 VITALS
SYSTOLIC BLOOD PRESSURE: 118 MMHG | HEIGHT: 67 IN | OXYGEN SATURATION: 94 % | WEIGHT: 150.8 LBS | TEMPERATURE: 97.5 F | DIASTOLIC BLOOD PRESSURE: 79 MMHG | HEART RATE: 111 BPM | RESPIRATION RATE: 16 BRPM | BODY MASS INDEX: 23.67 KG/M2

## 2025-07-15 DIAGNOSIS — J06.9 VIRAL URI: Primary | ICD-10-CM

## 2025-07-15 PROCEDURE — 3078F DIAST BP <80 MM HG: CPT | Performed by: PHYSICIAN ASSISTANT

## 2025-07-15 PROCEDURE — 87637 SARSCOV2&INF A&B&RSV AMP PRB: CPT | Performed by: PHYSICIAN ASSISTANT

## 2025-07-15 PROCEDURE — 3074F SYST BP LT 130 MM HG: CPT | Performed by: PHYSICIAN ASSISTANT

## 2025-07-15 PROCEDURE — 99213 OFFICE O/P EST LOW 20 MIN: CPT | Performed by: PHYSICIAN ASSISTANT

## 2025-07-15 PROCEDURE — 87651 STREP A DNA AMP PROBE: CPT | Performed by: PHYSICIAN ASSISTANT

## 2025-07-15 ASSESSMENT — ENCOUNTER SYMPTOMS
COUGH: 1
VOMITING: 1
HEADACHES: 1
EYE DISCHARGE: 0
DIARRHEA: 0
SORE THROAT: 0
MYALGIAS: 0
NAUSEA: 1
ABDOMINAL PAIN: 0
EYE REDNESS: 0
FEVER: 0

## 2025-07-15 ASSESSMENT — FIBROSIS 4 INDEX: FIB4 SCORE: 0.43

## 2025-07-15 NOTE — PROGRESS NOTES
Subjective     Martinez Smith is a 24 y.o. male who presents with GI Problem (Possible ear  infection ), Cough, and Headache (X 2 days )            URI   This is a new problem. Episode onset: x 2 days ago. The problem has been unchanged. There has been no fever. Associated symptoms include congestion, coughing (The patient reports an intermittent cough.), ear pain, headaches, nausea and vomiting (The patient reports 2 episodes of vomiting yesterday.  The patient has not had any additional episodes of vomiting as of today.). Pertinent negatives include no abdominal pain, diarrhea or sore throat. He has tried NSAIDs for the symptoms.     The patient reports no recent sick contacts.     The patient's past medical history is significant for type 1 diabetes.  The patient states his blood sugars have been within normal ranges.    PMH:  has a past medical history of Celiac disease, Celiac disease, Dental disorder, Diabetes type I (HCC), Environmental and seasonal allergies, and Psychiatric problem.  MEDS: Current Medications[1]  ALLERGIES: Allergies[2]  SURGHX: Past Surgical History[3]  SOCHX:  reports that he has never smoked. He has never used smokeless tobacco. He reports that he does not drink alcohol and does not use drugs.  FH: Family history was reviewed, no pertinent findings to report      Review of Systems   Constitutional:  Negative for fever.   HENT:  Positive for congestion and ear pain. Negative for ear discharge and sore throat.    Eyes:  Negative for discharge and redness.   Respiratory:  Positive for cough (The patient reports an intermittent cough.).    Gastrointestinal:  Positive for nausea and vomiting (The patient reports 2 episodes of vomiting yesterday.  The patient has not had any additional episodes of vomiting as of today.). Negative for abdominal pain and diarrhea.   Musculoskeletal:  Negative for myalgias.   Neurological:  Positive for headaches.              Objective     /79  "(BP Location: Left arm, Patient Position: Sitting, BP Cuff Size: Adult)   Pulse (!) 111   Temp 36.4 °C (97.5 °F) (Temporal)   Resp 16   Ht 1.702 m (5' 7\")   Wt 68.4 kg (150 lb 12.8 oz)   SpO2 94%   BMI 23.62 kg/m²      Physical Exam  Constitutional:       General: He is not in acute distress.     Appearance: Normal appearance. He is not ill-appearing.   HENT:      Head: Normocephalic and atraumatic.      Right Ear: Tympanic membrane, ear canal and external ear normal.      Left Ear: Tympanic membrane, ear canal and external ear normal.      Nose: Nose normal.      Mouth/Throat:      Mouth: Mucous membranes are moist.      Pharynx: Oropharynx is clear. Uvula midline. Posterior oropharyngeal erythema (mild) present.      Tonsils: No tonsillar exudate (no overlying exudates; a single circular exudate was noted to the right tonsil consistent with a tonsil stone).   Eyes:      Extraocular Movements: Extraocular movements intact.      Conjunctiva/sclera: Conjunctivae normal.   Cardiovascular:      Rate and Rhythm: Regular rhythm. Tachycardia present.      Heart sounds: Normal heart sounds.   Pulmonary:      Effort: Pulmonary effort is normal. No respiratory distress.      Breath sounds: Normal breath sounds. No wheezing.   Musculoskeletal:         General: Normal range of motion.      Cervical back: Normal range of motion and neck supple.   Skin:     General: Skin is warm and dry.   Neurological:      Mental Status: He is alert and oriented to person, place, and time.                  Progress:  Results for orders placed or performed in visit on 07/15/25   POCT CoV-2, Flu A/B, RSV by PCR    Collection Time: 07/15/25  3:07 PM   Result Value Ref Range    SARS-CoV-2 by PCR Negative Negative, Invalid    Influenza virus A RNA Negative Negative, Invalid    Influenza virus B, PCR Negative Negative, Invalid    RSV, PCR Negative Negative, Invalid   POCT GROUP A STREP, PCR    Collection Time: 07/15/25  3:07 PM   Result Value " Ref Range    POC Group A Strep, PCR Not Detected Not Detected, Invalid                     Assessment & Plan  Viral URI    Orders:    POCT CoV-2, Flu A/B, RSV by PCR    POCT GROUP A STREP, PCR         The patient's presenting symptoms and physical exam findings are consistent with a viral URI.  On physical exam, the patient had mild erythema to the posterior pharynx without tonsillar hypertrophy or overlying exudates.  A single circular exudate was noted to the right tonsil consistent with a tonsil stone.  The remainder the patient's physical exam today in clinic was normal, with the exception of an elevated heart rate.  The patient is nontoxic and appears in no acute distress.  The patient is nontoxic and appears in no acute distress.  The patient's vital signs are stable and within normal limits, with the exception of his elevated heart rate as previously mentioned.  He is afebrile today in clinic.  The patient's POCT Cepheid group A strep PCR testing today in clinic was negative.  The patient's POCT Cepheid viral testing was negative.  Discussed likely viral etiology with the patient.  Advised the patient to monitor for worsening signs and or symptoms.  Instructed the patient to closely monitor his blood sugars.  Informed the patient he may take his previously prescribed anti-nausea medicines to help with his current symptoms.  Recommend OTC medications and supportive care for symptomatic management.  Recommend patient follow-up with primary care as needed.  Discussed return precautions with the patient, and he verbalized understanding.    Differential diagnoses, supportive care, and indications for immediate follow-up discussed with patient.   Instructed to return to clinic or nearest emergency department for any change in condition, further concerns, or worsening of symptoms.    OTC Tylenol or Motrin for fever/discomfort.  OTC cough/cold medication for symptomatic relief  OTC Supportive Care for Congestion -  saline nasal spray or neti pot  Drink plenty of fluids  Follow-up with PCP  Return to clinic or go to the ED if symptoms worsen or fail to improve, or if the patient should develop worsening/increasing cough, congestion, ear pain, sore throat, shortness of breath, wheezing, chest pain, fever/chills, and/or any concerning symptoms.    Discussed plan with the patient, and he agrees to the above.    I personally reviewed prior external notes and test results pertinent to today's visit.  I have independently reviewed and interpreted all diagnostics ordered during this urgent care visit.     Please note that this dictation was created using voice recognition software. I have made every reasonable attempt to correct obvious errors, but I expect that there may be errors of grammar and possibly content that I did not discover before finalizing the note.     This note was electronically signed by Sandra Jacobson PA-C             [1]   Current Outpatient Medications:     Glucagon, rDNA, (GLUCAGON EMERGENCY) 1 MG Kit, Inject 1 mg as directed as needed (severe hypoglycemia). For severe hypoglycemia, Disp: 1 Kit, Rfl: 1    Vitamin D, Ergocalciferol, 86665 units Cap, Take 1 Capful by mouth every 7 days., Disp: 13 Capsule, Rfl: 3    LANTUS SOLOSTAR 100 UNIT/ML Solution Pen-injector injection, INJECT 45 UNITS UNDER THE SKIN 1 TIME A DAY AS NEEDED (OFF OF INSULIN PUMP)., Disp: 15 mL, Rfl: 1    insulin lispro (HUMALOG) 100 UNIT/ML INJ, Using up to 100 units as directed in insulin pump, Disp: 90 mL, Rfl: 3    ZENPEP 58028-98321 units Cap DR Particles, TAKE 2 CAPS WITH EACH MEAL AND 1 CAP WITH A SNACK, MAX 8 CAPS A DAY, Disp: , Rfl:     EPINEPHrine (EPIPEN) 0.3 MG/0.3ML Solution Auto-injector solution for injection, , Disp: , Rfl:     Lancets, Lancets order: Lancets for Dada Contour Next meter. Sig: use 5 times daily, Disp: 150 Each, Rfl: 11    amitriptyline (ELAVIL) 50 MG Tab, Take 25 mg by mouth at bedtime., Disp: , Rfl:      diphenhydrAMINE (BENADRYL) 25 MG Tab, Take 25 mg by mouth every 6 hours as needed for Sleep., Disp: , Rfl:     promethazine (PHENERGAN) 25 MG Tab, Take 1 Tablet by mouth every 6 hours as needed for Nausea/Vomiting., Disp: 30 Tablet, Rfl: 0    tizanidine (ZANAFLEX) 4 MG Tab, Take 4 mg by mouth every 6 hours as needed., Disp: , Rfl:     montelukast (SINGULAIR) 10 MG Tab, Take 10 mg by mouth every day., Disp: , Rfl:     ondansetron (ZOFRAN ODT) 4 MG TABLET DISPERSIBLE, Take 1 Tablet by mouth every 6 hours as needed for Nausea., Disp: 20 Tablet, Rfl: 0    fexofenadine (ALLEGRA) 60 MG Tab, Take 60 mg by mouth 2 times a day., Disp: , Rfl:     insulin infusion pump Device, Inject  under the skin continuous. Patient's own SQ insulin pump  Type of Insulin: Humalog Last change of tubin22 - Change tubing and site every 72 hours  Dosing: Basal rate: 0000 - 0300 = .625 units 0300 - 1200 = .6 units 1200 - 0000= .575 units Bolus ratio:  1 unit : 14 g carbohydrate    Sensitivity ratio:  1 units for every 50 over 130-150 mg/dL  Disconnect pump if patient becomes hypoglycemic and altered., Disp: , Rfl:     simethicone (MYLICON) 125 MG chewable tablet, Chew 1 Tablet every 6 hours as needed for Flatulence. (Patient not taking: Reported on 7/15/2025), Disp: 120 Tablet, Rfl: 0    Blood Glucose Test Strips, Test strips order: Test strips for Striiv Contour Next meter. Sig: use 5 times daily for insulin adjustment, Disp: 150 Strip, Rfl: 11  [2]   Allergies  Allergen Reactions    Dairy Food Allergy Nausea and Shortness of Breath    Eggs Unspecified     Nausea, vomiting, chest tightness      Nausea, vomiting, chest tightness, SOB    Mustard Seed Unspecified     Nausea and vomiting, SOB    Peanut (Diagnostic) Unspecified     SOB,Nausea, vomiting, and chest tightness    Shellfish Allergy Unspecified     SOB, Nausea, vomiting, chest tightness    Tree Nuts Food Allergy Anaphylaxis, Shortness of Breath, Itching, Nausea and Cough      Cashews and pistachio and coconut     Compazine Unspecified     Anxiety     Gluten Meal Unspecified     Patient reports has celiac    Reglan [Metoclopramide] Anxiety     Makes anxious    Potato      White potato causes nausea vomiting, SOB   [3]   Past Surgical History:  Procedure Laterality Date    ENDOSCOPY, PARANASAL SINUS, WITH TOTAL ETHMOIDECTOMY, SN Bilateral 8/29/2023    Procedure: STEREOTACTIC COMPUTER-ASSISTED GUIDANCE, BILATERAL MAXILLARY ANTROSTOMY, BILATERAL TOTAL ETHMOIDECTOMY WITH SPHENOIDECTOMY, BILATERAL FRONTAL SINUSOTOMY, SEPTOPLASTY, BILATERAL INFERIOR TURBINATE REDUCTION;  Surgeon: Renee Bunn M.D.;  Location: SURGERY SAME DAY [facility];  Service: Ent    SEPTOPLASTY, NOSE, WITH TURBINOPLASTY Bilateral 8/29/2023    Procedure: SEPTOPLASTY, NOSE, WITH TURBINOPLASTY;  Surgeon: Renee Bunn M.D.;  Location: SURGERY SAME DAY [facility];  Service: Ent    HYPOSPADIAS REPAIR

## 2025-08-02 ENCOUNTER — APPOINTMENT (OUTPATIENT)
Dept: LAB | Facility: MEDICAL CENTER | Age: 25
End: 2025-08-02
Payer: COMMERCIAL

## 2025-08-18 ENCOUNTER — OFFICE VISIT (OUTPATIENT)
Dept: URGENT CARE | Facility: PHYSICIAN GROUP | Age: 25
End: 2025-08-18
Payer: COMMERCIAL

## 2025-08-18 VITALS
WEIGHT: 156.4 LBS | TEMPERATURE: 98.7 F | SYSTOLIC BLOOD PRESSURE: 100 MMHG | OXYGEN SATURATION: 96 % | HEIGHT: 67 IN | RESPIRATION RATE: 18 BRPM | BODY MASS INDEX: 24.55 KG/M2 | HEART RATE: 93 BPM | DIASTOLIC BLOOD PRESSURE: 60 MMHG

## 2025-08-18 DIAGNOSIS — J32.9 RHINOSINUSITIS: Primary | ICD-10-CM

## 2025-08-18 PROCEDURE — 3074F SYST BP LT 130 MM HG: CPT | Performed by: FAMILY MEDICINE

## 2025-08-18 PROCEDURE — 99213 OFFICE O/P EST LOW 20 MIN: CPT | Performed by: FAMILY MEDICINE

## 2025-08-18 PROCEDURE — 3078F DIAST BP <80 MM HG: CPT | Performed by: FAMILY MEDICINE

## 2025-08-18 ASSESSMENT — FIBROSIS 4 INDEX: FIB4 SCORE: 0.43

## 2025-08-19 ASSESSMENT — ENCOUNTER SYMPTOMS
VOMITING: 0
NAUSEA: 0
PHOTOPHOBIA: 0
FEVER: 0
MYALGIAS: 0
EYE DISCHARGE: 0
WEIGHT LOSS: 0
EYE REDNESS: 0

## (undated) DEVICE — CANISTER SUCTION 3000ML MECHANICAL FILTER AUTO SHUTOFF MEDI-VAC NONSTERILE LF DISP  (40EA/CA)

## (undated) DEVICE — MASK OXYGEN VNYL ADLT MED CONC WITH 7 FOOT TUBING  - (50EA/CA)

## (undated) DEVICE — SLEEVE VASO CALF MED - (10PR/CA)

## (undated) DEVICE — GOWN SURGEONS LARGE - (32/CA)

## (undated) DEVICE — GLOVE SZ 6.5 BIOGEL PI MICRO - PF LF (50PR/BX)

## (undated) DEVICE — SENSOR OXIMETER ADULT SPO2 RD SET (20EA/BX)

## (undated) DEVICE — TUBE CONNECTING SUCTION - CLEAR PLASTIC STERILE 72 IN (50EA/CA)

## (undated) DEVICE — TOWEL STOP TIMEOUT SAFETY FLAG (40EA/CA)

## (undated) DEVICE — CANISTER SUCTION RIGID RED 1500CC (40EA/CA)

## (undated) DEVICE — KIT  I.V. START (100EA/CA)

## (undated) DEVICE — TOWELS CLOTH SURGICAL - (4/PK 20PK/CA)

## (undated) DEVICE — WATER IRRIGATION STERILE 1000ML (12EA/CA)

## (undated) DEVICE — LACTATED RINGERS INJ 1000 ML - (14EA/CA 60CA/PF)

## (undated) DEVICE — CANNULA O2 COMFORT SOFT EAR ADULT 7 FT TUBING (50/CA)

## (undated) DEVICE — CONTAINER SPECIMEN BAG OR - STERILE 4 OZ W/LID (100EA/CA)

## (undated) DEVICE — SHEATH SHARP SITE 30 DEGREE ENDOSCRUB II (5EA/PK)

## (undated) DEVICE — BLADE INFERIOR TURBINATE 2.9MM (5EA/PK)

## (undated) DEVICE — GOWN WARMING STANDARD FLEX - (30/CA)

## (undated) DEVICE — SOD. CHL., INJ., 0.9%, 100 ML

## (undated) DEVICE — TRACKER ENT INSTRUMENT

## (undated) DEVICE — TRACKER ENT PATIENT

## (undated) DEVICE — PACK ENT OR - (2EA/CA)

## (undated) DEVICE — BOVIE FOOT CONTROL SUCTION - 6IN 10FR (25EA/CA)

## (undated) DEVICE — SUCTION INSTRUMENT YANKAUER BULBOUS TIP W/O VENT (50EA/CA)

## (undated) DEVICE — SOD. CHL. INJ. 0.9% 250 ML - (36/CA 50CA/PF)

## (undated) DEVICE — BLADE STRAIGHT SINUS (5EA/PK)

## (undated) DEVICE — SUTURE 5-0 PLAIN GUT PC-1 - (12/BX)

## (undated) DEVICE — PATTIES SURG X-RAYCOTTONOID - 1/2 X 3 IN (200/CA)

## (undated) DEVICE — ANTI-FOG SOLUTION - 60BTL/CA

## (undated) DEVICE — SUTURE GENERAL

## (undated) DEVICE — SODIUM CHL IRRIGATION 0.9% 1000ML (12EA/CA)

## (undated) DEVICE — SUTURE 4-0 PLAIN GUT SC-1 ETHICON (36PK/BX)

## (undated) DEVICE — TUBING ENDOSCRUB II (5EA/PK)

## (undated) DEVICE — TUBING CLEARLINK DUO-VENT - C-FLO (48EA/CA)

## (undated) DEVICE — SET LEADWIRE 5 LEAD BEDSIDE DISPOSABLE ECG (1SET OF 5/EA)